# Patient Record
Sex: MALE | Race: WHITE | NOT HISPANIC OR LATINO | Employment: FULL TIME | URBAN - METROPOLITAN AREA
[De-identification: names, ages, dates, MRNs, and addresses within clinical notes are randomized per-mention and may not be internally consistent; named-entity substitution may affect disease eponyms.]

---

## 2017-01-20 ENCOUNTER — TRANSFERRED RECORDS (OUTPATIENT)
Dept: HEALTH INFORMATION MANAGEMENT | Facility: CLINIC | Age: 27
End: 2017-01-20

## 2017-02-27 ENCOUNTER — TRANSFERRED RECORDS (OUTPATIENT)
Dept: HEALTH INFORMATION MANAGEMENT | Facility: CLINIC | Age: 27
End: 2017-02-27

## 2017-03-05 ENCOUNTER — TRANSFERRED RECORDS (OUTPATIENT)
Dept: HEALTH INFORMATION MANAGEMENT | Facility: CLINIC | Age: 27
End: 2017-03-05

## 2017-03-05 ENCOUNTER — MEDICAL CORRESPONDENCE (OUTPATIENT)
Dept: HEALTH INFORMATION MANAGEMENT | Facility: CLINIC | Age: 27
End: 2017-03-05

## 2017-03-06 ENCOUNTER — TELEPHONE (OUTPATIENT)
Dept: TRANSPLANT | Facility: CLINIC | Age: 27
End: 2017-03-06

## 2017-03-06 ENCOUNTER — TRANSFERRED RECORDS (OUTPATIENT)
Dept: HEALTH INFORMATION MANAGEMENT | Facility: CLINIC | Age: 27
End: 2017-03-06

## 2017-03-06 VITALS — WEIGHT: 200 LBS | BODY MASS INDEX: 26.51 KG/M2 | HEIGHT: 73 IN

## 2017-03-06 NOTE — TELEPHONE ENCOUNTER
Received referral from [facility]  Referring- SHASTA Catalan,CNP  PCP- Dr Jeffery Azul  Coordinator- Jean Sanabria  Ascites due to Alcoholic Cirrhosis with Ascites  LM on AM to return call for intake questions

## 2017-03-06 NOTE — TELEPHONE ENCOUNTER
Intake Progress Note    Organ: Liver    On Dialysis:   No    Dialysis Schedule:  Days/shift  or N/A    Reported Medical History:  Jaundiced, paracentesis x 1  Inpt at [facility]    Records:  I will request.     Clinic RN Appt (Only Adult Kidney, Liver and Pancreas Referrals): Y     Preferred Evaluation Start Date:  ASAP     Online Forms    Best time patient can be reached: anytime- Leave message and will return call    Type of packet sent: Liver      Misc. Notes: May speak with emergency contacts listed in SWAPNIL [facility]    Verbal Consent: Y     Email Consent: Y or N    If no PCP or referring information the time of call informed pt to call back with information: Y or N    Informed patient to call if doesn't receive packet and or phone call from coordinator within given time Y    Insurance- [place]- [phone]

## 2017-03-06 NOTE — LETTER
LIVER TRANSPLANT  EVALUATION SCHEDULE    Patient:   Joe Sosa  MR#:    [id number]  Coordinator:  Jean Sanabria                                 [phone]  :     Andreina NORRIS     [phone]  [facility]    [facility]  Date(s):    March 27, 2017 & March 28, 2017    This is your evaluation schedule, please follow dates and times.  You will   receive reminder phone calls for other tests, but please follow this schedule  only!  If you have any questions about dates and times, please call us on  number listed above.  Thank you, Transplant Services     *NO FOOD or DRINK AFTER 10:30 PM*  (You may only have small amounts of water)    Day/Date:    Monday, March 27, 2017  Time Location Activity   6:30 a.m. Imaging and Lab testing  ([address] [place] [address] and [facility] ) Chest X-ray    6:45 a.m. Imaging and Lab testing  ([address] [place] [address] and [facility] ) Liver Ultrasound with dopplers=NO FOOD or DRINK 8 HOURS BEFORE TEST!!!   7:30 a.m. Imaging and Lab testing  ([address] [place] [address] and [facility] ) Blood tests    7:45 a.m. Imaging and Lab testing  ([address] [place] [address] and [facility] ) Blood tests (2nd ABO)   8:00 a.m. Diagnostic [facility] Treatment Center  ([address] [place] [address] and [facility]) Echocardiogram   9:30 a.m. [facility]  ([address] [place] [address] and [facility]) EKG   10:00-11:30a.m. Transplant Services  ([address] [place] [address] and [facility]) Pre-transplant class   With Class Transplant Services  ([address] [place] [address] and [facility]) Meet with Jean Sanabria,  Transplant Coordinator             Day/Date:    Tuesday, March 28, 2017  Time Location Activity   8:30 a.m. Transplant Services  ([address] [place] [address] and [facility]) Review evaluation process & daily schedule   9:00 a.m. Medicine Specialties  ([address] [place] [address] and [facility]) Appointment with Dr. Templeton,  Hepatologist   10:00 a.m. Transplant Services  ([address] [place] [address] and [facility]) Appointment with   Adrien,  Transplant Surgeon   10:30 a.m. Transplant Services  (3rd floor [facility]) Appointment with Shirley Machado  Registered Dietitian   11:00 a.m. Transplant Services  (3rd floor [facility]) Appointment with Ngoc Echeverria             Day/Date:    Every Thursday *OPTIONAL*  Time Location Activity   12:00p.m. - 1:30p.m. Liver Transplant Support Group  Hospital Station 7B  Room 7-120 Every Thursday *OPTIONAL*     *IF ON LINE CHECK IN IS NOT DONE, YOU WILL NEED TO CHECK IN 15 MINUTES EARLIER THEN THE FIRST SCHEDULED APPOINTMENT*

## 2017-03-06 NOTE — LETTER
2017    [facility]  [address] [place] [address]  Phone: [phone]  Fax: [phone]     Re: Joe Sosa  : 1990     Dear  Marline Jessica, SHASTA,CNP,    Thank you for referring your patient, Joe Sosa. We are writing to inform you that Mr. Sosa has completed the initial referral process with us to be considered for a liver transplant at the [facility].    Mr. Sosa assigned transplant coordinator is Jean Sanabria.  If you should have any questions or concerns, please feel free to call us at [phone] or [phone].  Our regular office hours are Monday - Friday from 8:30am to 5:00pm.    Sincerely,        Liver Transplant Team  [facility]

## 2017-03-07 ENCOUNTER — APPOINTMENT (OUTPATIENT)
Dept: TRANSPLANT | Facility: CLINIC | Age: 27
End: 2017-03-07
Attending: INTERNAL MEDICINE
Payer: MEDICAID

## 2017-03-09 ENCOUNTER — MEDICAL CORRESPONDENCE (OUTPATIENT)
Dept: TRANSPLANT | Facility: CLINIC | Age: 27
End: 2017-03-09

## 2017-03-09 DIAGNOSIS — K70.31 ALCOHOLIC CIRRHOSIS OF LIVER WITH ASCITES (H): Primary | ICD-10-CM

## 2017-03-09 DIAGNOSIS — K76.6 PORTAL HYPERTENSION (H): ICD-10-CM

## 2017-03-09 PROBLEM — E88.01 ALPHA-1-ANTITRYPSIN DEFICIENCY (H): Status: ACTIVE | Noted: 2017-03-09

## 2017-03-09 NOTE — TELEPHONE ENCOUNTER
27 y/o with alcoholic cirrhosis and possible alpha-1 antitrypsin (need to get records) who presented [facility] in [facility] on 2016 with decompensation. Patient stopped drinking a couple days before admit, went right into an IP treatment at [address] [place] [address] in [place], [place] and currently doing OP at [facility] in [place]. 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 He has had a couple of admits for decompensation since 11/2016 but appears to be getting his meds inline and doing well currently. He lives with a roommate and his mom helps out as well. Mentioned one, if not both, should attend his evaluation with him. He does not smoke, but has socially smoked in the past. It has been a while since he has been to the dentist.     MELD - 26    Ascites - yes but currently controlled  Taps - yes but not recently  HE - yes but controlled  GIB - no  EGD - 11/8/16 no bands  Colonoscopy - not needed    See snapshot for medical and Sx history    Plan: full evaluation with Dr. Templeton on 3/28

## 2017-03-13 NOTE — TELEPHONE ENCOUNTER
This patient has agreed to come for Liver Transplant Evaluation Appointments on 3/27/17 & 3/28/17. All appointments have been scheduled and a copy of the Liver Transplant Evaluation Schedule has been sent to the patient via [employer].

## 2017-03-14 ENCOUNTER — TRANSFERRED RECORDS (OUTPATIENT)
Dept: HEALTH INFORMATION MANAGEMENT | Facility: CLINIC | Age: 27
End: 2017-03-14

## 2017-03-27 ENCOUNTER — RADIANT APPOINTMENT (OUTPATIENT)
Dept: CARDIOLOGY | Facility: CLINIC | Age: 27
End: 2017-03-27
Attending: INTERNAL MEDICINE

## 2017-03-27 ENCOUNTER — APPOINTMENT (OUTPATIENT)
Dept: TRANSPLANT | Facility: CLINIC | Age: 27
End: 2017-03-27
Attending: INTERNAL MEDICINE
Payer: MEDICAID

## 2017-03-27 ENCOUNTER — TELEPHONE (OUTPATIENT)
Dept: GASTROENTEROLOGY | Facility: CLINIC | Age: 27
End: 2017-03-27

## 2017-03-27 DIAGNOSIS — K76.6 PORTAL HYPERTENSION (H): ICD-10-CM

## 2017-03-27 DIAGNOSIS — K70.31 ALCOHOLIC CIRRHOSIS OF LIVER WITH ASCITES (H): ICD-10-CM

## 2017-03-27 DIAGNOSIS — K70.31 ALCOHOLIC CIRRHOSIS OF LIVER WITH ASCITES (H): Primary | ICD-10-CM

## 2017-03-27 LAB
ABO + RH BLD: NORMAL
AFP SERPL-MCNC: NORMAL UG/L (ref 0–8)
ALBUMIN SERPL-MCNC: 2.5 G/DL (ref 3.4–5)
ALBUMIN UR-MCNC: NEGATIVE MG/DL
ALP SERPL-CCNC: 217 U/L (ref 40–150)
ALT SERPL W P-5'-P-CCNC: 58 U/L (ref 0–70)
ANION GAP SERPL CALCULATED.3IONS-SCNC: 7 MMOL/L (ref 3–14)
APPEARANCE UR: CLEAR
AST SERPL W P-5'-P-CCNC: 89 U/L (ref 0–45)
BILIRUB DIRECT SERPL-MCNC: 4.1 MG/DL (ref 0–0.2)
BILIRUB SERPL-MCNC: 7.7 MG/DL (ref 0.2–1.3)
BILIRUB UR QL STRIP: NEGATIVE
BLD GP AB SCN SERPL QL: NORMAL
BLD GP AB SCN TITR SERPL: NORMAL {TITER}
BLOOD BANK CMNT PATIENT-IMP: NORMAL
BUN SERPL-MCNC: 12 MG/DL (ref 7–30)
CALCIUM SERPL-MCNC: 8.1 MG/DL (ref 8.5–10.1)
CHLORIDE SERPL-SCNC: 100 MMOL/L (ref 94–109)
CHOLEST SERPL-MCNC: 86 MG/DL
CMV IGG SERPL QL IA: 0.3 AI (ref 0–0.8)
CO2 SERPL-SCNC: 33 MMOL/L (ref 20–32)
COLOR UR AUTO: ABNORMAL
CREAT SERPL-MCNC: 0.73 MG/DL (ref 0.66–1.25)
CREAT UR-MCNC: 159 MG/DL
DEPRECATED CALCIDIOL+CALCIFEROL SERPL-MC: ABNORMAL UG/L (ref 20–75)
EBV VCA IGG SER QL IA: ABNORMAL AI (ref 0–0.8)
ERYTHROCYTE [DISTWIDTH] IN BLOOD BY AUTOMATED COUNT: 13.9 % (ref 10–15)
GFR SERPL CREATININE-BSD FRML MDRD: ABNORMAL ML/MIN/1.7M2
GLUCOSE SERPL-MCNC: 104 MG/DL (ref 70–99)
GLUCOSE UR STRIP-MCNC: NEGATIVE MG/DL
HAV IGG SER QL IA: ABNORMAL
HBV CORE AB SERPL QL IA: ABNORMAL
HBV SURFACE AB SERPL IA-ACNC: 42.53 M[IU]/ML
HBV SURFACE AG SERPL QL IA: NONREACTIVE
HCT VFR BLD AUTO: 29.8 % (ref 40–53)
HDLC SERPL-MCNC: 34 MG/DL
HGB BLD-MCNC: 10.3 G/DL (ref 13.3–17.7)
HGB UR QL STRIP: NEGATIVE
HIV 1+2 AB+HIV1 P24 AG SERPL QL IA: NORMAL
HYALINE CASTS #/AREA URNS LPF: 9 /LPF (ref 0–2)
INR PPP: 2.6 (ref 0.86–1.14)
IRON SATN MFR SERPL: 65 % (ref 15–46)
IRON SERPL-MCNC: 138 UG/DL (ref 35–180)
KETONES UR STRIP-MCNC: NEGATIVE MG/DL
LDLC SERPL CALC-MCNC: 42 MG/DL
LEUKOCYTE ESTERASE UR QL STRIP: NEGATIVE
MCH RBC QN AUTO: 38.4 PG (ref 26.5–33)
MCHC RBC AUTO-ENTMCNC: 34.6 G/DL (ref 31.5–36.5)
MCV RBC AUTO: 111 FL (ref 78–100)
MUCOUS THREADS #/AREA URNS LPF: PRESENT /LPF
NITRATE UR QL: NEGATIVE
NONHDLC SERPL-MCNC: 52 MG/DL
PH UR STRIP: 6 PH (ref 5–7)
PHOSPHATE SERPL-MCNC: 4.5 MG/DL (ref 2.5–4.5)
PLATELET # BLD AUTO: 48 10E9/L (ref 150–450)
POTASSIUM SERPL-SCNC: 4 MMOL/L (ref 3.4–5.3)
PROT SERPL-MCNC: 6.3 G/DL (ref 6.8–8.8)
PROT UR-MCNC: <0.05 G/L
PROT/CREAT 24H UR: NORMAL G/G CR (ref 0–0.2)
PSA SERPL-ACNC: 0.02 UG/L (ref 0–4)
RBC # BLD AUTO: 2.68 10E12/L (ref 4.4–5.9)
RBC #/AREA URNS AUTO: 1 /HPF (ref 0–2)
SODIUM SERPL-SCNC: 140 MMOL/L (ref 133–144)
SP GR UR STRIP: 1.02 (ref 1–1.03)
SPECIMEN EXP DATE BLD: NORMAL
SPECIMEN EXP DATE BLD: NORMAL
T PALLIDUM IGG+IGM SER QL: NEGATIVE
TIBC SERPL-MCNC: 214 UG/DL (ref 240–430)
TRANSFERRIN SERPL-MCNC: 184 MG/DL (ref 210–360)
TRIGL SERPL-MCNC: 52 MG/DL
TSH SERPL DL<=0.005 MIU/L-ACNC: 2.46 MU/L (ref 0.4–4)
URN SPEC COLLECT METH UR: ABNORMAL
UROBILINOGEN UR STRIP-MCNC: 0 MG/DL (ref 0–2)
WBC # BLD AUTO: 4.6 10E9/L (ref 4–11)
WBC #/AREA URNS AUTO: 2 /HPF (ref 0–2)

## 2017-03-27 PROCEDURE — 82105 ALPHA-FETOPROTEIN SERUM: CPT | Performed by: INTERNAL MEDICINE

## 2017-03-27 PROCEDURE — 80076 HEPATIC FUNCTION PANEL: CPT | Performed by: INTERNAL MEDICINE

## 2017-03-27 PROCEDURE — 83540 ASSAY OF IRON: CPT | Performed by: INTERNAL MEDICINE

## 2017-03-27 PROCEDURE — 86480 TB TEST CELL IMMUN MEASURE: CPT | Performed by: INTERNAL MEDICINE

## 2017-03-27 PROCEDURE — 86704 HEP B CORE ANTIBODY TOTAL: CPT | Performed by: INTERNAL MEDICINE

## 2017-03-27 PROCEDURE — 85027 COMPLETE CBC AUTOMATED: CPT | Performed by: INTERNAL MEDICINE

## 2017-03-27 PROCEDURE — 86665 EPSTEIN-BARR CAPSID VCA: CPT | Performed by: INTERNAL MEDICINE

## 2017-03-27 PROCEDURE — 87340 HEPATITIS B SURFACE AG IA: CPT | Performed by: INTERNAL MEDICINE

## 2017-03-27 PROCEDURE — 86886 COOMBS TEST INDIRECT TITER: CPT | Performed by: INTERNAL MEDICINE

## 2017-03-27 PROCEDURE — 80061 LIPID PANEL: CPT | Performed by: INTERNAL MEDICINE

## 2017-03-27 PROCEDURE — 86850 RBC ANTIBODY SCREEN: CPT | Performed by: INTERNAL MEDICINE

## 2017-03-27 PROCEDURE — 80321 ALCOHOLS BIOMARKERS 1OR 2: CPT | Performed by: INTERNAL MEDICINE

## 2017-03-27 PROCEDURE — 84466 ASSAY OF TRANSFERRIN: CPT | Performed by: INTERNAL MEDICINE

## 2017-03-27 PROCEDURE — 86901 BLOOD TYPING SEROLOGIC RH(D): CPT | Performed by: INTERNAL MEDICINE

## 2017-03-27 PROCEDURE — 40000866 ZZHCL STATISTIC HIV 1/2 ANTIGEN/ANTIBODY PRETRANSPLANT ONLY: Performed by: INTERNAL MEDICINE

## 2017-03-27 PROCEDURE — 84156 ASSAY OF PROTEIN URINE: CPT | Mod: XU | Performed by: INTERNAL MEDICINE

## 2017-03-27 PROCEDURE — 86780 TREPONEMA PALLIDUM: CPT | Performed by: INTERNAL MEDICINE

## 2017-03-27 PROCEDURE — 86706 HEP B SURFACE ANTIBODY: CPT | Performed by: INTERNAL MEDICINE

## 2017-03-27 PROCEDURE — 85610 PROTHROMBIN TIME: CPT | Performed by: INTERNAL MEDICINE

## 2017-03-27 PROCEDURE — 84100 ASSAY OF PHOSPHORUS: CPT | Performed by: INTERNAL MEDICINE

## 2017-03-27 PROCEDURE — 86900 BLOOD TYPING SEROLOGIC ABO: CPT | Performed by: INTERNAL MEDICINE

## 2017-03-27 PROCEDURE — 86644 CMV ANTIBODY: CPT | Performed by: INTERNAL MEDICINE

## 2017-03-27 PROCEDURE — 80048 BASIC METABOLIC PNL TOTAL CA: CPT | Performed by: INTERNAL MEDICINE

## 2017-03-27 PROCEDURE — 84443 ASSAY THYROID STIM HORMONE: CPT | Performed by: INTERNAL MEDICINE

## 2017-03-27 PROCEDURE — 40000114 ZZH STATISTIC NO CHARGE CLINIC VISIT: Mod: ZF

## 2017-03-27 PROCEDURE — 86708 HEPATITIS A ANTIBODY: CPT | Performed by: INTERNAL MEDICINE

## 2017-03-27 PROCEDURE — 83550 IRON BINDING TEST: CPT | Performed by: INTERNAL MEDICINE

## 2017-03-27 PROCEDURE — G0103 PSA SCREENING: HCPCS | Performed by: INTERNAL MEDICINE

## 2017-03-27 PROCEDURE — 81001 URINALYSIS AUTO W/SCOPE: CPT | Performed by: INTERNAL MEDICINE

## 2017-03-27 PROCEDURE — 82306 VITAMIN D 25 HYDROXY: CPT | Performed by: INTERNAL MEDICINE

## 2017-03-27 PROCEDURE — 36415 COLL VENOUS BLD VENIPUNCTURE: CPT | Performed by: INTERNAL MEDICINE

## 2017-03-27 NOTE — MR AVS SNAPSHOT
After Visit Summary   3/27/2017    Joe Sosa    MRN: [id number]           Patient Information     Date Of Birth          1990        Visit Information        Provider Department      3/27/2017 10:00 AM UC TRANSPLANT CLASS [facility] Solid Organ Transplant        Today's Diagnoses     Alcoholic cirrhosis of liver with ascites (H)    -  1       Follow-ups after your visit        Your next 10 appointments already scheduled     Mar 28, 2017  8:30 AM CDT   (Arrive by 8:15 AM)   New Patient Visit with UC TXC EVALUATION   [facility] Solid Organ Transplant ([facility])    [address] [place] [id number] [phone]            Mar 28, 2017  9:00 AM CDT   (Arrive by 8:45 AM)   Pre-Transplant Liver Evaluation with Andreina Templeton MD   [facility] Hepatology ([facility])    [address] [place] [id number] [phone]            Mar 28, 2017 10:00 AM CDT   (Arrive by 9:45 AM)   Liver Transplant Pre-Op with Gian Jurado MD   [facility] Solid Organ Transplant ([facility])    [address] [place] [id number] [phone]            Mar 28, 2017 10:30 AM CDT   NUTRITION VISIT with Uma Machado RD   [facility] Solid Organ Transplant ([facility])    [address] [place] [id number] [phone]            Mar 28, 2017 11:00 AM CDT   (Arrive by 10:45 AM)   SOT SOCIAL WORK EVAL with TIFFANY Armendariz   [facility] Solid Organ Transplant ([facility])    [address] [place] [id number] [phone]              Who to contact     If you have questions or need follow up information about today's clinic visit or your schedule please contact [facility] SOLID ORGAN TRANSPLANT directly at [phone].  Normal or non-critical lab and imaging  results will be communicated to you by [employer], letter or phone within 4 business days after the clinic has received the results. If you do not hear from us within 7 days, please contact the clinic through [employer] or phone. If you have a critical or abnormal lab result, we will notify you by phone as soon as possible.  Submit refill requests through [employer] or call your pharmacy and they will forward the refill request to us. Please allow 3 business days for your refill to be completed.          Additional Information About Your Visit        [employer] Information     [employer] gives you secure access to your electronic health record. If you see a primary care provider, you can also send messages to your care team and make appointments. If you have questions, please call your primary care clinic.  If you do not have a primary care provider, please call [phone] and they will assist you.        Care EveryWhere ID     This is your Care EveryWhere ID. This could be used by other organizations to access your [place] medical records  [id number]         Blood Pressure from Last 3 Encounters:   No data found for BP    Weight from Last 3 Encounters:   03/06/17 90.7 kg (200 lb)              Today, you had the following     No orders found for display       Primary Care Provider Office Phone # Fax #    Jeffery Azul [phone]       [facility] [address] [place] [address]        Thank you!     Thank you for choosing [facility] SOLID ORGAN TRANSPLANT  for your care. Our goal is always to provide you with excellent care. Hearing back from our patients is one way we can continue to improve our services. Please take a few minutes to complete the written survey that you may receive in the mail after your visit with us. Thank you!             Your Updated Medication List - Protect others around you: Learn how to safely use, store and throw away your medicines at  www.disposemymeds.org.      Notice  As of 3/27/2017  3:06 PM    You have not been prescribed any medications.

## 2017-03-27 NOTE — PROGRESS NOTES
Liver Transplant Evaluation/Teaching    TEACHING TOPICS: Evaluation Process, Evaluation Items, Diagnostic Studies, Consultation, Chemical Dependency Policy, CD Eval, Donor Source, Liver Allocation, MELD System, UNOS, Waiting List, Follow up while on transplant list, Follow up after transplantation, Infection and Rejection, Immunosuppression , Medication Teaching, Lab Recording after transplant, Laboratory Frequency after transplant , Consent for evaluation and One year survival rates  INSTRUCTIONAL MATERIAL USED/GIVEN: Liver Transplant Handbook, MELD Booklet, Donor Booklet, Web Sites Options, Verbal instructions, Multiple Listing Brochure , Consent for evaluation signed, One year survival rates and SRTR (Scientific Registry) Data  Person(s) involved in teaching: patient and family  Asks Questions: YES  Eager to Learn: YES  Cooperative: YES  Receptive (willing/able to accept information): YES  Reason for the appointment, diagnosis and treatment plan:YES  Knowledge of proper use of medications and conditions for which they are ordered (with special attention to potential side effects or drug interactions): YES  Which situations necessitate calling provider and whom to contact:YES  Other: Patient and family attended transplant class. Asked excellent questions. Eval consent signed.   Teaching Concerns Addressed   Comments: n/a  Proper use and care of (medical equip, care aids, etc.):YES  Nutritional needs and diet plan: YES  Pain management techniques: YES  Wound Care: YES  How and/when to access community resources: YES  Patient is aware of and agrees to required commitment to post-op care and long term follow-up: YES  Patient has name and phone number of transplant coordinator.   Time Spent face-to-face teachin minutes.    Norma Harper RN, BSN  Liver transplant coordinator  [phone] Office

## 2017-03-27 NOTE — LETTER
3/27/2017       RE: Joe Sosa  [address]    [place] [address]     Dear Colleague,    Thank you for referring your patient, Joe Sosa, to the [facility] SOLID ORGAN TRANSPLANT at [facility]. Please see a copy of my visit note below.    Liver Transplant Evaluation/Teaching    TEACHING TOPICS: Evaluation Process, Evaluation Items, Diagnostic Studies, Consultation, Chemical Dependency Policy, CD Eval, Donor Source, Liver Allocation, MELD System, UNOS, Waiting List, Follow up while on transplant list, Follow up after transplantation, Infection and Rejection, Immunosuppression , Medication Teaching, Lab Recording after transplant, Laboratory Frequency after transplant , Consent for evaluation and One year survival rates  INSTRUCTIONAL MATERIAL USED/GIVEN: Liver Transplant Handbook, MELD Booklet, Donor Booklet, Web Sites Options, Verbal instructions, Multiple Listing Brochure , Consent for evaluation signed, One year survival rates and SRTR (Scientific Registry) Data  Person(s) involved in teaching: patient and family  Asks Questions: YES  Eager to Learn: YES  Cooperative: YES  Receptive (willing/able to accept information): YES  Reason for the appointment, diagnosis and treatment plan:YES  Knowledge of proper use of medications and conditions for which they are ordered (with special attention to potential side effects or drug interactions): YES  Which situations necessitate calling provider and whom to contact:YES  Other: Patient and family attended transplant class. Asked excellent questions. Eval consent signed.   Teaching Concerns Addressed   Comments: n/a  Proper use and care of (medical equip, care aids, etc.):YES  Nutritional needs and diet plan: YES  Pain management techniques: YES  Wound Care: YES  How and/when to access community resources: YES  Patient is aware of and agrees to required commitment to post-op care and long term follow-up: YES  Patient has  name and phone number of transplant coordinator.   Time Spent face-to-face teachin minutes.    Norma Harper RN, BSN  Liver transplant coordinator  [phone] Office          Again, thank you for allowing me to participate in the care of your patient.      Sincerely,    Transplant Class

## 2017-03-28 ENCOUNTER — ALLIED HEALTH/NURSE VISIT (OUTPATIENT)
Dept: TRANSPLANT | Facility: CLINIC | Age: 27
End: 2017-03-28
Attending: INTERNAL MEDICINE
Payer: MEDICAID

## 2017-03-28 ENCOUNTER — COMMITTEE REVIEW (OUTPATIENT)
Dept: TRANSPLANT | Facility: CLINIC | Age: 27
End: 2017-03-28

## 2017-03-28 ENCOUNTER — OFFICE VISIT (OUTPATIENT)
Dept: GASTROENTEROLOGY | Facility: CLINIC | Age: 27
End: 2017-03-28
Attending: INTERNAL MEDICINE
Payer: MEDICAID

## 2017-03-28 ENCOUNTER — OFFICE VISIT (OUTPATIENT)
Dept: TRANSPLANT | Facility: CLINIC | Age: 27
End: 2017-03-28
Attending: TRANSPLANT SURGERY
Payer: MEDICAID

## 2017-03-28 VITALS
DIASTOLIC BLOOD PRESSURE: 74 MMHG | HEIGHT: 73 IN | TEMPERATURE: 98.1 F | WEIGHT: 205 LBS | SYSTOLIC BLOOD PRESSURE: 128 MMHG | BODY MASS INDEX: 27.17 KG/M2 | OXYGEN SATURATION: 100 % | HEART RATE: 90 BPM

## 2017-03-28 VITALS
WEIGHT: 205 LBS | SYSTOLIC BLOOD PRESSURE: 128 MMHG | HEART RATE: 90 BPM | OXYGEN SATURATION: 100 % | BODY MASS INDEX: 27.17 KG/M2 | HEIGHT: 73 IN | TEMPERATURE: 98.1 F | DIASTOLIC BLOOD PRESSURE: 74 MMHG

## 2017-03-28 DIAGNOSIS — K70.31 ALCOHOLIC CIRRHOSIS OF LIVER WITH ASCITES (H): Primary | ICD-10-CM

## 2017-03-28 DIAGNOSIS — K76.82 HEPATIC ENCEPHALOPATHY (H): ICD-10-CM

## 2017-03-28 DIAGNOSIS — Z76.82 AWAITING ORGAN TRANSPLANT STATUS: Primary | ICD-10-CM

## 2017-03-28 DIAGNOSIS — E55.9 VITAMIN D DEFICIENCY: ICD-10-CM

## 2017-03-28 PROBLEM — F10.20 ALCOHOLISM (H): Status: ACTIVE | Noted: 2017-03-28

## 2017-03-28 LAB
ETHYL GLUCURONIDE UR QL: NORMAL
INTERPRETATION ECG - MUSE: NORMAL
M TB TUBERC IFN-G BLD QL: NEGATIVE
M TB TUBERC IFN-G/MITOGEN IGNF BLD: 0 IU/ML

## 2017-03-28 RX ORDER — ERGOCALCIFEROL 1.25 MG/1
50000 CAPSULE, LIQUID FILLED ORAL WEEKLY
Qty: 8 CAPSULE | Refills: 0 | Status: SHIPPED | OUTPATIENT
Start: 2017-03-28 | End: 2017-05-03

## 2017-03-28 RX ORDER — TORSEMIDE 20 MG/1
20 TABLET ORAL DAILY
Status: ON HOLD | COMMUNITY
Start: 2017-02-06 | End: 2017-07-06

## 2017-03-28 RX ORDER — LACTULOSE 10 G/15ML
45 SOLUTION ORAL 3 TIMES DAILY
Status: ON HOLD | COMMUNITY
Start: 2017-01-23 | End: 2017-07-06

## 2017-03-28 RX ORDER — SPIRONOLACTONE 50 MG/1
50 TABLET, FILM COATED ORAL DAILY
Status: ON HOLD | COMMUNITY
Start: 2017-02-28 | End: 2017-07-06

## 2017-03-28 ASSESSMENT — PAIN SCALES - GENERAL
PAINLEVEL: NO PAIN (0)
PAINLEVEL: NO PAIN (0)

## 2017-03-28 NOTE — LETTER
3/28/2017       RE: Joe Sosa  [address]    [place] [address]     Dear Colleague,    Thank you for referring your patient, Joe Sosa, to the [facility] SOLID ORGAN TRANSPLANT at [facility]. Please see a copy of my visit note below.    Assessment and Plan:  1. liver transplant evaluation - patient is a good candidate overall. Benefits and surgical risks of a liver transplantation were discussed.  2.  End stage liver disease due to Laennec's    Surgical evaluation:  1. Portal Vein:Patent  2. Hepatic Artery: Open  3. TIPS: absent  4. Previous Abdominal Surgery: No  5. Hepatocellular Carcinoma: None  6. Ascites: Present - minimal  7. Costal Angle: wide  8. Portopulmonary Hypertension: absent  9. Hepatopulmonary Syndrome: absent  10. Cardiac Evaluation: adequate  11. Nutritional Status: Good  12. Diabetes: none   13.Hypertension none  14. Smoker:none        Recommendations: 1. Complete chem dep counseling; 2. Requires 6 months of sobriety; 3. Evaluate for living donors      Patients overall evaluation will be discussed at the Liver Transplant selection committee meeting with a final recommendation on the patients suitability for transplant to be made at that time.    Consult Full  Details:  Joe Sosa was seen in consultation at the request of Dr. Templeton for evaluation as a potential liver transplant recipient.    Reason for Visit:  Joe Sosa is a 26 year old year old male with Laennec's, who presents for liver transplant evaluation.    HPI:  27 yo gentleman with h/o Laennec's cirrhosis, portal hypertension, MZ phenotype alpha-1 antitrypsin deficiency presented with decompensation last fall.  He developed ascites and edema.  EGD showed portal hypertensive gastropathy and varices.      His last alcohol intake was November, 2017, and he completed inpatient alcohol treatment.  Patient has shown symptomatic improvement after stopping alcohol.      His family history  is positive for liver dz in grandmother (? Alcohol cirrhosis) and great aunt with liver cancer.     MELD 25  ABO A  BMI 28    Past Medical History:   Diagnosis Date     Alcoholic cirrhosis of liver with ascites (H) 3/9/2017     Alpha-1-antitrypsin deficiency (H) 3/9/2017     Anasarca      Chronic hyponatremia      Hepatic encephalopathy (H)      SBP (spontaneous bacterial peritonitis) (H)      No past surgical history on file.  No past surgical history on file.  No family history on file.  No Known Allergies  Prior to Admission medications    Medication Sig Start Date End Date Taking? Authorizing Provider   sertraline (ZOLOFT) 50 MG tablet Take 1 tablet (50 mg) by mouth daily 4/18/17   Andreina Templeton MD   torsemide (DEMADEX) 20 MG tablet Take 20 mg by mouth daily  2/6/17 2/6/18  Reported, Patient   spironolactone (ALDACTONE) 50 MG tablet Take 50 mg by mouth daily  2/28/17 2/28/18  Reported, Patient   lactulose (CHRONULAC) 10 GM/15ML solution Take 45 mLs by mouth 3 times daily  1/23/17   Reported, Patient   rifaximin (XIFAXAN) 550 MG TABS tablet Take 550 mg by mouth 2 times daily 2/27/17   Reported, Patient   omeprazole (PRILOSEC) 20 MG CR capsule Take 20 mg by mouth daily  2/1/17   Reported, Patient   vitamin D (ERGOCALCIFEROL) 60773 UNIT capsule Take 1 capsule (50,000 Units) by mouth once a week 3/28/17   Andreina Templeton MD       Previous Transplant Hx: No    Cardiovascular Hx:       h/o Cardiac Issues: No       Exercise Tolerance: no chest pain or shortness of breath with exertion.    Potential Donor(s): No    ROS:    REVIEW OF SYSTEMS (check box if normal)  [x]                GENERAL  [x]                  PULMONARY [x]                 GENITOURINARY  [x]                 CNS                 [x]                  CARDIAC  [x]                  ENDOCRINE  [x]                 EARS,NOSE,THROAT [x]                  GASTROINTESTINAL [x]                  NEUROLOGIC    [x]                  FAROOQ  [x]                   HEMATOLOGY    Examination:     Vitals:  There were no vitals taken for this visit.    GENERAL APPEARANCE: alert and no distress  EYES: PERRL  HENT: mouth without ulcers or lesions  NECK: supple, no adenopathy  RESP: lungs clear to auscultation - no rales, rhonchi or wheezes  CV: regular rhythm, normal rate, no rub   ABDOMEN:  soft, nontender, no HSM or masses and bowel sounds normal  MS: extremities normal- no gross deformities noted, no evidence of inflammation in joints, no muscle tenderness  SKIN: no rash  NEURO: Normal strength and tone, sensory exam grossly normal, mentation intact and speech normal  PSYCH: mentation appears normal. and affect normal/bright      Results: No results found for this or any previous visit (from the past 168 hour(s)).  I had a long discussion with the patient regarding liver transplantation which included but was not limited to  the following points:    1. Liver transplant selection committee process.  2. The federal rules for cadaveric waiting list, the size and blood type matching of the organ. The availability of living-related donor transplantation.  3. The types of donors: brain death donors, non-heart beating donors, partial liver grafts: splits and living donor grafts  4. Extended criteria  Donors (older age, steasosis) and the increased  risk of primary non-function using the extended criteria donors  5. The CDC high risk donors,  Risk of donor transmitted infections and donor transmitted malignancy  6. The liver transplant operation and the associated risks and technical complications which can include intraoperative death, post operative death,  Primary non-function, bleeding requiring re-operations, arterial and biliary complications, bowel perforations, and intra abdominal abscess. Some of these complicaitons may require a second operation.  7. The postoperative course, the ICU stay and risk of postoperative complications which can  include sepsis, MI, stroke, brain injury, pneumonia, pleural effusions, and renal dysfunction.  8. The current 1 year and 5 year graft and patient survivals.  9. The need for life long immunosuppressive therapy and the side effects of these medications, including the possibility of toxicity, opportunistic infections, risk of cancer including lymphoma, and the possibility of rejection even if the patient is taking the medication exactly as prescribed.  10. The need for compliance with medications and follow-up visits in the clinic and thereafter.  11. The patient and family understand these risks and wish to proceed to transplantation       I spent 60 minutes with the patient and more than 50% of the time was spend in direct face to face counseling.      Again, thank you for allowing me to participate in the care of your patient.      Sincerely,    Gian Jurado MD

## 2017-03-28 NOTE — MR AVS SNAPSHOT
After Visit Summary   3/28/2017    Joe Sosa    MRN: [id number]           Patient Information     Date Of Birth          1990        Visit Information        Provider Department      3/28/2017 10:00 AM Gian Jurado MD [facility] Solid Organ Transplant        Today's Diagnoses     Alcoholic cirrhosis of liver with ascites (H)    -  1       Follow-ups after your visit        Your next 10 appointments already scheduled     Jun 20, 2017 10:00 AM CDT   Lab with  LAB   [facility] ([facility])    [address] [place] [address] [phone]            Jun 20, 2017 10:50 AM CDT   (Arrive by 10:35 AM)   Return Liver Transplant with Andreina Templeton MD   [facility] Hepatology ([facility])    [address] [place] [address] [phone]              Who to contact     If you have questions or need follow up information about today's clinic visit or your schedule please contact [facility] directly at [phone].  Normal or non-critical lab and imaging results will be communicated to you by Lontrahart, letter or phone within 4 business days after the clinic has received the results. If you do not hear from us within 7 days, please contact the clinic through LookStatt or phone. If you have a critical or abnormal lab result, we will notify you by phone as soon as possible.  Submit refill requests through Move Loot or call your pharmacy and they will forward the refill request to us. Please allow 3 business days for your refill to be completed.          Additional Information About Your Visit        Lontrahart Information     Move Loot gives you secure access to your electronic health record. If you see a primary care provider, you can also send messages to your care team and make appointments. If you have questions, please call your primary care clinic.  If you do not have a  primary care provider, please call [phone] and they will assist you.        Care EveryWhere ID     This is your Care EveryWhere ID. This could be used by other organizations to access your [place] medical records  [id number]         Blood Pressure from Last 3 Encounters:   04/18/17 117/75   03/28/17 128/74   03/28/17 128/74    Weight from Last 3 Encounters:   04/18/17 96 kg (211 lb 9.6 oz)   03/28/17 93 kg (205 lb)   03/28/17 93 kg (205 lb)              Today, you had the following     No orders found for display         Today's Medication Changes          These changes are accurate as of: 3/28/17 11:59 PM.  If you have any questions, ask your nurse or doctor.               Start taking these medicines.        Dose/Directions    vitamin D 33584 UNIT capsule   Commonly known as:  ERGOCALCIFEROL   Used for:  Vitamin D deficiency   Started by:  Andreina Templeton MD        Dose:  05621 Units   Take 1 capsule (50,000 Units) by mouth once a week   Quantity:  8 capsule   Refills:  0            Where to get your medicines      These medications were sent to [facility] [address] [place] [address] AT [address] [address]     Phone:  [phone]     vitamin D 41808 UNIT capsule                Primary Care Provider Office Phone # Fax #    Jeffery Azul [phone]       [facility] [address] [place] [address]        Thank you!     Thank you for choosing [facility] TRANSPL[facility]  for your care. Our goal is always to provide you with excellent care. Hearing back from our patients is one way we can continue to improve our services. Please take a few minutes to complete the written survey that you may receive in the mail after your visit with us. Thank you!             Your Updated Medication List - Protect others around you: Learn how to safely use, store and throw away your  medicines at www.disposemymeds.org.          This list is accurate as of: 3/28/17 11:59 PM.  Always use your most recent med list.                   Brand Name Dispense Instructions for use    lactulose 10 GM/15ML solution    CHRONULAC     Take 45 mLs by mouth 3 times daily       omeprazole 20 MG CR capsule    priLOSEC     Take 20 mg by mouth daily       rifaximin 550 MG Tabs tablet    XIFAXAN     Take 550 mg by mouth 2 times daily       spironolactone 50 MG tablet    ALDACTONE     Take 50 mg by mouth daily       torsemide 20 MG tablet    DEMADEX     Take 20 mg by mouth daily       vitamin D 63291 UNIT capsule    ERGOCALCIFEROL    8 capsule    Take 1 capsule (50,000 Units) by mouth once a week

## 2017-03-28 NOTE — LETTER
April 6, 2017    Joe Sosa  [address]  [address] [place] [id number]      Dear Mr. Sosa,    This letter is sent to confirm that you have completed your transplant work-up and you are a candidate in the liver transplant program at the [facility].  Your listing has been deferred in the hopes that you will not need a liver transplant.      Per our phone conversation, the plan will be to continue following your progress and if you liver does not continue to recover we will then proceed with listing you for liver transplantation.     Items we will need from you:      Please continue the work you are doing with your chemical dependency program.      Please continue to follow up with Dr. Templeton and the [facility], along with your PCP an any other physician caring for you.       We need to be kept informed of any changes in your medical condition such as:    o changes in your medications,   o significant changes in your health  o significant infections (such as pneumonia or abscesses)  o blood transfusions  o any condition which requires hospitalization  o any surgery      Remember to complete any routine cancer screening tests required before your transplant.  This includes colonoscopy; prostrate screening for men, and mammogram and gynecologic testing for women, as well as dental work.  Your primary care clinic can assist you with arranging for these exams.  Remind your caregivers to forward copies of the records and final reports.    We want you to know that our program has physician and surgeon coverage 24 hours a day, 365 days a year. If this coverage changes or there are substantial program changes, you will be notified in writing by letter.     Attached is a letter from the [employer] ([employer]). It describes the services and information offered to patients by [employer] and the Organ [facility]urement and Transplantation Network.    We appreciate having had the opportunity to  participate in your care.  If you have questions, please feel free to call the Transplant Office at [phone] or [phone].      Sincerely,     Saravanan Sanabria Jr., BSN, RN  Liver Transplant Coordinator  [phone]  Liver Transplant Program    Enclosures: [employer] Letter

## 2017-03-28 NOTE — LETTER
3/28/2017       RE: Joe Sosa  [address]  [address] [place] [address]     Dear Colleague,    Thank you for referring your patient, Joe Sosa, to the [facility] HEPATOLOGY at [facility]. Please see a copy of my visit note below.    REASON FOR CONSULTATION:  Liver transplant evaluation for alcoholic liver disease.      REFERRING PROVIDER:  Gastroenterology group at [facility], Marline Jessica NP, and also Jeffery Azul MD, Family Medicine at [facility].      SUBJECTIVE:  Joe Sosa is a 26-year-old man who was healthy up until last fall.  He noticed that when he was working that he was developing more lower extremity edema, and it became more and more difficult to work.  He is working overnight in the post office.  This continued through September and October, and finally on 11/07, he presented to medical attention.      He has a 10-year history of very heavy drinking.  He started drinking at age 12.  His most recent alcohol intake was a half a liter of vodka a day, and this had been a pattern for about 3 years.  He is currently completing chemical dependency counseling as an outpatient and aftercare program.  He did go through inpatient treatment.  This is his first chemical dependency evaluation and treatment.  He has been sober from alcohol since 11/04.  He stated he quit drinking because of the increased swelling and figured that alcohol was not good for him.  Over that weekend, he said he went through some withdrawal, and then finally on Monday, when he called the nurse line, he was told to come in to the hospital.      Since his initial diagnosis, he has had ascites requiring 1 tap and none since.  He has not had SBP.  He has lower extremity edema for which he takes torsemide 20 mg and spironolactone 50 mg.  He did have some low sodium values back December on increased diuretics, and his sodium reached just below 120.  Currently, his sodium is normal at 140.       He has had hepatic encephalopathy, is on rifaximin, lactulose.  He has about 4-5 bowel movements a day.  He presented to the Emergency Department last week because of increasing fatigue and a little bit of confusion.  He was discharged from the emergency department after an additional dose of lactulose.  He thinks he does understand how to titrate the lactulose and is able to manage this on his own.      He does not take anything herbal, over-the-counter, naturopathic, supplemental, etc.  He is also taking omeprazole.  He had an EGD in November that showed portal hypertensive gastropathy and varices.      He has had imaging and yesterday had an ultrasound with [facility].  The portal vein is patent.  There were no masses, and he had no ascites.      Currently, he feels his fluid is under pretty reasonable control, and he does maintain a low-sodium diet.      As part of this diagnosis, he was tested for alpha-1.  He is phenotype MZ.  His alpha-1 antitrypsin level is 76.  He also had hemochromatosis gene testing which was negative.  His iron saturation was a little bit elevated at 56.  Hepatitis A, B and C were checked.  Currently his bilirubin is 7.7.  This is the lowest it has been since November.  When last checked a week ago, it was 11.6.      PAST MEDICAL HISTORY:  Latexo teeth removal, otherwise healthy and no abdominal surgery.  No diabetes.      SOCIAL HISTORY:  He graduated from [employer].  He went to college at the [employer] for teaching.  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  He does not smoke.  Alcohol use is described as above.      FAMILY HISTORY:  Parents are both still alive.  Father lives in [place].  Mother, Therese, is here with him today.  She recently moved from [place] back to [place] in order  to be closer to him.  This was a planned move already, but she moved up the timing so she could be closer to him.  He has 2 siblings, an oldest brother and older sister who are alive and well.  In terms of liver disease in the family, his maternal grandmother and maternal great aunt both had liver disease, as did a maternal great grandmother.  Paternal grandmother had cirrhosis, possibly related to alcohol.  She  at age 82.  She was on oxygen and an inhaler at the end of her life. His maternal great aunt had liver cancer.  No further details are known.  Maternal great grandmother had some sort of liver condition and no further details are known.  He does not have any children.      REVIEW OF SYSTEMS:  Comprehensive review of systems is otherwise negative except what is listed in the history of present illness.  He has not been to dentist in a few years, as he does not have dental coverage.      PHYSICAL EXAMINATION:   VITAL SIGNS:  His blood pressure is 128/74, temperature 98.1, pulse 90, O2 sats 100%, weight is 205.  BMI is 27.   GENERAL:  Pleasant, in no acute distress.   HEENT:  He is icteric, no oral lesions, good dentition.   LYMPH:  No supraclavicular or cervical lymphadenopathy.   CARDIOVASCULAR:  Regular rate and rhythm.   CHEST:  Lungs are clear.   ABDOMEN:  Bowel sounds are present.  Abdomen is soft, nontender, nondistended.  Liver is slightly enlarged.  Spleen is not palpable.   EXTREMITIES:  Trace edema bilaterally.   NEUROLOGIC:  Speech is fluent and clear.  No asterixis or tremor.   SKIN:  Some dry scaly patches on his palms, otherwise no spider angiomata.  There is some acne on his back.   PSYCHIATRIC:  Appropriate, well-groomed, pleasant.      LABORATORY DATA:  From today, he is blood type A. Creatinine is 0.73, total bilirubin 7.7, INR is 2.6, sodium is 140, his MELD is 25, vitamin D is less than 4, TSH 2.46, white count 4.6, hemoglobin 2.3, platelets 48,000.      ASSESSMENT AND PLAN:  A  26-year-old man with alcoholic cirrhosis.  He has MZ phenotype alpha-1 antitrypsin, which would not have been the diagnosis related to his liver disease.      He has been sober from alcohol for 5 months and is currently completing treatment.  This is very encouraging.  He seems to have excellent insight.      He has a MELD score of 25 and is blood type A.  He does require a liver transplant evaluation, but my hope is that he will not end up needing a liver transplant.  There is a little that time that he may improve, and he is showing some signs of improvement both with fluid and his bilirubin declining.  Whether this will be sustained or not only time will tell.  He has no medical reasons why he would not be an excellent candidate and I think psychosocially he would also be an excellent candidate.  Therefore, we will complete the transplant evaluation and be prepared in the event that he needs a liver transplant without any improvement over the next few months.      I will leave his diuretics and his lactulose and Xifaxan where they are.  I instructed him how to titrate lactulose should it be necessary.  He can call Jean Diane, our transplant coordinator, any time for further management and assistance regarding medications.  We will check his labs in a week and see where he is.  I will plan to see him back in about 3 weeks.        Five months of medical charts were reviewed from [facility] including multiple pages and labs.  Over an hour was sent at this visit, over 50% counseling and coordination of care.         Again, thank you for allowing me to participate in the care of your patient.      Sincerely,    Andreina Templeton MD

## 2017-03-28 NOTE — MR AVS SNAPSHOT
After Visit Summary   3/28/2017    Joe Sosa    MRN: [id number]           Patient Information     Date Of Birth          1990        Visit Information        Provider Department      3/28/2017 10:30 AM Uma Machado RD [facility] Solid Organ Transplant        Today's Diagnoses     Alcoholic cirrhosis of liver with ascites (H)    -  1       Follow-ups after your visit        Future tests that were ordered for you today     Open Future Orders        Priority Expected Expires Ordered    Vitamin D Deficiency Routine 5/17/2017 4/27/2018 3/28/2017    Hepatic panel Routine 4/4/2017 4/28/2017 3/28/2017    INR Routine 4/4/2017 4/28/2017 3/28/2017    CBC with platelets Routine 4/4/2017 4/28/2017 3/28/2017    Basic metabolic panel Routine 4/4/2017 4/28/2017 3/28/2017            Who to contact     If you have questions or need follow up information about today's clinic visit or your schedule please contact [facility]GAN TRANSPL[facility] directly at [phone].  Normal or non-critical lab and imaging results will be communicated to you by Knowledge Delivery Systemshart, letter or phone within 4 business days after the clinic has received the results. If you do not hear from us within 7 days, please contact the clinic through Sterling Canyont or phone. If you have a critical or abnormal lab result, we will notify you by phone as soon as possible.  Submit refill requests through TripOvation or call your pharmacy and they will forward the refill request to us. Please allow 3 business days for your refill to be completed.          Additional Information About Your Visit        Knowledge Delivery Systemshart Information     TripOvation gives you secure access to your electronic health record. If you see a primary care provider, you can also send messages to your care team and make appointments. If you have questions, please call your primary care clinic.  If you do not have a primary care provider, please call [phone] and they will assist you.        Care  EveryWhere ID     This is your Care EveryWhere ID. This could be used by other organizations to access your [place] medical records  [id number]         Blood Pressure from Last 3 Encounters:   03/28/17 128/74   03/28/17 128/74    Weight from Last 3 Encounters:   03/28/17 93 kg (205 lb)   03/28/17 93 kg (205 lb)   03/06/17 90.7 kg (200 lb)              Today, you had the following     No orders found for display         Today's Medication Changes          These changes are accurate as of: 3/28/17 11:09 AM.  If you have any questions, ask your nurse or doctor.               Start taking these medicines.        Dose/Directions    vitamin D 28172 UNIT capsule   Commonly known as:  ERGOCALCIFEROL   Used for:  Vitamin D deficiency   Started by:  Andreina Templeton MD        Dose:  36555 Units   Take 1 capsule (50,000 Units) by mouth once a week   Quantity:  8 capsule   Refills:  0            Where to get your medicines      These medications were sent to [facility] [address] [place] [address] AT [address] [address]     Phone:  [phone]     vitamin D 81322 UNIT capsule                Primary Care Provider Office Phone # Fax #    Jeffery Azul [phone]       [facility] [address] [place] [address]        Thank you!     Thank you for choosing [facility]GAN TRANSPL[facility]  for your care. Our goal is always to provide you with excellent care. Hearing back from our patients is one way we can continue to improve our services. Please take a few minutes to complete the written survey that you may receive in the mail after your visit with us. Thank you!             Your Updated Medication List - Protect others around you: Learn how to safely use, store and throw away your medicines at www.disposemymeds.org.          This list is accurate as of: 3/28/17 11:09 AM.  Always use your most  recent med list.                   Brand Name Dispense Instructions for use    lactulose 10 GM/15ML solution    CHRONULAC     Take 30 g by mouth       omeprazole 20 MG CR capsule    priLOSEC     Take 20 mg by mouth       rifaximin 550 MG Tabs tablet    XIFAXAN     Take 550 mg by mouth 2 times daily       spironolactone 50 MG tablet    ALDACTONE     Take 50 mg by mouth       torsemide 20 MG tablet    DEMADEX     Take 20 mg by mouth       vitamin D 80261 UNIT capsule    ERGOCALCIFEROL    8 capsule    Take 1 capsule (50,000 Units) by mouth once a week

## 2017-03-28 NOTE — MR AVS SNAPSHOT
After Visit Summary   3/28/2017    Joe Sosa    MRN: [id number]           Patient Information     Date Of Birth          1990        Visit Information        Provider Department      3/28/2017 9:00 AM Andreina Templeton MD [facility] Hepatology        Today's Diagnoses     Alcoholic cirrhosis of liver with ascites (H)    -  1    Vitamin D deficiency        Hepatic encephalopathy (H)           Follow-ups after your visit        Follow-up notes from your care team     Return in about 3 weeks (around 4/18/2017).      Your next 10 appointments already scheduled     Apr 18, 2017  8:30 AM CDT   (Arrive by 8:15 AM)   Return Liver Transplant with Andreina Templeton MD   [facility] Hepatology ([facility])    [address] [place], [place] [address] [phone]              Future tests that were ordered for you today     Open Future Orders        Priority Expected Expires Ordered    Vitamin D Deficiency Routine 5/17/2017 4/27/2018 3/28/2017    Hepatic panel Routine 4/4/2017 4/28/2017 3/28/2017    INR Routine 4/4/2017 4/28/2017 3/28/2017    CBC with platelets Routine 4/4/2017 4/28/2017 3/28/2017    Basic metabolic panel Routine 4/4/2017 4/28/2017 3/28/2017            Who to contact     If you have questions or need follow up information about today's clinic visit or your schedule please contact [facility] HEPATOLOGY directly at [phone].  Normal or non-critical lab and imaging results will be communicated to you by MyChart, letter or phone within 4 business days after the clinic has received the results. If you do not hear from us within 7 days, please contact the clinic through MyChart or phone. If you have a critical or abnormal lab result, we will notify you by phone as soon as possible.  Submit refill requests through Breather or call your pharmacy and they will forward the refill request to us. Please allow 3 business days for your refill  "to be completed.          Additional Information About Your Visit        Loco2hart Information     [employer] gives you secure access to your electronic health record. If you see a primary care provider, you can also send messages to your care team and make appointments. If you have questions, please call your primary care clinic.  If you do not have a primary care provider, please call [phone] and they will assist you.        Care EveryWhere ID     This is your Care EveryWhere ID. This could be used by other organizations to access your [place] medical records  [id number]        Your Vitals Were     Pulse Temperature Height Pulse Oximetry BMI (Body Mass Index)       90 98.1  F (36.7  C) (Oral) 1.848 m (6' 0.75\") 100% 27.23 kg/m2        Blood Pressure from Last 3 Encounters:   03/28/17 128/74   03/28/17 128/74    Weight from Last 3 Encounters:   03/28/17 93 kg (205 lb)   03/28/17 93 kg (205 lb)   03/06/17 90.7 kg (200 lb)                 Today's Medication Changes          These changes are accurate as of: 3/28/17 11:54 AM.  If you have any questions, ask your nurse or doctor.               Start taking these medicines.        Dose/Directions    vitamin D 20389 UNIT capsule   Commonly known as:  ERGOCALCIFEROL   Used for:  Vitamin D deficiency   Started by:  Andreina Templeton MD        Dose:  13456 Units   Take 1 capsule (50,000 Units) by mouth once a week   Quantity:  8 capsule   Refills:  0            Where to get your medicines      These medications were sent to [facility] [address] [place] [address] AT [address] [address]     Phone:  [phone]     vitamin D 20527 UNIT capsule                Primary Care Provider Office Phone # Fax #    Jeffery Azul [phone]       [facility] [address] [place] [address]        Thank you!     Thank you for choosing [facility] HEPATOLOGY "  for your care. Our goal is always to provide you with excellent care. Hearing back from our patients is one way we can continue to improve our services. Please take a few minutes to complete the written survey that you may receive in the mail after your visit with us. Thank you!             Your Updated Medication List - Protect others around you: Learn how to safely use, store and throw away your medicines at www.disposemymeds.org.          This list is accurate as of: 3/28/17 11:54 AM.  Always use your most recent med list.                   Brand Name Dispense Instructions for use    lactulose 10 GM/15ML solution    CHRONULAC     Take 30 g by mouth       omeprazole 20 MG CR capsule    priLOSEC     Take 20 mg by mouth       rifaximin 550 MG Tabs tablet    XIFAXAN     Take 550 mg by mouth 2 times daily       spironolactone 50 MG tablet    ALDACTONE     Take 50 mg by mouth       torsemide 20 MG tablet    DEMADEX     Take 20 mg by mouth       vitamin D 22959 UNIT capsule    ERGOCALCIFEROL    8 capsule    Take 1 capsule (50,000 Units) by mouth once a week

## 2017-03-28 NOTE — PROGRESS NOTES
NUTRITION LIVER TRANSPLANT EVALUATION  Medical Nutrition Therapy    Weight and BMI:  Current BMI: 27.2  BMI is within criteria for liver transplant    Malnutrition Status:    Non-Severe malnutrition in the context of chronic illness  Malnutrition determined by:  - Up to 20% wt loss x 1 year (difficult to assess with major fluid losses)  - Mild/moderate fat loss  - Mild muscle loss/wasting        Visit Type: Initial Assessment    Joe Soas referred by Dr. Martin for MNT related to liver transplant evaluation    Patient accompanied by his mom    H/o previous txp: none    Nutrition Assessment:  Anthropometrics  Height:   72 in   BMI:    27.2  Weight Status:Overweight BMI 25-29.9   Weight:  205 lbs            IBW (lb): 178  % IBW: 115      Wt Hx: No edema currently. He was as high as 290's 11/2016 with fluid. With diuretics he reports losing ~100 lbs (with ~20-30 lbs of dry wt). UBW prior to hosp 11/2016, UBW ~240 lbs. He and his mom endorse some mild muscle loss and loss of strength.     Adj/dosing BW: 205 lbs/93 kg          Recent Labs   Lab Test  03/27/17   0628   CHOL  86   HDL  34*   LDL  42   TRIG  52   Vit D level <4    Vitamins, Supplements, Herbals, Pertinent Meds:   None     PHYSICAL FINDINGS  Observed  Muscle Wasting - Mild     MALNUTRITION  % Intake:  Decreased intake does not meet criteria for malnutrition   % Weight Loss:  Up to 20% in 1 year (non-severe malnutrition)  Subcutaneous Fat Loss:  Mild/Moderate  Muscle Loss:  Mild  Fluid Accumulation/Edema:  None noted  Malnutrition Diagnosis: Non-Severe malnutrition in the context of chronic illness    Nutrition History  Pt-reported special diets/eating habits: low Na+ since 11/2016   Dining out/food not made at home: 1x/week, but looks at Na+ content online prior to dining out   Appetite: good/baseline  Pt cooks for self. His mom does cook meals for him and gives him leftovers.     Recall:  B: oatmeal, Greek yogurt, fruit, toast with PB  L:  leftovers  D: buttered pasta noodles or lower sodium pasta sauce, home made beef stew with no salt broth, occasional pizza (monitors portion size), steamed veggie bags once every 2 days   Sn: manfred bar, crackers, skinny pop, ice cream   Beverages: water, crystal light, occasional juice, 1-2 cups milk/day (varies), Powerade     Current adherence to recommended diet (low Na+): Good    Physical Activity  Swims 2x/week      Nutrition Prescription  Energy:  2325     (25 kcal/kg for maintenance)   Protein:  74-98+  (0.8-1.0+ g/kg)           Fluid:  1 ml/kcal or per MD        Micronutrient:  Na+ <2000mg/day         Nutrition Diagnosis:  Food and nutrition related knowledge deficit r/t pre liver transplant eval AEB pt verbalized not hearing pre/post transplant diet guidelines.    Nutrition Intervention:  Nutrition education provided:  Discussed sodium intake (low sodium foods and drinks, seasoning food without salt and tips for low sodium diet). Pt is doing very well with this.   Reviewed adequate protein intake. Encouraged receiving protein from both animal and plant based sources. Provided pt with list of protein sources and goal range per above calculations. Pt just tried a protein shake with his mom this morning. Encouraged protein shakes or protein bars with 10-20 grams of protein minimum as this may be easier than eating a lot of meat/eggs, etc.   Discussed low vit D level with pt and Dr Templeton. Dr Templeton to Rx vit D.   Reviewed post txp diet guidelines in brief (will review in further detail post txp): med regimen and possible side effects, following a low sat/trans fat, low sodium, low simple CHO, high protein and calcium diet, K+ levels post-txp; avoiding excessive weight gain, adequate exercise, and review of proper food safety measures d/t immunosuppressant therapy post-op. Stressed importance of not taking any herbal/Chinese/alternative medicines or supplements post txp (d/t risk for rejection, unknown  effects on the organs, potential interactions with immunosuppresants).    Patient Understanding: Pt verbalized understanding of education provided.  Expected Compliance: Good  Follow-Up Plans: PRN     Nutrition Goals:  1. Limit Na+ <2000mg/day  2. Minimum of 74 g protein/day     Provided pt with contact info.   Time spent with patient: 30 minutes.  Uma Machado RD, LD

## 2017-03-28 NOTE — COMMITTEE REVIEW
"Abdominal Committee Review Note     Evaluation Date: 3/28/2017  Committee Review Date: 3/28/2017    Organ being evaluated for: Liver    Transplant Phase: Evaluation  Transplant Status: Active    Transplant Coordinator: Saravanan Sanabria Jr.  Transplant Surgeon:   Gian Jurado    Referring Physician: Marline Jessica    Primary Diagnosis: alcoholic cirrhosis  Secondary Diagnosis:     Committee Review Members:  Nutrition Uma Machado, RD   Pharmacy Antonio Gaines, [facility]    - Clinical Ngoc Echeverria, TIFFANY, Shantel Nam, [facility]   Transplant lAia Kc MD, Norma Harper, ANUSHKA, Noe Logan MD, Jr Saravanan Sanabria, ANUSHKA, Andreina Templeton MD, Emilia Friedman, APRN CNP, Steven Hilario MD, Abdirahman Kilpatrick MD, Asif Molina MD   Transplant Surgery Gian Jurado MD       Transplant Eligibility: Cirrhosis with MELD, ETOH    Committee Review Decision: Approved    Relative Contraindications: None    Absolute Contraindications: None    Committee Chair Andreina Templeton MD verbally attested to the committee's decision.    Committee Discussion Details: Approved pending evaluation and 6 months sober, however, defer listing pending continued follow up to see if he improves or MELD stays high.     Discussed with patient who stated it was a \"positive deferment\" and planned on staying the course with his CD appts and follow up with GI.    Transplant episode will be closed at this time and will be re-opened if needed in the future.   "

## 2017-03-28 NOTE — MR AVS SNAPSHOT
After Visit Summary   3/28/2017    Joe Sosa    MRN: [id number]           Patient Information     Date Of Birth          1990        Visit Information        Provider Department      3/28/2017 11:00 AM Ngoc Echeverria MSW [facility]        Today's Diagnoses     Awaiting organ transplant status    -  1       Follow-ups after your visit        Your next 10 appointments already scheduled     Apr 18, 2017  8:30 AM CDT   (Arrive by 8:15 AM)   Return Liver Transplant with Andreina Templeton MD   [facility] ([facility])    [address] [place] [id number] [phone]              Who to contact     If you have questions or need follow up information about today's clinic visit or your schedule please contact [facility] SOLID ORGAN TRANSPLANT directly at [phone].  Normal or non-critical lab and imaging results will be communicated to you by [employer], letter or phone within 4 business days after the clinic has received the results. If you do not hear from us within 7 days, please contact the clinic through [employer] or phone. If you have a critical or abnormal lab result, we will notify you by phone as soon as possible.  Submit refill requests through [employer] or call your pharmacy and they will forward the refill request to us. Please allow 3 business days for your refill to be completed.          Additional Information About Your Visit        MyChart Information     [employer] gives you secure access to your electronic health record. If you see a primary care provider, you can also send messages to your care team and make appointments. If you have questions, please call your primary care clinic.  If you do not have a primary care provider, please call [phone] and they will assist you.        Care EveryWhere ID     This is your Care EveryWhere ID. This could be used by other organizations to access your [facility]  medical records  [id number]         Blood Pressure from Last 3 Encounters:   03/28/17 128/74   03/28/17 128/74    Weight from Last 3 Encounters:   03/28/17 93 kg (205 lb)   03/28/17 93 kg (205 lb)   03/06/17 90.7 kg (200 lb)              Today, you had the following     No orders found for display         Today's Medication Changes          These changes are accurate as of: 3/28/17 11:59 PM.  If you have any questions, ask your nurse or doctor.               Start taking these medicines.        Dose/Directions    vitamin D 64770 UNIT capsule   Commonly known as:  ERGOCALCIFEROL   Used for:  Vitamin D deficiency   Started by:  Andreina Templeton MD        Dose:  00712 Units   Take 1 capsule (50,000 Units) by mouth once a week   Quantity:  8 capsule   Refills:  0            Where to get your medicines      These medications were sent to [facility] [address] [place] [id number]     Phone:  [phone]     vitamin D 34140 UNIT capsule                Primary Care Provider Office Phone # Fax #    Jeffery Azul [phone]       [facility] [address] [place] [address]        Thank you!     Thank you for choosing [facility] SOLID ORGAN TRANSPLANT  for your care. Our goal is always to provide you with excellent care. Hearing back from our patients is one way we can continue to improve our services. Please take a few minutes to complete the written survey that you may receive in the mail after your visit with us. Thank you!             Your Updated Medication List - Protect others around you: Learn how to safely use, store and throw away your medicines at www.disposemymeds.org.          This list is accurate as of: 3/28/17 11:59 PM.  Always use your most recent med list.                   Brand Name Dispense Instructions for use    lactulose 10 GM/15ML solution     CHRONULAC     Take 30 g by mouth       omeprazole 20 MG CR capsule    priLOSEC     Take 20 mg by mouth       rifaximin 550 MG Tabs tablet    XIFAXAN     Take 550 mg by mouth 2 times daily       spironolactone 50 MG tablet    ALDACTONE     Take 50 mg by mouth       torsemide 20 MG tablet    DEMADEX     Take 20 mg by mouth       vitamin D 02356 UNIT capsule    ERGOCALCIFEROL    8 capsule    Take 1 capsule (50,000 Units) by mouth once a week

## 2017-03-28 NOTE — PROGRESS NOTES
REASON FOR CONSULTATION:  Liver transplant evaluation for alcoholic liver disease.      REFERRING PROVIDER:  Gastroenterology group at [facility], Marline Jessica NP, and also Jeffery Azul MD, Family Medicine at [facility].      SUBJECTIVE:  Joe Sosa is a 26-year-old man who was healthy up until last fall.  He noticed that when he was working that he was developing more lower extremity edema, and it became more and more difficult to work.  He is working overnight in the post office.  This continued through September and October, and finally on 11/07, he presented to medical attention.      He has a 10-year history of very heavy drinking.  He started drinking at age 12.  His most recent alcohol intake was a half a liter of vodka a day, and this had been a pattern for about 3 years.  He is currently completing chemical dependency counseling as an outpatient and aftercare program.  He did go through inpatient treatment.  This is his first chemical dependency evaluation and treatment.  He has been sober from alcohol since 11/04.  He stated he quit drinking because of the increased swelling and figured that alcohol was not good for him.  Over that weekend, he said he went through some withdrawal, and then finally on Monday, when he called the nurse line, he was told to come in to the hospital.      Since his initial diagnosis, he has had ascites requiring 1 tap and none since.  He has not had SBP.  He has lower extremity edema for which he takes torsemide 20 mg and spironolactone 50 mg.  He did have some low sodium values back December on increased diuretics, and his sodium reached just below 120.  Currently, his sodium is normal at 140.      He has had hepatic encephalopathy, is on rifaximin, lactulose.  He has about 4-5 bowel movements a day.  He presented to the Emergency Department last week because of increasing fatigue and a little bit of confusion.  He was discharged from the emergency department after  an additional dose of lactulose.  He thinks he does understand how to titrate the lactulose and is able to manage this on his own.      He does not take anything herbal, over-the-counter, naturopathic, supplemental, etc.  He is also taking omeprazole.  He had an EGD in November that showed portal hypertensive gastropathy and varices.      He has had imaging and yesterday had an ultrasound with [facility].  The portal vein is patent.  There were no masses, and he had no ascites.      Currently, he feels his fluid is under pretty reasonable control, and he does maintain a low-sodium diet.      As part of this diagnosis, he was tested for alpha-1.  He is phenotype MZ.  His alpha-1 antitrypsin level is 76.  He also had hemochromatosis gene testing which was negative.  His iron saturation was a little bit elevated at 56.  Hepatitis A, B and C were checked.  Currently his bilirubin is 7.7.  This is the lowest it has been since November.  When last checked a week ago, it was 11.6.      PAST MEDICAL HISTORY:  [place] teeth removal, otherwise healthy and no abdominal surgery.  No diabetes.      SOCIAL HISTORY:  He graduated from [employer].  He went to college at the [employer] for teaching.  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  He does not smoke.  Alcohol use is described as above.      FAMILY HISTORY:  Parents are both still alive.  Father lives in [place].  Mother, Therese, is here with him today.  She recently moved from [place] back to [place] in order to be closer to him.  This was a planned move already, but she moved up the timing so she could be closer to him.  He has 2 siblings, an oldest brother and older sister who are alive and well.  In terms of liver disease in the family, his maternal grandmother and maternal great  aunt both had liver disease, as did a maternal great grandmother.  Paternal grandmother had cirrhosis, possibly related to alcohol.  She  at age 82.  She was on oxygen and an inhaler at the end of her life. His maternal great aunt had liver cancer.  No further details are known.  Maternal great grandmother had some sort of liver condition and no further details are known.  He does not have any children.      REVIEW OF SYSTEMS:  Comprehensive review of systems is otherwise negative except what is listed in the history of present illness.  He has not been to dentist in a few years, as he does not have dental coverage.      PHYSICAL EXAMINATION:   VITAL SIGNS:  His blood pressure is 128/74, temperature 98.1, pulse 90, O2 sats 100%, weight is 205.  BMI is 27.   GENERAL:  Pleasant, in no acute distress.   HEENT:  He is icteric, no oral lesions, good dentition.   LYMPH:  No supraclavicular or cervical lymphadenopathy.   CARDIOVASCULAR:  Regular rate and rhythm.   CHEST:  Lungs are clear.   ABDOMEN:  Bowel sounds are present.  Abdomen is soft, nontender, nondistended.  Liver is slightly enlarged.  Spleen is not palpable.   EXTREMITIES:  Trace edema bilaterally.   NEUROLOGIC:  Speech is fluent and clear.  No asterixis or tremor.   SKIN:  Some dry scaly patches on his palms, otherwise no spider angiomata.  There is some acne on his back.   PSYCHIATRIC:  Appropriate, well-groomed, pleasant.      LABORATORY DATA:  From today, he is blood type A. Creatinine is 0.73, total bilirubin 7.7, INR is 2.6, sodium is 140, his MELD is 25, vitamin D is less than 4, TSH 2.46, white count 4.6, hemoglobin 2.3, platelets 48,000.      ASSESSMENT AND PLAN:  A 26-year-old man with alcoholic cirrhosis.  He has MZ phenotype alpha-1 antitrypsin, which would not have been the diagnosis related to his liver disease.      He has been sober from alcohol for 5 months and is currently completing treatment.  This is very encouraging.  He seems to have  excellent insight.      He has a MELD score of 25 and is blood type A.  He does require a liver transplant evaluation, but my hope is that he will not end up needing a liver transplant.  There is a little that time that he may improve, and he is showing some signs of improvement both with fluid and his bilirubin declining.  Whether this will be sustained or not only time will tell.  He has no medical reasons why he would not be an excellent candidate and I think psychosocially he would also be an excellent candidate.  Therefore, we will complete the transplant evaluation and be prepared in the event that he needs a liver transplant without any improvement over the next few months.      I will leave his diuretics and his lactulose and Xifaxan where they are.  I instructed him how to titrate lactulose should it be necessary.  He can call Jean Diane, our transplant coordinator, any time for further management and assistance regarding medications.  We will check his labs in a week and see where he is.  I will plan to see him back in about 3 weeks.        Five months of medical charts were reviewed from [facility] including multiple pages and labs.  Over an hour was sent at this visit, over 50% counseling and coordination of care.

## 2017-03-29 NOTE — PROGRESS NOTES
Psychosocial Assessment for Liver Transplant Evaluation  Joe Sosa was seen in the Transplant Center as part of her evaluation as a potential liver transplant recipient.  His mother Therese was also present for this interview.  Living Situation: Joe is a [age] year old man from [place], [place].  He lives in an apartment along with a roommate who has been his friend since middle school.  Joe denies any current concerns with his living situation.  Education/Employment:  Joe's highest level of education is a Master's Degree in Education.  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  He is currently subbing two days per week, and is hopeful to work more when he finishes outpatients chemical dependency treatment.    Financial /Income: Froilan has income from his employment.  We discussed applying for Social Security Disability benefits in the future if he cannot return to more full-time employment.   His mother is currently receiving SSDI benefits and is aware of how to apply for benefits.  I encouraged Froilan to create an online account at [email or web address].  Health Insurance:  [place] Medicaid.  We reviewed the income and asset guidelines for this program.  Joe's current income is around $750 and he is well within the guideline.  This writer talked with Joe and his mother about the financial risks of transplant, particularly about the high cost of transplant related medications and the importance of maintaining adequate health insurance coverage.  Family/Social Support: Joe has never  and he is not currently in a relationship.  He has no dependents.  Joe has strong support from his mother Tehrese who lives in [place] and his father Fabrizio who lives in [place], [place].  He also has an other brother Randolph ([place]) and older sister Sobeida ([place], [place]).  Froilan reports many close friends and their parents who would  "also provide support.  This writer stressed the importance of having a stable and involved support network before and after transplant.  Provided Joe  with education about the relationship between a stable support system and better surgical and post-transplant outcomes compared to patients with a limited support system.    Functional Status: Joe is currently independent with his personal cares, medication management (mom provides some reminders), ambulation and driving.  He has had six hospitalizations since November of 2016 and he was cautioned about driving due to his diagnosis of Hepatic Encephalopathy.  Chemical Dependency:  Joe has a history of chewing tobacco but not since September of 2016.  He denies any history of pain medication abuse.  He reports use of marijuana in high school and college but no use in over two years.  He denies any other illicit drug use history.  Joe denies any current alcohol consumption.  He has been sober since 11/4/16.  Prior to abstaining from alcohol Joe estimates he was drinking six days per week, 1/2 liter of vodka per day.  He reports drinking this heavily for the past three years, and reports \"heavy\" drinking for the past ten years.  Joe attributes his heavy drinking to \"depression.\"  Joe completed inpatient chemical dependency treatment at [address] [place] [address] in [place], [place] on January 31, 2017 and he entered outpatient treatment at [facility] in [place] on February 1st, 2017.  Joe does not have an end date for his current treatment but he recently reduced his treatment days from four days per week to three days per week.  He also attends four AA meetings per week and has a sponsor who he meets with weekly.  Joe has no other history of chemical dependency treatment or previous attempts to quit drinking.  Mental Health: Joe self-reports depression.  He is not currently taking any medication.  He was prescribed Remron and an antidepressant while enrolled in inpatient " MI/CD treatment but it has since been discontinued.  Joe has a history of suicidal ideation dating back to when he was in fourth grade he thought about slitting his neck.  He also called a suicide hotline in May of 2016 when he experienced suicidal ideation.  Joe denies any hospitalizations for his mental health.  His PHQ-9 score today is nine.  He does not have an outpatient mental health provider other than his current chemical dependency counselor.  He will need to be referred to an outpatient provider following completion of chemical dependency treatment.  Adjustment to Illness:  Froilan became very tearful when talking about the progression of his illness and the impact it has had on his life.  He has reservations about accepting a live donor, though his mother reports his siblings have inquired about potentially being a live donor for Joe.  This writer provided Joe  with supportive counseling throughout this interview.  This writer also encouraged Joe to attend the liver transplant support group for additional support and encouragement.   Impression/Recommendations:   Joe and his mother Therese verbalize understanding the psychosocial risks of transplant and teaching provided during this evaluation.  Joe has been sober from alcohol since November 4, 2016.  He does not currently met the sobriety criteria recommended for listing.  He has completed an inpatient treatment program and is currently enrolled in an outpatient chemical dependency treatment program.  He will have his counselor update this writer with his progress and an estimated date of treatment completion.  Joe has a history of untreated depression and it will be extremely important for him to establish care with an outpatient mental health provider following chemical dependency treatment.  Joe is in agreement with this recommendation.  Joe has adequate finances and health insurance for transplant, a stable living situation and an involved  "support system.   We discussed the Caregiver Agreement for Liver Transplantation, and Therese reports she would be Joe's primary caregiver.  This writer will remain available to assist patient throughout the evaluation process and will follow patient through transplant if he is listed.  It was a pleasure to evaluate this patient for liver transplant.   Teaching completed during assessment:  1.     Housing and relocation needs post transplant.  2.     Caregiver needs post transplant.  3.     Financial issues related to transplant.  4.     Risks of alcohol use post transplant.  5.     Common psychosocial stressors pre/post transplant.          6.     Liver Transplant support group availability.          7.     [facility] Directive-patient has a directive, he was advised to provide             Psychosocial Risks of Transplant Reviewed:  1.     Increased stress related to your emotional, family, social, employment, or   financial situation.  2.     Affect on work and/or disability benefits.  3.     Affect on future health and life insurance.  4.     Transplant outcome expectations may not be met.  5.     Mental Health risks: anxiety, depression, PTSD, guilt, grief and chronic fatigue.     Ngoc Echeverria [facility]    Addendum on 4/3/17: Received an update from Joann Navarro MA, LPC at [facility]. This report provides verification of patient's attendance at their treatment program, and she anticipates Joe will complete treatment sometime in June 2017.    Addendum on 4/11/17: Another faxed letter was received from Joann Navarro in regards to Joe's depression and a recommendation for him to pursue medication \"should one be identified that would react well with his other medications and current functioning.\"  I left Joann a voice message today requesting she refer patient to their in-house psychiatrist if this has not yet been done.    I spoke with Joann and she reports patient has " been referred to [facility] in [place] but he has not yet scheduled an appointment because he is reluctant to try medication for his depression due to a past experience with medication(s).  I asked that she encourage him to have a psychiatric evaluation to determine if medication should/could be considered.

## 2017-04-11 DIAGNOSIS — E55.9 VITAMIN D DEFICIENCY: ICD-10-CM

## 2017-04-11 LAB
ALBUMIN SERPL-MCNC: 2.5 G/DL (ref 3.4–5)
ALP SERPL-CCNC: 194 U/L (ref 40–150)
ALT SERPL W P-5'-P-CCNC: 54 U/L (ref 0–70)
ANION GAP SERPL CALCULATED.3IONS-SCNC: 6 MMOL/L (ref 3–14)
AST SERPL W P-5'-P-CCNC: 92 U/L (ref 0–45)
BILIRUB DIRECT SERPL-MCNC: 4.2 MG/DL (ref 0–0.2)
BILIRUB SERPL-MCNC: 9.3 MG/DL (ref 0.2–1.3)
BUN SERPL-MCNC: 15 MG/DL (ref 7–30)
CALCIUM SERPL-MCNC: 8 MG/DL (ref 8.5–10.1)
CHLORIDE SERPL-SCNC: 102 MMOL/L (ref 94–109)
CO2 SERPL-SCNC: 30 MMOL/L (ref 20–32)
CREAT SERPL-MCNC: 0.65 MG/DL (ref 0.66–1.25)
ERYTHROCYTE [DISTWIDTH] IN BLOOD BY AUTOMATED COUNT: 13 % (ref 10–15)
GFR SERPL CREATININE-BSD FRML MDRD: ABNORMAL ML/MIN/1.7M2
GLUCOSE SERPL-MCNC: 117 MG/DL (ref 70–99)
HCT VFR BLD AUTO: 30.2 % (ref 40–53)
HGB BLD-MCNC: 10.8 G/DL (ref 13.3–17.7)
INR PPP: 1.85 (ref 0.86–1.14)
MCH RBC QN AUTO: 39.3 PG (ref 26.5–33)
MCHC RBC AUTO-ENTMCNC: 35.8 G/DL (ref 31.5–36.5)
MCV RBC AUTO: 110 FL (ref 78–100)
PLATELET # BLD AUTO: 51 10E9/L (ref 150–450)
POTASSIUM SERPL-SCNC: 3.6 MMOL/L (ref 3.4–5.3)
PROT SERPL-MCNC: 6.4 G/DL (ref 6.8–8.8)
RBC # BLD AUTO: 2.75 10E12/L (ref 4.4–5.9)
SODIUM SERPL-SCNC: 138 MMOL/L (ref 133–144)
WBC # BLD AUTO: 4.7 10E9/L (ref 4–11)

## 2017-04-11 PROCEDURE — 85610 PROTHROMBIN TIME: CPT | Performed by: INTERNAL MEDICINE

## 2017-04-11 PROCEDURE — 80048 BASIC METABOLIC PNL TOTAL CA: CPT | Performed by: INTERNAL MEDICINE

## 2017-04-11 PROCEDURE — 85027 COMPLETE CBC AUTOMATED: CPT | Performed by: INTERNAL MEDICINE

## 2017-04-11 PROCEDURE — 36415 COLL VENOUS BLD VENIPUNCTURE: CPT | Performed by: INTERNAL MEDICINE

## 2017-04-11 PROCEDURE — 80076 HEPATIC FUNCTION PANEL: CPT | Performed by: INTERNAL MEDICINE

## 2017-04-13 ENCOUNTER — TRANSFERRED RECORDS (OUTPATIENT)
Dept: HEALTH INFORMATION MANAGEMENT | Facility: CLINIC | Age: 27
End: 2017-04-13

## 2017-04-17 ENCOUNTER — TRANSFERRED RECORDS (OUTPATIENT)
Dept: HEALTH INFORMATION MANAGEMENT | Facility: CLINIC | Age: 27
End: 2017-04-17

## 2017-04-18 ENCOUNTER — OFFICE VISIT (OUTPATIENT)
Dept: GASTROENTEROLOGY | Facility: CLINIC | Age: 27
End: 2017-04-18
Attending: INTERNAL MEDICINE
Payer: MEDICAID

## 2017-04-18 VITALS
RESPIRATION RATE: 18 BRPM | WEIGHT: 211.6 LBS | HEIGHT: 73 IN | HEART RATE: 84 BPM | BODY MASS INDEX: 28.04 KG/M2 | DIASTOLIC BLOOD PRESSURE: 75 MMHG | TEMPERATURE: 98.3 F | OXYGEN SATURATION: 100 % | SYSTOLIC BLOOD PRESSURE: 117 MMHG

## 2017-04-18 DIAGNOSIS — E55.9 VITAMIN D DEFICIENCY: ICD-10-CM

## 2017-04-18 DIAGNOSIS — K70.31 ALCOHOLIC CIRRHOSIS OF LIVER WITH ASCITES (H): Primary | ICD-10-CM

## 2017-04-18 DIAGNOSIS — F10.20 ALCOHOLISM (H): ICD-10-CM

## 2017-04-18 DIAGNOSIS — F43.20 ADJUSTMENT DISORDER, UNSPECIFIED TYPE: ICD-10-CM

## 2017-04-18 DIAGNOSIS — K76.82 HEPATIC ENCEPHALOPATHY (H): ICD-10-CM

## 2017-04-18 PROCEDURE — 99212 OFFICE O/P EST SF 10 MIN: CPT | Mod: ZF

## 2017-04-18 ASSESSMENT — PAIN SCALES - GENERAL: PAINLEVEL: NO PAIN (0)

## 2017-04-18 NOTE — NURSING NOTE
"Chief Complaint   Patient presents with     RECHECK     alpha 1 antitrypsin and alcohol induced cirrhosis       Initial /75 (BP Location: Right arm, Patient Position: Chair, Cuff Size: Adult Large)  Pulse 84  Temp 98.3  F (36.8  C) (Oral)  Resp 18  Ht 1.848 m (6' 0.76\")  Wt 96 kg (211 lb 9.6 oz)  SpO2 100%  BMI 28.1 kg/m2 Estimated body mass index is 28.1 kg/(m^2) as calculated from the following:    Height as of this encounter: 1.848 m (6' 0.76\").    Weight as of this encounter: 96 kg (211 lb 9.6 oz).  Medication Reconciliation: complete    "

## 2017-04-18 NOTE — LETTER
4/18/2017      RE: Joe Sosa  [address]    [facility] [address]       SUBJECTIVE:  Joe is a 26-year-old man with alcoholic cirrhosis.  He has been sober from alcohol since 11/06/2016.  He is here for followup with his mother.      He still has been sober from alcohol.  He has been struggling mostly with anxiety, and asked for recommendations regarding medications.  When he was in treatment, he was on Remeron and Trintellix.  He has been working to establish care in a psychiatry/counseling office, but has not yet been given any prescriptions as he has not met with a prescriber. HE denies suicidal ideation, denies desire to hurt himself or others.     He has been paying close attention to his diet; eating a lot of protein, and making sure he is between 80 and 90 g per day.  He has not had any nausea, vomiting, diarrhea, constipation, blood in his stool or issues with confusion.      CURRENT MEDICATIONS:  Torsemide, spironolactone, rifaximin, omeprazole and lactulose.  He is taking all of these, and not having any difficulties.  He feels like his fluid is under good control.  He has gained a little bit of weight, but does not think that it is fluid overload.      He had blood tests done last week.  His MELD is 22.  His total bilirubin has gone from 7.7 to 9.3.  Kidney function is still good.  INR is improved from 2.6 to 1.85.        PHYSICAL EXAMINATION:   VITAL SIGNS:  His blood pressure is 117/75, temperature 98.3, pulse 84, respiratory rate 18, O2 sats 100%, weight 211.  BMI is 28.   GENERAL:  In no acute distress.   HEENT:  He is icteric.   LYMPH:  No supraclavicular or cervical lymphadenopathy.   CARDIOVASCULAR:  Regular rate and rhythm.   CHEST:  Lungs are clear.   ABDOMEN:  Bowel sounds are present.  Abdomen is soft, nontender and nondistended.   EXTREMITIES:  No edema.   SKIN:  No rash.   NEUROLOGIC:  Speech is fluent and clear.  No asterixis or tremor.      LABORATORY DATA:  Reviewed from last  week.      ASSESSMENT AND PLAN:  A 26-year-old man with alcoholic cirrhosis.  He has been sober from alcohol for about 5 months.  He has had some elevation in his bilirubin, but improved INR and his labs over about a 3-week period.  We will check them again later this week and see what trajectory he is on.  He will remain on the same medications.      He patient completed an 8-week course of vitamin D for vitamin D deficiency, and I will also check his vitamin D level at that time.      Regarding his anxiety and depression, he agrees with me that this is the primary issue, and really needs to establish care with someone who can prescribe medications for anxiety and depression.  I will get him started on Zoloft to see if that is helpful; 50 mg at bedtime.  His primary care doctor or psychiatrist could take this over at any time.  I am not familiar with Trintellix, and am not the best person to manage his anxiety and depression, and he understands this.      I plan to see him back in a couple of months, and we will continue our discussion about whether he needs to have a liver transplant evaluation completed.  This was a 40-minute visit, over 50% in counseling and coordination of care.       Andreina Templeton MD

## 2017-04-18 NOTE — MR AVS SNAPSHOT
After Visit Summary   4/18/2017    Joe Sosa    MRN: [id number]           Patient Information     Date Of Birth          1990        Visit Information        Provider Department      4/18/2017 8:30 AM Anrdeina Templeton MD [facility]        Today's Diagnoses     Alcoholic cirrhosis of liver with ascites (H)    -  1    Adjustment disorder, unspecified type        Vitamin D deficiency        Hepatic encephalopathy (H)        Alcoholism (H)           Follow-ups after your visit        Follow-up notes from your care team     Return in about 2 months (around 6/18/2017).      Your next 10 appointments already scheduled     Jun 20, 2017 10:00 AM CDT   Lab with  LAB   [facility] ([facility])    [address] [place] [address]  1st [facility] [id number] [phone]            Jun 20, 2017 10:50 AM CDT   (Arrive by 10:35 AM)   Return Liver Transplant with Andreina Templeton MD   [facility] ([facility])    [address] [place] [address]  3rd [facility] [address] [phone]              Future tests that were ordered for you today     Open Future Orders        Priority Expected Expires Ordered    Vitamin D Deficiency Routine 4/20/2017 5/18/2017 4/18/2017    Hepatic panel Routine 4/27/2017 5/18/2017 4/18/2017    INR Routine 4/27/2017 5/18/2017 4/18/2017    CBC with platelets Routine 4/27/2017 5/18/2017 4/18/2017    Basic metabolic panel Routine 4/27/2017 5/18/2017 4/18/2017            Who to contact     If you have questions or need follow up information about today's clinic visit or your schedule please contact [facility] directly at [phone].  Normal or non-critical lab and imaging results will be communicated to you by MyChart, letter or phone within 4 business days after the clinic has received the results. If you do not hear from us within 7 days, please contact the clinic through [employer] or  "phone. If you have a critical or abnormal lab result, we will notify you by phone as soon as possible.  Submit refill requests through [employer] or call your pharmacy and they will forward the refill request to us. Please allow 3 business days for your refill to be completed.          Additional Information About Your Visit        [employer] Information     [employer] gives you secure access to your electronic health record. If you see a primary care provider, you can also send messages to your care team and make appointments. If you have questions, please call your primary care clinic.  If you do not have a primary care provider, please call [phone] and they will assist you.        Care EveryWhere ID     This is your Care EveryWhere ID. This could be used by other organizations to access your [facility] medical records  [id number]        Your Vitals Were     Pulse Temperature Respirations Height Pulse Oximetry BMI (Body Mass Index)    84 98.3  F (36.8  C) (Oral) 18 1.848 m (6' 0.76\") 100% 28.1 kg/m2       Blood Pressure from Last 3 Encounters:   04/18/17 117/75   03/28/17 128/74   03/28/17 128/74    Weight from Last 3 Encounters:   04/18/17 96 kg (211 lb 9.6 oz)   03/28/17 93 kg (205 lb)   03/28/17 93 kg (205 lb)                 Today's Medication Changes          These changes are accurate as of: 4/18/17  9:15 AM.  If you have any questions, ask your nurse or doctor.               Start taking these medicines.        Dose/Directions    sertraline 50 MG tablet   Commonly known as:  ZOLOFT   Used for:  Adjustment disorder, unspecified type   Started by:  Andreina Templeton MD        Dose:  50 mg   Take 1 tablet (50 mg) by mouth daily   Quantity:  30 tablet   Refills:  1            Where to get your medicines      These medications were sent to [facility] [id number] [place] [address] [address] [address] AT [facility] & [address] [address]     Phone:  [phone]     " sertraline 50 MG tablet                Primary Care Provider Office Phone # Fax #    Jeffery Azul [phone]       [facility] [address] [place] [address]        Thank you!     Thank you for choosing [facility]  for your care. Our goal is always to provide you with excellent care. Hearing back from our patients is one way we can continue to improve our services. Please take a few minutes to complete the written survey that you may receive in the mail after your visit with us. Thank you!             Your Updated Medication List - Protect others around you: Learn how to safely use, store and throw away your medicines at www.disposemymeds.org.          This list is accurate as of: 4/18/17  9:15 AM.  Always use your most recent med list.                   Brand Name Dispense Instructions for use    lactulose 10 GM/15ML solution    CHRONULAC     Take 45 mLs by mouth 3 times daily       omeprazole 20 MG CR capsule    priLOSEC     Take 20 mg by mouth daily       rifaximin 550 MG Tabs tablet    XIFAXAN     Take 550 mg by mouth 2 times daily       sertraline 50 MG tablet    ZOLOFT    30 tablet    Take 1 tablet (50 mg) by mouth daily       spironolactone 50 MG tablet    ALDACTONE     Take 50 mg by mouth daily       torsemide 20 MG tablet    DEMADEX     Take 20 mg by mouth daily       vitamin D 28709 UNIT capsule    ERGOCALCIFEROL    8 capsule    Take 1 capsule (50,000 Units) by mouth once a week

## 2017-04-18 NOTE — PROGRESS NOTES
SUBJECTIVE:  Joe is a 26-year-old man with alcoholic cirrhosis.  He has been sober from alcohol since 11/06/2016.  He is here for followup with his mother.      He still has been sober from alcohol.  He has been struggling mostly with anxiety, and asked for recommendations regarding medications.  When he was in treatment, he was on Remeron and Trintellix.  He has been working to establish care in a psychiatry/counseling office, but has not yet been given any prescriptions as he has not met with a prescriber. HE denies suicidal ideation, denies desire to hurt himself or others.     He has been paying close attention to his diet; eating a lot of protein, and making sure he is between 80 and 90 g per day.  He has not had any nausea, vomiting, diarrhea, constipation, blood in his stool or issues with confusion.      CURRENT MEDICATIONS:  Torsemide, spironolactone, rifaximin, omeprazole and lactulose.  He is taking all of these, and not having any difficulties.  He feels like his fluid is under good control.  He has gained a little bit of weight, but does not think that it is fluid overload.      He had blood tests done last week.  His MELD is 22.  His total bilirubin has gone from 7.7 to 9.3.  Kidney function is still good.  INR is improved from 2.6 to 1.85.        PHYSICAL EXAMINATION:   VITAL SIGNS:  His blood pressure is 117/75, temperature 98.3, pulse 84, respiratory rate 18, O2 sats 100%, weight 211.  BMI is 28.   GENERAL:  In no acute distress.   HEENT:  He is icteric.   LYMPH:  No supraclavicular or cervical lymphadenopathy.   CARDIOVASCULAR:  Regular rate and rhythm.   CHEST:  Lungs are clear.   ABDOMEN:  Bowel sounds are present.  Abdomen is soft, nontender and nondistended.   EXTREMITIES:  No edema.   SKIN:  No rash.   NEUROLOGIC:  Speech is fluent and clear.  No asterixis or tremor.      LABORATORY DATA:  Reviewed from last week.      ASSESSMENT AND PLAN:  A 26-year-old man with alcoholic cirrhosis.  He has  been sober from alcohol for about 5 months.  He has had some elevation in his bilirubin, but improved INR and his labs over about a 3-week period.  We will check them again later this week and see what trajectory he is on.  He will remain on the same medications.      He patient completed an 8-week course of vitamin D for vitamin D deficiency, and I will also check his vitamin D level at that time.      Regarding his anxiety and depression, he agrees with me that this is the primary issue, and really needs to establish care with someone who can prescribe medications for anxiety and depression.  I will get him started on Zoloft to see if that is helpful; 50 mg at bedtime.  His primary care doctor or psychiatrist could take this over at any time.  I am not familiar with Trintellix, and am not the best person to manage his anxiety and depression, and he understands this.      I plan to see him back in a couple of months, and we will continue our discussion about whether he needs to have a liver transplant evaluation completed.  This was a 40-minute visit, over 50% in counseling and coordination of care.

## 2017-04-20 ENCOUNTER — TELEPHONE (OUTPATIENT)
Dept: TRANSPLANT | Facility: CLINIC | Age: 27
End: 2017-04-20

## 2017-04-20 NOTE — TELEPHONE ENCOUNTER
Received a call from patient's mother Therese requesting education about applying for SSDI benefits and education was provided.   Therese reports Joe has many financial concerns because of he has not been able to work much (1-2 days per week).  He has not been able to afford his rent and his roommate has been covering the cost of his rent.  Therese is contemplating talking to Joe about moving in with her to increase his level of supervision and financial concerns.         TIFFANY Paul, [facility]  Liver Transplant   Phone [phone]  Pager [phone]

## 2017-04-25 NOTE — PROGRESS NOTES
Assessment and Plan:  1. liver transplant evaluation - patient is a good candidate overall. Benefits and surgical risks of a liver transplantation were discussed.  2.  End stage liver disease due to Laennec's    Surgical evaluation:  1. Portal Vein:Patent  2. Hepatic Artery: Open  3. TIPS: absent  4. Previous Abdominal Surgery: No  5. Hepatocellular Carcinoma: None  6. Ascites: Present - minimal  7. Costal Angle: wide  8. Portopulmonary Hypertension: absent  9. Hepatopulmonary Syndrome: absent  10. Cardiac Evaluation: adequate  11. Nutritional Status: Good  12. Diabetes: none   13.Hypertension none  14. Smoker:none        Recommendations: 1. Complete chem dep counseling; 2. Requires 6 months of sobriety; 3. Evaluate for living donors      Patients overall evaluation will be discussed at the Liver Transplant selection committee meeting with a final recommendation on the patients suitability for transplant to be made at that time.    Consult Full  Details:  Joe Sosa was seen in consultation at the request of Dr. Templeton for evaluation as a potential liver transplant recipient.    Reason for Visit:  Joe Sosa is a 26 year old year old male with Laennec's, who presents for liver transplant evaluation.    HPI:  25 yo gentleman with h/o Laennec's cirrhosis, portal hypertension, MZ phenotype alpha-1 antitrypsin deficiency presented with decompensation last fall.  He developed ascites and edema.  EGD showed portal hypertensive gastropathy and varices.      His last alcohol intake was November, 2017, and he completed inpatient alcohol treatment.  Patient has shown symptomatic improvement after stopping alcohol.      His family history is positive for liver dz in grandmother (? Alcohol cirrhosis) and great aunt with liver cancer.     MELD 25  ABO A  BMI 28    Past Medical History:   Diagnosis Date     Alcoholic cirrhosis of liver with ascites (H) 3/9/2017     Alpha-1-antitrypsin deficiency (H) 3/9/2017     Louisesarylie       Chronic hyponatremia      Hepatic encephalopathy (H)      SBP (spontaneous bacterial peritonitis) (H)      No past surgical history on file.  No past surgical history on file.  No family history on file.  No Known Allergies  Prior to Admission medications    Medication Sig Start Date End Date Taking? Authorizing Provider   sertraline (ZOLOFT) 50 MG tablet Take 1 tablet (50 mg) by mouth daily 4/18/17   Andreina Templeton MD   torsemide (DEMADEX) 20 MG tablet Take 20 mg by mouth daily  2/6/17 2/6/18  Reported, Patient   spironolactone (ALDACTONE) 50 MG tablet Take 50 mg by mouth daily  2/28/17 2/28/18  Reported, Patient   lactulose (CHRONULAC) 10 GM/15ML solution Take 45 mLs by mouth 3 times daily  1/23/17   Reported, Patient   rifaximin (XIFAXAN) 550 MG TABS tablet Take 550 mg by mouth 2 times daily 2/27/17   Reported, Patient   omeprazole (PRILOSEC) 20 MG CR capsule Take 20 mg by mouth daily  2/1/17   Reported, Patient   vitamin D (ERGOCALCIFEROL) 30701 UNIT capsule Take 1 capsule (50,000 Units) by mouth once a week 3/28/17   Andreina Templeton MD       Previous Transplant Hx: No    Cardiovascular Hx:       h/o Cardiac Issues: No       Exercise Tolerance: no chest pain or shortness of breath with exertion.    Potential Donor(s): No    ROS:    REVIEW OF SYSTEMS (check box if normal)  [x]                GENERAL  [x]                  PULMONARY [x]                 GENITOURINARY  [x]                 CNS                 [x]                  CARDIAC  [x]                  ENDOCRINE  [x]                 EARS,NOSE,THROAT [x]                  GASTROINTESTINAL [x]                  NEUROLOGIC    [x]                 MUSCLOSKELTAL  [x]                   HEMATOLOGY    Examination:     Vitals:  There were no vitals taken for this visit.    GENERAL APPEARANCE: alert and no distress  EYES: PERRL  HENT: mouth without ulcers or lesions  NECK: supple, no adenopathy  RESP: lungs clear to auscultation - no rales,  rhonchi or wheezes  CV: regular rhythm, normal rate, no rub   ABDOMEN:  soft, nontender, no HSM or masses and bowel sounds normal  MS: extremities normal- no gross deformities noted, no evidence of inflammation in joints, no muscle tenderness  SKIN: no rash  NEURO: Normal strength and tone, sensory exam grossly normal, mentation intact and speech normal  PSYCH: mentation appears normal. and affect normal/bright      Results: No results found for this or any previous visit (from the past 168 hour(s)).  I had a long discussion with the patient regarding liver transplantation which included but was not limited to  the following points:    1. Liver transplant selection committee process.  2. The federal rules for cadaveric waiting list, the size and blood type matching of the organ. The availability of living-related donor transplantation.  3. The types of donors: brain death donors, non-heart beating donors, partial liver grafts: splits and living donor grafts  4. Extended criteria  Donors (older age, steasosis) and the increased  risk of primary non-function using the extended criteria donors  5. The CDC high risk donors,  Risk of donor transmitted infections and donor transmitted malignancy  6. The liver transplant operation and the associated risks and technical complications which can include intraoperative death, post operative death,  Primary non-function, bleeding requiring re-operations, arterial and biliary complications, bowel perforations, and intra abdominal abscess. Some of these complicaitons may require a second operation.  7. The postoperative course, the ICU stay and risk of postoperative complications which can include sepsis, MI, stroke, brain injury, pneumonia, pleural effusions, and renal dysfunction.  8. The current 1 year and 5 year graft and patient survivals.  9. The need for life long immunosuppressive therapy and the side effects of these medications, including the possibility of toxicity,  opportunistic infections, risk of cancer including lymphoma, and the possibility of rejection even if the patient is taking the medication exactly as prescribed.  10. The need for compliance with medications and follow-up visits in the clinic and thereafter.  11. The patient and family understand these risks and wish to proceed to transplantation       I spent 60 minutes with the patient and more than 50% of the time was spend in direct face to face counseling.

## 2017-05-01 DIAGNOSIS — F43.20 ADJUSTMENT DISORDER, UNSPECIFIED TYPE: ICD-10-CM

## 2017-05-01 LAB
ALBUMIN SERPL-MCNC: 2.6 G/DL (ref 3.4–5)
ALP SERPL-CCNC: 244 U/L (ref 40–150)
ALT SERPL W P-5'-P-CCNC: 105 U/L (ref 0–70)
ANION GAP SERPL CALCULATED.3IONS-SCNC: 9 MMOL/L (ref 3–14)
AST SERPL W P-5'-P-CCNC: 127 U/L (ref 0–45)
BILIRUB DIRECT SERPL-MCNC: 4.5 MG/DL (ref 0–0.2)
BILIRUB SERPL-MCNC: 9.4 MG/DL (ref 0.2–1.3)
BUN SERPL-MCNC: 16 MG/DL (ref 7–30)
CALCIUM SERPL-MCNC: 8.2 MG/DL (ref 8.5–10.1)
CHLORIDE SERPL-SCNC: 90 MMOL/L (ref 94–109)
CO2 SERPL-SCNC: 33 MMOL/L (ref 20–32)
CREAT SERPL-MCNC: 0.67 MG/DL (ref 0.66–1.25)
ERYTHROCYTE [DISTWIDTH] IN BLOOD BY AUTOMATED COUNT: 13.9 % (ref 10–15)
GFR SERPL CREATININE-BSD FRML MDRD: ABNORMAL ML/MIN/1.7M2
GLUCOSE SERPL-MCNC: 115 MG/DL (ref 70–99)
HCT VFR BLD AUTO: 33.3 % (ref 40–53)
HGB BLD-MCNC: 11.8 G/DL (ref 13.3–17.7)
INR PPP: 1.76 (ref 0.86–1.14)
MCH RBC QN AUTO: 38.9 PG (ref 26.5–33)
MCHC RBC AUTO-ENTMCNC: 35.4 G/DL (ref 31.5–36.5)
MCV RBC AUTO: 110 FL (ref 78–100)
PLATELET # BLD AUTO: ABNORMAL 10E9/L (ref 150–450)
POTASSIUM SERPL-SCNC: 4.4 MMOL/L (ref 3.4–5.3)
PROT SERPL-MCNC: 6.4 G/DL (ref 6.8–8.8)
RBC # BLD AUTO: 3.03 10E12/L (ref 4.4–5.9)
SODIUM SERPL-SCNC: 132 MMOL/L (ref 133–144)
WBC # BLD AUTO: 6.1 10E9/L (ref 4–11)

## 2017-05-01 PROCEDURE — 85610 PROTHROMBIN TIME: CPT | Performed by: INTERNAL MEDICINE

## 2017-05-01 PROCEDURE — 80048 BASIC METABOLIC PNL TOTAL CA: CPT | Performed by: INTERNAL MEDICINE

## 2017-05-01 PROCEDURE — 36415 COLL VENOUS BLD VENIPUNCTURE: CPT | Performed by: INTERNAL MEDICINE

## 2017-05-01 PROCEDURE — 80076 HEPATIC FUNCTION PANEL: CPT | Performed by: INTERNAL MEDICINE

## 2017-05-01 PROCEDURE — 82306 VITAMIN D 25 HYDROXY: CPT | Performed by: INTERNAL MEDICINE

## 2017-05-01 PROCEDURE — 85027 COMPLETE CBC AUTOMATED: CPT | Performed by: INTERNAL MEDICINE

## 2017-05-02 LAB — DEPRECATED CALCIDIOL+CALCIFEROL SERPL-MC: 6 UG/L (ref 20–75)

## 2017-05-03 ENCOUNTER — TELEPHONE (OUTPATIENT)
Dept: GASTROENTEROLOGY | Facility: CLINIC | Age: 27
End: 2017-05-03

## 2017-05-03 DIAGNOSIS — E55.9 VITAMIN D DEFICIENCY: ICD-10-CM

## 2017-05-03 DIAGNOSIS — E55.9 VITAMIN D DEFICIENCY: Primary | ICD-10-CM

## 2017-05-03 RX ORDER — ERGOCALCIFEROL 1.25 MG/1
50000 CAPSULE, LIQUID FILLED ORAL WEEKLY
Qty: 8 CAPSULE | Refills: 0 | Status: SHIPPED | OUTPATIENT
Start: 2017-05-03 | End: 2017-08-10

## 2017-05-03 NOTE — TELEPHONE ENCOUNTER
Instructed pt the following per Dr. Templeton: Vitamin D level improved but still low. Continue vitamin D 50,000 IU weekly for another 8 weeks, then recheck vitamin D level. Sent in refill for Vitamin D.

## 2017-05-04 ENCOUNTER — TRANSFERRED RECORDS (OUTPATIENT)
Dept: HEALTH INFORMATION MANAGEMENT | Facility: CLINIC | Age: 27
End: 2017-05-04

## 2017-05-08 ENCOUNTER — TRANSFERRED RECORDS (OUTPATIENT)
Dept: HEALTH INFORMATION MANAGEMENT | Facility: CLINIC | Age: 27
End: 2017-05-08

## 2017-05-09 DIAGNOSIS — K70.31 ALCOHOLIC CIRRHOSIS OF LIVER WITH ASCITES (H): Primary | ICD-10-CM

## 2017-05-09 DIAGNOSIS — E88.01 ALPHA-1-ANTITRYPSIN DEFICIENCY (H): ICD-10-CM

## 2017-05-16 ENCOUNTER — COMMITTEE REVIEW (OUTPATIENT)
Dept: TRANSPLANT | Facility: CLINIC | Age: 27
End: 2017-05-16

## 2017-05-16 NOTE — COMMITTEE REVIEW
Abdominal Committee Review Note     Evaluation Date: 3/28/2017  Committee Review Date: 5/16/2017    Organ being evaluated for: Liver    Transplant Phase: Evaluation  Transplant Status: Active    Transplant Coordinator: Saravanan Sanabria Jr.  Transplant Surgeon:   Gian Jurado    Referring Physician: Marline Jessica    Primary Diagnosis: alcoholic cirrhosis  Secondary Diagnosis:     Committee Review Members:  Nutrition Uma Machado, RD   Pharmacy Antonio Gaines, [facility]    - Clinical Ngoc Echeverria, TIFFANY, Shantel Nam, [facility]   Transplant Alia Kc MD, Norma Harper, RN, Jr Saravanan Sanabria RN, Andreina Templeton MD, Steven Hilario MD, Chino Martin MD, Asif Molina MD, Gian Jurado MD       Transplant Eligibility: Cirrhosis with MELD, ETOH    Committee Review Decision: Approved    Relative Contraindications: None    Absolute Contraindications: None    Committee Chair Andreina Templeton MD verbally attested to the committee's decision.    Committee Discussion Details: Approved for listing - previously approved but deferred at that time

## 2017-05-17 DIAGNOSIS — E55.9 VITAMIN D DEFICIENCY: ICD-10-CM

## 2017-05-17 DIAGNOSIS — K70.31 ALCOHOLIC CIRRHOSIS OF LIVER WITH ASCITES (H): ICD-10-CM

## 2017-05-17 LAB
AFP SERPL-MCNC: 2 UG/L (ref 0–8)
ALBUMIN SERPL-MCNC: 2.3 G/DL (ref 3.4–5)
ALP SERPL-CCNC: 226 U/L (ref 40–150)
ALT SERPL W P-5'-P-CCNC: 63 U/L (ref 0–70)
ANION GAP SERPL CALCULATED.3IONS-SCNC: 7 MMOL/L (ref 3–14)
AST SERPL W P-5'-P-CCNC: 79 U/L (ref 0–45)
BILIRUB DIRECT SERPL-MCNC: 3.8 MG/DL (ref 0–0.2)
BILIRUB SERPL-MCNC: 7.6 MG/DL (ref 0.2–1.3)
BUN SERPL-MCNC: 12 MG/DL (ref 7–30)
CALCIUM SERPL-MCNC: 7.6 MG/DL (ref 8.5–10.1)
CHLORIDE SERPL-SCNC: 101 MMOL/L (ref 94–109)
CO2 SERPL-SCNC: 29 MMOL/L (ref 20–32)
CREAT SERPL-MCNC: 0.58 MG/DL (ref 0.66–1.25)
GFR SERPL CREATININE-BSD FRML MDRD: ABNORMAL ML/MIN/1.7M2
GLUCOSE SERPL-MCNC: 121 MG/DL (ref 70–99)
POTASSIUM SERPL-SCNC: 3.9 MMOL/L (ref 3.4–5.3)
PROT SERPL-MCNC: 5.9 G/DL (ref 6.8–8.8)
SODIUM SERPL-SCNC: 137 MMOL/L (ref 133–144)

## 2017-05-17 PROCEDURE — 85027 COMPLETE CBC AUTOMATED: CPT | Performed by: INTERNAL MEDICINE

## 2017-05-17 PROCEDURE — 80321 ALCOHOLS BIOMARKERS 1OR 2: CPT | Mod: 90 | Performed by: INTERNAL MEDICINE

## 2017-05-17 PROCEDURE — 82105 ALPHA-FETOPROTEIN SERUM: CPT | Performed by: INTERNAL MEDICINE

## 2017-05-17 PROCEDURE — 80076 HEPATIC FUNCTION PANEL: CPT | Performed by: INTERNAL MEDICINE

## 2017-05-17 PROCEDURE — 99000 SPECIMEN HANDLING OFFICE-LAB: CPT | Performed by: INTERNAL MEDICINE

## 2017-05-17 PROCEDURE — 36415 COLL VENOUS BLD VENIPUNCTURE: CPT | Performed by: INTERNAL MEDICINE

## 2017-05-17 PROCEDURE — 82306 VITAMIN D 25 HYDROXY: CPT | Performed by: INTERNAL MEDICINE

## 2017-05-17 PROCEDURE — 80048 BASIC METABOLIC PNL TOTAL CA: CPT | Performed by: INTERNAL MEDICINE

## 2017-05-17 PROCEDURE — 85610 PROTHROMBIN TIME: CPT | Performed by: INTERNAL MEDICINE

## 2017-05-18 LAB
DEPRECATED CALCIDIOL+CALCIFEROL SERPL-MC: 7 UG/L (ref 20–75)
ERYTHROCYTE [DISTWIDTH] IN BLOOD BY AUTOMATED COUNT: 13.8 % (ref 10–15)
ETHYL GLUCURONIDE UR QL: NORMAL
HCT VFR BLD AUTO: 30.7 % (ref 40–53)
HGB BLD-MCNC: 11 G/DL (ref 13.3–17.7)
INR PPP: 1.72 (ref 0.86–1.14)
MCH RBC QN AUTO: 39 PG (ref 26.5–33)
MCHC RBC AUTO-ENTMCNC: 35.8 G/DL (ref 31.5–36.5)
MCV RBC AUTO: 109 FL (ref 78–100)
PLATELET # BLD AUTO: 51 10E9/L (ref 150–450)
RBC # BLD AUTO: 2.82 10E12/L (ref 4.4–5.9)
WBC # BLD AUTO: 5.5 10E9/L (ref 4–11)

## 2017-05-18 RX ORDER — ERGOCALCIFEROL 1.25 MG/1
CAPSULE, LIQUID FILLED ORAL
Qty: 8 CAPSULE | Refills: 0 | OUTPATIENT
Start: 2017-05-18

## 2017-05-19 ENCOUNTER — DOCUMENTATION ONLY (OUTPATIENT)
Dept: TRANSPLANT | Facility: CLINIC | Age: 27
End: 2017-05-19

## 2017-05-19 NOTE — LETTER
May 19, 2017    Joe Sosa  [address]  [address] [place] [id number]      Dear Mr. Sosa,    This letter is sent to confirm that you have completed your transplant work-up and you are a candidate in the liver transplant program at the [facility].  You were placed on the liver active waitlist on 5/19/17. You will need to have your MELD labs drawn at least once a month to update you on the transplant list.      When you are active on the waitlist and an organ becomes available, a coordinator will need to speak to you immediately.  You could be contacted at any time during the day or night as an organ could become available at any time.  Please make certain our office always has your current telephone numbers and address.      Items we will need from you:      We have received approval from your insurance company for the transplant procedure.  It is critical that you notify us if there is any change in your insurance.  It is also important that you familiarize yourself with the details of your specific insurance policy.  Our patient  is available to assist you if you should have any questions regarding your coverage.      During this waiting period, we may request additional periodic laboratory tests with your primary physician.  It will be your responsibility to remind your physician to forward your results to the Transplant Office.      We need to be kept informed of any changes in your medical condition such as:    o changes in your medications,   o significant changes in your health  o significant infections (such as pneumonia or abscesses)  o blood transfusions  o any condition which requires hospitalization  o any surgery      Remember to complete any routine cancer screening tests required before your transplant.  This includes colonoscopy; prostate screening for men, and mammogram and gynecologic testing for women, as well as dental work.  Your primary  care clinic can assist you with arranging for these exams.  Remind your caregivers to forward copies of the records and final reports.    We want you to know that our program has physician and surgeon coverage 24 hours a day, 365 days a year. If this coverage changes or there are substantial program changes, you will be notified in writing by letter.     Attached is a letter from the [employer] ([employer]). It describes the services and information offered to patients by [employer] and the Organ Procurement and Transplantation Network.    We appreciate having had the opportunity to participate in your care.  If you have questions, please feel free to call the Transplant Office at [phone] or [phone].      Sincerely,     Saravanan Sanabria Jr., BSN, RN  Liver Transplant Coordinator  [phone]  Liver Transplant Program    Enclosures: [facility] Letter

## 2017-05-23 ENCOUNTER — DOCUMENTATION ONLY (OUTPATIENT)
Dept: PHARMACY | Facility: CLINIC | Age: 27
End: 2017-05-23

## 2017-05-23 NOTE — PHARMACY-MEDICATION REGIMEN REVIEW
Pharmacy Liver Pre-Transplant Medication Evaluation    This patient is a 26 year old male being considered for liver transplantation.   As part of the liver pre-transplant patient evaluation, pharmacy has screened this patient s electronic medical record for medication related concerns.     Assessment/Plan:  No significant potential medication related issues are expected for this patient post-transplant, based on the medical record medication list review.  Pharmacy will continue to participate in this patient's care throughout the transplant course. Please contact pharmacy with any further medication related questions or concerns.     Antonio Gaines R.Ph.  [facility]  [phone] pager

## 2017-05-30 ENCOUNTER — TELEPHONE (OUTPATIENT)
Dept: TRANSPLANT | Facility: CLINIC | Age: 27
End: 2017-05-30

## 2017-05-30 NOTE — TELEPHONE ENCOUNTER
Received a voice message from patient's mother Therese with concerns about patient's insurance not covering Xifaxan anymore.  I called Therese to address her concern.  Therese reports patient's MA changed to [employer] MA this month.  It is likely the medication requires prior authorization under this new insurance carrier.  I advised Therese to contact [facility] and request they obtain prior authorization.  Joe's copay should not be more than $3 if prior authorized.  Therese agrees to this plan.  She also reports Joe has nine days left of this medication.  I encouraged her to call with with any additional concerns.        TIFFANY Paul, [facility]  Liver Transplant   Phone [phone]  Pager [phone]

## 2017-06-01 ENCOUNTER — ALLIED HEALTH/NURSE VISIT (OUTPATIENT)
Dept: RESEARCH | Facility: CLINIC | Age: 27
End: 2017-06-01

## 2017-06-01 ENCOUNTER — TRANSFERRED RECORDS (OUTPATIENT)
Dept: HEALTH INFORMATION MANAGEMENT | Facility: CLINIC | Age: 27
End: 2017-06-01

## 2017-06-01 DIAGNOSIS — Z00.6 EXAMINATION OF PARTICIPANT IN CLINICAL TRIAL: Primary | ICD-10-CM

## 2017-06-01 NOTE — PROGRESS NOTES
Study Name: International Randomized Trial to Evaluate The Effectiveness of the Portable Organ Care System(OCS) Liver For Preserving and Assessing Donor Livers for Transplantation (OCS Liver PROTECT Trial) (IRB #[id number])  Per our IRB approved recruitment process, an eligibility letter and consent for the above study was sent to the patient on 6/1/2017. The patient will be called the following week to ensure it was received and to discuss the study.   Please contact the , Smiley Licea RN at [phone] with any questions.

## 2017-06-01 NOTE — MR AVS SNAPSHOT
After Visit Summary   6/1/2017    Joe Sosa    MRN: [id number]           Patient Information     Date Of Birth          1990        Visit Information        Provider Department      6/1/2017 10:46 AM Specialty, Provider [facility], [place],  Clinical Research        Today's Diagnoses     Examination of participant in clinical trial    -  1       Follow-ups after your visit        Your next 10 appointments already scheduled     Jun 20, 2017 10:00 AM CDT   Lab with  LAB   [facility] ([facility])    [address] [place] [id number] [phone]            Jun 20, 2017 10:50 AM CDT   (Arrive by 10:35 AM)   Return Liver Transplant with Andreina Templeton MD   [facility] Hepatology ([facility])    [address] [place] [id number] [phone]              Who to contact     If you have questions or need follow up information about today's clinic visit or your schedule please contact [facility]  [facility] directly at [phone].  Normal or non-critical lab and imaging results will be communicated to you by WIBhart, letter or phone within 4 business days after the clinic has received the results. If you do not hear from us within 7 days, please contact the clinic through [employer] or phone. If you have a critical or abnormal lab result, we will notify you by phone as soon as possible.  Submit refill requests through [employer] or call your pharmacy and they will forward the refill request to us. Please allow 3 business days for your refill to be completed.          Additional Information About Your Visit        WIBhart Information     [employer] gives you secure access to your electronic health record. If you see a primary care provider, you can also send messages to your care team and make appointments. If you have questions, please call your primary care clinic.  If you do not have a  primary care provider, please call [phone] and they will assist you.        Care EveryWhere ID     This is your Care EveryWhere ID. This could be used by other organizations to access your [facility] medical records  [id number]         Blood Pressure from Last 3 Encounters:   04/18/17 117/75   03/28/17 128/74   03/28/17 128/74    Weight from Last 3 Encounters:   04/18/17 96 kg (211 lb 9.6 oz)   03/28/17 93 kg (205 lb)   03/28/17 93 kg (205 lb)              Today, you had the following     No orders found for display       Primary Care Provider Office Phone # Fax #    Jeffery Azul [phone]       [facility] [address] [place] [address]        Thank you!     Thank you for choosing [facility],  [facility]  for your care. Our goal is always to provide you with excellent care. Hearing back from our patients is one way we can continue to improve our services. Please take a few minutes to complete the written survey that you may receive in the mail after your visit with us. Thank you!             Your Updated Medication List - Protect others around you: Learn how to safely use, store and throw away your medicines at www.disposemymeds.org.          This list is accurate as of: 6/1/17 10:48 AM.  Always use your most recent med list.                   Brand Name Dispense Instructions for use    lactulose 10 GM/15ML solution    CHRONULAC     Take 45 mLs by mouth 3 times daily       omeprazole 20 MG CR capsule    priLOSEC     Take 20 mg by mouth daily       rifaximin 550 MG Tabs tablet    XIFAXAN     Take 550 mg by mouth 2 times daily       sertraline 50 MG tablet    ZOLOFT    30 tablet    Take 1 tablet (50 mg) by mouth daily       spironolactone 50 MG tablet    ALDACTONE     Take 50 mg by mouth daily       torsemide 20 MG tablet    DEMADEX     Take 20 mg by mouth daily       vitamin D 77614 UNIT capsule    ERGOCALCIFEROL    8 capsule    Take 1 capsule  (50,000 Units) by mouth once a week

## 2017-06-08 ENCOUNTER — TELEPHONE (OUTPATIENT)
Dept: TRANSPLANT | Facility: CLINIC | Age: 27
End: 2017-06-08

## 2017-06-08 NOTE — TELEPHONE ENCOUNTER
I called patient to discuss his progress with chemical dependency treatment.  He reports he will complete outpatient treatment this week, but will continue seeing his counselor Joann Navarro weekly.  He also continues to attend AA meetings 2-3 times per week and he sees his sponsor weekly.  I asked Joe to have Joann send a discharge summary to me upon completion of treatment.    Reminded Joe of the Liver Transplant Support Group and encouraged his attendance. Supportive counseling also provided today.        TIFFANY Paul, [facility]  Liver Transplant   Phone [phone]  Pager [phone]     Addendum: I received a fax from Joann Navarro MA, LPC at the [facility], dated 6/12/17.  She states Joe did graduate from the group portion of their program on 6/1/17.  She further states Joe will continue to work with her on an individual basis.  Drug screens have been negative for substances.  Entire fax sent to [facility] for scanning into [employer].

## 2017-06-12 ENCOUNTER — TRANSFERRED RECORDS (OUTPATIENT)
Dept: HEALTH INFORMATION MANAGEMENT | Facility: CLINIC | Age: 27
End: 2017-06-12

## 2017-06-14 ENCOUNTER — ALLIED HEALTH/NURSE VISIT (OUTPATIENT)
Dept: RESEARCH | Facility: CLINIC | Age: 27
End: 2017-06-14

## 2017-06-14 DIAGNOSIS — Z00.6 EXAMINATION OF PARTICIPANT IN CLINICAL TRIAL: Primary | ICD-10-CM

## 2017-06-14 NOTE — PROGRESS NOTES
Transplant Research Organization Informed Consent Process Documentation  Study Name: International Randomized Trial to Evaluate the Effectiveness of the Portable Organ Care System (OCS) Liver for Preserving and Assessing Donor Livers for Transplantation (OCS Liver PROTECT Trial) (IRB # [id number])  ICF Version Date / IRB Approval Date:  Version dt 02/21/2017, IRB Approved 02/21/2017    Date of Approach/Consent: 6/13/2017    The subject was screened and meets all of the inclusion criteria and none of the exclusion criteria is met.    The subject was told:  -that the study involves research  -the purpose of the research study  -the expected duration of the study and the approximate number of subject sought  -of procedures that are identified as experimental  -of reasonably foreseeable risks or discomforts to the subject  -of any benefits to the subject or others that may be expected from the research  -of alternative procedures and/or treatment  -how the confidentiality of records would be maintained  -whether or not compensation and medical treatments are available should injury occur as a result of the study  -who to contact if they have questions related to the research study or questions regarding research subjects' rights  -that participation is completely voluntary and that their decision to or not to participate will have no impact on their relationships with the N and the staff    No study procedures were completed prior to the consent being obtained.  The use of historical information (lab or assessments) used for the purpose of the study was approved by subject.  The subject was fully aware that we would be reviewing their medical record for the study.  The subject demonstrated an understanding of what the study involved.  Specifically, how this study differed from standard of care at our center and what was required of the subject as part of the study.  The subject reviewed the consent form and was given  the opportunity to ask questions before signing.  Questions and concerns were answered by the study staff and/or study physician.  A copy of the signed informed consent document was provided to the subject.  [x] Yes [] No  The subject was offered a copy of the signed informed consent but declined. [] Yes [x] No  The consent require the use of a :   [] Yes [x]  No     A 'short form' consent was used:     [] Yes [x] No         If yes, provide name of witness: N/A  The subject required a legally-authorized representative (LAR) to sign on their behalf:                                                    [] Yes  [x] No   If yes, please record name of LAR: N/A    Questions to Evaluate Subject Comprehension of Study  Adult or Legally Authorized Representative (LAR) for a Dependent:  Question: Adequate Response? If No, explain what actions were taken   What is being studied? [x] Yes  [] No   If you participate, what will be different than if you decide not to participate?  [x] Yes  [] No   How long will the study last; will you be required to return for visits? [x] Yes  [] No   What kinds of risks are these? [x] Yes  [] No

## 2017-06-20 ENCOUNTER — OFFICE VISIT (OUTPATIENT)
Dept: GASTROENTEROLOGY | Facility: CLINIC | Age: 27
End: 2017-06-20
Attending: INTERNAL MEDICINE
Payer: COMMERCIAL

## 2017-06-20 VITALS
BODY MASS INDEX: 29.2 KG/M2 | HEART RATE: 79 BPM | DIASTOLIC BLOOD PRESSURE: 75 MMHG | TEMPERATURE: 98.3 F | HEIGHT: 73 IN | SYSTOLIC BLOOD PRESSURE: 124 MMHG | WEIGHT: 220.3 LBS

## 2017-06-20 DIAGNOSIS — K70.31 ALCOHOLIC CIRRHOSIS OF LIVER WITH ASCITES (H): Primary | ICD-10-CM

## 2017-06-20 DIAGNOSIS — K76.82 HEPATIC ENCEPHALOPATHY (H): ICD-10-CM

## 2017-06-20 DIAGNOSIS — K70.31 ALCOHOLIC CIRRHOSIS OF LIVER WITH ASCITES (H): ICD-10-CM

## 2017-06-20 LAB
ALBUMIN SERPL-MCNC: 2.3 G/DL (ref 3.4–5)
ALP SERPL-CCNC: 302 U/L (ref 40–150)
ALT SERPL W P-5'-P-CCNC: 48 U/L (ref 0–70)
ANION GAP SERPL CALCULATED.3IONS-SCNC: 6 MMOL/L (ref 3–14)
AST SERPL W P-5'-P-CCNC: 85 U/L (ref 0–45)
BILIRUB DIRECT SERPL-MCNC: 3.7 MG/DL (ref 0–0.2)
BILIRUB SERPL-MCNC: 8.4 MG/DL (ref 0.2–1.3)
BUN SERPL-MCNC: 11 MG/DL (ref 7–30)
CALCIUM SERPL-MCNC: 8 MG/DL (ref 8.5–10.1)
CHLORIDE SERPL-SCNC: 100 MMOL/L (ref 94–109)
CO2 SERPL-SCNC: 29 MMOL/L (ref 20–32)
CREAT SERPL-MCNC: 0.62 MG/DL (ref 0.66–1.25)
ERYTHROCYTE [DISTWIDTH] IN BLOOD BY AUTOMATED COUNT: 13.2 % (ref 10–15)
GFR SERPL CREATININE-BSD FRML MDRD: ABNORMAL ML/MIN/1.7M2
GLUCOSE SERPL-MCNC: 118 MG/DL (ref 70–99)
HCT VFR BLD AUTO: 31.4 % (ref 40–53)
HGB BLD-MCNC: 11.2 G/DL (ref 13.3–17.7)
INR PPP: 2.39 (ref 0.86–1.14)
MCH RBC QN AUTO: 39.6 PG (ref 26.5–33)
MCHC RBC AUTO-ENTMCNC: 35.7 G/DL (ref 31.5–36.5)
MCV RBC AUTO: 111 FL (ref 78–100)
PLATELET # BLD AUTO: 50 10E9/L (ref 150–450)
POTASSIUM SERPL-SCNC: 4 MMOL/L (ref 3.4–5.3)
PROT SERPL-MCNC: 5.9 G/DL (ref 6.8–8.8)
RBC # BLD AUTO: 2.83 10E12/L (ref 4.4–5.9)
SODIUM SERPL-SCNC: 134 MMOL/L (ref 133–144)
WBC # BLD AUTO: 4.7 10E9/L (ref 4–11)

## 2017-06-20 PROCEDURE — 85610 PROTHROMBIN TIME: CPT | Performed by: INTERNAL MEDICINE

## 2017-06-20 PROCEDURE — 36415 COLL VENOUS BLD VENIPUNCTURE: CPT | Performed by: INTERNAL MEDICINE

## 2017-06-20 PROCEDURE — 85027 COMPLETE CBC AUTOMATED: CPT | Performed by: INTERNAL MEDICINE

## 2017-06-20 PROCEDURE — 80076 HEPATIC FUNCTION PANEL: CPT | Performed by: INTERNAL MEDICINE

## 2017-06-20 PROCEDURE — 80048 BASIC METABOLIC PNL TOTAL CA: CPT | Performed by: INTERNAL MEDICINE

## 2017-06-20 PROCEDURE — 99213 OFFICE O/P EST LOW 20 MIN: CPT | Mod: ZF

## 2017-06-20 ASSESSMENT — PAIN SCALES - GENERAL: PAINLEVEL: NO PAIN (0)

## 2017-06-20 NOTE — PROGRESS NOTES
"SUBJECTIVE:  Joe is a 26-year-old man with alcoholic cirrhosis, he is listed for liver transplant since 5/19/17.  He has been sober from alcohol since 11/06/2016.  He is here for followup with his mother. MELD is 26. Last labs were today. He is listed for LT.    He has a brother who is being evaluated for being a living donor.      He still has been sober from alcohol.  Not working as school is out for the summer.Planning to move in with mom for now. Worried about howhe will hear the phone if it rings for an organ offer in the middle of the night.  Worried about the fact that his MELD went from 20 to 26 in one month.      He has been paying close attention to his diet; eating a lot of protein, and making sure he is between 80 and 90 g per day.  He has not had any nausea, vomiting, diarrhea, constipation, blood in his stool or issues with confusion.  NO regular exercise but will       CURRENT MEDICATIONS:  Torsemide, spironolactone, rifaximin, omeprazole and lactulose.  He is taking all of these, and not having any difficulties.  He feels like his fluid is under good control.  He has gained a little bit of weight, but does not think that it is fluid overload.         PHYSICAL EXAMINATION:   Vitals: /75  Pulse 79  Temp 98.3  F (36.8  C) (Oral)  Ht 1.854 m (6' 1\")  Wt 99.9 kg (220 lb 4.8 oz)  BMI 29.07 kg/m2  BMI= Body mass index is 29.07 kg/(m^2).  GENERAL:  In no acute distress.   HEENT:  He is icteric.   LYMPH:  No supraclavicular or cervical lymphadenopathy.   CARDIOVASCULAR:  Regular rate and rhythm.   CHEST:  Lungs are clear.   ABDOMEN:  Bowel sounds are present.  Abdomen is soft, nontender and nondistended.   EXTREMITIES:  No edema.   SKIN:  No rash.   NEUROLOGIC:  Speech is fluent and clear.  No asterixis or tremor.       LABORATORY DATA:  Reviewed today.      ASSESSMENT AND PLAN:  A 26-year-old man with alcoholic cirrhosis.  He has been sober from alcohol for about 7 months.  He is listed for liver " transplant with a MELD of 26, ABO A.      He completed an 8-week course of vitamin D for vitamin D deficiency, and his vitamin D level came up but he is still defiicient. In the middle of a second course. Will recheck vitamin D after another 8 week treatment, mid-end July      Will recheck labs in about 1 week, as his has had a jump in his MELD and he is worried about the instability. I reviewed and updated all of his phone numbers.    Encouraged continued attention to good nutrition and to activity as tolerated.    I plan to see him back in a couple of months.

## 2017-06-20 NOTE — NURSING NOTE
"Chief Complaint   Patient presents with     RECHECK     follow up with general liver, tzimmer cma       Initial /75  Pulse 79  Temp 98.3  F (36.8  C) (Oral)  Ht 1.854 m (6' 1\")  Wt 99.9 kg (220 lb 4.8 oz)  BMI 29.07 kg/m2 Estimated body mass index is 29.07 kg/(m^2) as calculated from the following:    Height as of this encounter: 1.854 m (6' 1\").    Weight as of this encounter: 99.9 kg (220 lb 4.8 oz).  Medication Reconciliation: complete    "

## 2017-06-20 NOTE — LETTER
"6/20/2017       RE: Joe Sosa  [address]  [address]  [facility] [address]     Dear Colleague,    Thank you for referring your patient, Joe Sosa, to the [facility] HEPATOLOGY at [facility]. Please see a copy of my visit note below.    SUBJECTIVE:  Joe is a 26-year-old man with alcoholic cirrhosis, he is listed for liver transplant since 5/19/17.  He has been sober from alcohol since 11/06/2016.  He is here for followup with his mother. MELD is 26. Last labs were today. He is listed for LT.    He has a brother who is being evaluated for being a living donor.      He still has been sober from alcohol.   Not working as school is out for the summer.Planning to move in with mom for now. Worried about howhe will hear the phone if it rings for an organ offer in the middle of the night.  Worried about the fact that his MELD went from 20 to 26 in one month.      He has been paying close attention to his diet; eating a lot of protein, and making sure he is between 80 and 90 g per day.  He has not had any nausea, vomiting, diarrhea, constipation, blood in his stool or issues with confusion.  NO regular exercise but will       CURRENT MEDICATIONS:  Torsemide, spironolactone, rifaximin, omeprazole and lactulose.  He is taking all of these, and not having any difficulties.  He feels like his fluid is under good control.  He has gained a little bit of weight, but does not think that it is fluid overload.         PHYSICAL EXAMINATION:   Vitals: /75  Pulse 79  Temp 98.3  F (36.8  C) (Oral)  Ht 1.854 m (6' 1\")  Wt 99.9 kg (220 lb 4.8 oz)  BMI 29.07 kg/m2  BMI= Body mass index is 29.07 kg/(m^2).  GENERAL:  In no acute distress.   HEENT:  He is icteric.   LYMPH:  No supraclavicular or cervical lymphadenopathy.   CARDIOVASCULAR:  Regular rate and rhythm.   CHEST:  Lungs are clear.   ABDOMEN:  Bowel sounds are present.  Abdomen is soft, nontender and nondistended. "   EXTREMITIES:  No edema.   SKIN:  No rash.   NEUROLOGIC:  Speech is fluent and clear.  No asterixis or tremor.       LABORATORY DATA:  Reviewed  today.      ASSESSMENT AND PLAN:  A 26-year-old man with alcoholic cirrhosis.  He has been sober from alcohol for about 7 months.  He is listed for liver transplant with a MELD of 26, ABO A.      He completed an 8-week course of vitamin D for vitamin D deficiency, and his vitamin D level came up but he is still defiicient. In the middle of a second course. Will recheck vitamin D after another 8 week treatment, mid-end July      Will recheck labs in about 1 week, as his has had a jump in his MELD and he is worried about the instability. I reviewed and updated all of his phone numbers.    Encouraged continued attention to good nutrition and to activity as tolerated.    I plan to see him back in a couple of months.    Again, thank you for allowing me to participate in the care of your patient.      Sincerely,    Andreina Templeton MD

## 2017-06-20 NOTE — MR AVS SNAPSHOT
After Visit Summary   6/20/2017    Joe Sosa    MRN: [id number]           Patient Information     Date Of Birth          1990        Visit Information        Provider Department      6/20/2017 10:50 AM Andreina Templeton MD [facility] Hepatology        Today's Diagnoses     Alcoholic cirrhosis of liver with ascites (H)    -  1    Hepatic encephalopathy (H)           Follow-ups after your visit        Follow-up notes from your care team     Return in about 3 months (around 9/20/2017).      Your next 10 appointments already scheduled     Sep 19, 2017 10:15 AM CDT   Lab with  LAB   [facility] ([facility])    [address] [place] [address]  1st [facility] [id number] [phone]            Sep 19, 2017 11:10 AM CDT   (Arrive by 10:55 AM)   Return Liver Transplant with Andreina Templeton MD   [facility] Hepatology ([facility])    [address] [place] [address] [id number] [phone]              Future tests that were ordered for you today     Open Future Orders        Priority Expected Expires Ordered    INR Routine 6/27/2017 7/20/2017 6/20/2017    CBC with platelets Routine 6/27/2017 7/20/2017 6/20/2017    Basic metabolic panel Routine 6/27/2017 7/20/2017 6/20/2017    Hepatic panel Routine 6/27/2017 7/20/2017 6/20/2017            Who to contact     If you have questions or need follow up information about today's clinic visit or your schedule please contact [facility] HEPATOLOGY directly at [phone].  Normal or non-critical lab and imaging results will be communicated to you by MyChart, letter or phone within 4 business days after the clinic has received the results. If you do not hear from us within 7 days, please contact the clinic through MyChart or phone. If you have a critical or abnormal lab result, we will notify you by phone as soon as possible.  Submit refill requests through [employer] or call your  "pharmacy and they will forward the refill request to us. Please allow 3 business days for your refill to be completed.          Additional Information About Your Visit        [employer] Information     [employer] gives you secure access to your electronic health record. If you see a primary care provider, you can also send messages to your care team and make appointments. If you have questions, please call your primary care clinic.  If you do not have a primary care provider, please call [phone] and they will assist you.        Care EveryWhere ID     This is your Care EveryWhere ID. This could be used by other organizations to access your [place] medical records  [id number]        Your Vitals Were     Pulse Temperature Height BMI (Body Mass Index)          79 98.3  F (36.8  C) (Oral) 1.854 m (6' 1\") 29.07 kg/m2         Blood Pressure from Last 3 Encounters:   06/20/17 124/75   04/18/17 117/75   03/28/17 128/74    Weight from Last 3 Encounters:   06/20/17 99.9 kg (220 lb 4.8 oz)   04/18/17 96 kg (211 lb 9.6 oz)   03/28/17 93 kg (205 lb)                 Today's Medication Changes          These changes are accurate as of: 6/20/17 11:28 AM.  If you have any questions, ask your nurse or doctor.               Stop taking these medicines if you haven't already. Please contact your care team if you have questions.     sertraline 50 MG tablet   Commonly known as:  ZOLOFT   Stopped by:  Andreina Templeton MD                    Primary Care Provider Office Phone # Fax #    Jeffery Azul [phone]       [facility] [address] [place] [address]        Thank you!     Thank you for choosing [facility] HEPATOLOGY  for your care. Our goal is always to provide you with excellent care. Hearing back from our patients is one way we can continue to improve our services. Please take a few minutes to complete the written survey that you may receive in the mail after your " visit with us. Thank you!             Your Updated Medication List - Protect others around you: Learn how to safely use, store and throw away your medicines at www.disposemymeds.org.          This list is accurate as of: 6/20/17 11:28 AM.  Always use your most recent med list.                   Brand Name Dispense Instructions for use    lactulose 10 GM/15ML solution    CHRONULAC     Take 45 mLs by mouth 3 times daily       omeprazole 20 MG CR capsule    priLOSEC     Take 20 mg by mouth daily       rifaximin 550 MG Tabs tablet    XIFAXAN     Take 550 mg by mouth 2 times daily       spironolactone 50 MG tablet    ALDACTONE     Take 50 mg by mouth daily       torsemide 20 MG tablet    DEMADEX     Take 20 mg by mouth daily       vitamin D 01123 UNIT capsule    ERGOCALCIFEROL    8 capsule    Take 1 capsule (50,000 Units) by mouth once a week

## 2017-06-27 ENCOUNTER — HOSPITAL ENCOUNTER (INPATIENT)
Facility: CLINIC | Age: 27
LOS: 8 days | Discharge: HOME HEALTH | DRG: 006 | End: 2017-07-06
Attending: TRANSPLANT SURGERY | Admitting: TRANSPLANT SURGERY
Payer: COMMERCIAL

## 2017-06-27 ENCOUNTER — ORGAN (OUTPATIENT)
Dept: TRANSPLANT | Facility: CLINIC | Age: 27
End: 2017-06-27

## 2017-06-27 ENCOUNTER — ANESTHESIA EVENT (OUTPATIENT)
Dept: SURGERY | Facility: CLINIC | Age: 27
DRG: 006 | End: 2017-06-27
Payer: COMMERCIAL

## 2017-06-27 ENCOUNTER — APPOINTMENT (OUTPATIENT)
Dept: GENERAL RADIOLOGY | Facility: CLINIC | Age: 27
DRG: 006 | End: 2017-06-27
Attending: PHYSICIAN ASSISTANT
Payer: COMMERCIAL

## 2017-06-27 DIAGNOSIS — Z94.4 LIVER TRANSPLANT RECIPIENT (H): Primary | ICD-10-CM

## 2017-06-27 PROBLEM — K72.10 END STAGE LIVER DISEASE (H): Status: ACTIVE | Noted: 2017-06-27

## 2017-06-27 LAB
ABO + RH BLD: NORMAL
ABO + RH BLD: NORMAL
ALBUMIN SERPL-MCNC: 2.4 G/DL (ref 3.4–5)
ALP SERPL-CCNC: 280 U/L (ref 40–150)
ALT SERPL W P-5'-P-CCNC: 51 U/L (ref 0–70)
AMYLASE SERPL-CCNC: 62 U/L (ref 30–110)
ANION GAP SERPL CALCULATED.3IONS-SCNC: 6 MMOL/L (ref 3–14)
APTT PPP: 45 SEC (ref 22–37)
AST SERPL W P-5'-P-CCNC: 88 U/L (ref 0–45)
BASOPHILS # BLD AUTO: 0 10E9/L (ref 0–0.2)
BASOPHILS NFR BLD AUTO: 0.2 %
BILIRUB SERPL-MCNC: 7.1 MG/DL (ref 0.2–1.3)
BLD GP AB SCN SERPL QL: NORMAL
BLD PROD TYP BPU: NORMAL
BLOOD BANK CMNT PATIENT-IMP: NORMAL
BUN SERPL-MCNC: 12 MG/DL (ref 7–30)
CALCIUM SERPL-MCNC: 8.3 MG/DL (ref 8.5–10.1)
CHLORIDE SERPL-SCNC: 100 MMOL/L (ref 94–109)
CMV IGG SERPL QL IA: 0.2 AI (ref 0–0.8)
CMV IGM SERPL QL IA: NORMAL AI (ref 0–0.8)
CO2 SERPL-SCNC: 30 MMOL/L (ref 20–32)
CREAT SERPL-MCNC: 0.63 MG/DL (ref 0.66–1.25)
DIFFERENTIAL METHOD BLD: ABNORMAL
EBV VCA IGG SER QL IA: ABNORMAL AI (ref 0–0.8)
EBV VCA IGM SER QL IA: 0.7 AI (ref 0–0.8)
EOSINOPHIL # BLD AUTO: 0 10E9/L (ref 0–0.7)
EOSINOPHIL NFR BLD AUTO: 0.7 %
ERYTHROCYTE [DISTWIDTH] IN BLOOD BY AUTOMATED COUNT: 13.9 % (ref 10–15)
FIBRINOGEN PPP-MCNC: 173 MG/DL (ref 200–420)
GFR SERPL CREATININE-BSD FRML MDRD: ABNORMAL ML/MIN/1.7M2
GLUCOSE SERPL-MCNC: 107 MG/DL (ref 70–99)
HBV CORE AB SERPL QL IA: NONREACTIVE
HBV SURFACE AB SERPL IA-ACNC: 25.58 M[IU]/ML
HCT VFR BLD AUTO: 31.2 % (ref 40–53)
HCV AB SERPL QL IA: NORMAL
HGB BLD-MCNC: 10.9 G/DL (ref 13.3–17.7)
HIV 1+2 AB+HIV1 P24 AG SERPL QL IA: NORMAL
IMM GRANULOCYTES # BLD: 0 10E9/L (ref 0–0.4)
IMM GRANULOCYTES NFR BLD: 0.2 %
INR PPP: 2.33 (ref 0.86–1.14)
LYMPHOCYTES # BLD AUTO: 1.1 10E9/L (ref 0.8–5.3)
LYMPHOCYTES NFR BLD AUTO: 25.3 %
MAGNESIUM SERPL-MCNC: 2 MG/DL (ref 1.6–2.3)
MCH RBC QN AUTO: 37.8 PG (ref 26.5–33)
MCHC RBC AUTO-ENTMCNC: 34.9 G/DL (ref 31.5–36.5)
MCV RBC AUTO: 108 FL (ref 78–100)
MONOCYTES # BLD AUTO: 0.6 10E9/L (ref 0–1.3)
MONOCYTES NFR BLD AUTO: 13.1 %
NEUTROPHILS # BLD AUTO: 2.7 10E9/L (ref 1.6–8.3)
NEUTROPHILS NFR BLD AUTO: 60.5 %
NRBC # BLD AUTO: 0 10*3/UL
NRBC BLD AUTO-RTO: 0 /100
NUM BPU REQUESTED: 10
PHOSPHATE SERPL-MCNC: 3.8 MG/DL (ref 2.5–4.5)
PLATELET # BLD AUTO: 49 10E9/L (ref 150–450)
PLATELET # BLD EST: ABNORMAL 10*3/UL
POTASSIUM SERPL-SCNC: 4.3 MMOL/L (ref 3.4–5.3)
PROT SERPL-MCNC: 6 G/DL (ref 6.8–8.8)
RBC # BLD AUTO: 2.88 10E12/L (ref 4.4–5.9)
SODIUM SERPL-SCNC: 135 MMOL/L (ref 133–144)
SPECIMEN EXP DATE BLD: NORMAL
WBC # BLD AUTO: 4.5 10E9/L (ref 4–11)

## 2017-06-27 PROCEDURE — 87389 HIV-1 AG W/HIV-1&-2 AB AG IA: CPT | Performed by: PHYSICIAN ASSISTANT

## 2017-06-27 PROCEDURE — 85610 PROTHROMBIN TIME: CPT | Performed by: PHYSICIAN ASSISTANT

## 2017-06-27 PROCEDURE — 86901 BLOOD TYPING SEROLOGIC RH(D): CPT | Performed by: PHYSICIAN ASSISTANT

## 2017-06-27 PROCEDURE — 86803 HEPATITIS C AB TEST: CPT | Performed by: PHYSICIAN ASSISTANT

## 2017-06-27 PROCEDURE — 83735 ASSAY OF MAGNESIUM: CPT | Performed by: PHYSICIAN ASSISTANT

## 2017-06-27 PROCEDURE — 86923 COMPATIBILITY TEST ELECTRIC: CPT | Performed by: PHYSICIAN ASSISTANT

## 2017-06-27 PROCEDURE — 86645 CMV ANTIBODY IGM: CPT | Performed by: PHYSICIAN ASSISTANT

## 2017-06-27 PROCEDURE — 86704 HEP B CORE ANTIBODY TOTAL: CPT | Performed by: PHYSICIAN ASSISTANT

## 2017-06-27 PROCEDURE — 84100 ASSAY OF PHOSPHORUS: CPT | Performed by: PHYSICIAN ASSISTANT

## 2017-06-27 PROCEDURE — 86706 HEP B SURFACE ANTIBODY: CPT | Performed by: PHYSICIAN ASSISTANT

## 2017-06-27 PROCEDURE — 85730 THROMBOPLASTIN TIME PARTIAL: CPT | Performed by: PHYSICIAN ASSISTANT

## 2017-06-27 PROCEDURE — 93010 ELECTROCARDIOGRAM REPORT: CPT | Performed by: INTERNAL MEDICINE

## 2017-06-27 PROCEDURE — 86665 EPSTEIN-BARR CAPSID VCA: CPT | Performed by: PHYSICIAN ASSISTANT

## 2017-06-27 PROCEDURE — 93005 ELECTROCARDIOGRAM TRACING: CPT

## 2017-06-27 PROCEDURE — 86850 RBC ANTIBODY SCREEN: CPT | Performed by: PHYSICIAN ASSISTANT

## 2017-06-27 PROCEDURE — 80053 COMPREHEN METABOLIC PANEL: CPT | Performed by: PHYSICIAN ASSISTANT

## 2017-06-27 PROCEDURE — 85384 FIBRINOGEN ACTIVITY: CPT | Performed by: PHYSICIAN ASSISTANT

## 2017-06-27 PROCEDURE — 86644 CMV ANTIBODY: CPT | Performed by: PHYSICIAN ASSISTANT

## 2017-06-27 PROCEDURE — 85025 COMPLETE CBC W/AUTO DIFF WBC: CPT | Performed by: PHYSICIAN ASSISTANT

## 2017-06-27 PROCEDURE — 25000132 ZZH RX MED GY IP 250 OP 250 PS 637: Performed by: PHYSICIAN ASSISTANT

## 2017-06-27 PROCEDURE — 82150 ASSAY OF AMYLASE: CPT | Performed by: PHYSICIAN ASSISTANT

## 2017-06-27 PROCEDURE — 36415 COLL VENOUS BLD VENIPUNCTURE: CPT | Performed by: PHYSICIAN ASSISTANT

## 2017-06-27 PROCEDURE — 25000132 ZZH RX MED GY IP 250 OP 250 PS 637: Performed by: TRANSPLANT SURGERY

## 2017-06-27 PROCEDURE — 86900 BLOOD TYPING SEROLOGIC ABO: CPT | Performed by: PHYSICIAN ASSISTANT

## 2017-06-27 PROCEDURE — 71020 XR CHEST 2 VW: CPT

## 2017-06-27 RX ORDER — PIPERACILLIN SODIUM, TAZOBACTAM SODIUM 3; .375 G/15ML; G/15ML
3.38 INJECTION, POWDER, LYOPHILIZED, FOR SOLUTION INTRAVENOUS ONCE
Status: DISCONTINUED | OUTPATIENT
Start: 2017-06-27 | End: 2017-06-28 | Stop reason: HOSPADM

## 2017-06-27 RX ORDER — LIDOCAINE 40 MG/G
CREAM TOPICAL
Status: DISCONTINUED | OUTPATIENT
Start: 2017-06-27 | End: 2017-06-28 | Stop reason: HOSPADM

## 2017-06-27 RX ORDER — PIPERACILLIN SODIUM, TAZOBACTAM SODIUM 2; .25 G/10ML; G/10ML
2.25 INJECTION, POWDER, LYOPHILIZED, FOR SOLUTION INTRAVENOUS
Status: DISCONTINUED | OUTPATIENT
Start: 2017-06-27 | End: 2017-06-28 | Stop reason: HOSPADM

## 2017-06-27 RX ORDER — LACTULOSE 10 G/15ML
45 SOLUTION ORAL 3 TIMES DAILY
Status: DISCONTINUED | OUTPATIENT
Start: 2017-06-27 | End: 2017-06-29

## 2017-06-27 RX ORDER — ERGOCALCIFEROL 1.25 MG/1
50000 CAPSULE, LIQUID FILLED ORAL WEEKLY
Status: DISCONTINUED | OUTPATIENT
Start: 2017-06-28 | End: 2017-06-29

## 2017-06-27 RX ORDER — TORSEMIDE 20 MG/1
20 TABLET ORAL DAILY
Status: DISCONTINUED | OUTPATIENT
Start: 2017-06-27 | End: 2017-06-29

## 2017-06-27 RX ORDER — SPIRONOLACTONE 25 MG/1
50 TABLET ORAL DAILY
Status: DISCONTINUED | OUTPATIENT
Start: 2017-06-27 | End: 2017-06-29

## 2017-06-27 RX ADMIN — RIFAXIMIN 550 MG: 550 TABLET ORAL at 20:24

## 2017-06-27 RX ADMIN — LACTULOSE 45 ML: 20 SOLUTION ORAL at 20:24

## 2017-06-27 RX ADMIN — SPIRONOLACTONE 50 MG: 25 TABLET ORAL at 11:02

## 2017-06-27 RX ADMIN — RIFAXIMIN 550 MG: 550 TABLET ORAL at 11:02

## 2017-06-27 RX ADMIN — TORSEMIDE 20 MG: 20 TABLET ORAL at 11:03

## 2017-06-27 RX ADMIN — LACTULOSE 45 ML: 20 SOLUTION ORAL at 14:21

## 2017-06-27 RX ADMIN — OMEPRAZOLE 20 MG: 20 CAPSULE, DELAYED RELEASE ORAL at 11:02

## 2017-06-27 NOTE — PROGRESS NOTES
"CLINICAL NUTRITION SERVICES - ASSESSMENT NOTE     Nutrition Prescription    Malnutrition Status:    Patient does not meet two of the above criteria necessary for diagnosing malnutrition but is at risk for malnutrition    Future/Additional Recommendations:  TF recommendations: TwoCal HN @ 60 ml/hr (1440 ml/day) + 3 pkts Prosource TF to provide 3000 kcals (30 Kcals/kg/day), 154 g PRO (1.6 gms/kg/day), 1008 ml free H2O, 315 g CHO and 7 g Fiber daily.    With diet advancement, please order oral supplements (Ensure Plus TID between meals) and calorie counts.      Consider increasing vitamin D to 50,000 2-3x/week d/t chronically low levels (<10).         REASON FOR ASSESSMENT  Joe Sosa is a/an 26 year old male assessed by the dietitian for Provider Order - Liver Transplant Pre-Op    NUTRITION HISTORY  Patient seen by outpatient RD in 3/2017 where patient reported following a low sodium diet since 11/2016.  Since his RD visit has been focusing on increasing his protein to 80-90 grams/day.      He notes his mom cooks all of his meals.  He has a good appetite and has been eating all of his meals.  She also makes 1-2 protein shakes for him daily (using Ryan One protein powder or Ryan Protein and Greens powder).      CURRENT NUTRITION ORDERS  Diet: Regular  Intake/Tolerance: Anxious to eat a lunch meal this afternoon, feeling very hungry.    LABS  Very low vitamin D levels (5/17) - 7, is taking 50,000 of ergocalciferol weekly since 3/2017 with only slow improvement (started at <4)    MEDICATIONS  Medications reviewed    ANTHROPOMETRICS  Height: 188 cm (6' 2\")  Most Recent Weight: 98.8 kg (217 lb 14.4 oz)    IBW: 86.4 kg (114%)  BMI: Overweight BMI 25-29.9  Weight History: Denies weight loss, appears to have possibly gained desirable weight over the last 3 months.    Wt Readings from Last 15 Encounters:   06/27/17 98.8 kg (217 lb 14.4 oz)   06/20/17 99.9 kg (220 lb 4.8 oz)   04/18/17 96 kg (211 lb 9.6 oz)   03/28/17 93 kg " (205 lb)   03/28/17 93 kg (205 lb)   03/06/17 90.7 kg (200 lb)     Dosing Weight: 99 kg    ASSESSED NUTRITION NEEDS  Estimated Energy Needs: 1154-5583 kcals/day (30 - 35 kcals/kg )  Justification: Increased needs s/p liver transplant  Estimated Protein Needs: 149-198 grams protein/day (1.5 - 2 grams of pro/kg)  Justification: Increased needs s/p liver transplant  Estimated Fluid Needs: 1 mL/kcal   Justification: Maintenance    PHYSICAL FINDINGS  See malnutrition section below.    MALNUTRITION  % Intake: No decreased intake noted  % Weight Loss: None noted  Subcutaneous Fat Loss: None observed  Muscle Loss: Upper arm (bicep, tricep):  mild  Fluid Accumulation/Edema: None noted  Malnutrition Diagnosis: Patient does not meet two of the above criteria necessary for diagnosing malnutrition but is at risk for malnutrition    NUTRITION DIAGNOSIS  Predicted inadequate nutrient intake (kcal/pro) related to high assessed needs post-transplant    INTERVENTIONS  Implementation  Nutrition Education: Provided education on high assessed needs post-transplant, oral supplements, potential for TFs/FT, briefly discussed post-transplant diet guidelines    Goals  Diet adv v nutrition support within 2-3 days.     Monitoring/Evaluation  Progress toward goals will be monitored and evaluated per protocol.    Yamilex Jay MS, RD, LD  Pager [phone]

## 2017-06-27 NOTE — LETTER
Transition Communication Hand-off for Care Transitions to Next Level of Care Provider    Name: Joe Sosa  MRN #: [id number]  Primary Care Provider: Jeffery Azul     Primary Clinic: [facility] [address] [place] [address]     Reason for Hospitalization:  Transplant [Z94.9]  Admit Date/Time: 6/27/2017  8:42 AM  Discharge Date: 7.6.17  Payor Source: Payor: UCARE / Plan: UCARE [place] / Product Type: HMO /                Reason for Communication Hand-off Referral: Other s/p LT    Discharge Plan:       Concern for non-adherence with plan of care:   Y/N N  Discharge Needs Assessment:  Needs       Most Recent Value    Equipment Currently Used at Home none    Transportation Available car, family or friend will provide    Home Care [facility] [phone], Fax: [phone]            Follow-up specialty is recommended: Yes    Follow-up plan:  Future Appointments  Date Time Provider Department Center   7/10/2017 9:00 AM UC SPEC INFUSION [facility]   7/10/2017 9:30 AM Chino Martin MD [facility]   9/19/2017 10:15 AM  LAB UCLAB [facility]   9/19/2017 11:10 AM Andreina Templeton MD [facility]       Any outstanding tests or procedures:        Referrals     Future Labs/Procedures    Home care nursing referral     Comments:    _______________________  [facility]  Phone  [phone]  Fax  [phone]  ______________________    RN skilled nursing visit. RN to assess vital signs and weight, respiratory and cardiac status, pain level and activity tolerance, incision for signs/symptoms of infection, hydration, nutrition and bowel status and home safety.  RN to teach medication management.  RN to provide morning lab draws (Monday/Thursday)  Patient will need first lab draw 7/13    ---pt will come to [facility] clinic on Monday, 7/10 @ 0900    Your provider has ordered home care nursing services. If you have not been contacted within 2 days of your  discharge please call the inpatient department phone number at [phone] .            Bean Recommendations:      Odette Juarez    AVS/Discharge Summary is the source of truth; this is a helpful guide for improved communication of patient story

## 2017-06-27 NOTE — H&P
[facility], [place]    History and Physical  Solid Organ Transplant     Date of Admission:  6/27/2017    Assessment & Plan   Joe Sosa is a 26 year old male who presents with end stage liver disease secondary to alcoholic cirrhosis. ESLD complicated by hepatic encephalopathy, ascites (paracentesis x 1-2x), SBP x 1 episode, chronic hyponatremia and anasarca. PMH significant for alpha-1antitrypsin deficiency. No recent illnesses or hospitalizations. Admitted for possible liver transplant dependent on quality of donor graft.     May take AM medications  NPO status, will depend on OR time. May be allowed to eat  OR time tomorrow AM.  Start NS 50 cc/hr while NPO  EKG, prolonged QT, QTc 499  CXR and labs to be reviewed.       Tennille Peña PA-C    Code Status   Full Code    Primary Care Physician   Jeffery Azul    Chief Complaint   End stage liver disease, candidate for liver transplant    History is obtained from the patient    History of Present Illness   Joe Sosa is a 26 year old male who presents with end stage liver disease secondary to alcoholic cirrhosis. ESLD complicated by hepatic encephalopathy, ascites (paracentesis x 1-2x), SBP x 1 episode, chronic hyponatremia and anasarca. PMH significant for alpha-1antitrypsin deficiency. No recent illnesses or hospitalizations. Admitted for possible liver transplant dependent on quality of donor graft. He has been sober since 11/2016. He has completed inpatient and outpatient CD treatment. He continues to go to  and see a counselor.     Per surgical evaluation consult note:  Surgical evaluation:  1. Portal Vein:Patent  2. Hepatic Artery: Open  3. TIPS: absent  4. Previous Abdominal Surgery: No  5. Hepatocellular Carcinoma: None  6. Ascites: Present - minimal  7. Costal Angle: wide  8. Portopulmonary Hypertension: absent  9. Hepatopulmonary Syndrome: absent  10. Cardiac Evaluation: adequate  11. Nutritional Status: Good  12.  Diabetes: none                  13.Hypertension none  14. Smoker:none           Past Medical History    I have reviewed this patient's medical history and updated it with pertinent information if needed.   Past Medical History:   Diagnosis Date     Alcoholic cirrhosis of liver with ascites (H) 3/9/2017     Alpha-1-antitrypsin deficiency (H) 3/9/2017     Anasarca      Chronic hyponatremia      Hepatic encephalopathy (H)      SBP (spontaneous bacterial peritonitis) (H)        Past Surgical History   I have reviewed this patient's surgical history and updated it with pertinent information if needed.  No past surgical history on file.    Prior to Admission Medications   Prior to Admission Medications   Prescriptions Last Dose Informant Patient Reported? Taking?   lactulose (CHRONULAC) 10 GM/15ML solution   Yes No   Sig: Take 45 mLs by mouth 3 times daily    omeprazole (PRILOSEC) 20 MG CR capsule   Yes No   Sig: Take 20 mg by mouth daily    rifaximin (XIFAXAN) 550 MG TABS tablet   Yes No   Sig: Take 550 mg by mouth 2 times daily   spironolactone (ALDACTONE) 50 MG tablet   Yes No   Sig: Take 50 mg by mouth daily    torsemide (DEMADEX) 20 MG tablet   Yes No   Sig: Take 20 mg by mouth daily    vitamin D (ERGOCALCIFEROL) 09912 UNIT capsule   No No   Sig: Take 1 capsule (50,000 Units) by mouth once a week      Facility-Administered Medications: None     Allergies   No Known Allergies    Social History   I have reviewed this patient's social history and updated it with pertinent information if needed. Joe OSMAN Ian  reports that he has never smoked. He has quit using smokeless tobacco. His smokeless tobacco use included Chew. He reports that he does not drink alcohol or use illicit drugs.    Family History   I have reviewed this patient's family history and updated it with pertinent information if needed.   No family history on file.    Review of Systems   C: NEGATIVE for fever, chills, change in weight  E/M: NEGATIVE for ear,  mouth and throat problems  R: NEGATIVE for significant cough or SOB  CV: NEGATIVE for chest pain, palpitations or peripheral edema  CV: NEGATIVE for chest pain, palpitations or peripheral edema  GI: NEGATIVE for nausea, abdominal pain, heartburn, or change in bowel habits  : negative for dysuria, hematuria, decreased urinary stream, erectile dysfunction  MUSCULOSKELETAL: NEGATIVE for significant arthralgias or myalgia  NEURO: NEGATIVE for weakness, dizziness or paresthesias  ENDOCRINE: NEGATIVE for temperature intolerance, skin/hair changes  HEME/ALLERGY/IMMUNE: NEGATIVE for bleeding problems  PSYCHIATRIC: negative  ROS otherwise negative    Physical Exam   Temp: 97.7  F (36.5  C) Temp src: Oral   Pulse: 92   Resp: 16 SpO2: 100 % O2 Device: None (Room air)    Vital Signs with Ranges  Temp:  [97.7  F (36.5  C)] 97.7  F (36.5  C)  Pulse:  [92] 92  Resp:  [16] 16  SpO2:  [100 %] 100 %  217 lbs 14.4 oz    General Appearance: in no apparent distress.   Skin: normal, dry, jaundice  Heart: regular rate and rhythm  Lungs: clear to auscultation  Abdomen: soft, non tender, fluid wave.  Extremities: edema: present bilateral. 1-2+   Neurologic: awake, alert and oriented. Tremor absent.. Asterixis: absent.  No IV access    Data   Results for orders placed or performed during the hospital encounter of 06/27/17 (from the past 24 hour(s))   EKG 12-lead, tracing only   Result Value Ref Range    Interpretation ECG Click View Image link to view waveform and result    CBC with platelets differential   Result Value Ref Range    WBC 4.5 4.0 - 11.0 10e9/L    RBC Count 2.88 (L) 4.4 - 5.9 10e12/L    Hemoglobin 10.9 (L) 13.3 - 17.7 g/dL    Hematocrit 31.2 (L) 40.0 - 53.0 %     (H) 78 - 100 fl    MCH 37.8 (H) 26.5 - 33.0 pg    MCHC 34.9 31.5 - 36.5 g/dL    RDW 13.9 10.0 - 15.0 %    Platelet Count 49 (LL) 150 - 450 10e9/L    Diff Method Automated Method     % Neutrophils 60.5 %    % Lymphocytes 25.3 %    % Monocytes 13.1 %    %  Eosinophils 0.7 %    % Basophils 0.2 %    % Immature Granulocytes 0.2 %    Nucleated RBCs 0 0 /100    Absolute Neutrophil 2.7 1.6 - 8.3 10e9/L    Absolute Lymphocytes 1.1 0.8 - 5.3 10e9/L    Absolute Monocytes 0.6 0.0 - 1.3 10e9/L    Absolute Eosinophils 0.0 0.0 - 0.7 10e9/L    Absolute Basophils 0.0 0.0 - 0.2 10e9/L    Abs Immature Granulocytes 0.0 0 - 0.4 10e9/L    Absolute Nucleated RBC 0.0     Platelet Estimate Confirming automated cell count    INR   Result Value Ref Range    INR 2.33 (H) 0.86 - 1.14   Partial thromboplastin time   Result Value Ref Range    PTT 45 (H) 22 - 37 sec   Fibrinogen activity   Result Value Ref Range    Fibrinogen 173 (L) 200 - 420 mg/dL   Comprehensive metabolic panel   Result Value Ref Range    Sodium 135 133 - 144 mmol/L    Potassium 4.3 3.4 - 5.3 mmol/L    Chloride 100 94 - 109 mmol/L    Carbon Dioxide 30 20 - 32 mmol/L    Anion Gap 6 3 - 14 mmol/L    Glucose 107 (H) 70 - 99 mg/dL    Urea Nitrogen 12 7 - 30 mg/dL    Creatinine 0.63 (L) 0.66 - 1.25 mg/dL    GFR Estimate >90  Non  GFR Calc   >60 mL/min/1.7m2    GFR Estimate If Black >90   GFR Calc   >60 mL/min/1.7m2    Calcium 8.3 (L) 8.5 - 10.1 mg/dL    Bilirubin Total 7.1 (H) 0.2 - 1.3 mg/dL    Albumin 2.4 (L) 3.4 - 5.0 g/dL    Protein Total 6.0 (L) 6.8 - 8.8 g/dL    Alkaline Phosphatase 280 (H) 40 - 150 U/L    ALT 51 0 - 70 U/L    AST 88 (H) 0 - 45 U/L   Magnesium level   Result Value Ref Range    Magnesium 2.0 1.6 - 2.3 mg/dL   Phosphorus level   Result Value Ref Range    Phosphorus 3.8 2.5 - 4.5 mg/dL   Amylase   Result Value Ref Range    Amylase 62 30 - 110 U/L   ABO/Rh type and screen   Result Value Ref Range    ABO Pending     RH(D) Pending     Antibody Screen Pending     Test Valid Only At       [facility]    Specimen Expires 06/30/2017

## 2017-06-27 NOTE — IP AVS SNAPSHOT
MRN:[id number]                      After Visit Summary   2017    Joe Sosa    MRN: [id number]           Thank you!     Thank you for choosing [facility] for your care. Our goal is always to provide you with excellent care. Hearing back from our patients is one way we can continue to improve our services. Please take a few minutes to complete the written survey that you may receive in the mail after you visit with us. Thank you!        Patient Information     Date Of Birth          1990        Designated Caregiver       Most Recent Value    Caregiver    Will someone help with your care after discharge? yes    Name of designated caregiver Therese Welch    Phone number of caregiver [phone]    Caregiver address [id number], [place]      About your hospital stay     You were admitted on:  2017 You last received care in the:  [address] [facility] [place]    You were discharged on:  2017        Reason for your hospital stay        donor liver transplant, duct to duct anastomosis of bile duct over biliary stent. Surgeon: Dr. Martin                  Who to Call     For medical emergencies, please call 911.  For non-urgent questions about your medical care, please call your primary care provider or clinic, [phone]  For questions related to your surgery, please call your surgery clinic        Attending Provider     Provider Specialty    Chino Martin MD Transplant    [facility]       Primary Care Provider Office Phone # Fax #    Jeffery Azul [phone]      Follow-up Appointments     Adult [facility] Follow-up and recommended labs and tests       [facility] will [facility] in [facility] (ph. [phone]) next Monday, 7/10.   Your labs will be drawn at 9:00am just prior to your appointment. DO NOT take tacrolimus (Prograf) until after your labs are drawn. Remember to bring all  of your medications with you to this appointment.      LABS:  Transplant labs (CBC, BMP, hepatic panel, mag, phos, and tacrolimus levels) to be drawn every Monday and Thursday for 4 weeks.  Check Tacrolimus level and BMP on 7/7. Page results to transplant fellow, [phone]).   FOLLOW UP APPOINTMENTS:  Remember to always bring an updated medication list to all appointments.     Follow up with your transplant surgeon, Dr. Martin, in 2 weeks.  Follow up with transplant hepatology at 1 month.    Follow up with primary care provider in 2-4 weeks. (Pt to schedule). Follow up blood glucose levels.   Your staples will be removed 3 weeks after your operation.  You have a biliary stent in place.  Your coordinator will follow up in 6-8 weeks.  Call scheduling at [phone] (option 1), if you have not heard about your appointments within 48 hours after discharge.    WHEN TO CONTACT YOUR  COORDINATOR:  Transplant Coordinator [phone]  Notify your coordinator if you have abdominal pain, increased redness or drainage from your incision, or fever greater than 100.5F.  Notify your coordinator immediately if you are ever unable to take your immunosuppressive medications for any reason.  If it is outside of office hours, please call the hospital switchboard at [phone] and ask to have the liver transplant surgery fellow paged for urgent medical questions, or present to the emergency department.    OTHER DISCHARGE INSTRUCTIONS:  LAM plan: Please monitor color and amount of output. Drain will remain in place until output is < 500 cc x 3 days.   Monitor blood glucose levels with scheduled labs (fasting labs).   Walk at least four times a day, lift no greater than 10 pounds for 6-8 weeks from the time of surgery.  No driving while taking narcotics or 3 weeks after surgery.    Diet recommendations post-transplant: Heart healthy dietary habits long term (low saturated/trans fat, low sodium). High protein diet x 8 weeks. Practice  food safety precautions.                  Your next 10 appointments already scheduled     Jul 07, 2017  8:40 AM CDT   LAB with BA LAB ONLY   [facility] ([facility])    [address] [place] [address] [phone]           Patient must bring picture ID.  Patient should be prepared to give a urine specimen  Please do not eat 10-12 hours before your appointment if you are coming in fasting for labs on lipids, cholesterol, or glucose (sugar).  Pregnant women should follow their Care Team instructions. Water with medications is okay. Do not drink coffee or other fluids.   If you have concerns about taking  your medications, please ask at office or if scheduling via [employer], send a message by clicking on Secure Messaging, Message Your Care Team.            Jul 10, 2017  9:00 AM CDT   (Arrive by 8:45 AM)   New Transplant Visit with UC SPEC INFUSION, UC 43 ATC   [facility] ([facility])    [address]  [address] [facility] [id number] [phone]            Jul 10, 2017  9:30 AM CDT   (Arrive by 9:15 AM)   Return with Chino Martin MD   [facility] ([facility])    [address]  [address] [facility] [id number] [phone]            Sep 19, 2017 10:15 AM CDT   Lab with UC LAB   [facility] ([facility])    [address] [place] [address]  1st Floor  [facility] [address] [phone]            Sep 19, 2017 11:10 AM CDT   (Arrive by 10:55 AM)   Return Liver Transplant with Andreina Templeton MD   [facility] ([facility])    [address]  3rd Floor  [facility] [id number] [phone]              Additional Services     Home care nursing referral       _______________________  [facility]  Phone   "[phone]  Fax  [phone]  ______________________    RN skilled nursing visit, pt will need visit Saturday, 7/8   ---Joe, Home care cannot see you on Friday 7/7 do you will need to go to [facility] for Labs @ 8:20am 7/7/17 (just this one time that home care cannot see you)  ---pt will come to ATC clinic on Monday, 7/10 @ 0900    RN to assess vital signs and weight, respiratory and cardiac status, pain level and activity tolerance, incision for signs/symptoms of infection, hydration, nutrition and bowel status and home safety.    RN to teach medication management.    RN to provide morning lab draws (Monday/Thursday)  And per MD orders      Your provider has ordered home care nursing services. If you have not been contacted within 2 days of your discharge please call the inpatient department phone number at [phone] .                  Further instructions from your care team       ________________________________________________________  Discharge RN please fax discharge orders to home care agency: [facility]  ________________________________________________________        HOLD Tacrolimus on 7/6 evening dose. Restart tacrolimus 3 mg oral twice a day on 7/7 morning dose.     Pending Results     No orders found from 6/25/2017 to 6/28/2017.            Statement of Approval     Ordered          07/06/17 0958  I have reviewed and agree with all the recommendations and orders detailed in this document.     Approved and electronically signed by:  Tennille Peña PA-C             Admission Information     Date & Time Provider Department Dept. Phone    6/27/2017 Gian Jurado MD [address] [facility] [phone]      Your Vitals Were     Blood Pressure Pulse Temperature Respirations Height Weight    130/87 (BP Location: Right arm) 77 98.2  F (36.8  C) (Oral) 16 1.88 m (6' 2\") 100.9 kg (222 lb 6.4 oz)    Pulse Oximetry BMI (Body Mass Index)                97% 28.55 kg/m2          MyChart " Information     [employer] gives you secure access to your electronic health record. If you see a primary care provider, you can also send messages to your care team and make appointments. If you have questions, please call your primary care clinic.  If you do not have a primary care provider, please call [phone] and they will assist you.        Care EveryWhere ID     This is your Care EveryWhere ID. This could be used by other organizations to access your [place] medical records  [id number]        Equal Access to Services     JENAE VAUGHN : Hadii hermilo turner hadasho Soomaali, waaxda luqadaha, qaybta kaalmada adeegyada, waxobdulio lee. So [facility] [phone].    ATENCIÓN: Si habla español, tiene a chun disposición servicios gratuitos de asistencia lingüística. Llame al [phone].    We comply with applicable federal civil rights laws and [place] laws. We do not discriminate on the basis of race, color, national origin, age, disability sex, sexual orientation or gender identity.               Review of your medicines      START taking        Dose / Directions    clotrimazole 10 MG Lozg lozenge   Commonly known as:  MYCELEX        Dose:  1 Nona   Place 1 lozenge (1 Nona) inside cheek 3 times daily   Quantity:  90 each   Refills:  0       furosemide 20 MG tablet   Commonly known as:  LASIX        Dose:  20 mg   Take 1 tablet (20 mg) by mouth daily for 7 days   Quantity:  7 tablet   Refills:  1       multivitamin, therapeutic with minerals Tabs tablet        Dose:  1 tablet   1 tablet by Per Feeding Tube route daily   Quantity:  30 each   Refills:  0       mycophenolate 250 MG capsule   Commonly known as:  CELLCEPT - GENERIC EQUIVALENT        Dose:  1000 mg   Take 4 capsules (1,000 mg) by mouth 2 times daily   Quantity:  240 capsule   Refills:  11       oxyCODONE 5 MG IR tablet   Commonly known as:  ROXICODONE        Dose:  5-10 mg   Take 1-2 tablets (5-10 mg) by mouth every 6 hours as  needed for moderate to severe pain   Quantity:  40 tablet   Refills:  0       senna-docusate 8.6-50 MG per tablet   Commonly known as:  SENOKOT-S;PERICOLACE        Dose:  2 tablet   Take 2 tablets by mouth 2 times daily as needed for constipation   Quantity:  30 tablet   Refills:  0       sulfamethoxazole-trimethoprim 400-80 MG per tablet   Commonly known as:  BACTRIM/SEPTRA   Indication:  PCP prophylaxis        Dose:  1 tablet   Take 1 tablet by mouth daily   Quantity:  30 tablet   Refills:  5       tacrolimus 1 MG capsule   Commonly known as:  PROGRAF - GENERIC EQUIVALENT        Dose:  3 mg   Start taking on:  7/7/2017   Take 3 capsules (3 mg) by mouth 2 times daily   Quantity:  180 capsule   Refills:  11       ursodiol 300 MG capsule   Commonly known as:  ACTIGALL        Dose:  300 mg   Take 1 capsule (300 mg) by mouth 2 times daily   Quantity:  60 capsule   Refills:  11       valGANciclovir 450 MG tablet   Commonly known as:  VALCYTE   Indication:  Treatment to Prevent Cytomegalovirus Disease        Dose:  450 mg   Take 1 tablet (450 mg) by mouth daily   Quantity:  30 tablet   Refills:  5         CONTINUE these medicines which have NOT CHANGED        Dose / Directions    omeprazole 20 MG CR capsule   Commonly known as:  priLOSEC   Used for:  Vitamin D deficiency        Dose:  20 mg   Take 20 mg by mouth daily   Refills:  0       vitamin D 34419 UNIT capsule   Commonly known as:  ERGOCALCIFEROL   Used for:  Vitamin D deficiency        Dose:  30765 Units   Take 1 capsule (50,000 Units) by mouth once a week   Quantity:  8 capsule   Refills:  0         STOP taking     lactulose 10 GM/15ML solution   Commonly known as:  CHRONULAC           rifaximin 550 MG Tabs tablet   Commonly known as:  XIFAXAN           spironolactone 50 MG tablet   Commonly known as:  ALDACTONE           torsemide 20 MG tablet   Commonly known as:  DEMADEX                Where to get your medicines      These medications were sent to [facility] [place], [place] - [address] [address]     Phone:  [phone]     clotrimazole 10 MG Lozg lozenge    furosemide 20 MG tablet    multivitamin, therapeutic with minerals Tabs tablet    mycophenolate 250 MG capsule    senna-docusate 8.6-50 MG per tablet    sulfamethoxazole-trimethoprim 400-80 MG per tablet    tacrolimus 1 MG capsule    ursodiol 300 MG capsule    valGANciclovir 450 MG tablet         Some of these will need a paper prescription and others can be bought over the counter. Ask your nurse if you have questions.     Bring a paper prescription for each of these medications     oxyCODONE 5 MG IR tablet                Protect others around you: Learn how to safely use, store and throw away your medicines at www.disposemymeds.org.             Medication List: This is a list of all your medications and when to take them. Check marks below indicate your daily home schedule. Keep this list as a reference.      Medications           Morning Afternoon Evening Bedtime As Needed    clotrimazole 10 MG Lozg lozenge   Commonly known as:  MYCELEX   Place 1 lozenge (1 Nona) inside cheek 3 times daily   Last time this was given:  1 Nona on 7/6/2017  8:27 AM                                furosemide 20 MG tablet   Commonly known as:  LASIX   Take 1 tablet (20 mg) by mouth daily for 7 days   Last time this was given:  40 mg on 7/6/2017  8:27 AM                                multivitamin, therapeutic with minerals Tabs tablet   1 tablet by Per Feeding Tube route daily   Last time this was given:  1 tablet on 7/5/2017  1:04 PM                                mycophenolate 250 MG capsule   Commonly known as:  CELLCEPT - GENERIC EQUIVALENT   Take 4 capsules (1,000 mg) by mouth 2 times daily   Last time this was given:  1,000 mg on 7/6/2017  8:27 AM                                omeprazole 20 MG CR capsule   Commonly known as:  priLOSEC   Take 20 mg by mouth  daily   Last time this was given:  20 mg on 6/27/2017 11:02 AM                                oxyCODONE 5 MG IR tablet   Commonly known as:  ROXICODONE   Take 1-2 tablets (5-10 mg) by mouth every 6 hours as needed for moderate to severe pain   Last time this was given:  10 mg on 7/6/2017 11:01 AM                                senna-docusate 8.6-50 MG per tablet   Commonly known as:  SENOKOT-S;PERICOLACE   Take 2 tablets by mouth 2 times daily as needed for constipation   Last time this was given:  2 tablets on 7/4/2017  7:23 PM                                sulfamethoxazole-trimethoprim 400-80 MG per tablet   Commonly known as:  BACTRIM/SEPTRA   Take 1 tablet by mouth daily   Last time this was given:  1 tablet on 7/6/2017  8:27 AM                                tacrolimus 1 MG capsule   Commonly known as:  PROGRAF - GENERIC EQUIVALENT   Take 3 capsules (3 mg) by mouth 2 times daily   Start taking on:  7/7/2017   Last time this was given:  5 mg on 7/6/2017  8:27 AM                                ursodiol 300 MG capsule   Commonly known as:  ACTIGALL   Take 1 capsule (300 mg) by mouth 2 times daily   Last time this was given:  300 mg on 7/6/2017  8:27 AM                                valGANciclovir 450 MG tablet   Commonly known as:  VALCYTE   Take 1 tablet (450 mg) by mouth daily   Last time this was given:  450 mg on 7/6/2017  8:27 AM                                vitamin D 98587 UNIT capsule   Commonly known as:  ERGOCALCIFEROL   Take 1 capsule (50,000 Units) by mouth once a week

## 2017-06-27 NOTE — IP AVS SNAPSHOT
[address] [address]    Phone:  [phone]                                       After Visit Summary   6/27/2017    Joe Sosa    MRN: [id number]           After Visit Summary Signature Page     I have received my discharge instructions, and my questions have been answered. I have discussed any challenges I see with this plan with the nurse or doctor.    ..........................................................................................................................................  Patient/Patient Representative Signature      ..........................................................................................................................................  Patient Representative Print Name and Relationship to Patient    ..................................................               ................................................  Date                                            Time    ..........................................................................................................................................  Reviewed by Signature/Title    ...................................................              ..............................................  Date                                                            Time

## 2017-06-27 NOTE — TELEPHONE ENCOUNTER
Donors with risk factors or behaviors that increase their chance of having an infection are called a Public Health Service (or [facility]) Increased Risk donor.  These donors undergo basic and a second level of more sensitive testing for infections like HIV (AIDS), Hepatitis B or C.   This particular donor meets PHS Increased Risk guidelines due to Person who has been newly diagnosed with or have been treated for syphilis, gonorrhea, chlamydia, or genital ulcers in the preceding 12 months     Even without the PHS increased risk label, infection or cancer can be transmitted from donors through transplant, but it is rare.  The risk of getting a donor transmitted infection from this donor is low, but higher than a donor without this label, so that is why we discuss this with you.  The test results for HIV and hepatitis in this donor were negative. Even with negative test results, there is still a small chance (less than 1 in 100) that this donor could have an infection that could be transmitted with the organ transplant.  Our doctor has reviewed the information about this donor. She/he recommends that you consider this organ. In his/her opinion, the potential benefits of accepting the organ outweigh the risks of getting an infection from this donor.   Everyone has a different level of how much risk they are willing to accept for themselves.  The decision to accept this organ is yours.  If you decide NOT to accept the organ, you will not lose your place on the waiting list.  Do you have any questions?

## 2017-06-27 NOTE — CONSULTS
Liver Transplant Social Work  D: Joe Sosa is a 26 year old single man who was admitted for a potential  donor liver transplant today. I met with him and his mother for a short time to introduce myself and social work services, as his liver transplant , Ngoc Echeverria, is away this week. Joe lives with his mother in [place]. She will be his primary caregiver.   I: I provided brief education about the transplant process, and answered questions. I also provided his mother with information about discount parking passes. A chart review was also performed.   A: Joe and his mother were awaiting an update from the transplant surgeons, who arrived as I was leaving.   P: I will follow up with this patient and his mother tomorrow. (pager [phone])

## 2017-06-27 NOTE — PLAN OF CARE
Problem: Goal Outcome Summary  Goal: Goal Outcome Summary  Outcome: No Change  Patient admitted for possible liver transplant. At this time awaiting results. Possible 3 am surgery. Labs, EKG, Chest x-ray completed. Still needs PIV and two showers. Patient is alert and oriented. VSS on room air. Tolerating a regular diet. Switched to clears NPO at 1800. Up independently. Will continue to monitor and plan of care.

## 2017-06-28 ENCOUNTER — DOCUMENTATION ONLY (OUTPATIENT)
Dept: TRANSPLANT | Facility: CLINIC | Age: 27
End: 2017-06-28

## 2017-06-28 ENCOUNTER — APPOINTMENT (OUTPATIENT)
Dept: GENERAL RADIOLOGY | Facility: CLINIC | Age: 27
DRG: 006 | End: 2017-06-28
Attending: TRANSPLANT SURGERY
Payer: COMMERCIAL

## 2017-06-28 ENCOUNTER — ANESTHESIA (OUTPATIENT)
Dept: SURGERY | Facility: CLINIC | Age: 27
DRG: 006 | End: 2017-06-28
Payer: COMMERCIAL

## 2017-06-28 PROBLEM — Z94.4 LIVER TRANSPLANT RECIPIENT (H): Status: ACTIVE | Noted: 2017-06-28

## 2017-06-28 PROBLEM — Z94.4 S/P LIVER TRANSPLANT (H): Status: ACTIVE | Noted: 2017-06-28

## 2017-06-28 LAB
ALBUMIN SERPL-MCNC: 2.2 G/DL (ref 3.4–5)
ALP SERPL-CCNC: 84 U/L (ref 40–150)
ALT SERPL W P-5'-P-CCNC: 836 U/L (ref 0–70)
ANGLE RATE OF CLOT GROWTH: 61.2 DEG (ref 59–74)
ANGLE RATE OF CLOT GROWTH: 62.6 DEG (ref 59–74)
ANGLE RATE OF CLOT GROWTH: 63.3 DEG (ref 59–74)
ANGLE RATE OF CLOT GROWTH: 63.6 DEG (ref 59–74)
ANGLE RATE OF CLOT GROWTH: 63.7 DEG (ref 59–74)
ANGLE RATE OF CLOT GROWTH: NORMAL DEG (ref 59–74)
ANGLE RATE OF CLOT GROWTH: NORMAL DEG (ref 59–74)
ANION GAP SERPL CALCULATED.3IONS-SCNC: 7 MMOL/L (ref 3–14)
ANION GAP SERPL CALCULATED.3IONS-SCNC: 8 MMOL/L (ref 3–14)
APTT PPP: 103 SEC (ref 22–37)
APTT PPP: 45 SEC (ref 22–37)
APTT PPP: 59 SEC (ref 22–37)
AST SERPL W P-5'-P-CCNC: 2612 U/L (ref 0–45)
BASE DEFICIT BLDA-SCNC: 1.1 MMOL/L
BASE DEFICIT BLDA-SCNC: 2.6 MMOL/L
BASE DEFICIT BLDA-SCNC: 3.8 MMOL/L
BASE DEFICIT BLDA-SCNC: 4 MMOL/L
BASE DEFICIT BLDA-SCNC: 4.4 MMOL/L
BASE DEFICIT BLDA-SCNC: NORMAL MMOL/L
BASE EXCESS BLDA CALC-SCNC: 0 MMOL/L
BASE EXCESS BLDA CALC-SCNC: 3.7 MMOL/L
BASE EXCESS BLDA CALC-SCNC: NORMAL MMOL/L
BASE EXCESS BLDV CALC-SCNC: 0.4 MMOL/L
BASOPHILS # BLD AUTO: 0 10E9/L (ref 0–0.2)
BASOPHILS NFR BLD AUTO: 0 %
BILIRUB SERPL-MCNC: 7.5 MG/DL (ref 0.2–1.3)
BLD PROD TYP BPU: NORMAL
BLD UNIT ID BPU: 0
BLOOD PRODUCT CODE: NORMAL
BPU ID: NORMAL
BUN SERPL-MCNC: 16 MG/DL (ref 7–30)
BUN SERPL-MCNC: 20 MG/DL (ref 7–30)
CA-I BLD-MCNC: 4.1 MG/DL (ref 4.4–5.2)
CA-I BLD-MCNC: 4.2 MG/DL (ref 4.4–5.2)
CA-I BLD-MCNC: 4.4 MG/DL (ref 4.4–5.2)
CA-I BLD-MCNC: 4.9 MG/DL (ref 4.4–5.2)
CA-I BLD-MCNC: 5.2 MG/DL (ref 4.4–5.2)
CA-I BLD-MCNC: 5.3 MG/DL (ref 4.4–5.2)
CA-I BLD-MCNC: 5.3 MG/DL (ref 4.4–5.2)
CA-I BLD-MCNC: NORMAL MG/DL (ref 4.4–5.2)
CALCIUM SERPL-MCNC: 8.6 MG/DL (ref 8.5–10.1)
CALCIUM SERPL-MCNC: 9 MG/DL (ref 8.5–10.1)
CHLORIDE SERPL-SCNC: 108 MMOL/L (ref 94–109)
CHLORIDE SERPL-SCNC: 108 MMOL/L (ref 94–109)
CI HYPERCOAGULATION INDEX: NORMAL RATIO (ref 0–3)
CI HYPERCOAGULATION INDEX: NORMAL RATIO (ref 0–3)
CI HYPOCOAGULATION INDEX: 0.5 RATIO (ref 0–3)
CI HYPOCOAGULATION INDEX: 0.8 RATIO (ref 0–3)
CI HYPOCOAGULATION INDEX: 1.9 RATIO (ref 0–3)
CI HYPOCOAGULATION INDEX: 2.5 RATIO (ref 0–3)
CI HYPOCOAGULATION INDEX: 3.4 RATIO (ref 0–3)
CI HYPOCOAGULATION INDEX: NORMAL RATIO (ref 0–3)
CI HYPOCOAGULATION INDEX: NORMAL RATIO (ref 0–3)
CLOT LYSIS 30M P MA LENFR BLD TEG: 0 % (ref 0–8)
CLOT LYSIS 30M P MA LENFR BLD TEG: NORMAL % (ref 0–8)
CLOT LYSIS 30M P MA LENFR BLD TEG: NORMAL % (ref 0–8)
CLOT STRENGTH BLD TEG: 3.9 KD/SC (ref 5.3–13.2)
CLOT STRENGTH BLD TEG: 4.7 KD/SC (ref 5.3–13.2)
CLOT STRENGTH BLD TEG: 4.9 KD/SC (ref 5.3–13.2)
CLOT STRENGTH BLD TEG: 5.1 KD/SC (ref 5.3–13.2)
CLOT STRENGTH BLD TEG: 5.2 KD/SC (ref 5.3–13.2)
CLOT STRENGTH BLD TEG: NORMAL KD/SC (ref 5.3–13.2)
CLOT STRENGTH BLD TEG: NORMAL KD/SC (ref 5.3–13.2)
CO2 SERPL-SCNC: 24 MMOL/L (ref 20–32)
CO2 SERPL-SCNC: 25 MMOL/L (ref 20–32)
CREAT SERPL-MCNC: 0.64 MG/DL (ref 0.66–1.25)
CREAT SERPL-MCNC: 0.74 MG/DL (ref 0.66–1.25)
DIFFERENTIAL METHOD BLD: ABNORMAL
EOSINOPHIL # BLD AUTO: 0 10E9/L (ref 0–0.7)
EOSINOPHIL NFR BLD AUTO: 0 %
ERYTHROCYTE [DISTWIDTH] IN BLOOD BY AUTOMATED COUNT: 17.9 % (ref 10–15)
ERYTHROCYTE [DISTWIDTH] IN BLOOD BY AUTOMATED COUNT: 18.1 % (ref 10–15)
ERYTHROCYTE [DISTWIDTH] IN BLOOD BY AUTOMATED COUNT: 18.4 % (ref 10–15)
FIBRINOGEN PPP-MCNC: 132 MG/DL (ref 200–420)
FIBRINOGEN PPP-MCNC: 144 MG/DL (ref 200–420)
FIBRINOGEN PPP-MCNC: 146 MG/DL (ref 200–420)
FIBRINOGEN PPP-MCNC: 152 MG/DL (ref 200–420)
FIBRINOGEN PPP-MCNC: 154 MG/DL (ref 200–420)
GFR SERPL CREATININE-BSD FRML MDRD: ABNORMAL ML/MIN/1.7M2
GFR SERPL CREATININE-BSD FRML MDRD: ABNORMAL ML/MIN/1.7M2
GLUCOSE BLD-MCNC: 115 MG/DL (ref 70–99)
GLUCOSE BLD-MCNC: 123 MG/DL (ref 70–99)
GLUCOSE BLD-MCNC: 239 MG/DL (ref 70–99)
GLUCOSE BLD-MCNC: 261 MG/DL (ref 70–99)
GLUCOSE BLD-MCNC: 95 MG/DL (ref 70–99)
GLUCOSE BLD-MCNC: 97 MG/DL (ref 70–99)
GLUCOSE BLD-MCNC: NORMAL MG/DL (ref 70–99)
GLUCOSE BLDC GLUCOMTR-MCNC: 159 MG/DL (ref 70–99)
GLUCOSE BLDC GLUCOMTR-MCNC: 162 MG/DL (ref 70–99)
GLUCOSE BLDC GLUCOMTR-MCNC: 166 MG/DL (ref 70–99)
GLUCOSE BLDC GLUCOMTR-MCNC: 171 MG/DL (ref 70–99)
GLUCOSE BLDC GLUCOMTR-MCNC: 174 MG/DL (ref 70–99)
GLUCOSE BLDC GLUCOMTR-MCNC: 179 MG/DL (ref 70–99)
GLUCOSE BLDC GLUCOMTR-MCNC: 198 MG/DL (ref 70–99)
GLUCOSE BLDC GLUCOMTR-MCNC: 213 MG/DL (ref 70–99)
GLUCOSE BLDC GLUCOMTR-MCNC: 218 MG/DL (ref 70–99)
GLUCOSE BLDC GLUCOMTR-MCNC: 241 MG/DL (ref 70–99)
GLUCOSE BLDC GLUCOMTR-MCNC: 268 MG/DL (ref 70–99)
GLUCOSE SERPL-MCNC: 197 MG/DL (ref 70–99)
GLUCOSE SERPL-MCNC: 250 MG/DL (ref 70–99)
HCO3 BLD-SCNC: 20 MMOL/L (ref 21–28)
HCO3 BLD-SCNC: 20 MMOL/L (ref 21–28)
HCO3 BLD-SCNC: 21 MMOL/L (ref 21–28)
HCO3 BLD-SCNC: 22 MMOL/L (ref 21–28)
HCO3 BLD-SCNC: 24 MMOL/L (ref 21–28)
HCO3 BLD-SCNC: 24 MMOL/L (ref 21–28)
HCO3 BLD-SCNC: 27 MMOL/L (ref 21–28)
HCO3 BLD-SCNC: NORMAL MMOL/L (ref 21–28)
HCO3 BLDV-SCNC: 24 MMOL/L (ref 21–28)
HCT VFR BLD AUTO: 21 % (ref 40–53)
HCT VFR BLD AUTO: 25 % (ref 40–53)
HCT VFR BLD AUTO: 25.8 % (ref 40–53)
HGB BLD-MCNC: 7.5 G/DL (ref 13.3–17.7)
HGB BLD-MCNC: 8.3 G/DL (ref 13.3–17.7)
HGB BLD-MCNC: 8.4 G/DL (ref 13.3–17.7)
HGB BLD-MCNC: 8.5 G/DL (ref 13.3–17.7)
HGB BLD-MCNC: 8.9 G/DL (ref 13.3–17.7)
HGB BLD-MCNC: 9 G/DL (ref 13.3–17.7)
HGB BLD-MCNC: 9.1 G/DL (ref 13.3–17.7)
HGB BLD-MCNC: 9.2 G/DL (ref 13.3–17.7)
HGB BLD-MCNC: 9.2 G/DL (ref 13.3–17.7)
HGB BLD-MCNC: 9.3 G/DL (ref 13.3–17.7)
HGB BLD-MCNC: 9.4 G/DL (ref 13.3–17.7)
HGB BLD-MCNC: NORMAL G/DL (ref 13.3–17.7)
IMM GRANULOCYTES # BLD: 0 10E9/L (ref 0–0.4)
IMM GRANULOCYTES NFR BLD: 0.2 %
IMM GRANULOCYTES NFR BLD: 0.3 %
IMM GRANULOCYTES NFR BLD: 0.3 %
INR PPP: 1.64 (ref 0.86–1.14)
INR PPP: 2.24 (ref 0.86–1.14)
INR PPP: 2.49 (ref 0.86–1.14)
INR PPP: 2.49 (ref 0.86–1.14)
INR PPP: 2.78 (ref 0.86–1.14)
INR PPP: 3.4 (ref 0.86–1.14)
INTERPRETATION ECG - MUSE: NORMAL
K TIME TO SPEC CLOT STRENGTH: 1.9 MIN (ref 1–3)
K TIME TO SPEC CLOT STRENGTH: 2.1 MIN (ref 1–3)
K TIME TO SPEC CLOT STRENGTH: 2.2 MIN (ref 1–3)
K TIME TO SPEC CLOT STRENGTH: 2.3 MIN (ref 1–3)
K TIME TO SPEC CLOT STRENGTH: 2.3 MIN (ref 1–3)
K TIME TO SPEC CLOT STRENGTH: NORMAL MIN (ref 1–3)
K TIME TO SPEC CLOT STRENGTH: NORMAL MIN (ref 1–3)
LACTATE BLD-SCNC: 1.2 MMOL/L (ref 0.7–2.1)
LACTATE BLD-SCNC: 2.9 MMOL/L (ref 0.7–2.1)
LACTATE BLD-SCNC: 3.9 MMOL/L (ref 0.7–2.1)
LACTATE BLD-SCNC: 4 MMOL/L (ref 0.7–2.1)
LACTATE BLD-SCNC: 4.3 MMOL/L (ref 0.7–2.1)
LACTATE BLD-SCNC: 5.2 MMOL/L (ref 0.7–2.1)
LACTATE BLD-SCNC: 5.6 MMOL/L (ref 0.7–2.1)
LACTATE BLD-SCNC: 5.8 MMOL/L (ref 0.7–2.1)
LIPASE SERPL-CCNC: 327 U/L (ref 73–393)
LY60 LYSIS AT 60 MINUTES: 0 % (ref 0–15)
LY60 LYSIS AT 60 MINUTES: 0.1 % (ref 0–15)
LY60 LYSIS AT 60 MINUTES: 1.9 % (ref 0–15)
LY60 LYSIS AT 60 MINUTES: 2.5 % (ref 0–15)
LY60 LYSIS AT 60 MINUTES: 2.7 % (ref 0–15)
LY60 LYSIS AT 60 MINUTES: NORMAL % (ref 0–15)
LY60 LYSIS AT 60 MINUTES: NORMAL % (ref 0–15)
LYMPHOCYTES # BLD AUTO: 0.3 10E9/L (ref 0.8–5.3)
LYMPHOCYTES # BLD AUTO: 0.4 10E9/L (ref 0.8–5.3)
LYMPHOCYTES # BLD AUTO: 0.5 10E9/L (ref 0.8–5.3)
LYMPHOCYTES NFR BLD AUTO: 4.1 %
LYMPHOCYTES NFR BLD AUTO: 5.6 %
LYMPHOCYTES NFR BLD AUTO: 6.2 %
MA MAXIMUM CLOT STRENGTH: 43.6 MM (ref 55–74)
MA MAXIMUM CLOT STRENGTH: 48.2 MM (ref 55–74)
MA MAXIMUM CLOT STRENGTH: 49.3 MM (ref 55–74)
MA MAXIMUM CLOT STRENGTH: 50.4 MM (ref 55–74)
MA MAXIMUM CLOT STRENGTH: 51 MM (ref 55–74)
MA MAXIMUM CLOT STRENGTH: NORMAL MM (ref 55–74)
MA MAXIMUM CLOT STRENGTH: NORMAL MM (ref 55–74)
MAGNESIUM SERPL-MCNC: 1.8 MG/DL (ref 1.6–2.3)
MAGNESIUM SERPL-MCNC: 2 MG/DL (ref 1.6–2.3)
MCH RBC QN AUTO: 36.5 PG (ref 26.5–33)
MCH RBC QN AUTO: 36.6 PG (ref 26.5–33)
MCH RBC QN AUTO: 37 PG (ref 26.5–33)
MCHC RBC AUTO-ENTMCNC: 35.7 G/DL (ref 31.5–36.5)
MCHC RBC AUTO-ENTMCNC: 35.7 G/DL (ref 31.5–36.5)
MCHC RBC AUTO-ENTMCNC: 36.4 G/DL (ref 31.5–36.5)
MCV RBC AUTO: 102 FL (ref 78–100)
MONOCYTES # BLD AUTO: 0.4 10E9/L (ref 0–1.3)
MONOCYTES # BLD AUTO: 0.4 10E9/L (ref 0–1.3)
MONOCYTES # BLD AUTO: 0.7 10E9/L (ref 0–1.3)
MONOCYTES NFR BLD AUTO: 5.4 %
MONOCYTES NFR BLD AUTO: 6.1 %
MONOCYTES NFR BLD AUTO: 9.4 %
MRSA DNA SPEC QL NAA+PROBE: ABNORMAL
NEUTROPHILS # BLD AUTO: 6 10E9/L (ref 1.6–8.3)
NEUTROPHILS # BLD AUTO: 6.5 10E9/L (ref 1.6–8.3)
NEUTROPHILS # BLD AUTO: 7.4 10E9/L (ref 1.6–8.3)
NEUTROPHILS NFR BLD AUTO: 84.1 %
NEUTROPHILS NFR BLD AUTO: 88 %
NEUTROPHILS NFR BLD AUTO: 90.3 %
NRBC # BLD AUTO: 0 10*3/UL
NRBC BLD AUTO-RTO: 0 /100
NUM BPU REQUESTED: 1
NUM BPU REQUESTED: 2
NUM BPU REQUESTED: 3
NUM BPU REQUESTED: 5
NUM BPU REQUESTED: 5
O2/TOTAL GAS SETTING VFR VENT: 28 %
O2/TOTAL GAS SETTING VFR VENT: 28 %
O2/TOTAL GAS SETTING VFR VENT: 32 %
O2/TOTAL GAS SETTING VFR VENT: 40 %
O2/TOTAL GAS SETTING VFR VENT: 43 %
O2/TOTAL GAS SETTING VFR VENT: NORMAL %
OXYHGB MFR BLDV: 82 %
PCO2 BLD: 32 MM HG (ref 35–45)
PCO2 BLD: 33 MM HG (ref 35–45)
PCO2 BLD: 35 MM HG (ref 35–45)
PCO2 BLD: 36 MM HG (ref 35–45)
PCO2 BLD: 38 MM HG (ref 35–45)
PCO2 BLD: NORMAL MM HG (ref 35–45)
PCO2 BLDV: 35 MM HG (ref 40–50)
PH BLD: 7.38 PH (ref 7.35–7.45)
PH BLD: 7.4 PH (ref 7.35–7.45)
PH BLD: 7.41 PH (ref 7.35–7.45)
PH BLD: 7.41 PH (ref 7.35–7.45)
PH BLD: 7.42 PH (ref 7.35–7.45)
PH BLD: 7.47 PH (ref 7.35–7.45)
PH BLD: 7.53 PH (ref 7.35–7.45)
PH BLD: NORMAL PH (ref 7.35–7.45)
PH BLDV: 7.45 PH (ref 7.32–7.43)
PHOSPHATE SERPL-MCNC: 3.7 MG/DL (ref 2.5–4.5)
PLATELET # BLD AUTO: 54 10E9/L (ref 150–450)
PLATELET # BLD AUTO: 55 10E9/L (ref 150–450)
PLATELET # BLD AUTO: 67 10E9/L (ref 150–450)
PLATELET # BLD AUTO: 69 10E9/L (ref 150–450)
PLATELET # BLD AUTO: 71 10E9/L (ref 150–450)
PO2 BLD: 122 MM HG (ref 80–105)
PO2 BLD: 151 MM HG (ref 80–105)
PO2 BLD: 158 MM HG (ref 80–105)
PO2 BLD: 158 MM HG (ref 80–105)
PO2 BLD: 160 MM HG (ref 80–105)
PO2 BLD: 220 MM HG (ref 80–105)
PO2 BLD: 52 MM HG (ref 80–105)
PO2 BLD: NORMAL MM HG (ref 80–105)
PO2 BLDV: 51 MM HG (ref 25–47)
POTASSIUM BLD-SCNC: 3.6 MMOL/L (ref 3.4–5.3)
POTASSIUM BLD-SCNC: 3.7 MMOL/L (ref 3.4–5.3)
POTASSIUM BLD-SCNC: 3.7 MMOL/L (ref 3.4–5.3)
POTASSIUM BLD-SCNC: 4.1 MMOL/L (ref 3.4–5.3)
POTASSIUM BLD-SCNC: 4.2 MMOL/L (ref 3.4–5.3)
POTASSIUM BLD-SCNC: 4.7 MMOL/L (ref 3.4–5.3)
POTASSIUM BLD-SCNC: NORMAL MMOL/L (ref 3.4–5.3)
POTASSIUM SERPL-SCNC: 3.9 MMOL/L (ref 3.4–5.3)
POTASSIUM SERPL-SCNC: 4 MMOL/L (ref 3.4–5.3)
POTASSIUM SERPL-SCNC: 4 MMOL/L (ref 3.4–5.3)
PROT SERPL-MCNC: 4.6 G/DL (ref 6.8–8.8)
R TIME UNTIL CLOT FORMS: 4.6 MIN (ref 4–9)
R TIME UNTIL CLOT FORMS: 4.7 MIN (ref 4–9)
R TIME UNTIL CLOT FORMS: 6.6 MIN (ref 4–9)
R TIME UNTIL CLOT FORMS: 7.1 MIN (ref 4–9)
R TIME UNTIL CLOT FORMS: 7.2 MIN (ref 4–9)
R TIME UNTIL CLOT FORMS: NORMAL MIN (ref 4–9)
R TIME UNTIL CLOT FORMS: NORMAL MIN (ref 4–9)
RBC # BLD AUTO: 2.05 10E12/L (ref 4.4–5.9)
RBC # BLD AUTO: 2.46 10E12/L (ref 4.4–5.9)
RBC # BLD AUTO: 2.52 10E12/L (ref 4.4–5.9)
SODIUM BLD-SCNC: 133 MMOL/L (ref 133–144)
SODIUM BLD-SCNC: 134 MMOL/L (ref 133–144)
SODIUM BLD-SCNC: 135 MMOL/L (ref 133–144)
SODIUM BLD-SCNC: 135 MMOL/L (ref 133–144)
SODIUM BLD-SCNC: 136 MMOL/L (ref 133–144)
SODIUM BLD-SCNC: 136 MMOL/L (ref 133–144)
SODIUM BLD-SCNC: NORMAL MMOL/L (ref 133–144)
SODIUM SERPL-SCNC: 140 MMOL/L (ref 133–144)
SODIUM SERPL-SCNC: 140 MMOL/L (ref 133–144)
SPECIMEN SOURCE: ABNORMAL
TRANSFUSION STATUS PATIENT QL: NORMAL
WBC # BLD AUTO: 6.8 10E9/L (ref 4–11)
WBC # BLD AUTO: 7.8 10E9/L (ref 4–11)
WBC # BLD AUTO: 8.1 10E9/L (ref 4–11)

## 2017-06-28 PROCEDURE — 25000128 H RX IP 250 OP 636: Performed by: ANESTHESIOLOGY

## 2017-06-28 PROCEDURE — 85384 FIBRINOGEN ACTIVITY: CPT | Performed by: TRANSPLANT SURGERY

## 2017-06-28 PROCEDURE — 83735 ASSAY OF MAGNESIUM: CPT | Performed by: SURGERY

## 2017-06-28 PROCEDURE — S0164 INJECTION PANTROPRAZOLE: HCPCS | Performed by: STUDENT IN AN ORGANIZED HEALTH CARE EDUCATION/TRAINING PROGRAM

## 2017-06-28 PROCEDURE — C9399 UNCLASSIFIED DRUGS OR BIOLOG: HCPCS | Performed by: ANESTHESIOLOGY

## 2017-06-28 PROCEDURE — 25000125 ZZHC RX 250: Performed by: ANESTHESIOLOGY

## 2017-06-28 PROCEDURE — 83605 ASSAY OF LACTIC ACID: CPT | Performed by: TRANSPLANT SURGERY

## 2017-06-28 PROCEDURE — 82803 BLOOD GASES ANY COMBINATION: CPT | Performed by: ANESTHESIOLOGY

## 2017-06-28 PROCEDURE — 25000132 ZZH RX MED GY IP 250 OP 250 PS 637: Performed by: PHYSICIAN ASSISTANT

## 2017-06-28 PROCEDURE — 87077 CULTURE AEROBIC IDENTIFY: CPT | Performed by: TRANSPLANT SURGERY

## 2017-06-28 PROCEDURE — 25000125 ZZHC RX 250: Performed by: STUDENT IN AN ORGANIZED HEALTH CARE EDUCATION/TRAINING PROGRAM

## 2017-06-28 PROCEDURE — P9012 CRYOPRECIPITATE EACH UNIT: HCPCS | Performed by: TRANSPLANT SURGERY

## 2017-06-28 PROCEDURE — 85384 FIBRINOGEN ACTIVITY: CPT | Performed by: STUDENT IN AN ORGANIZED HEALTH CARE EDUCATION/TRAINING PROGRAM

## 2017-06-28 PROCEDURE — 25000128 H RX IP 250 OP 636: Performed by: STUDENT IN AN ORGANIZED HEALTH CARE EDUCATION/TRAINING PROGRAM

## 2017-06-28 PROCEDURE — 27210913 ZZH NUTRITION PRODUCT INTERMEDIATE PACKET

## 2017-06-28 PROCEDURE — 87641 MR-STAPH DNA AMP PROBE: CPT | Performed by: TRANSPLANT SURGERY

## 2017-06-28 PROCEDURE — 27210995 ZZH RX 272: Performed by: TRANSPLANT SURGERY

## 2017-06-28 PROCEDURE — P9037 PLATE PHERES LEUKOREDU IRRAD: HCPCS | Performed by: TRANSPLANT SURGERY

## 2017-06-28 PROCEDURE — 83605 ASSAY OF LACTIC ACID: CPT | Performed by: SURGERY

## 2017-06-28 PROCEDURE — P9041 ALBUMIN (HUMAN),5%, 50ML: HCPCS | Performed by: STUDENT IN AN ORGANIZED HEALTH CARE EDUCATION/TRAINING PROGRAM

## 2017-06-28 PROCEDURE — 82947 ASSAY GLUCOSE BLOOD QUANT: CPT | Performed by: TRANSPLANT SURGERY

## 2017-06-28 PROCEDURE — 88309 TISSUE EXAM BY PATHOLOGIST: CPT | Performed by: TRANSPLANT SURGERY

## 2017-06-28 PROCEDURE — 85610 PROTHROMBIN TIME: CPT | Performed by: TRANSPLANT SURGERY

## 2017-06-28 PROCEDURE — 84295 ASSAY OF SERUM SODIUM: CPT | Performed by: ANESTHESIOLOGY

## 2017-06-28 PROCEDURE — 40000275 ZZH STATISTIC RCP TIME EA 10 MIN

## 2017-06-28 PROCEDURE — 82805 BLOOD GASES W/O2 SATURATION: CPT | Performed by: TRANSPLANT SURGERY

## 2017-06-28 PROCEDURE — 36000068 ZZH SURGERY LEVEL 5 1ST 30 MIN - UMMC: Performed by: TRANSPLANT SURGERY

## 2017-06-28 PROCEDURE — 37000008 ZZH ANESTHESIA TECHNICAL FEE, 1ST 30 MIN: Performed by: TRANSPLANT SURGERY

## 2017-06-28 PROCEDURE — 82330 ASSAY OF CALCIUM: CPT | Performed by: TRANSPLANT SURGERY

## 2017-06-28 PROCEDURE — 83690 ASSAY OF LIPASE: CPT | Performed by: STUDENT IN AN ORGANIZED HEALTH CARE EDUCATION/TRAINING PROGRAM

## 2017-06-28 PROCEDURE — 40000170 ZZH STATISTIC PRE-PROCEDURE ASSESSMENT II: Performed by: TRANSPLANT SURGERY

## 2017-06-28 PROCEDURE — 87081 CULTURE SCREEN ONLY: CPT | Performed by: TRANSPLANT SURGERY

## 2017-06-28 PROCEDURE — 87186 SC STD MICRODIL/AGAR DIL: CPT | Performed by: TRANSPLANT SURGERY

## 2017-06-28 PROCEDURE — 20000004 ZZH R&B ICU UMMC

## 2017-06-28 PROCEDURE — 80048 BASIC METABOLIC PNL TOTAL CA: CPT | Performed by: SURGERY

## 2017-06-28 PROCEDURE — 84132 ASSAY OF SERUM POTASSIUM: CPT | Performed by: TRANSPLANT SURGERY

## 2017-06-28 PROCEDURE — 80053 COMPREHEN METABOLIC PANEL: CPT | Performed by: STUDENT IN AN ORGANIZED HEALTH CARE EDUCATION/TRAINING PROGRAM

## 2017-06-28 PROCEDURE — 25000125 ZZHC RX 250: Performed by: TRANSPLANT SURGERY

## 2017-06-28 PROCEDURE — 37000009 ZZH ANESTHESIA TECHNICAL FEE, EACH ADDTL 15 MIN: Performed by: TRANSPLANT SURGERY

## 2017-06-28 PROCEDURE — 85610 PROTHROMBIN TIME: CPT | Performed by: STUDENT IN AN ORGANIZED HEALTH CARE EDUCATION/TRAINING PROGRAM

## 2017-06-28 PROCEDURE — 85730 THROMBOPLASTIN TIME PARTIAL: CPT | Performed by: STUDENT IN AN ORGANIZED HEALTH CARE EDUCATION/TRAINING PROGRAM

## 2017-06-28 PROCEDURE — 88304 TISSUE EXAM BY PATHOLOGIST: CPT | Performed by: TRANSPLANT SURGERY

## 2017-06-28 PROCEDURE — P9012 CRYOPRECIPITATE EACH UNIT: HCPCS | Performed by: SURGERY

## 2017-06-28 PROCEDURE — 83605 ASSAY OF LACTIC ACID: CPT | Performed by: STUDENT IN AN ORGANIZED HEALTH CARE EDUCATION/TRAINING PROGRAM

## 2017-06-28 PROCEDURE — 83735 ASSAY OF MAGNESIUM: CPT | Performed by: STUDENT IN AN ORGANIZED HEALTH CARE EDUCATION/TRAINING PROGRAM

## 2017-06-28 PROCEDURE — 25000128 H RX IP 250 OP 636: Performed by: TRANSPLANT SURGERY

## 2017-06-28 PROCEDURE — 40000048 ZZH STATISTIC DAILY SWAN MONITORING

## 2017-06-28 PROCEDURE — 41000018 ZZH PER-PERFUSION 1ST 30 MIN: Performed by: TRANSPLANT SURGERY

## 2017-06-28 PROCEDURE — 36415 COLL VENOUS BLD VENIPUNCTURE: CPT | Performed by: ANESTHESIOLOGY

## 2017-06-28 PROCEDURE — 93503 INSERT/PLACE HEART CATHETER: CPT

## 2017-06-28 PROCEDURE — 85027 COMPLETE CBC AUTOMATED: CPT | Performed by: STUDENT IN AN ORGANIZED HEALTH CARE EDUCATION/TRAINING PROGRAM

## 2017-06-28 PROCEDURE — 40000196 ZZH STATISTIC RAPCV CVP MONITORING

## 2017-06-28 PROCEDURE — 81200005 ZZH ACQUISITION LIVER CADAVER DONOR

## 2017-06-28 PROCEDURE — 25000565 ZZH ISOFLURANE, EA 15 MIN: Performed by: TRANSPLANT SURGERY

## 2017-06-28 PROCEDURE — 94002 VENT MGMT INPAT INIT DAY: CPT

## 2017-06-28 PROCEDURE — 40000344 ZZHCL STATISTIC THAWING COMPONENT: Performed by: SURGERY

## 2017-06-28 PROCEDURE — 40000014 ZZH STATISTIC ARTERIAL MONITORING DAILY

## 2017-06-28 PROCEDURE — 85004 AUTOMATED DIFF WBC COUNT: CPT | Performed by: STUDENT IN AN ORGANIZED HEALTH CARE EDUCATION/TRAINING PROGRAM

## 2017-06-28 PROCEDURE — 83605 ASSAY OF LACTIC ACID: CPT | Performed by: ANESTHESIOLOGY

## 2017-06-28 PROCEDURE — 85396 CLOTTING ASSAY WHOLE BLOOD: CPT | Performed by: TRANSPLANT SURGERY

## 2017-06-28 PROCEDURE — 36000070 ZZH SURGERY LEVEL 5 EA 15 ADDTL MIN - UMMC: Performed by: TRANSPLANT SURGERY

## 2017-06-28 PROCEDURE — 85610 PROTHROMBIN TIME: CPT | Performed by: SURGERY

## 2017-06-28 PROCEDURE — P9016 RBC LEUKOCYTES REDUCED: HCPCS | Performed by: PHYSICIAN ASSISTANT

## 2017-06-28 PROCEDURE — 85610 PROTHROMBIN TIME: CPT | Performed by: ANESTHESIOLOGY

## 2017-06-28 PROCEDURE — 85730 THROMBOPLASTIN TIME PARTIAL: CPT | Performed by: TRANSPLANT SURGERY

## 2017-06-28 PROCEDURE — 85049 AUTOMATED PLATELET COUNT: CPT | Performed by: TRANSPLANT SURGERY

## 2017-06-28 PROCEDURE — 41000019 ZZH PERA-PERFUSION EACH ADDTL 15 MIN: Performed by: TRANSPLANT SURGERY

## 2017-06-28 PROCEDURE — 87081 CULTURE SCREEN ONLY: CPT | Performed by: PHYSICIAN ASSISTANT

## 2017-06-28 PROCEDURE — P9059 PLASMA, FRZ BETWEEN 8-24HOUR: HCPCS | Performed by: TRANSPLANT SURGERY

## 2017-06-28 PROCEDURE — 88313 SPECIAL STAINS GROUP 2: CPT | Performed by: TRANSPLANT SURGERY

## 2017-06-28 PROCEDURE — 27210447 ZZH PACK CELL SAVER CSP: Performed by: TRANSPLANT SURGERY

## 2017-06-28 PROCEDURE — 87640 STAPH A DNA AMP PROBE: CPT | Performed by: TRANSPLANT SURGERY

## 2017-06-28 PROCEDURE — 82803 BLOOD GASES ANY COMBINATION: CPT | Performed by: TRANSPLANT SURGERY

## 2017-06-28 PROCEDURE — 84100 ASSAY OF PHOSPHORUS: CPT | Performed by: STUDENT IN AN ORGANIZED HEALTH CARE EDUCATION/TRAINING PROGRAM

## 2017-06-28 PROCEDURE — 83735 ASSAY OF MAGNESIUM: CPT | Performed by: TRANSPLANT SURGERY

## 2017-06-28 PROCEDURE — P9059 PLASMA, FRZ BETWEEN 8-24HOUR: HCPCS | Performed by: STUDENT IN AN ORGANIZED HEALTH CARE EDUCATION/TRAINING PROGRAM

## 2017-06-28 PROCEDURE — 25000132 ZZH RX MED GY IP 250 OP 250 PS 637: Performed by: TRANSPLANT SURGERY

## 2017-06-28 PROCEDURE — 27210429 ZZH NUTRITION PRODUCT INTERMEDIATE LITER

## 2017-06-28 PROCEDURE — C1769 GUIDE WIRE: HCPCS | Performed by: TRANSPLANT SURGERY

## 2017-06-28 PROCEDURE — 40000986 XR CHEST PORT 1 VW

## 2017-06-28 PROCEDURE — 27210794 ZZH OR GENERAL SUPPLY STERILE: Performed by: TRANSPLANT SURGERY

## 2017-06-28 PROCEDURE — 84295 ASSAY OF SERUM SODIUM: CPT | Performed by: TRANSPLANT SURGERY

## 2017-06-28 PROCEDURE — P9041 ALBUMIN (HUMAN),5%, 50ML: HCPCS | Performed by: TRANSPLANT SURGERY

## 2017-06-28 PROCEDURE — 84132 ASSAY OF SERUM POTASSIUM: CPT | Performed by: ANESTHESIOLOGY

## 2017-06-28 PROCEDURE — 84132 ASSAY OF SERUM POTASSIUM: CPT | Performed by: SURGERY

## 2017-06-28 PROCEDURE — 82330 ASSAY OF CALCIUM: CPT | Performed by: ANESTHESIOLOGY

## 2017-06-28 PROCEDURE — 85025 COMPLETE CBC W/AUTO DIFF WBC: CPT | Performed by: SURGERY

## 2017-06-28 PROCEDURE — 40000344 ZZHCL STATISTIC THAWING COMPONENT: Performed by: STUDENT IN AN ORGANIZED HEALTH CARE EDUCATION/TRAINING PROGRAM

## 2017-06-28 PROCEDURE — P9059 PLASMA, FRZ BETWEEN 8-24HOUR: HCPCS | Performed by: SURGERY

## 2017-06-28 PROCEDURE — 25000132 ZZH RX MED GY IP 250 OP 250 PS 637: Performed by: SURGERY

## 2017-06-28 PROCEDURE — C2617 STENT, NON-COR, TEM W/O DEL: HCPCS | Performed by: TRANSPLANT SURGERY

## 2017-06-28 PROCEDURE — 25000131 ZZH RX MED GY IP 250 OP 636 PS 637: Performed by: TRANSPLANT SURGERY

## 2017-06-28 PROCEDURE — 85018 HEMOGLOBIN: CPT | Performed by: TRANSPLANT SURGERY

## 2017-06-28 PROCEDURE — P9041 ALBUMIN (HUMAN),5%, 50ML: HCPCS | Performed by: ANESTHESIOLOGY

## 2017-06-28 PROCEDURE — 00000146 ZZHCL STATISTIC GLUCOSE BY METER IP

## 2017-06-28 PROCEDURE — 25000128 H RX IP 250 OP 636

## 2017-06-28 PROCEDURE — 0FY00Z0 TRANSPLANTATION OF LIVER, ALLOGENEIC, OPEN APPROACH: ICD-10-PCS | Performed by: TRANSPLANT SURGERY

## 2017-06-28 PROCEDURE — 85384 FIBRINOGEN ACTIVITY: CPT | Performed by: SURGERY

## 2017-06-28 PROCEDURE — 40000344 ZZHCL STATISTIC THAWING COMPONENT: Performed by: TRANSPLANT SURGERY

## 2017-06-28 PROCEDURE — 25000125 ZZHC RX 250

## 2017-06-28 PROCEDURE — 82947 ASSAY GLUCOSE BLOOD QUANT: CPT | Performed by: ANESTHESIOLOGY

## 2017-06-28 PROCEDURE — 25000125 ZZHC RX 250: Performed by: SURGERY

## 2017-06-28 PROCEDURE — 40000556 ZZH STATISTIC PERIPHERAL IV START W US GUIDANCE

## 2017-06-28 DEVICE — STENT URETERAL FIRM 6FRX28CM W/O GW G23407
Type: IMPLANTABLE DEVICE | Site: BILE DUCT | Status: NON-FUNCTIONAL
Removed: 2017-08-11

## 2017-06-28 RX ORDER — SODIUM CHLORIDE, SODIUM LACTATE, POTASSIUM CHLORIDE, CALCIUM CHLORIDE 600; 310; 30; 20 MG/100ML; MG/100ML; MG/100ML; MG/100ML
INJECTION, SOLUTION INTRAVENOUS CONTINUOUS PRN
Status: DISCONTINUED | OUTPATIENT
Start: 2017-06-28 | End: 2017-06-28

## 2017-06-28 RX ORDER — CALCIUM CHLORIDE 100 MG/ML
INJECTION INTRAVENOUS; INTRAVENTRICULAR PRN
Status: DISCONTINUED | OUTPATIENT
Start: 2017-06-28 | End: 2017-06-28

## 2017-06-28 RX ORDER — MYCOPHENOLATE MOFETIL 200 MG/ML
1000 POWDER, FOR SUSPENSION ORAL
Status: DISCONTINUED | OUTPATIENT
Start: 2017-06-28 | End: 2017-07-01 | Stop reason: CLARIF

## 2017-06-28 RX ORDER — POTASSIUM CHLORIDE 750 MG/1
20-40 TABLET, EXTENDED RELEASE ORAL
Status: DISCONTINUED | OUTPATIENT
Start: 2017-06-28 | End: 2017-06-30

## 2017-06-28 RX ORDER — NICOTINE POLACRILEX 4 MG
15-30 LOZENGE BUCCAL
Status: DISCONTINUED | OUTPATIENT
Start: 2017-06-28 | End: 2017-07-01

## 2017-06-28 RX ORDER — ALBUMIN, HUMAN INJ 5% 5 %
50 SOLUTION INTRAVENOUS ONCE
Status: DISCONTINUED | OUTPATIENT
Start: 2017-06-28 | End: 2017-06-28

## 2017-06-28 RX ORDER — MAGNESIUM SULFATE HEPTAHYDRATE 40 MG/ML
4 INJECTION, SOLUTION INTRAVENOUS EVERY 4 HOURS PRN
Status: DISCONTINUED | OUTPATIENT
Start: 2017-06-28 | End: 2017-06-30

## 2017-06-28 RX ORDER — DEXTROSE MONOHYDRATE 25 G/50ML
25-50 INJECTION, SOLUTION INTRAVENOUS
Status: DISCONTINUED | OUTPATIENT
Start: 2017-06-28 | End: 2017-07-01

## 2017-06-28 RX ORDER — FUROSEMIDE 10 MG/ML
20 INJECTION INTRAMUSCULAR; INTRAVENOUS ONCE
Status: COMPLETED | OUTPATIENT
Start: 2017-06-28 | End: 2017-06-28

## 2017-06-28 RX ORDER — FENTANYL CITRATE 50 UG/ML
INJECTION, SOLUTION INTRAMUSCULAR; INTRAVENOUS
Status: COMPLETED
Start: 2017-06-28 | End: 2017-06-28

## 2017-06-28 RX ORDER — IPRATROPIUM BROMIDE AND ALBUTEROL SULFATE 2.5; .5 MG/3ML; MG/3ML
3 SOLUTION RESPIRATORY (INHALATION) EVERY 4 HOURS PRN
Status: DISCONTINUED | OUTPATIENT
Start: 2017-06-28 | End: 2017-07-06 | Stop reason: HOSPADM

## 2017-06-28 RX ORDER — VALGANCICLOVIR 450 MG/1
450 TABLET, FILM COATED ORAL DAILY
Status: DISCONTINUED | OUTPATIENT
Start: 2017-06-28 | End: 2017-07-06 | Stop reason: HOSPADM

## 2017-06-28 RX ORDER — PIPERACILLIN SODIUM, TAZOBACTAM SODIUM 2; .25 G/10ML; G/10ML
2.25 INJECTION, POWDER, LYOPHILIZED, FOR SOLUTION INTRAVENOUS
Status: DISCONTINUED | OUTPATIENT
Start: 2017-06-28 | End: 2017-06-28 | Stop reason: HOSPADM

## 2017-06-28 RX ORDER — PROPOFOL 10 MG/ML
INJECTION, EMULSION INTRAVENOUS CONTINUOUS PRN
Status: DISCONTINUED | OUTPATIENT
Start: 2017-06-28 | End: 2017-06-28

## 2017-06-28 RX ORDER — PROPOFOL 10 MG/ML
5-75 INJECTION, EMULSION INTRAVENOUS CONTINUOUS
Status: DISCONTINUED | OUTPATIENT
Start: 2017-06-28 | End: 2017-06-30

## 2017-06-28 RX ORDER — LIDOCAINE HYDROCHLORIDE 20 MG/ML
INJECTION, SOLUTION INFILTRATION; PERINEURAL PRN
Status: DISCONTINUED | OUTPATIENT
Start: 2017-06-28 | End: 2017-06-28

## 2017-06-28 RX ORDER — POTASSIUM CL/LIDO/0.9 % NACL 10MEQ/0.1L
10 INTRAVENOUS SOLUTION, PIGGYBACK (ML) INTRAVENOUS
Status: DISCONTINUED | OUTPATIENT
Start: 2017-06-28 | End: 2017-06-30

## 2017-06-28 RX ORDER — SODIUM CHLORIDE 9 MG/ML
INJECTION, SOLUTION INTRAVENOUS CONTINUOUS PRN
Status: DISCONTINUED | OUTPATIENT
Start: 2017-06-28 | End: 2017-06-28

## 2017-06-28 RX ORDER — FENTANYL CITRATE 50 UG/ML
50-100 INJECTION, SOLUTION INTRAMUSCULAR; INTRAVENOUS
Status: DISCONTINUED | OUTPATIENT
Start: 2017-06-28 | End: 2017-06-30

## 2017-06-28 RX ORDER — NALOXONE HYDROCHLORIDE 0.4 MG/ML
.1-.4 INJECTION, SOLUTION INTRAMUSCULAR; INTRAVENOUS; SUBCUTANEOUS
Status: DISCONTINUED | OUTPATIENT
Start: 2017-06-28 | End: 2017-06-28

## 2017-06-28 RX ORDER — SULFAMETHOXAZOLE AND TRIMETHOPRIM 400; 80 MG/1; MG/1
1 TABLET ORAL DAILY
Status: DISCONTINUED | OUTPATIENT
Start: 2017-06-28 | End: 2017-07-06 | Stop reason: HOSPADM

## 2017-06-28 RX ORDER — PIPERACILLIN SODIUM, TAZOBACTAM SODIUM 3; .375 G/15ML; G/15ML
3.38 INJECTION, POWDER, LYOPHILIZED, FOR SOLUTION INTRAVENOUS EVERY 6 HOURS
Status: COMPLETED | OUTPATIENT
Start: 2017-06-28 | End: 2017-06-30

## 2017-06-28 RX ORDER — PROPOFOL 10 MG/ML
10-20 INJECTION, EMULSION INTRAVENOUS EVERY 30 MIN PRN
Status: DISCONTINUED | OUTPATIENT
Start: 2017-06-28 | End: 2017-06-29

## 2017-06-28 RX ORDER — SODIUM CHLORIDE, SODIUM LACTATE, POTASSIUM CHLORIDE, CALCIUM CHLORIDE 600; 310; 30; 20 MG/100ML; MG/100ML; MG/100ML; MG/100ML
INJECTION, SOLUTION INTRAVENOUS CONTINUOUS
Status: DISCONTINUED | OUTPATIENT
Start: 2017-06-28 | End: 2017-06-28 | Stop reason: HOSPADM

## 2017-06-28 RX ORDER — PREDNISONE 10 MG/1
10 TABLET ORAL ONCE
Status: COMPLETED | OUTPATIENT
Start: 2017-07-03 | End: 2017-07-03

## 2017-06-28 RX ORDER — MYCOPHENOLATE MOFETIL 250 MG/1
1000 CAPSULE ORAL
Status: DISCONTINUED | OUTPATIENT
Start: 2017-06-28 | End: 2017-07-06 | Stop reason: HOSPADM

## 2017-06-28 RX ORDER — FUROSEMIDE 10 MG/ML
INJECTION INTRAMUSCULAR; INTRAVENOUS
Status: COMPLETED
Start: 2017-06-28 | End: 2017-06-28

## 2017-06-28 RX ORDER — FENTANYL CITRATE 50 UG/ML
25-50 INJECTION, SOLUTION INTRAMUSCULAR; INTRAVENOUS
Status: DISCONTINUED | OUTPATIENT
Start: 2017-06-28 | End: 2017-06-28

## 2017-06-28 RX ORDER — ONDANSETRON 4 MG/1
4 TABLET, ORALLY DISINTEGRATING ORAL EVERY 30 MIN PRN
Status: DISCONTINUED | OUTPATIENT
Start: 2017-06-28 | End: 2017-06-28

## 2017-06-28 RX ORDER — SODIUM CHLORIDE, SODIUM LACTATE, POTASSIUM CHLORIDE, CALCIUM CHLORIDE 600; 310; 30; 20 MG/100ML; MG/100ML; MG/100ML; MG/100ML
INJECTION, SOLUTION INTRAVENOUS CONTINUOUS
Status: DISCONTINUED | OUTPATIENT
Start: 2017-06-28 | End: 2017-06-30

## 2017-06-28 RX ORDER — METHYLPREDNISOLONE SODIUM SUCCINATE 125 MG/2ML
100 INJECTION, POWDER, LYOPHILIZED, FOR SOLUTION INTRAMUSCULAR; INTRAVENOUS ONCE
Status: COMPLETED | OUTPATIENT
Start: 2017-06-30 | End: 2017-06-30

## 2017-06-28 RX ORDER — AMINO ACIDS/PROTEIN HYDROLYS 11G-40/45
1 LIQUID IN PACKET (ML) ORAL 3 TIMES DAILY
Status: DISCONTINUED | OUTPATIENT
Start: 2017-06-28 | End: 2017-06-30

## 2017-06-28 RX ORDER — ONDANSETRON 2 MG/ML
4 INJECTION INTRAMUSCULAR; INTRAVENOUS EVERY 30 MIN PRN
Status: DISCONTINUED | OUTPATIENT
Start: 2017-06-28 | End: 2017-06-28

## 2017-06-28 RX ORDER — POTASSIUM CHLORIDE 7.45 MG/ML
10 INJECTION INTRAVENOUS
Status: DISCONTINUED | OUTPATIENT
Start: 2017-06-28 | End: 2017-06-30

## 2017-06-28 RX ORDER — NALOXONE HYDROCHLORIDE 0.4 MG/ML
.1-.4 INJECTION, SOLUTION INTRAMUSCULAR; INTRAVENOUS; SUBCUTANEOUS
Status: DISCONTINUED | OUTPATIENT
Start: 2017-06-28 | End: 2017-07-06 | Stop reason: HOSPADM

## 2017-06-28 RX ORDER — ALBUMIN, HUMAN INJ 5% 5 %
SOLUTION INTRAVENOUS CONTINUOUS PRN
Status: DISCONTINUED | OUTPATIENT
Start: 2017-06-28 | End: 2017-06-28

## 2017-06-28 RX ORDER — FENTANYL CITRATE 50 UG/ML
INJECTION, SOLUTION INTRAMUSCULAR; INTRAVENOUS PRN
Status: DISCONTINUED | OUTPATIENT
Start: 2017-06-28 | End: 2017-06-28

## 2017-06-28 RX ORDER — PIPERACILLIN SODIUM, TAZOBACTAM SODIUM 3; .375 G/15ML; G/15ML
3.38 INJECTION, POWDER, LYOPHILIZED, FOR SOLUTION INTRAVENOUS ONCE
Status: COMPLETED | OUTPATIENT
Start: 2017-06-28 | End: 2017-06-28

## 2017-06-28 RX ORDER — METHYLPREDNISOLONE SODIUM SUCCINATE 125 MG/2ML
50 INJECTION, POWDER, LYOPHILIZED, FOR SOLUTION INTRAMUSCULAR; INTRAVENOUS ONCE
Status: COMPLETED | OUTPATIENT
Start: 2017-07-01 | End: 2017-07-01

## 2017-06-28 RX ORDER — HYDROMORPHONE HYDROCHLORIDE 1 MG/ML
.3-.5 INJECTION, SOLUTION INTRAMUSCULAR; INTRAVENOUS; SUBCUTANEOUS EVERY 5 MIN PRN
Status: DISCONTINUED | OUTPATIENT
Start: 2017-06-28 | End: 2017-06-28

## 2017-06-28 RX ORDER — PROPOFOL 10 MG/ML
INJECTION, EMULSION INTRAVENOUS PRN
Status: DISCONTINUED | OUTPATIENT
Start: 2017-06-28 | End: 2017-06-28

## 2017-06-28 RX ORDER — ALBUMIN, HUMAN INJ 5% 5 %
SOLUTION INTRAVENOUS CONTINUOUS PRN
Status: DISCONTINUED | OUTPATIENT
Start: 2017-06-28 | End: 2017-06-30

## 2017-06-28 RX ORDER — POTASSIUM CHLORIDE 29.8 MG/ML
20 INJECTION INTRAVENOUS
Status: DISCONTINUED | OUTPATIENT
Start: 2017-06-28 | End: 2017-06-30

## 2017-06-28 RX ORDER — POTASSIUM CHLORIDE 1.5 G/1.58G
20-40 POWDER, FOR SOLUTION ORAL
Status: DISCONTINUED | OUTPATIENT
Start: 2017-06-28 | End: 2017-06-30

## 2017-06-28 RX ORDER — ALBUMIN, HUMAN INJ 5% 5 %
25 SOLUTION INTRAVENOUS ONCE
Status: COMPLETED | OUTPATIENT
Start: 2017-06-28 | End: 2017-06-28

## 2017-06-28 RX ORDER — VALGANCICLOVIR HYDROCHLORIDE 50 MG/ML
450 POWDER, FOR SOLUTION ORAL DAILY
Status: DISCONTINUED | OUTPATIENT
Start: 2017-06-28 | End: 2017-07-01 | Stop reason: CLARIF

## 2017-06-28 RX ADMIN — HUMAN INSULIN 5.5 UNITS/HR: 100 INJECTION, SOLUTION SUBCUTANEOUS at 23:23

## 2017-06-28 RX ADMIN — FENTANYL CITRATE 100 MCG: 50 INJECTION, SOLUTION INTRAMUSCULAR; INTRAVENOUS at 07:15

## 2017-06-28 RX ADMIN — SODIUM CHLORIDE: 9 INJECTION, SOLUTION INTRAVENOUS at 09:34

## 2017-06-28 RX ADMIN — FUROSEMIDE 20 MG: 10 INJECTION, SOLUTION INTRAVENOUS at 18:08

## 2017-06-28 RX ADMIN — ROCURONIUM BROMIDE 20 MG: 10 INJECTION INTRAVENOUS at 11:50

## 2017-06-28 RX ADMIN — SODIUM CHLORIDE 1000 MG: 9 INJECTION, SOLUTION INTRAVENOUS at 10:53

## 2017-06-28 RX ADMIN — PIPERACILLIN SODIUM AND TAZOBACTAM SODIUM 2.25 G: .25; 2 INJECTION, POWDER, LYOPHILIZED, FOR SOLUTION INTRAVENOUS at 12:32

## 2017-06-28 RX ADMIN — Medication 2 G: at 22:55

## 2017-06-28 RX ADMIN — PHENYLEPHRINE HYDROCHLORIDE 100 MCG: 10 INJECTION, SOLUTION INTRAMUSCULAR; INTRAVENOUS; SUBCUTANEOUS at 08:17

## 2017-06-28 RX ADMIN — HUMAN INSULIN 6 UNITS/HR: 100 INJECTION, SOLUTION SUBCUTANEOUS at 15:18

## 2017-06-28 RX ADMIN — MICAFUNGIN SODIUM 100 MG: 10 INJECTION, POWDER, LYOPHILIZED, FOR SOLUTION INTRAVENOUS at 09:29

## 2017-06-28 RX ADMIN — CALCIUM CHLORIDE 1000 MG: 100 INJECTION, SOLUTION INTRAVENOUS at 09:02

## 2017-06-28 RX ADMIN — ALBUMIN (HUMAN) 25 G: 12.5 SOLUTION INTRAVENOUS at 20:57

## 2017-06-28 RX ADMIN — FENTANYL CITRATE 100 MCG: 50 INJECTION, SOLUTION INTRAMUSCULAR; INTRAVENOUS at 09:15

## 2017-06-28 RX ADMIN — SODIUM CHLORIDE, POTASSIUM CHLORIDE, SODIUM LACTATE AND CALCIUM CHLORIDE: 600; 310; 30; 20 INJECTION, SOLUTION INTRAVENOUS at 07:36

## 2017-06-28 RX ADMIN — ROCURONIUM BROMIDE 20 MG: 10 INJECTION INTRAVENOUS at 10:20

## 2017-06-28 RX ADMIN — FENTANYL CITRATE 100 MCG: 50 INJECTION, SOLUTION INTRAMUSCULAR; INTRAVENOUS at 14:00

## 2017-06-28 RX ADMIN — ROCURONIUM BROMIDE 20 MG: 10 INJECTION INTRAVENOUS at 12:33

## 2017-06-28 RX ADMIN — PIPERACILLIN SODIUM, TAZOBACTAM SODIUM 3.38 G: 3; .375 INJECTION, POWDER, LYOPHILIZED, FOR SOLUTION INTRAVENOUS at 08:32

## 2017-06-28 RX ADMIN — PANTOPRAZOLE SODIUM 40 MG: 40 INJECTION, POWDER, FOR SOLUTION INTRAVENOUS at 16:23

## 2017-06-28 RX ADMIN — PROPOFOL 160 MG: 10 INJECTION, EMULSION INTRAVENOUS at 07:15

## 2017-06-28 RX ADMIN — SODIUM BICARBONATE 50 MEQ: 84 INJECTION INTRAVENOUS at 10:47

## 2017-06-28 RX ADMIN — NOREPINEPHRINE BITARTRATE 6.4 MCG: 1 INJECTION INTRAVENOUS at 09:02

## 2017-06-28 RX ADMIN — PROPOFOL 70 MCG/KG/MIN: 10 INJECTION, EMULSION INTRAVENOUS at 21:41

## 2017-06-28 RX ADMIN — FENTANYL CITRATE 50 MCG: 50 INJECTION, SOLUTION INTRAMUSCULAR; INTRAVENOUS at 10:22

## 2017-06-28 RX ADMIN — PROPOFOL 100 MCG/KG/MIN: 10 INJECTION, EMULSION INTRAVENOUS at 15:02

## 2017-06-28 RX ADMIN — PROPOFOL 70 MCG/KG/MIN: 10 INJECTION, EMULSION INTRAVENOUS at 17:00

## 2017-06-28 RX ADMIN — PHENYLEPHRINE HYDROCHLORIDE 100 MCG: 10 INJECTION, SOLUTION INTRAMUSCULAR; INTRAVENOUS; SUBCUTANEOUS at 07:15

## 2017-06-28 RX ADMIN — SODIUM BICARBONATE 50 MEQ: 84 INJECTION INTRAVENOUS at 10:55

## 2017-06-28 RX ADMIN — POTASSIUM CHLORIDE 20 MEQ: 1.5 POWDER, FOR SOLUTION ORAL at 16:10

## 2017-06-28 RX ADMIN — FENTANYL CITRATE 50 MCG: 50 INJECTION, SOLUTION INTRAMUSCULAR; INTRAVENOUS at 08:43

## 2017-06-28 RX ADMIN — CALCIUM CHLORIDE 500 MG: 100 INJECTION, SOLUTION INTRAVENOUS at 11:04

## 2017-06-28 RX ADMIN — LACTULOSE 45 ML: 20 SOLUTION ORAL at 19:41

## 2017-06-28 RX ADMIN — NOREPINEPHRINE BITARTRATE 6.4 MCG: 1 INJECTION INTRAVENOUS at 10:03

## 2017-06-28 RX ADMIN — ROCURONIUM BROMIDE 20 MG: 10 INJECTION INTRAVENOUS at 11:12

## 2017-06-28 RX ADMIN — MYCOPHENOLATE MOFETIL 1000 MG: 200 POWDER, FOR SUSPENSION ORAL at 17:47

## 2017-06-28 RX ADMIN — ROCURONIUM BROMIDE 30 MG: 10 INJECTION INTRAVENOUS at 09:14

## 2017-06-28 RX ADMIN — PHENYLEPHRINE HYDROCHLORIDE 100 MCG: 10 INJECTION, SOLUTION INTRAMUSCULAR; INTRAVENOUS; SUBCUTANEOUS at 08:56

## 2017-06-28 RX ADMIN — PROPOFOL 70 MCG/KG/MIN: 10 INJECTION, EMULSION INTRAVENOUS at 23:53

## 2017-06-28 RX ADMIN — CALCIUM CHLORIDE 500 MG: 100 INJECTION, SOLUTION INTRAVENOUS at 10:44

## 2017-06-28 RX ADMIN — FENTANYL CITRATE 50 MCG: 50 INJECTION, SOLUTION INTRAMUSCULAR; INTRAVENOUS at 12:34

## 2017-06-28 RX ADMIN — PIPERACILLIN SODIUM AND TAZOBACTAM SODIUM 2.25 G: .25; 2 INJECTION, POWDER, LYOPHILIZED, FOR SOLUTION INTRAVENOUS at 10:32

## 2017-06-28 RX ADMIN — FENTANYL CITRATE 50 MCG/HR: 50 INJECTION, SOLUTION INTRAMUSCULAR; INTRAVENOUS at 15:22

## 2017-06-28 RX ADMIN — SULFAMETHOXAZOLE AND TRIMETHOPRIM 1 TABLET: 400; 80 TABLET ORAL at 16:09

## 2017-06-28 RX ADMIN — PHENYLEPHRINE HYDROCHLORIDE 100 MCG: 10 INJECTION, SOLUTION INTRAMUSCULAR; INTRAVENOUS; SUBCUTANEOUS at 08:58

## 2017-06-28 RX ADMIN — FENTANYL CITRATE 50 MCG: 50 INJECTION, SOLUTION INTRAMUSCULAR; INTRAVENOUS at 07:40

## 2017-06-28 RX ADMIN — PHENYLEPHRINE HYDROCHLORIDE 100 MCG: 10 INJECTION, SOLUTION INTRAMUSCULAR; INTRAVENOUS; SUBCUTANEOUS at 08:59

## 2017-06-28 RX ADMIN — ALBUMIN (HUMAN): 12.5 SOLUTION INTRAVENOUS at 08:59

## 2017-06-28 RX ADMIN — PROPOFOL 10 MCG/KG/MIN: 10 INJECTION, EMULSION INTRAVENOUS at 12:49

## 2017-06-28 RX ADMIN — CALCIUM CHLORIDE 500 MG: 100 INJECTION, SOLUTION INTRAVENOUS at 10:51

## 2017-06-28 RX ADMIN — PHENYLEPHRINE HYDROCHLORIDE 100 MCG: 10 INJECTION, SOLUTION INTRAMUSCULAR; INTRAVENOUS; SUBCUTANEOUS at 08:53

## 2017-06-28 RX ADMIN — LIDOCAINE HYDROCHLORIDE 100 MG: 20 INJECTION, SOLUTION INFILTRATION; PERINEURAL at 07:15

## 2017-06-28 RX ADMIN — ALBUMIN (HUMAN): 12.5 SOLUTION INTRAVENOUS at 10:33

## 2017-06-28 RX ADMIN — PIPERACILLIN AND TAZOBACTAM 3.38 G: 3; .375 INJECTION, POWDER, LYOPHILIZED, FOR SOLUTION INTRAVENOUS; PARENTERAL at 19:42

## 2017-06-28 RX ADMIN — ROCURONIUM BROMIDE 20 MG: 10 INJECTION INTRAVENOUS at 09:41

## 2017-06-28 RX ADMIN — ROCURONIUM BROMIDE 50 MG: 10 INJECTION INTRAVENOUS at 07:15

## 2017-06-28 RX ADMIN — PROPOFOL 30 MG: 10 INJECTION, EMULSION INTRAVENOUS at 09:14

## 2017-06-28 RX ADMIN — SUGAMMADEX 200 MG: 100 INJECTION, SOLUTION INTRAVENOUS at 14:03

## 2017-06-28 RX ADMIN — VANCOMYCIN HYDROCHLORIDE 1500 MG: 10 INJECTION, POWDER, LYOPHILIZED, FOR SOLUTION INTRAVENOUS at 09:29

## 2017-06-28 RX ADMIN — PROPOFOL 40 MG: 10 INJECTION, EMULSION INTRAVENOUS at 14:03

## 2017-06-28 RX ADMIN — PROPOFOL 40 MG: 10 INJECTION, EMULSION INTRAVENOUS at 13:53

## 2017-06-28 RX ADMIN — MIDAZOLAM HYDROCHLORIDE 2 MG: 1 INJECTION, SOLUTION INTRAMUSCULAR; INTRAVENOUS at 07:15

## 2017-06-28 RX ADMIN — ROCURONIUM BROMIDE 20 MG: 10 INJECTION INTRAVENOUS at 07:25

## 2017-06-28 RX ADMIN — CALCIUM CHLORIDE 500 MG: 100 INJECTION, SOLUTION INTRAVENOUS at 10:59

## 2017-06-28 RX ADMIN — MULTIVITAMIN 15 ML: LIQUID ORAL at 16:09

## 2017-06-28 RX ADMIN — FENTANYL CITRATE 100 MCG: 50 INJECTION, SOLUTION INTRAMUSCULAR; INTRAVENOUS at 11:12

## 2017-06-28 RX ADMIN — HUMAN INSULIN 2.5 UNITS/HR: 100 INJECTION, SOLUTION SUBCUTANEOUS at 12:05

## 2017-06-28 RX ADMIN — NOREPINEPHRINE BITARTRATE 0.03 MCG/KG/MIN: 1 INJECTION INTRAVENOUS at 09:04

## 2017-06-28 RX ADMIN — VASOPRESSIN 1 UNITS: 20 INJECTION, SOLUTION INTRAMUSCULAR; SUBCUTANEOUS at 10:51

## 2017-06-28 RX ADMIN — MIDAZOLAM HYDROCHLORIDE 1 MG: 1 INJECTION, SOLUTION INTRAMUSCULAR; INTRAVENOUS at 10:42

## 2017-06-28 RX ADMIN — FENTANYL CITRATE 50 MCG: 50 INJECTION, SOLUTION INTRAMUSCULAR; INTRAVENOUS at 07:43

## 2017-06-28 RX ADMIN — PIPERACILLIN AND TAZOBACTAM 3.38 G: 3; .375 INJECTION, POWDER, LYOPHILIZED, FOR SOLUTION INTRAVENOUS; PARENTERAL at 16:09

## 2017-06-28 RX ADMIN — EPINEPHRINE 0.01 MCG/KG/MIN: 1 INJECTION PARENTERAL at 10:25

## 2017-06-28 RX ADMIN — SODIUM CHLORIDE: 9 INJECTION, SOLUTION INTRAVENOUS at 07:06

## 2017-06-28 RX ADMIN — FENTANYL CITRATE 100 MCG: 50 INJECTION, SOLUTION INTRAMUSCULAR; INTRAVENOUS at 14:43

## 2017-06-28 RX ADMIN — FUROSEMIDE 20 MG: 10 INJECTION INTRAMUSCULAR; INTRAVENOUS at 18:08

## 2017-06-28 RX ADMIN — PROPOFOL: 10 INJECTION, EMULSION INTRAVENOUS at 13:35

## 2017-06-28 RX ADMIN — POTASSIUM CHLORIDE 20 MEQ: 29.8 INJECTION, SOLUTION INTRAVENOUS at 23:53

## 2017-06-28 RX ADMIN — VALGANCICLOVIR HYDROCHLORIDE 450 MG: 50 POWDER, FOR SOLUTION ORAL at 16:09

## 2017-06-28 RX ADMIN — SODIUM CHLORIDE, POTASSIUM CHLORIDE, SODIUM LACTATE AND CALCIUM CHLORIDE: 600; 310; 30; 20 INJECTION, SOLUTION INTRAVENOUS at 15:24

## 2017-06-28 RX ADMIN — ROCURONIUM BROMIDE 20 MG: 10 INJECTION INTRAVENOUS at 08:26

## 2017-06-28 RX ADMIN — VASOPRESSIN 1 UNITS: 20 INJECTION, SOLUTION INTRAMUSCULAR; SUBCUTANEOUS at 08:58

## 2017-06-28 RX ADMIN — ROCURONIUM BROMIDE 20 MG: 10 INJECTION INTRAVENOUS at 07:44

## 2017-06-28 RX ADMIN — PROPOFOL 70 MCG/KG/MIN: 10 INJECTION, EMULSION INTRAVENOUS at 19:57

## 2017-06-28 RX ADMIN — FENTANYL CITRATE 50 MCG: 50 INJECTION, SOLUTION INTRAMUSCULAR; INTRAVENOUS at 08:36

## 2017-06-28 RX ADMIN — SODIUM CHLORIDE, POTASSIUM CHLORIDE, SODIUM LACTATE AND CALCIUM CHLORIDE: 600; 310; 30; 20 INJECTION, SOLUTION INTRAVENOUS at 11:14

## 2017-06-28 RX ADMIN — VASOPRESSIN 1 UNITS/HR: 20 INJECTION, SOLUTION INTRAMUSCULAR; SUBCUTANEOUS at 10:25

## 2017-06-28 RX ADMIN — PROPOFOL 10 MG: 10 INJECTION, EMULSION INTRAVENOUS at 07:45

## 2017-06-28 RX ADMIN — ALBUMIN (HUMAN): 12.5 SOLUTION INTRAVENOUS at 10:01

## 2017-06-28 RX ADMIN — RIFAXIMIN 550 MG: 550 TABLET ORAL at 19:42

## 2017-06-28 RX ADMIN — Medication 1 PACKET: at 19:42

## 2017-06-28 RX ADMIN — MIDAZOLAM HYDROCHLORIDE 1 MG: 1 INJECTION, SOLUTION INTRAMUSCULAR; INTRAVENOUS at 09:25

## 2017-06-28 NOTE — H&P
SURGICAL ICU H&P  June 28, 2017      CO-MORBIDITIES:   No diagnosis found.    ASSESSMENT: Joe Sosa is a 26 year old male with alpha 1 antitrypsin and alcoholic cirrhosis POD # 0 s/p DDLT    PLAN:  Neuro/ pain/ sedation:  - Monitor neurological status. Notify the MD for any acute changes in exam.  - Fentanyl IV  mcg IV PRN for pain.  - Propofol gtt for sedation.    Pulmonary care: Respiratory insufficiency requiring ventilation  - Mechanically ventilated  - FiO2 100%, PEEP 5  - Wean FiO2 as able  - Pressure support trial in AM    Cardiovascular: No issues  - Monitor hemodynamic status  - levophed gtt, wean as tolerated  - goal CVP 8-10    GI care: S/p liver transplant  - NPO  - NGT LIS  - Hepatic panel in AM  - INR/lactate q6h   - Protonix 40 mg suspension via NG qd for ulcer ppx in context of steroids   - immunosuppression regimen per txp    Fluids/ Electrolytes/ Nutrition:   - No indication for parenteral nutrition.  - restart maintenance fluids after CVP 8-10.    Renal/ Fluid Balance:   -Urine output low  -Will continue to monitor intake and output via maya  -BMP q12h    Endocrine:   - insulin gtt  - Prednisone taper    ID/ Antibiotics:  - Perioperative IV vanc/zosyn/fluconazole for 2 days    Heme:  - Goals: INR <2; Fibrinogen >200; Hgb >8; plt >50  - Hemoglobin stable.  - Recheck labs q6h in perioperative period; replace with products PRN     Prophylaxis:    - Post transplant ppx: Valcyte/dapsone/nystatin   - Mechanical prophylaxis for DVT.   - No chemical DVT prophylaxis due to high risk of bleeding on POD0    Lines/ tubes/ drains:  - R IJ CVC, L subclavian mac with PA catheter, Maya, NGT, PIV, A line, ETT, abdominal LAM     Disposition:  - Surgical ICU.     Patient findings and plan discussed with surgical ICU staff Dr. Brigid Henriquez   Surgery PGY2  [phone]    - - - - - - - - - - - - - - - - - - - - - - - - - - - - - - - - - - - - - - - - - - - - - - - - - - - - - - - - - - - -  - - - - - - - - - - - -     PRIMARY TEAM: Liver transplant  PRIMARY PHYSICIAN: Dr. Martin    REASON FOR CRITICAL CARE ADMISSION: Hemodynamic monitoring, ventilator care   ADMITTING PHYSICIAN: Dr. Kuhn    HISTORY PRESENTING ILLNESS: Joe Sosa is a 26 year old male with hx of alpha-1-antitrypsin and alcoholic cirrhosis c/b encephalopathy, ascities, hx of SBP, chronic hyponatremia admitted for liver transplant.     REVIEW OF SYSTEMS: Unable to obtain    PAST MEDICAL HISTORY:   Past Medical History:   Diagnosis Date     Alcoholic cirrhosis of liver with ascites (H) 3/9/2017     Alpha-1-antitrypsin deficiency (H) 3/9/2017     Anasarca      Chronic hyponatremia      Hepatic encephalopathy (H)      SBP (spontaneous bacterial peritonitis) (H)        SURGICAL HISTORY:   History reviewed. No pertinent surgical history.    SOCIAL HISTORY:   Social History     Social History     Marital status: Single     Spouse name: N/A     Number of children: N/A     Years of education: N/A     Occupational History     Not on file.     Social History Main Topics     Smoking status: Never Smoker     Smokeless tobacco: Former User     Types: Chew     Alcohol use No      Comment: Quit Nov. 4, 2016     Drug use: No     Sexual activity: Not on file     Other Topics Concern     Not on file     Social History Narrative         FAMILY HISTORY: No bleeding/clotting disorders nor problems with anesthesia.    ALLERGIES:    No Known Allergies    MEDICATIONS:    No current facility-administered medications on file prior to encounter.   Current Outpatient Prescriptions on File Prior to Encounter:  vitamin D (ERGOCALCIFEROL) 11723 UNIT capsule Take 1 capsule (50,000 Units) by mouth once a week   torsemide (DEMADEX) 20 MG tablet Take 20 mg by mouth daily    spironolactone (ALDACTONE) 50 MG tablet Take 50 mg by mouth daily    lactulose (CHRONULAC) 10 GM/15ML solution Take 45 mLs by mouth 3 times daily    rifaximin (XIFAXAN) 550 MG TABS tablet Take  550 mg by mouth 2 times daily   omeprazole (PRILOSEC) 20 MG CR capsule Take 20 mg by mouth daily        PHYSICAL EXAMINATION:  Temp:  [98.2  F (36.8  C)-100.1  F (37.8  C)] 100.1  F (37.8  C)  Pulse:  [83-90] 83  Heart Rate:  [94-99] 98  Resp:  [12-18] 12  BP: (116-139)/(54-72) 139/72  MAP:  [78 mmHg-91 mmHg] 91 mmHg  Arterial Line BP: (117-131)/(57-68) 131/68  SpO2:  [97 %-100 %] 99 %  General: Intubated, sedated  HEENT: Normocephalic, atraumatic. Patent nares. RIJ MAC  Neck: No cervical lymphadenopathy. No thyromegaly.   Chest wall: Symmetric thorax. No masses or tenderness to palpation. L subclavian MAC   Respiratory: Non-labored breathing. Lung sounds clear to auscultation bilaterally.   Cardiovascular: Regular rate and rhythm.  Gastrointestinal: Abdomen soft, non-distended, incision c/d/i  Extremities: Moving all four extremities. No limb deformities. No pedal edema.   Skin: As noted above. No rashes or lesions appreciated.    LABS: Reviewed.   Arterial Blood Gases     Recent Labs  Lab 06/28/17  1430 06/28/17  1330 06/28/17  1226 06/28/17  1151   PH 7.41 Quantity not sufficientNOTIFIED JOEL AT 1350 6/28/17 JSD 7.41 7.42   PCO2 38 Quantity not sufficientNOTIFIED JOEL AT 1350 6/28/17 JSD 32* 32*   PO2 52* Quantity not sufficientNOTIFIED JOEL AT 1350 6/28/17 * 151*   HCO3 24 Quantity not sufficientNOTIFIED JOEL AT 1350 6/28/17 JSD 20* 20*     Complete Blood Count     Recent Labs  Lab 06/28/17  1550 06/28/17  1421 06/28/17  1330 06/28/17  1226  06/28/17  1145  06/27/17  0923   WBC 6.8 7.8  --   --   --   --   --  4.5   HGB 9.1* 9.2* Quantity not sufficientNOTIFIED JOEL AT 1350 6/28/17 JSD  9.0* 8.5*  < > 8.3*  < > 10.9*   PLT 54* 67* 69*  --   --  71*  --  49*   < > = values in this interval not displayed.  Basic Metabolic Panel    Recent Labs  Lab 06/28/17  1550 06/28/17  1421 06/28/17  1330 06/28/17  1226  06/27/17  0923    140 Quantity not sufficientNOTIFIED JOEL AT 1350 6/28/17    < > 135   POTASSIUM 3.9 4.0 Quantity not sufficientNOTIFIED JOEL AT 1350 17 JSD 4.2  < > 4.3   CHLORIDE 108 108  --   --   --  100   CO2 25 24  --   --   --  30   BUN 20 16  --   --   --  12   CR 0.74 0.64*  --   --   --  0.63*   * 250* Quantity not sufficientNOTIFIED JOEL AT 1350 17 *  < > 107*   < > = values in this interval not displayed.  Liver Function Tests    Recent Labs  Lab 17  1550 17  1421 17  1330 17  1145  17  0923   AST  --  2612*  --   --   --  88*   ALT  --  836*  --   --   --  51   ALKPHOS  --  84  --   --   --  280*   BILITOTAL  --  7.5*  --   --   --  7.1*   ALBUMIN  --  2.2*  --   --   --  2.4*   INR 2.24* 2.49* 2.78* 3.40*  < > 2.33*   < > = values in this interval not displayed.  Pancreatic Enzymes    Recent Labs  Lab 17  1421 17  0923   LIPASE 327  --    AMYLASE  --  62     Coagulation Profile    Recent Labs  Lab 17  1550 17  1421 17  1330 17  1145  17  0923   INR 2.24* 2.49* 2.78* 3.40*  < > 2.33*   PTT  --  45* 59* 103*  --  45*   < > = values in this interval not displayed.  Lactate  Invalid input(s): LACTATE    IMAGING:  Results for orders placed or performed during the hospital encounter of 17   XR Chest 2 Views    Narrative    XR CHEST 2 VW  2017 9:37 AM      HISTORY:  donor transplant going to OR NOW    COMPARISON: 3/27/2017    FINDINGS: Upper abdomen is unremarkable. Cardiac silhouette is not  enlarged. No pneumothorax or pleural effusion. No focal airspace  opacities. No acute osseous abnormalities.      Impression    IMPRESSION: Clear lungs. No acute findings.    I have personally reviewed the examination and initial interpretation  and I agree with the findings.    NED HINDS MD   XR Chest Port 1 View    Narrative    XR CHEST PORT 1 VW  2017 2:52 PM      HISTORY: Check ET tube placement - Liver transplant    COMPARISON: 2017    FINDINGS:  Single portable AP view of the chest upright. Endotracheal  tube 3.5 cm superior to the medardo. Right IJ Medina-Stephanie catheter tip  projects over the main pulmonary artery outflow tract. Enteric tube  proceeds below the level of the diaphragm and the inferior margin of  the field-of-view. The cardiomediastinal silhouette and pulmonary  vasculature are stable and within normal limits. Low lung volumes.  There is no focal airspace opacity, pleural effusion, or pneumothorax.  The visualized upper abdomen, osseous structures, and soft tissues are  unremarkable.      Impression    IMPRESSION:   1. Endotracheal tube 3.5 cm superior to the medardo.  2. No focal airspace consolidation.  3. Low lung volumes.     I have personally reviewed the examination and initial interpretation  and I agree with the findings.    LEONARDO HOLMAN MD

## 2017-06-28 NOTE — ANESTHESIA POSTPROCEDURE EVALUATION
Patient: Joe Sosa    Procedure(s):  TRANSPLANT LIVER RECIPIENT  DONOR with bile duct stenting - Wound Class: II-Clean Contaminated    Diagnosis:Liver failure  Diagnosis Additional Information: No value filed.    Anesthesia Type:  General, ETT    Note:  Anesthesia Post Evaluation    Patient location during evaluation: ICU  Patient participation: Unable to evaluate secondary to administered sedation  Level of consciousness: responsive to light touch  Pain management: unable to assess  Airway patency: patent  Cardiovascular status: blood pressure returned to baseline and stable  Respiratory status: ventilator and ETT  Hydration status: euvolemic  PONV: unable to assess     Anesthetic complications: None          Last vitals:  Vitals:    17 2037 17 2342 17 0331   BP: 116/63 128/60 122/54   Pulse: 90 85 83   Resp: 18 18 18   Temp: 36.9  C (98.4  F) 36.8  C (98.2  F) 36.8  C (98.3  F)   SpO2: 97% 97% 100%         Electronically Signed By: Abdelrahman Spain MD  2017  2:28 PM

## 2017-06-28 NOTE — ANESTHESIA PREPROCEDURE EVALUATION
Anesthesia Evaluation     . Pt has not had prior anesthetic            ROS/MED HX    ENT/Pulmonary:  - neg pulmonary ROS     Neurologic: Comment: Hepatic Encephalopathy     (+)other neuro Hepatic Encephalopathy    Cardiovascular:  - neg cardiovascular ROS   (+) ----. : . . . :. . Previous cardiac testing Echodate:3/27/17results:Interpretation Summary  Global and regional left ventricular function is normal with an EF of 55-60%.  The right ventricle is normal size. Global right ventricular function is  normal.  No significant valvular abnormalities.  Pulmonary artery systolic pressure cannot be assessed. Estimated mean right  atrial pressure is <3 mmHg.   date: results: date:3/27/17 results:Sinus rhythm  Prolonged QT  Ventricular rate 91 BPM date: results:          METS/Exercise Tolerance:     Hematologic:  - neg hematologic  ROS       Musculoskeletal:  - neg musculoskeletal ROS       GI/Hepatic:  - neg GI/hepatic ROS   (+) liver disease, Other GI/Hepatic Alcoholic Cirrhosis       Renal/Genitourinary:  - ROS Renal section negative       Endo:  - neg endo ROS       Psychiatric:  - neg psychiatric ROS       Infectious Disease:  - neg infectious disease ROS       Malignancy:      - no malignancy   Other:    (+) No chance of pregnancy C-spine cleared: N/A, no H/O Chronic Pain,no other significant disability   - neg other ROS               Procedure: Procedure(s):  TRANSPLANT LIVER RECIPIENT  DONOR  - Wound Class:     HPI: Joe Sosa is a 26 year old male with end stage liver disease secondary to alcoholic cirrhosis, who presents electively for orthoptic liver transplantation under general anesthesia. ESLD complicated by hepatic encephalopathy, ascites (paracentesis x 1-2x), prior SBP, chronic hyponatremia and anasarca. PMH significant for alpha-1antitrypsin deficiency.  No history of prior surgical interventions, family history of adverse reactions to anesthesia or known drug allergies.      PMHx/PSHx:  Past  Medical History:   Diagnosis Date     Alcoholic cirrhosis of liver with ascites (H) 3/9/2017     Alpha-1-antitrypsin deficiency (H) 3/9/2017     Anasarca      Chronic hyponatremia      Hepatic encephalopathy (H)      SBP (spontaneous bacterial peritonitis) (H)        No past surgical history on file.      No current facility-administered medications on file prior to encounter.   Current Outpatient Prescriptions on File Prior to Encounter:  vitamin D (ERGOCALCIFEROL) 64472 UNIT capsule Take 1 capsule (50,000 Units) by mouth once a week   torsemide (DEMADEX) 20 MG tablet Take 20 mg by mouth daily    spironolactone (ALDACTONE) 50 MG tablet Take 50 mg by mouth daily    lactulose (CHRONULAC) 10 GM/15ML solution Take 45 mLs by mouth 3 times daily    rifaximin (XIFAXAN) 550 MG TABS tablet Take 550 mg by mouth 2 times daily   omeprazole (PRILOSEC) 20 MG CR capsule Take 20 mg by mouth daily        Social Hx:   Social History   Substance Use Topics     Smoking status: Never Smoker     Smokeless tobacco: Former User     Types: Chew     Alcohol use No      Comment: Quit Nov. 4, 2016       Allergies: No Known Allergies      NPO Status: > 8 hours     Labs:    Blood Bank:  Lab Results   Component Value Date    ABO A 06/27/2017    RH  Pos 06/27/2017    AS Neg 06/27/2017     BMP:  Recent Labs   Lab Test  06/27/17 0923   NA  135   POTASSIUM  4.3   CHLORIDE  100   CO2  30   BUN  12   CR  0.63*   GLC  107*   BRITNI  8.3*     CBC:   Recent Labs   Lab Test  06/27/17 0923   WBC  4.5   RBC  2.88*   HGB  10.9*   HCT  31.2*   MCV  108*   MCH  37.8*   MCHC  34.9   RDW  13.9   PLT  49*     Coags:  Recent Labs   Lab Test  06/27/17 0923   INR  2.33*   PTT  45*   FIBR  173*         Physical Exam  Normal systems: cardiovascular, pulmonary and dental    Airway   Mallampati: I  TM distance: >3 FB  Neck ROM: full    Dental     Cardiovascular   Rhythm and rate: regular and normal      Pulmonary    breath sounds clear to auscultation    Other  findings: *Icteric  *Asterixis   *18 G IV on the right forearm- patent                 Anesthesia Plan      History & Physical Review  History and physical reviewed and following examination; no interval change.    ASA Status:  3 .    NPO Status:  > 8 hours    Plan for General and ETT with Intravenous induction. Maintenance will be Balanced.      Additional equipment: 2nd IV, Arterial Line, Central Line and CVP      Postoperative Care  Postoperative pain management:  IV analgesics.      Consents  Anesthetic plan, risks, benefits and alternatives discussed with:  Patient and Parent (Mother and/or Father).  Use of blood products discussed: Yes.   Use of blood products discussed with Patient.  Consented to blood products.  .        Airway: No prior intubations, feasible airway per physical examination    Special Considerations:  MELD score 24     Plan:   - ASA 3  - Post induction arterial line   - GETA with standard ASA monitors, IV induction, balanced anesthetic  - PIV x 2   - 2 MAC CVC RIJ catheters   - Antibiotics per transplant surgery  - PONV prophylaxis  - Pain management with Fentanyl/dilaudid boluses     Emil Cornejo MD  Anesthesiology Resident  [phone]

## 2017-06-28 NOTE — TELEPHONE ENCOUNTER
Donors with risk factors or behaviors that increase their chance of having an infection are called a Public Health Service (or [facility]) Increased Risk donor.  These donors undergo basic and a second level of more sensitive testing for infections like HIV (AIDS), Hepatitis B or C.   This particular donor meets PHS Increased Risk guidelines due to Person who has been newly diagnosed with or have been treated for syphilis, gonorrhea, chlamydia, or genital ulcers in the preceding 12 months     Even without the PHS increased risk label, infection or cancer can be transmitted from donors through transplant, but it is rare.  The risk of getting a donor transmitted infection from this donor is low, but higher than a donor without this label, so that is why we discuss this with you.  The test results for HIV and hepatitis in this donor were negative. Even with negative test results, there is still a small chance (less than 1 in 100) that this donor could have an infection that could be transmitted with the organ transplant.  Our doctor has reviewed the information about this donor. She/he recommends that you consider this organ. In his/her opinion, the potential benefits of accepting the organ outweigh the risks of getting an infection from this donor.   Everyone has a different level of how much risk they are willing to accept for themselves.  The decision to accept this organ is yours.  If you decide NOT to accept the organ, you will not lose your place on the waiting list.  Do you have any questions?

## 2017-06-28 NOTE — PLAN OF CARE
Problem: Goal Outcome Summary  Goal: Goal Outcome Summary  Outcome: No Change  VSS on RA. Alert and oriented x4. Denies pain and nausea. NPO since 1800 for possible liver transplant. Pre op showers completed x2. Voiding spontaneously, loose BM x1 with lactulose dose. PIV SL. Report given to OR RN at approximately 0530; pt on litter to pre op area at 0545. All belongings gathered and sent with pt. Pt's family is at bedside, supportive.

## 2017-06-28 NOTE — ANESTHESIA PROCEDURE NOTES
Arterial Line Procedure Note  Staff:     Anesthesiologist:  JOSEPH SANDS    Resident/CRNA:  SPENCER GALVAN    Arterial line performed by resident/CRNA in presence of a teaching physician    Location: In OR After Induction  Procedure Start/Stop Times:     patient identified, IV checked, risks and benefits discussed, informed consent, monitors and equipment checked, pre-op evaluation and at physician/surgeon's request      Correct Patient: Yes      Correct Position: Yes      Correct Site: Yes      Correct Procedure: Yes      Correct Laterality:  N/A    Site Marked:  N/A  Line Placement:     Procedure:  Arterial Line    Insertion Site:  Radial    Insertion laterality:  Left    Skin Prep: Chloraprep      Patient Prep: patient draped, mask, sterile gloves, hat and hand hygiene      Local skin infiltration:  None    Ultrasound Guided?: No      Catheter size:  20 gauge, 12 cm    Cath secured with: suture      Dressing:  Tegaderm    Complications:  None obvious    Arterial waveform: Yes      IBP within 10% of NIBP: Yes

## 2017-06-28 NOTE — OP NOTE
Transplant Center   Operative Note     Procedure date:  17    Preoperative diagnosis:  End Stage Liver Disease due to Laennec's    Postoperative diagnosis:  Same, severe coagulopathy after reperfusion and severe portal hypertension    Procedure:  1. Liver transplant   2. End-to-end Choledochocholedochostomy  3.Portal Venovenous bypass       Surgeon:  Surgeon(s) and Role:     * Chino Martin MD - Primary     * Emil Chappell MD - Assisting        Fellow/assistant:   Dr Ta LÓPEZ ,There was no qualified resident to assist with this procedure    Anesthesia:  General    Specimen:  LIVER    Drains:  1 melanie    Urine output:  few mls    Estimated blood loss:  600 cc    Fluids administered:       Intraoperative Events: liver reperfused well    Complications: None    Findings: severe portal hypertension and coagulopathy     Indication: Joe Sosa with a history of End Stage Liver Disease due to Laennec's who presents for  - Brain Death  Liver  transplant. A suitable donor offer has become available. I spoke to the patient and his mother about the donor. The donor was aged 37 yrs with at BMI of 47, liver biopsy showed stage 1 fibrosis and <20% macro steatosis. After discussing the risks and benefits of proceeding, the patient agreed to proceed with surgery and provided informed consent.    Final ABO/Crossmatch verification: After the donor organ arrived to the operating room and prior to anastomosis, I participated in the transplant pre-verification upon organ receipt timeout by visually verifying the donor ID, organ and laterality, donor blood type, recipient unique identifier, recipient blood type, and that the donor and recipient are blood type compatible.     Donor UNOS ID:  [id number]    Donor ABO:  A    Donor arterial clamp on:  2017  1:06 AM    Preservation fluid:  SSPS    Vessels with organ:  Yes    Donor organ arrival to recipient room:  2017  7:12 AM    Total ischemic time:   593    Cold ischemic time:  544    Warm ischemic time:  49    Ex-vivo:  NO    Time placed on Ex-vivo perfusion:  Yes    Total time on Ex-vivo perfusion:  NO min     Back Table Preparation:   Procedure:  Bench preparation of the liver allograft for transplantation    Preoperative diagnosis:  End Stage Liver Disease due to Laennec's    Postoperative diagnosis:  Same,     Surgeon:  Surgeon(s) and Role:     * Chino Martin MD - Primary     * Emil Chappell MD - Assisting     * Ailyn Rodriguez MD - Assisting     * Jose A Lafleur MD - Assisting    Co-Surgeon:  Chino Martin M.D.    Fellow/Assistant: There was no qualified resident to assist with this procedure    Anesthesia:  None    Graft biopsy:  Yes-   <20% MACRO AND STAGE I fibrosis    Macroscopic steatosis:  Mild    Back table reconstruction:  none    Intimal flap repair:  no    # of hepatic arteries:  1 the entire hepatic artery was from SMA    # of portal veins:  1    Accessory arteries:  0    # of hepatic veins:  3    # of bile ducts:  1    Graft weight:  VERY LARGE LIVER     Findings:   Liver Laceration: No  Overall quality of liver: GOOD    Back Table Procedure: The liver allograft was received and inspected and the aforementioned findings were noted. It had been previously flushed with UW. The donor liver was placed in fresh ice-cold preservation solution. We identified the inferior vena cava. Two stay sutures were placed on the supra-hepatic portion of the cava. Two stay sutures were placed on the infra-hepatic portion of the cava. The fibro-fatty tissue and adrenal gland was cleared of inferior vena cava. The phrenic vein was ligated. The adrenal vein was ligated. The IVC was tested for leaks by using a bulb syringe. We suture ligated all identified leaks. The portal vein was identified. All the fibro-fatty area or tissue around the portal vein was removed and the portal vein was dissected up to its bifurcation. An 8-Comoran  cannula was placed in the portal vein and fixed with a stitch. The portal vein was tested for leaks. We suture ligated all identified leaks. The cannula was left in place to be used for flushing the liver at the time of implantation. The hepatic artery anatomy was identified. The celiac axis  was traced all the way from the aortic patch to the level of the gastro-duodenal artery. Dissection was stopped at the level of the gastro-duodenal artery. All the leaks in the hepatic artery tributaries were suture ligated. The bile duct was inspected and flushed. No reconstruction was required. The liver was placed back in ice-cold preservation solution until ready for transplantation. Faculty was present for the critical portions of the procedure.    Findings: SEVERE PORTAL HYPERTENSION AND COAGULOPATHY   Operative Procedure:   Arterial anastomosis start:  6/28/2017 10:10 AM    Recipient arterial unclamp:  6/28/2017 10:59 AM    Extra vessels used:  NO    Extra vessels banked:  No    Previous upper abd surgery:  No    Previous cholecystectomy?  Yes    Portal vein:  Thrombus: No   Patent: Yes-     Specify:     On portal bypass:  Yes-  1 Lt/mt    Arterial flow:  Sufficient: Yes-  Recipient hepatic artery at bifurcation to the donor hepatic artery close to GDA    Bile duct anastomosis:  To bowel: No   Specify:    To duct: Yes-  Recipient duct was 10 mm and donor duct was 4mm. interupted anastamosis over a single J stent   Specify:      Joe Sosa was brought to the operating room, placed in a supine position, and a time out was performed. Sequential compression devices were placed on both lower extremities and general endotracheal anesthesia was induced. The patient was given IV antibiotics, and Solumedrol. A Major catheter was placed. A central line was placed by Anesthesia service. The abdomen was then shaved, prepped, and draped in the usual sterile fashion.  The backtable preparation occurred prior to implantation.    We  entered the abdominal cavity using mercedes  incision. The abdomen was examined. Lysis of adhesions took  an additional 15  minutes of operating time. We proceeded to mobilization of the liver first by dividing falciform ligament, next dividing the triangular ligaments and mobilizing the left lobe with further evaluation of the right lobe of the liver. Next the right lobe of the liver was mobilized. We elected to proceed with a hilar dissection. The right and left hepatic arteries were identified, ligated and divided, followed by ligation and division of the common bile duct. The portal vein was cleared from tissue and small branches were tied off and divided. The left and right portal veins were separately ligated and the portal vein was divided. At this point we went on the bypass with bypass flow of 1 liter per minute. Next, we continued mobilizing the right lobe. The adhesions between the right lobe and the right diaphragm were divided and the lobe was mobilized up to the vena cava. The hepatic veins were identified. Next, we dissected out the caudate lobe and infrahepatic IVC.     We applied Infrahepatic and suprahepatic clamps to the IVC and the liver was excised with curved Genao scissors. The recipient's liver was passed off from the operating table. Next, complete hemostasis was obtained. The donor liver was brought into the field. The suprahepatic IVC anastomosis was constructed with 3-0 Prolene followed by the construction of infrahepatic anastomosis with 4-0 Prolene. Next, we came off the bypass and end-to-end portal anastomosis was constructed between the donor and recipient portal veins using 6-0 Prolene. During the construction of the infrahepatic IVC anastomosis, the liver was flushed with 5% albumin. Once the portal anastomosis was constructed, the liver was reperfused. Complete hemostasis was obtained and arterial anastomosis was performed between the donor celiac trunk patch using Camargo technique  and the bifurcation of the right and left recipient hepatic arteries. After clamps were released, there was a good flow within the donor artery. Again, all the arterial branches that were bleeding were ligated and complete hemostasis was obtained. After the anastomosis was performed, good flow was re-established, hemostasis was obtained. Next, the biliary anastomosis was performed using a 6-0 INTERRUPTED PDS suture over the stent.     Next again the abdomen was irrigated and hemostasis was obtained. We closed the abdomen with No 1 PDS. All needle, sponge and instrument counts were correct x 2. Faculty was present for key portions of the procedure. The patient was awakened, extubated, and transferred to the ICU for post-op monitoring.

## 2017-06-28 NOTE — TELEPHONE ENCOUNTER
Donors with risk factors or behaviors that increase their chance of having an infection are called a Public Health Service (or [facility]) Increased Risk donor.  These donors undergo basic and a second level of more sensitive testing for infections like HIV (AIDS), Hepatitis B or C.   This particular donor meets PHS Increased Risk guidelines due to Person who has been newly diagnosed with or have been treated for syphilis, gonorrhea, chlamydia, or genital ulcers in the preceding 12 months     Even without the PHS increased risk label, infection or cancer can be transmitted from donors through transplant, but it is rare.  The risk of getting a donor transmitted infection from this donor is low, but higher than a donor without this label, so that is why we discuss this with you.  The test results for HIV and hepatitis in this donor were negative. Even with negative test results, there is still a small chance (less than 1 in 100) that this donor could have an infection that could be transmitted with the organ transplant.  Our doctor has reviewed the information about this donor. She/he recommends that you consider this organ. In his/her opinion, the potential benefits of accepting the organ outweigh the risks of getting an infection from this donor.   Everyone has a different level of how much risk they are willing to accept for themselves.  The decision to accept this organ is yours.  If you decide NOT to accept the organ, you will not lose your place on the waiting list.  Do you have any questions?

## 2017-06-28 NOTE — OR NURSING
INR 2.33 from yesterday's AM labs. Unable to reach AKIRA Bull, by phone. [facility] text paged with result. INR to be rechecked this morning. No new orders received from [facility]. Will continue to monitor.

## 2017-06-28 NOTE — PROGRESS NOTES
Liver Transplant Social Work  D: I met with Mr. Vizcaino's mother, Therese, in the OR waiting room this afternoon. His father and sister were also present today during Joe's liver transplant surgery. His mother stated that he had been fairly calm going into surgery this morning, but seemed a little nervous when his family had to leave him.   I: Education related to the post transplant phase, including potential discharge options was discussed. I reminded his mother of tomorrow's weekly liver transplant support group. I also encouraged her to go home to sleep tonight, once she sees her son and feels comfortable with how he is doing, as she has been at the hospital for at least 36 hours.   A: Joe continues to have a good support network. He will hopefully be able to return to his mother's home with home care when ready for discharge, as he is reported to have been quite functional prior to surgery. He may, however, require a brief ARU stay, depending on his medical status. He has [facility] which should provide good coverage for his post transplant care needs.   P: Social work will continue to be available for support, resources, disposition and other psychosocial needs as they arise. (pager [phone])

## 2017-06-28 NOTE — PROGRESS NOTES
Transplant Surgery    I discussed an organ offer with Mr. Joe Sosa today. The donor is a Public Health Service ([facility]S) increased risk donor. The nature of the known risk behaviors (IV drug use) was disclosed. CLARISSE testing is negative for HCV, HIV and other tests were reviewed. The risk of potential donor transmitted infection due to hi-risk status was discussed in detail, estimated overall as low, but higher than a donor without this designation. Mr. Sosa verbalized an understanding of this risk and wishes to proceed with transplantation with this donor.    Emil Chappell MD  Transplant Surgery Fellow  Pager: [phone]

## 2017-06-28 NOTE — ANESTHESIA PROCEDURE NOTES
PA Catheter Insertion Note  Anesthesiologist: JOSEPH SANDS  Resident:  DEENA KHAN   PA Catheter placed by resident/CRNA in the presence of a teaching physician.  Introducer: Introducer placed as part of procedure (SEE separate note)   Skin prep:  Chloraprep Cap, Full body drape, hand hygiene, Mask, Sterile gloves and Sterile gown    PA Catheter type:  CCO    Distance catheter advanced:  50 cm.    Appropriate RA, RV, PA  waveforms?: Yes    Dressing:  Biopatch and Tegaderm    Complications:  None apparent

## 2017-06-28 NOTE — OR NURSING
No order for informed consent in Epic. Liver transplant cross cover notified, awaiting response. Will continue to monitor.

## 2017-06-28 NOTE — ANESTHESIA PROCEDURE NOTES
Central Line Procedure Note  Staff:     Anesthesiologist:  JOSEPH SANDS    Resident/CRNA:  DEENA KHAN    Central line placed by Resident/CRNA in the presence of a teaching physician    Location: In OR after induction  Procedure Start/Stop Times:     patient identified, IV checked, site marked, risks and benefits discussed, informed consent, monitors and equipment checked, pre-op evaluation and at physician/surgeon's request      Correct Patient: Yes      Correct Position: Yes      Correct Site: Yes      Correct Procedure: Yes      Correct Laterality:  Yes    Site Marked:  Yes  Line Placement:     Procedure:  Central Line    Insertion laterality:  Left    Insertion site:  Subclavian    Position:  Trendelenburg      Maximal Sterile Barriers: All elements of maximal sterile barrier technique followed      (Maximal sterile barriers include:   Sterile gown, Sterile Gloves, Mask, Cap, Whole body draped, hand hygiene and acceptable skin prep).       Injection Technique:  Ultrasound guided and Seldinger Technique    Sterile Ultrasound Technique:  Sterile probe cover and Sterile gel    Vein evaluated via U/S for patency/adequacy of catheter insertion and is adequate.  Using realtime U/S imaging the vein was punctured, and needle was observed entering vein on U/S      Permanent Image entered into patient's record      Catheter size:  9 Fr, 2 lumen 11.5 cm (MAC)    Cath secured with: suture      Dressing:  Tegaderm and Biopatch    Complications:  None obvious    Blood aspirated all lumens: Yes      All Lumens Flushed: Yes      Verification method:  Ultrasound

## 2017-06-28 NOTE — ANESTHESIA CARE TRANSFER NOTE
Patient: Joe Sosa    Procedure(s):  TRANSPLANT LIVER RECIPIENT  DONOR with bile duct stenting - Wound Class: II-Clean Contaminated    Diagnosis: Liver failure  Diagnosis Additional Information: No value filed.    Anesthesia Type:   General, ETT     Note:  Airway :ETT  Patient transferred to:PACU  Comments: Transported with monitors.  Stable.  Report given.      Vitals: (Last set prior to Anesthesia Care Transfer)    CRNA VITALS  2017 1333 - 2017 1413      2017             Resp Rate (observed): 18                Electronically Signed By: Payton Silva MD  2017  2:13 PM

## 2017-06-28 NOTE — PROGRESS NOTES
CLINICAL NUTRITION SERVICES - BRIEF NOTE    Nutrition Prescription    RECOMMENDATIONS FOR MDs/PROVIDERS TO ORDER:  -Free water flush adjustment per MD discretion pending sodium and fluid status  -Consider increasing vitamin D to 50,000 2-3x/week d/t chronically low levels (<10)    Recommendations already ordered by Registered Dietitian (RD):  -Once FT verified, start TF via NGT: TwoCal HN @ 10 mL/hr.  -If K/Mg are WNL and Phos >1.9, adv TF by 10 mL q8h to goal of 60 mL/hr  -15 mL liquid MVI for micronutrient needs  -30 mL water flush q4h for tube patency  -Prosource TF protein modular TID to provide an additional 120 kcal and 33 g PRO  -TF @ goal + Prosource packets provide total provisions of: 3000 kcals (30 Kcals/kg/day), 154 g PRO (1.6 gms/kg/day), 1008 ml free H2O, 315 g CHO and 7 g Fiber daily.  -Assess gastric residuals and HOB >30 degrees while feeding gastrically    Future/Additional Recommendations:  -EN start, adv, lytes  -Gastric feeding tolerance     Received consult for Registered Dietitian to Assess and Order TF per MNT protocol    Nutrition Progress Note - f/u for progress towards previous nutrition POC (see previous 6/27 reassessment for details)    Vani Ramirez RDN, LD  Pgr: [phone]

## 2017-06-28 NOTE — ANESTHESIA PROCEDURE NOTES
Central Line Procedure Note  Staff:     Anesthesiologist:  JOSEPH SADNS    Resident/CRNA:  DEENA KHAN    Central line placed by Resident/CRNA in the presence of a teaching physician    Location: In OR after induction  Procedure Start/Stop Times:     patient identified, IV checked, site marked, risks and benefits discussed, informed consent, monitors and equipment checked, pre-op evaluation and at physician/surgeon's request      Correct Patient: Yes      Correct Position: Yes      Correct Site: Yes      Correct Procedure: Yes      Correct Laterality:  Yes    Site Marked:  Yes  Line Placement:     Procedure:  Central Line    Insertion laterality:  Right    Insertion site:  Internal Jugular    Position:  Trendelenburg      Maximal Sterile Barriers: All elements of maximal sterile barrier technique followed      (Maximal sterile barriers include:   Sterile gown, Sterile Gloves, Mask, Cap, Whole body draped, hand hygiene and acceptable skin prep).Skin Prep: Chloraprep         Injection Technique:  Ultrasound guided and Seldinger Technique    Sterile Ultrasound Technique:  Sterile probe cover and Sterile gel    Vein evaluated via U/S for patency/adequacy of catheter insertion and is adequate.  Using realtime U/S imaging the vein was punctured, and needle was observed entering vein on U/S      Permanent Image entered into patient's record      Local skin infiltration:  None    Catheter size:  9 Fr, 2 lumen 11.5 cm (MAC)    Cath secured with: suture      Dressing:  Tegaderm and Biopatch    Complications:  None obvious    Blood aspirated all lumens: Yes      All Lumens Flushed: Yes      Verification method:  Ultrasound

## 2017-06-28 NOTE — PHARMACY-CONSULT NOTE
Pharmacy Tube Feeding Consult    Medication reviewed for administration by feeding tube and for potential food/drug interactions.    Recommendation: No changes are needed at this time. If pantoprazole is changed to enteral route, the oral suspension formulation is recommended.    Pharmacy will continue to follow as new medications are ordered.    Norberto Villatoro, LidaD

## 2017-06-29 ENCOUNTER — DOCUMENTATION ONLY (OUTPATIENT)
Dept: TRANSPLANT | Facility: CLINIC | Age: 27
End: 2017-06-29

## 2017-06-29 ENCOUNTER — ALLIED HEALTH/NURSE VISIT (OUTPATIENT)
Dept: RESEARCH | Facility: CLINIC | Age: 27
End: 2017-06-29

## 2017-06-29 ENCOUNTER — APPOINTMENT (OUTPATIENT)
Dept: PHYSICAL THERAPY | Facility: CLINIC | Age: 27
DRG: 006 | End: 2017-06-29
Attending: TRANSPLANT SURGERY
Payer: COMMERCIAL

## 2017-06-29 ENCOUNTER — APPOINTMENT (OUTPATIENT)
Dept: ULTRASOUND IMAGING | Facility: CLINIC | Age: 27
DRG: 006 | End: 2017-06-29
Attending: SURGERY
Payer: COMMERCIAL

## 2017-06-29 ENCOUNTER — APPOINTMENT (OUTPATIENT)
Dept: GENERAL RADIOLOGY | Facility: CLINIC | Age: 27
DRG: 006 | End: 2017-06-29
Attending: SURGERY
Payer: COMMERCIAL

## 2017-06-29 DIAGNOSIS — Z00.6 EXAMINATION OF PARTICIPANT IN CLINICAL TRIAL: Primary | ICD-10-CM

## 2017-06-29 LAB
ALBUMIN SERPL-MCNC: 2.4 G/DL (ref 3.4–5)
ALP SERPL-CCNC: 56 U/L (ref 40–150)
ALT SERPL W P-5'-P-CCNC: 556 U/L (ref 0–70)
AMYLASE SERPL-CCNC: 30 U/L (ref 30–110)
ANION GAP SERPL CALCULATED.3IONS-SCNC: 8 MMOL/L (ref 3–14)
ANION GAP SERPL CALCULATED.3IONS-SCNC: 9 MMOL/L (ref 3–14)
AST SERPL W P-5'-P-CCNC: 1282 U/L (ref 0–45)
BASE DEFICIT BLDA-SCNC: 0.2 MMOL/L
BASE DEFICIT BLDA-SCNC: 0.9 MMOL/L
BASOPHILS # BLD AUTO: 0 10E9/L (ref 0–0.2)
BASOPHILS NFR BLD AUTO: 0 %
BILIRUB DIRECT SERPL-MCNC: 1.5 MG/DL (ref 0–0.2)
BILIRUB SERPL-MCNC: 2.2 MG/DL (ref 0.2–1.3)
BLD PROD TYP BPU: NORMAL
BLD UNIT ID BPU: 0
BLOOD PRODUCT CODE: NORMAL
BPU ID: NORMAL
BUN SERPL-MCNC: 24 MG/DL (ref 7–30)
BUN SERPL-MCNC: 38 MG/DL (ref 7–30)
CA-I BLD-MCNC: 5 MG/DL (ref 4.4–5.2)
CALCIUM SERPL-MCNC: 7.8 MG/DL (ref 8.5–10.1)
CALCIUM SERPL-MCNC: 8.2 MG/DL (ref 8.5–10.1)
CHLORIDE SERPL-SCNC: 107 MMOL/L (ref 94–109)
CHLORIDE SERPL-SCNC: 109 MMOL/L (ref 94–109)
CO2 SERPL-SCNC: 25 MMOL/L (ref 20–32)
CO2 SERPL-SCNC: 26 MMOL/L (ref 20–32)
CREAT SERPL-MCNC: 1.05 MG/DL (ref 0.66–1.25)
CREAT SERPL-MCNC: 1.21 MG/DL (ref 0.66–1.25)
DIFFERENTIAL METHOD BLD: ABNORMAL
EOSINOPHIL # BLD AUTO: 0 10E9/L (ref 0–0.7)
EOSINOPHIL NFR BLD AUTO: 0 %
ERYTHROCYTE [DISTWIDTH] IN BLOOD BY AUTOMATED COUNT: 19.5 % (ref 10–15)
ERYTHROCYTE [DISTWIDTH] IN BLOOD BY AUTOMATED COUNT: 20 % (ref 10–15)
ERYTHROCYTE [DISTWIDTH] IN BLOOD BY AUTOMATED COUNT: 20.2 % (ref 10–15)
ERYTHROCYTE [DISTWIDTH] IN BLOOD BY AUTOMATED COUNT: 20.4 % (ref 10–15)
FIBRINOGEN PPP-MCNC: 152 MG/DL (ref 200–420)
FIBRINOGEN PPP-MCNC: 242 MG/DL (ref 200–420)
GFR SERPL CREATININE-BSD FRML MDRD: 71 ML/MIN/1.7M2
GFR SERPL CREATININE-BSD FRML MDRD: 84 ML/MIN/1.7M2
GLUCOSE BLDC GLUCOMTR-MCNC: 122 MG/DL (ref 70–99)
GLUCOSE BLDC GLUCOMTR-MCNC: 122 MG/DL (ref 70–99)
GLUCOSE BLDC GLUCOMTR-MCNC: 132 MG/DL (ref 70–99)
GLUCOSE BLDC GLUCOMTR-MCNC: 135 MG/DL (ref 70–99)
GLUCOSE BLDC GLUCOMTR-MCNC: 140 MG/DL (ref 70–99)
GLUCOSE BLDC GLUCOMTR-MCNC: 141 MG/DL (ref 70–99)
GLUCOSE BLDC GLUCOMTR-MCNC: 141 MG/DL (ref 70–99)
GLUCOSE BLDC GLUCOMTR-MCNC: 143 MG/DL (ref 70–99)
GLUCOSE BLDC GLUCOMTR-MCNC: 145 MG/DL (ref 70–99)
GLUCOSE BLDC GLUCOMTR-MCNC: 148 MG/DL (ref 70–99)
GLUCOSE BLDC GLUCOMTR-MCNC: 151 MG/DL (ref 70–99)
GLUCOSE BLDC GLUCOMTR-MCNC: 158 MG/DL (ref 70–99)
GLUCOSE BLDC GLUCOMTR-MCNC: 160 MG/DL (ref 70–99)
GLUCOSE BLDC GLUCOMTR-MCNC: 161 MG/DL (ref 70–99)
GLUCOSE BLDC GLUCOMTR-MCNC: 166 MG/DL (ref 70–99)
GLUCOSE BLDC GLUCOMTR-MCNC: 189 MG/DL (ref 70–99)
GLUCOSE SERPL-MCNC: 147 MG/DL (ref 70–99)
GLUCOSE SERPL-MCNC: 147 MG/DL (ref 70–99)
HBA1C MFR BLD: NORMAL % (ref 4.3–6)
HCO3 BLD-SCNC: 24 MMOL/L (ref 21–28)
HCO3 BLD-SCNC: 25 MMOL/L (ref 21–28)
HCT VFR BLD AUTO: 19.6 % (ref 40–53)
HCT VFR BLD AUTO: 20.8 % (ref 40–53)
HCT VFR BLD AUTO: 21.5 % (ref 40–53)
HCT VFR BLD AUTO: 25.7 % (ref 40–53)
HGB BLD-MCNC: 6.8 G/DL (ref 13.3–17.7)
HGB BLD-MCNC: 7.3 G/DL (ref 13.3–17.7)
HGB BLD-MCNC: 7.6 G/DL (ref 13.3–17.7)
HGB BLD-MCNC: 8.1 G/DL (ref 13.3–17.7)
HGB BLD-MCNC: 9.2 G/DL (ref 13.3–17.7)
IMM GRANULOCYTES # BLD: 0 10E9/L (ref 0–0.4)
IMM GRANULOCYTES NFR BLD: 0.2 %
INR PPP: 1.27 (ref 0.86–1.14)
INR PPP: 1.54 (ref 0.86–1.14)
LACTATE BLD-SCNC: 0.8 MMOL/L (ref 0.7–2.1)
LACTATE BLD-SCNC: 0.9 MMOL/L (ref 0.7–2.1)
LACTATE BLD-SCNC: 1.3 MMOL/L (ref 0.7–2.1)
LACTATE BLD-SCNC: 1.6 MMOL/L (ref 0.7–2.1)
LIPASE SERPL-CCNC: 113 U/L (ref 73–393)
LYMPHOCYTES # BLD AUTO: 0.4 10E9/L (ref 0.8–5.3)
LYMPHOCYTES NFR BLD AUTO: 5 %
MAGNESIUM SERPL-MCNC: 2.5 MG/DL (ref 1.6–2.3)
MCH RBC QN AUTO: 33.8 PG (ref 26.5–33)
MCH RBC QN AUTO: 34.3 PG (ref 26.5–33)
MCH RBC QN AUTO: 34.5 PG (ref 26.5–33)
MCH RBC QN AUTO: 34.5 PG (ref 26.5–33)
MCHC RBC AUTO-ENTMCNC: 34.7 G/DL (ref 31.5–36.5)
MCHC RBC AUTO-ENTMCNC: 35.1 G/DL (ref 31.5–36.5)
MCHC RBC AUTO-ENTMCNC: 35.3 G/DL (ref 31.5–36.5)
MCHC RBC AUTO-ENTMCNC: 35.8 G/DL (ref 31.5–36.5)
MCV RBC AUTO: 100 FL (ref 78–100)
MCV RBC AUTO: 96 FL (ref 78–100)
MCV RBC AUTO: 96 FL (ref 78–100)
MCV RBC AUTO: 98 FL (ref 78–100)
MONOCYTES # BLD AUTO: 0.5 10E9/L (ref 0–1.3)
MONOCYTES NFR BLD AUTO: 6.1 %
NEUTROPHILS # BLD AUTO: 7.3 10E9/L (ref 1.6–8.3)
NEUTROPHILS NFR BLD AUTO: 88.7 %
NRBC # BLD AUTO: 0 10*3/UL
NRBC BLD AUTO-RTO: 0 /100
NUM BPU REQUESTED: 1
NUM BPU REQUESTED: 10
NUM BPU REQUESTED: 5
O2/TOTAL GAS SETTING VFR VENT: 30 %
O2/TOTAL GAS SETTING VFR VENT: 30 %
PCO2 BLD: 39 MM HG (ref 35–45)
PCO2 BLD: 41 MM HG (ref 35–45)
PH BLD: 7.38 PH (ref 7.35–7.45)
PH BLD: 7.41 PH (ref 7.35–7.45)
PHOSPHATE SERPL-MCNC: 5.4 MG/DL (ref 2.5–4.5)
PLATELET # BLD AUTO: 40 10E9/L (ref 150–450)
PLATELET # BLD AUTO: 45 10E9/L (ref 150–450)
PLATELET # BLD AUTO: 46 10E9/L (ref 150–450)
PLATELET # BLD AUTO: 65 10E9/L (ref 150–450)
PO2 BLD: 117 MM HG (ref 80–105)
PO2 BLD: 120 MM HG (ref 80–105)
POTASSIUM SERPL-SCNC: 4 MMOL/L (ref 3.4–5.3)
POTASSIUM SERPL-SCNC: 4.2 MMOL/L (ref 3.4–5.3)
PROT SERPL-MCNC: 4.8 G/DL (ref 6.8–8.8)
RBC # BLD AUTO: 1.97 10E12/L (ref 4.4–5.9)
RBC # BLD AUTO: 2.13 10E12/L (ref 4.4–5.9)
RBC # BLD AUTO: 2.25 10E12/L (ref 4.4–5.9)
RBC # BLD AUTO: 2.67 10E12/L (ref 4.4–5.9)
SODIUM SERPL-SCNC: 142 MMOL/L (ref 133–144)
SODIUM SERPL-SCNC: 142 MMOL/L (ref 133–144)
TRANSFUSION STATUS PATIENT QL: NORMAL
WBC # BLD AUTO: 12.8 10E9/L (ref 4–11)
WBC # BLD AUTO: 7.4 10E9/L (ref 4–11)
WBC # BLD AUTO: 8.3 10E9/L (ref 4–11)
WBC # BLD AUTO: 9.9 10E9/L (ref 4–11)

## 2017-06-29 PROCEDURE — 25000125 ZZHC RX 250: Performed by: STUDENT IN AN ORGANIZED HEALTH CARE EDUCATION/TRAINING PROGRAM

## 2017-06-29 PROCEDURE — 20000004 ZZH R&B ICU UMMC

## 2017-06-29 PROCEDURE — 85027 COMPLETE CBC AUTOMATED: CPT | Performed by: TRANSPLANT SURGERY

## 2017-06-29 PROCEDURE — 83735 ASSAY OF MAGNESIUM: CPT | Performed by: SURGERY

## 2017-06-29 PROCEDURE — 97161 PT EVAL LOW COMPLEX 20 MIN: CPT | Mod: GP

## 2017-06-29 PROCEDURE — 00000146 ZZHCL STATISTIC GLUCOSE BY METER IP

## 2017-06-29 PROCEDURE — 40000048 ZZH STATISTIC DAILY SWAN MONITORING

## 2017-06-29 PROCEDURE — 40000193 ZZH STATISTIC PT WARD VISIT

## 2017-06-29 PROCEDURE — 25000128 H RX IP 250 OP 636: Performed by: TRANSPLANT SURGERY

## 2017-06-29 PROCEDURE — 85610 PROTHROMBIN TIME: CPT | Performed by: SURGERY

## 2017-06-29 PROCEDURE — 25000131 ZZH RX MED GY IP 250 OP 636 PS 637: Performed by: TRANSPLANT SURGERY

## 2017-06-29 PROCEDURE — 85025 COMPLETE CBC W/AUTO DIFF WBC: CPT | Performed by: SURGERY

## 2017-06-29 PROCEDURE — 40000344 ZZHCL STATISTIC THAWING COMPONENT: Performed by: STUDENT IN AN ORGANIZED HEALTH CARE EDUCATION/TRAINING PROGRAM

## 2017-06-29 PROCEDURE — 40000014 ZZH STATISTIC ARTERIAL MONITORING DAILY

## 2017-06-29 PROCEDURE — 76700 US EXAM ABDOM COMPLETE: CPT | Mod: XS

## 2017-06-29 PROCEDURE — 25000128 H RX IP 250 OP 636: Performed by: STUDENT IN AN ORGANIZED HEALTH CARE EDUCATION/TRAINING PROGRAM

## 2017-06-29 PROCEDURE — 27210913 ZZH NUTRITION PRODUCT INTERMEDIATE PACKET

## 2017-06-29 PROCEDURE — 85018 HEMOGLOBIN: CPT | Performed by: SURGERY

## 2017-06-29 PROCEDURE — 85027 COMPLETE CBC AUTOMATED: CPT | Performed by: STUDENT IN AN ORGANIZED HEALTH CARE EDUCATION/TRAINING PROGRAM

## 2017-06-29 PROCEDURE — 40000986 XR CHEST PORT 1 VW

## 2017-06-29 PROCEDURE — 25000125 ZZHC RX 250: Performed by: SURGERY

## 2017-06-29 PROCEDURE — 99207 ZZC DROP WITH A PROCEDURE: CPT | Mod: 25

## 2017-06-29 PROCEDURE — P9012 CRYOPRECIPITATE EACH UNIT: HCPCS | Performed by: STUDENT IN AN ORGANIZED HEALTH CARE EDUCATION/TRAINING PROGRAM

## 2017-06-29 PROCEDURE — 25000131 ZZH RX MED GY IP 250 OP 636 PS 637: Performed by: PHYSICIAN ASSISTANT

## 2017-06-29 PROCEDURE — 40000275 ZZH STATISTIC RCP TIME EA 10 MIN

## 2017-06-29 PROCEDURE — 25000132 ZZH RX MED GY IP 250 OP 250 PS 637: Performed by: STUDENT IN AN ORGANIZED HEALTH CARE EDUCATION/TRAINING PROGRAM

## 2017-06-29 PROCEDURE — P9016 RBC LEUKOCYTES REDUCED: HCPCS | Performed by: PHYSICIAN ASSISTANT

## 2017-06-29 PROCEDURE — 97116 GAIT TRAINING THERAPY: CPT | Mod: GP

## 2017-06-29 PROCEDURE — 82330 ASSAY OF CALCIUM: CPT | Performed by: SURGERY

## 2017-06-29 PROCEDURE — 25000132 ZZH RX MED GY IP 250 OP 250 PS 637: Performed by: PHYSICIAN ASSISTANT

## 2017-06-29 PROCEDURE — 83690 ASSAY OF LIPASE: CPT | Performed by: SURGERY

## 2017-06-29 PROCEDURE — 25000132 ZZH RX MED GY IP 250 OP 250 PS 637: Performed by: SURGERY

## 2017-06-29 PROCEDURE — P9037 PLATE PHERES LEUKOREDU IRRAD: HCPCS | Performed by: STUDENT IN AN ORGANIZED HEALTH CARE EDUCATION/TRAINING PROGRAM

## 2017-06-29 PROCEDURE — 40000196 ZZH STATISTIC RAPCV CVP MONITORING

## 2017-06-29 PROCEDURE — 27210429 ZZH NUTRITION PRODUCT INTERMEDIATE LITER

## 2017-06-29 PROCEDURE — 36556 INSERT NON-TUNNEL CV CATH: CPT | Mod: GC | Performed by: SURGERY

## 2017-06-29 PROCEDURE — 85384 FIBRINOGEN ACTIVITY: CPT | Performed by: SURGERY

## 2017-06-29 PROCEDURE — 80048 BASIC METABOLIC PNL TOTAL CA: CPT | Performed by: TRANSPLANT SURGERY

## 2017-06-29 PROCEDURE — 97530 THERAPEUTIC ACTIVITIES: CPT | Mod: GP

## 2017-06-29 PROCEDURE — 94003 VENT MGMT INPAT SUBQ DAY: CPT

## 2017-06-29 PROCEDURE — 82248 BILIRUBIN DIRECT: CPT | Performed by: SURGERY

## 2017-06-29 PROCEDURE — 83605 ASSAY OF LACTIC ACID: CPT | Performed by: SURGERY

## 2017-06-29 PROCEDURE — 25000132 ZZH RX MED GY IP 250 OP 250 PS 637: Performed by: TRANSPLANT SURGERY

## 2017-06-29 PROCEDURE — 82150 ASSAY OF AMYLASE: CPT | Performed by: SURGERY

## 2017-06-29 PROCEDURE — 84100 ASSAY OF PHOSPHORUS: CPT | Performed by: SURGERY

## 2017-06-29 PROCEDURE — 80053 COMPREHEN METABOLIC PANEL: CPT | Performed by: SURGERY

## 2017-06-29 PROCEDURE — 82803 BLOOD GASES ANY COMBINATION: CPT | Performed by: SURGERY

## 2017-06-29 PROCEDURE — 85610 PROTHROMBIN TIME: CPT | Performed by: TRANSPLANT SURGERY

## 2017-06-29 PROCEDURE — 82803 BLOOD GASES ANY COMBINATION: CPT | Performed by: TRANSPLANT SURGERY

## 2017-06-29 RX ORDER — FUROSEMIDE 10 MG/ML
20 INJECTION INTRAMUSCULAR; INTRAVENOUS ONCE
Status: COMPLETED | OUTPATIENT
Start: 2017-06-29 | End: 2017-06-29

## 2017-06-29 RX ORDER — FUROSEMIDE 10 MG/ML
60 INJECTION INTRAMUSCULAR; INTRAVENOUS ONCE
Status: COMPLETED | OUTPATIENT
Start: 2017-06-29 | End: 2017-06-29

## 2017-06-29 RX ORDER — FLUCONAZOLE 200 MG/1
200 TABLET ORAL DAILY
Status: DISCONTINUED | OUTPATIENT
Start: 2017-06-29 | End: 2017-07-05

## 2017-06-29 RX ORDER — TACROLIMUS 0.5 MG/1
2 CAPSULE ORAL
Status: DISCONTINUED | OUTPATIENT
Start: 2017-06-29 | End: 2017-07-02

## 2017-06-29 RX ORDER — MULTIPLE VITAMINS W/ MINERALS TAB 9MG-400MCG
1 TAB ORAL DAILY
Status: DISCONTINUED | OUTPATIENT
Start: 2017-06-29 | End: 2017-07-06 | Stop reason: HOSPADM

## 2017-06-29 RX ADMIN — PROPOFOL 70 MCG/KG/MIN: 10 INJECTION, EMULSION INTRAVENOUS at 02:07

## 2017-06-29 RX ADMIN — POTASSIUM CHLORIDE 20 MEQ: 29.8 INJECTION, SOLUTION INTRAVENOUS at 20:51

## 2017-06-29 RX ADMIN — PIPERACILLIN AND TAZOBACTAM 3.38 G: 3; .375 INJECTION, POWDER, LYOPHILIZED, FOR SOLUTION INTRAVENOUS; PARENTERAL at 02:07

## 2017-06-29 RX ADMIN — PANTOPRAZOLE SODIUM 40 MG: 40 INJECTION, POWDER, FOR SOLUTION INTRAVENOUS at 07:38

## 2017-06-29 RX ADMIN — MYCOPHENOLATE MOFETIL 1000 MG: 200 POWDER, FOR SUSPENSION ORAL at 07:40

## 2017-06-29 RX ADMIN — LACTULOSE 45 ML: 20 SOLUTION ORAL at 07:41

## 2017-06-29 RX ADMIN — Medication 1 PACKET: at 07:41

## 2017-06-29 RX ADMIN — Medication 1 PACKET: at 14:55

## 2017-06-29 RX ADMIN — HUMAN INSULIN 3 UNITS/HR: 100 INJECTION, SOLUTION SUBCUTANEOUS at 09:54

## 2017-06-29 RX ADMIN — FUROSEMIDE 20 MG: 10 INJECTION, SOLUTION INTRAVENOUS at 01:41

## 2017-06-29 RX ADMIN — PIPERACILLIN AND TAZOBACTAM 3.38 G: 3; .375 INJECTION, POWDER, LYOPHILIZED, FOR SOLUTION INTRAVENOUS; PARENTERAL at 14:54

## 2017-06-29 RX ADMIN — FENTANYL CITRATE 75 MCG/HR: 50 INJECTION, SOLUTION INTRAMUSCULAR; INTRAVENOUS at 08:29

## 2017-06-29 RX ADMIN — PIPERACILLIN AND TAZOBACTAM 3.38 G: 3; .375 INJECTION, POWDER, LYOPHILIZED, FOR SOLUTION INTRAVENOUS; PARENTERAL at 19:42

## 2017-06-29 RX ADMIN — TACROLIMUS 2 MG: 5 CAPSULE ORAL at 09:53

## 2017-06-29 RX ADMIN — TACROLIMUS 2 MG: 0.5 CAPSULE ORAL at 19:42

## 2017-06-29 RX ADMIN — FUROSEMIDE 60 MG: 10 INJECTION, SOLUTION INTRAVENOUS at 10:20

## 2017-06-29 RX ADMIN — SULFAMETHOXAZOLE AND TRIMETHOPRIM 1 TABLET: 400; 80 TABLET ORAL at 07:39

## 2017-06-29 RX ADMIN — PROPOFOL 20 MCG/KG/MIN: 10 INJECTION, EMULSION INTRAVENOUS at 08:54

## 2017-06-29 RX ADMIN — FLUCONAZOLE 200 MG: 200 TABLET ORAL at 12:49

## 2017-06-29 RX ADMIN — VALGANCICLOVIR HYDROCHLORIDE 450 MG: 450 TABLET ORAL at 07:39

## 2017-06-29 RX ADMIN — HUMAN INSULIN 5.5 UNITS/HR: 100 INJECTION, SOLUTION SUBCUTANEOUS at 04:16

## 2017-06-29 RX ADMIN — POTASSIUM CHLORIDE 20 MEQ: 750 TABLET, EXTENDED RELEASE ORAL at 19:03

## 2017-06-29 RX ADMIN — SPIRONOLACTONE 50 MG: 25 TABLET ORAL at 07:39

## 2017-06-29 RX ADMIN — HUMAN INSULIN 3 UNITS/HR: 100 INJECTION, SOLUTION SUBCUTANEOUS at 19:23

## 2017-06-29 RX ADMIN — MULTIPLE VITAMINS W/ MINERALS TAB 1 TABLET: TAB at 14:54

## 2017-06-29 RX ADMIN — PIPERACILLIN AND TAZOBACTAM 3.38 G: 3; .375 INJECTION, POWDER, LYOPHILIZED, FOR SOLUTION INTRAVENOUS; PARENTERAL at 07:38

## 2017-06-29 RX ADMIN — PROPOFOL 55 MCG/KG/MIN: 10 INJECTION, EMULSION INTRAVENOUS at 04:37

## 2017-06-29 RX ADMIN — SODIUM CHLORIDE 200 MG: 9 INJECTION, SOLUTION INTRAVENOUS at 07:41

## 2017-06-29 RX ADMIN — MYCOPHENOLATE MOFETIL 1000 MG: 250 CAPSULE ORAL at 19:03

## 2017-06-29 RX ADMIN — RIFAXIMIN 550 MG: 550 TABLET ORAL at 07:39

## 2017-06-29 NOTE — PLAN OF CARE
Problem: Goal Outcome Summary  Goal: Goal Outcome Summary  Outcome: No Change  Neuro: Pupils 3mm equal and reactive. Squeezes hand and gives thumbs up to command. Fentanyl @ 75 and Propofol @ 35.  CV: NSR 80-90s HR overnight. MAP goal >60 met overnight without intervention. CVP ranged from 9-13. Paged SICU about 13 CVP, 20 of lasix was given, pt put out about 300cc UO after lasix. MIV @ 10.  CO: 13.8-14.6, CI: 6.1-6.3, PA: 22/10 ,24/12, 28/9. All pulses palpable.   Resp: Intubated. 8.0 ET tube. CMV. 30%, TV- 600, R-12, PEEP-5. Lung sounds clear, coarse at 0000, resolved with lasix. Currently PS, tolerating well. ABG sent, WNL  GI: OG with trickle feeds @ 10. 30 FWF. Insulin drip 3-5.5 overnight, BS at goal. No BM overnight.   : Major in place, about 80 cc UO/hr.  Skin:  Jaundice. Abdominal incision, dried drainage on dressing. LAM in RQ about 5-10cc dark blood UO/2 hrs. Abdomen soft and round.   Labs: Overnight a total of 2 RBC (for hemoglobin of 7.5 and 6.8, MD notified) , 1 plasma, 1 platelet (for plts of 46 (MD notified, goal is >50), and cyro were transfused. Mag and K were replaced. ABX given overnight.

## 2017-06-29 NOTE — PROGRESS NOTES
[facility], [place]  Procedure Note          Extubation:       Joe Sosa  MRN# [id number]   10:40 AM, June 29, 2017         Patient extubated at: 10:40 AM, June 29, 2017   Supplemental Oxygen: Via nasal cannula at 3 liters per minute   Cough: The cough is good and non-productive   Secretion Mode: Able to clear   Secretion Amount: non amount, thin and secnt in color   Respiratory Exam:: Breath sounds: clear and diminished     Location: bilaterally   Skin Exam:: Patient color: natural   Patient Status: Currently appears comfortable. No adverse reaction     Arterial Blood Gasses: pH Arterial (pH)   Date Value   06/29/2017 7.38     pO2 Arterial (mm Hg)   Date Value   06/29/2017 120 (H)     pCO2 Arterial (mm Hg)   Date Value   06/29/2017 41     Bicarbonate Arterial (mmol/L)   Date Value   06/29/2017 24            Recorded by Maxi Ferreira

## 2017-06-29 NOTE — PLAN OF CARE
Problem: Goal Outcome Summary  Goal: Goal Outcome Summary  OT 4A: OT orders received. Per discussion with PT and chart review, plan to hold OT eval d/t anticipation that pt will not have needs post-op. Pt is young and with independent PLOF, will likely be able to self-modify ADL within precautions without skilled instruction.  PT to notify OT skilled intervention required as pt progresses.

## 2017-06-29 NOTE — PROGRESS NOTES
SURGICAL ICU Progress Note  6/29/2017        CO-MORBIDITIES:   No diagnosis found.    ASSESSMENT: Joe Sosa is a 27 year old male with alpha 1 antitrypsin and alcoholic cirrhosis POD # 1 s/p DDLT.  He continues to do well and improve in the post-operative period.    Plan today/Major changes:  - Multiple blood products required overnight and this morning   Total Blood Products since arrival to SICU:    Platelets: 2     FFP: 2    PRBCs: 3    Cryoprecipitate: 2  - D/c liver failure meds  - Start fluconazole   - Extubated this morning, doing well on 3L O2 NC  - Lasix 60mg   - d/c NG tube, begin clear liquid diet     PLAN:  Neuro/ pain/ sedation:  - Monitor neurological status. Notify the MD for any acute changes in exam.  - Fentanyl IV  mcg IV gtt, + PRN for pain, running at 75 mcg/hr currently.    Pulmonary care: Respiratory insufficiency requiring ventilation  - Extubated, satting well on 3L O2 via NC.  - d/c PPI    Cardiovascular: No issues  - Monitor hemodynamic status, vital signs stable but requiring blood products to maintain Hgb/Plt goals  - off pressors  - goal CVP 8-10    GI care: S/p liver transplant  - Advanced to clear liquid diet today after extubation, tube feeds stopped and NG removed  - Hepatic panel in AM  - INR/lactate q6h   - immunosuppression regimen per txp    Fluids/ Electrolytes/ Nutrition:   - restart maintenance fluids after CVP 8-10.    Renal/ Fluid Balance:   -Urine output low, 1x dose Lasix 60mg per txp recs to increase output  -Will continue to monitor intake and output via maya  -BMP q12h    Endocrine:   - insulin gtt  - Prednisone taper    ID/ Antibiotics:  - Perioperative IV vanc/zosyn/fluconazole for 2 days    Heme:  - Goals: INR <2; Fibrinogen >200; Hgb >8; plt >50  - Hemoglobin stable, requiring products to remain >8  - Recheck labs q6h in perioperative period; replace with products PRN    Prophylaxis:    - Post transplant ppx: Valcyte/dapsone/nystatin   - Mechanical  prophylaxis for DVT.   - No chemical DVT prophylaxis due to high risk of bleeding    Lines/ tubes/ drains:  - R IJ CVC, L subclavian mac with PA catheter, Major, NGT, PIV, A line, abdominal LAM     Disposition:  - Surgical ICU.     Patient findings and plan discussed with surgical ICU staff Dr. Brigid Osman MD  PGY-1  #: [id number]    - - - - - - - - - - - - - - - - - - - - - - - - - - - - - - - - - - - - - - - - - - - - - - - - - - - - - - - - - - - - - - - - - - - - - - - -   Subjective:  Mr. Sosa continues to improve today.  After extubation, he is in good spirits and is able to communicate well with family and staff.  He has been calm and cooperative, and has no issues at this point in time.  He reports good pain control, no nausea/vomiting, no fevers/chills, and no shortness of breath.    PAST MEDICAL HISTORY:   Past Medical History:   Diagnosis Date     Alcoholic cirrhosis of liver with ascites (H) 3/9/2017     Alpha-1-antitrypsin deficiency (H) 3/9/2017     Anasarca      Chronic hyponatremia      Hepatic encephalopathy (H)      SBP (spontaneous bacterial peritonitis) (H)        SURGICAL HISTORY:   Past Surgical History:   Procedure Laterality Date     TRANSPLANT LIVER RECIPIENT  DONOR N/A 2017    Procedure: TRANSPLANT LIVER RECIPIENT  DONOR;  TRANSPLANT LIVER RECIPIENT  DONOR with bile duct stenting;  Surgeon: Chino Martin MD;  Location:  OR       SOCIAL HISTORY:   Social History     Social History     Marital status: Single     Spouse name: N/A     Number of children: N/A     Years of education: N/A     Occupational History     Not on file.     Social History Main Topics     Smoking status: Never Smoker     Smokeless tobacco: Former User     Types: Chew     Alcohol use No      Comment: Quit 2016     Drug use: No     Sexual activity: Not on file     Other Topics Concern     Not on file     Social History Narrative         FAMILY HISTORY: No  bleeding/clotting disorders nor problems with anesthesia.    ALLERGIES:    No Known Allergies    MEDICATIONS:    No current facility-administered medications on file prior to encounter.   Current Outpatient Prescriptions on File Prior to Encounter:  vitamin D (ERGOCALCIFEROL) 16605 UNIT capsule Take 1 capsule (50,000 Units) by mouth once a week   torsemide (DEMADEX) 20 MG tablet Take 20 mg by mouth daily    spironolactone (ALDACTONE) 50 MG tablet Take 50 mg by mouth daily    lactulose (CHRONULAC) 10 GM/15ML solution Take 45 mLs by mouth 3 times daily    rifaximin (XIFAXAN) 550 MG TABS tablet Take 550 mg by mouth 2 times daily   omeprazole (PRILOSEC) 20 MG CR capsule Take 20 mg by mouth daily        PHYSICAL EXAMINATION:  Temp:  [98.2  F (36.8  C)-101.2  F (38.4  C)] 98.4  F (36.9  C)  Heart Rate:  [] 85  Resp:  [10-14] 11  BP: (115-142)/(55-81) 135/81  MAP:  [60 mmHg-93 mmHg] 86 mmHg  Arterial Line BP: ()/(45-71) 125/64  FiO2 (%):  [30 %-40 %] 30 %  SpO2:  [80 %-99 %] 99 %  General: Calm and cooperative, no acute distress.  Extubated and doing well.  HEENT: Normocephalic, atraumatic. Patent nares. RIJ MAC  Chest wall: Symmetric thorax. No masses or tenderness to palpation. L subclavian MAC   Respiratory: Non-labored breathing. Lung sounds clear to auscultation bilaterally.   Cardiovascular: Regular rate and rhythm, no murmurs/rubs/gallops.  Gastrointestinal: Abdomen soft, non-distended, incision closed with staples and is c/d/i  Extremities: Moving all four extremities spontaneously.  No peripheral edema.   Skin: No rashes or lesions appreciated.    LABS: Reviewed.   Arterial Blood Gases     Recent Labs  Lab 06/29/17  0641 06/29/17  0311 06/28/17  1430 06/28/17  1330   PH 7.38 7.41 7.41 Quantity not sufficientNOTIFIED JOEL AT 1350 6/28/17 JSD   PCO2 41 39 38 Quantity not sufficientNOTIFIED JOEL AT 1350 6/28/17 JSD   PO2 120* 117* 52* Quantity not sufficientNOTIFIED JOEL AT 1350 6/28/17 DON    HCO3 24 25 24 Quantity not sufficientNOTIFIED JOEL AT 1350 17 JSD     Complete Blood Count     Recent Labs  Lab 17  0641 17  1550   WBC 9.9 8.3 8.1 6.8   HGB 7.3* 6.8* 7.5* 9.1*   PLT 45* 46* 55* 54*     Basic Metabolic Panel    Recent Labs  Lab 17  1550 17  1421 17  1330  17  0923     --  140 140 Quantity not sufficientNOTIFIED JOEL AT 1350 17 JSD  < > 135   POTASSIUM 4.2 4.0 3.9 4.0 Quantity not sufficientNOTIFIED JOEL AT 1350 17 JSD  < > 4.3   CHLORIDE 109  --  108 108  --   --  100   CO2 25  --  25 24  --   --  30   BUN 24  --  20 16  --   --  12   CR 1.05  --  0.74 0.64*  --   --  0.63*   *  --  197* 250* Quantity not sufficientNOTIFIED JOEL AT 1350 17 JSD  < > 107*   < > = values in this interval not displayed.  Liver Function Tests    Recent Labs  Lab 17  1550 17  1421  17  0923   AST 1282*  --   --  2612*  --  88*   *  --   --  836*  --  51   ALKPHOS 56  --   --  84  --  280*   BILITOTAL 2.2*  --   --  7.5*  --  7.1*   ALBUMIN 2.4*  --   --  2.2*  --  2.4*   INR 1.54* 1.64* 2.24* 2.49*  < > 2.33*   < > = values in this interval not displayed.  Pancreatic Enzymes    Recent Labs  Lab 17  14217  0923   LIPASE 113 327  --    AMYLASE 30  --  62     Coagulation Profile    Recent Labs  Lab 1728/17  1550 17  1421 17  1330 17  1145  17  0923   INR 1.54* 1.64* 2.24* 2.49* 2.78* 3.40*  < > 2.33*   PTT  --   --   --  45* 59* 103*  --  45*   < > = values in this interval not displayed.  Lactate  Invalid input(s): LACTATE    IMAGING:  Results for orders placed or performed during the hospital encounter of 17   XR Chest 2 Views    Narrative    XR CHEST 2 VW  2017 9:37 AM      HISTORY:  donor transplant going to OR  NOW    COMPARISON: 3/27/2017    FINDINGS: Upper abdomen is unremarkable. Cardiac silhouette is not  enlarged. No pneumothorax or pleural effusion. No focal airspace  opacities. No acute osseous abnormalities.      Impression    IMPRESSION: Clear lungs. No acute findings.    I have personally reviewed the examination and initial interpretation  and I agree with the findings.    NED HINDS MD   XR Chest Port 1 View    Narrative    XR CHEST PORT 1 VW  6/28/2017 2:52 PM      HISTORY: Check ET tube placement - Liver transplant    COMPARISON: 6/27/2017    FINDINGS: Single portable AP view of the chest upright. Endotracheal  tube 3.5 cm superior to the medardo. Right IJ [place]Stephanie catheter tip  projects over the main pulmonary artery outflow tract. Enteric tube  proceeds below the level of the diaphragm and the inferior margin of  the field-of-view. The cardiomediastinal silhouette and pulmonary  vasculature are stable and within normal limits. Low lung volumes.  There is no focal airspace opacity, pleural effusion, or pneumothorax.  The visualized upper abdomen, osseous structures, and soft tissues are  unremarkable.      Impression    IMPRESSION:   1. Endotracheal tube 3.5 cm superior to the medardo.  2. No focal airspace consolidation.  3. Low lung volumes.     I have personally reviewed the examination and initial interpretation  and I agree with the findings.    LEONARDO HOLMAN MD

## 2017-06-29 NOTE — PROGRESS NOTES
Transplant Research Organization Subject Eligibility Documentation  Study Name: International Randomized Trial to Evaluate the Effectiveness of the Portable Organ Care System (OCS) Liver for Preserving and Assessing Donor Livers for Transplantation (OCS Liver PROTECT Trial) (IRB # [id number])  ICF Version Date / IRB Approval Date:  Version dt 02/21/2017, IRB Approved 02/21/2017   Contacts: Kelsey Elliott ([phone]) and Neisha Modi ([phone])     I was paged at 04:54 AM on 6/27/2017 by Jennifer Phan  regarding the offer of a liver for transplant to Joe Sosa who was consented to the PROTECT Liver OCS study.  Ultimately, Dr. Molina and Dr. Jurado considered the donor organ to be ineligible for the study due to marginal condition and the subject was transplanted off-study.

## 2017-06-29 NOTE — PHARMACY-TRANSPLANT NOTE
Adult Liver Transplant Post Operative Note    27 year old male  s/p  donor liver transplant on 17 for Alcoholic liver disease.      Planned immunosuppression regimen to include:   INDUCTION with: methylprednisolone/prednisone taper through POD #5.  MAINTENANCE to include mycophenolate and tacrolimus with initial goal trough levels of 10-15 mcg/L for 0-3 months post-transplant.  Patient may continue prednisone at 5 mg PO daily until tacrolimus level is therapeutic.     Surgical prophylaxis includes: piperacillin-tazobactam IV for 48 hours.    Opportunistic pathogen prophylaxis includes: trimethoprim/sulfamethoxazole, valganciclovir, and fluconazole.    Patient is not enrolled in medication study.    Pharmacy will monitor for medication interactions and immunosuppression levels in conjunction with the team. Medication therapy needs for discharge planning will continue to be addressed throughout the current admission via multidisciplinary rounds and order review.  Pharmacy will make recommendations as appropriate.    Octaviano Justin,PharmD

## 2017-06-29 NOTE — PROGRESS NOTES
Immunosuppression Note:    Joe Sosa is a 27 year old male who is seen today  for immunosuppression management     IChino MD, I have examined the patient with the resident/PA/Fellow, discussed and agree with the note and findings.  I have reviewed today's vital signs, medications, labs and imaging. I reviewed the immunosuppression medications and levels. I spoke to the patient/family and explained below clinical details and answered all the questions      Transplant Surgery  Inpatient Daily Progress Note  06/29/2017    Assessment & Plan: 27 year old male with PMH significant for ESLD secondary to EtOH/alpha 1 antitrypsin now s/p DD OLT on 6/28/17 with Dr. Martin. ESLD complicated by hepatic encephalopathy, ascites (paracentesis x 1-2x), SBP x 1 episode, chronic hyponatremia and anasarca.    Graft function: good, improved. LFTs improving. Pain well controlled.   Immunosuppression management: Solu-medrol taper per protocol. Cellcept 1000 mg BID. Tacrolimus, will start 2 mg BID today. Goal 10-15. Complexity of management:Medium. Contributing factors: thrombocytopenia, anemia and induction  Hematology: HGB 6.8 this AM, transfuse PRBC, repeat HGB 7.5 this AM. PLT 45. PLT, FFP, PRBC and cryo transfusions since transplant. Recommend giving 10 Vitamin K today.   Cardiorespiratory: Intubated on Vent overnight, extubated this AM. Now stable on room kayla to 1.5 L oxygen.    GI/Nutrition: NPO. Plan to advance diet as tolerated. Will start bowel regimen.   Endocrine: Hyperglycemia due to steroids, continue insulin gtt  Fluid/Electrolytes: MIVF: LR@ 10 cc/hour. Plan for Lasix today.   : Major to be discontinued today  Infectious disease: Tmax 101.2. Continue Zosyn x 3 days per protocol. D/c Micafungin and start Fluconazole. Will d/c Vanco today as well.   Prophylaxis: DVT, fall, GI, fungal  Disposition: 4A    Medical Decision Making: Medium  Subsequent visit 86654 (moderate level decision  "making)    DENISE/Fellow/Resident Provider: Sydni Ram PA-C [id number]    Faculty: Chino Martin M.D.    __________________________________________________________________  Transplant History: Admitted 6/27/2017 for liver transplant.   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    6/28/2017 (Liver), Postoperative day: 1     Interval History: History is obtained from the patient  Overnight events: Liver transplant yesterday. Extubated this AM. No complaints.     ROS:   A 10-point review of systems was negative except as noted above.    Curent Meds:    fluconazole  200 mg Oral Daily     multivitamin, therapeutic with minerals  1 tablet Per Feeding Tube Daily     tacrolimus  2 mg Oral BID IS     sodium chloride (PF)  3 mL Intravenous Q8H     [START ON 6/30/2017] methylPREDNISolone  100 mg Intravenous Once    Followed by     [START ON 7/1/2017] methylPREDNISolone  50 mg Intravenous Once    Followed by     [START ON 7/2/2017] predniSONE  25 mg Oral Once    Followed by     [START ON 7/3/2017] predniSONE  10 mg Oral Once     valGANciclovir  450 mg Oral Daily    Or     valGANciclovir  450 mg Oral or NG Tube Daily     piperacillin-tazobactam  3.375 g Intravenous Q6H     sulfamethoxazole-trimethoprim  1 tablet Oral Daily     mycophenolate  1,000 mg Oral BID IS    Or     mycophenolate  1,000 mg Oral or NG Tube BID IS     protein modular  1 packet Per Feeding Tube TID       Physical Exam:     Admit Weight: 98.8 kg (217 lb 14.4 oz)    Current Vitals:   /88  Pulse 83  Temp 98.8  F (37.1  C) (Axillary)  Resp 13  Ht 1.88 m (6' 2\")  Wt 107.4 kg (236 lb 12.4 oz)  SpO2 99%  BMI 30.4 kg/m2    CVP (mmHg): 10 mmHg    Vital sign ranges:    Temp:  [98.2  F (36.8  C)-101.2  F (38.4  C)] 98.8  F (37.1  C)  Heart Rate:  [] 83  Resp:  [10-18] 13  BP: (115-146)/(55-90) 142/88  MAP:  [60 mmHg-101 mmHg] 97 mmHg  Arterial Line BP: ()/(45-74) 142/70  FiO2 (%):  [30 %-40 %] 30 %  SpO2:  [80 %-100 %] " 99 %  Patient Vitals for the past 24 hrs:   BP Temp Temp src Heart Rate Resp SpO2 Weight   06/29/17 1200 - 98.8  F (37.1  C) Axillary - - - -   06/29/17 1045 - 98.4  F (36.9  C) - 83 13 99 % -   06/29/17 1030 142/88 98.5  F (36.9  C) - 81 18 99 % -   06/29/17 1015 - - - 92 - 100 % -   06/29/17 1000 132/81 - - 85 12 98 % -   06/29/17 0930 143/90 - - 83 - 100 % -   06/29/17 0915 - - - 87 - 98 % -   06/29/17 0900 146/73 98.4  F (36.9  C) Axillary 84 11 98 % -   06/29/17 0845 - 98.2  F (36.8  C) - 85 10 99 % -   06/29/17 0830 - 98.2  F (36.8  C) - - - - -   06/29/17 0815 - 98.5  F (36.9  C) - 90 13 - -   06/29/17 0800 - 98.2  F (36.8  C) Axillary 89 14 98 % -   06/29/17 0700 - - - - 14 - -   06/29/17 0645 - - - 89 - 97 % -   06/29/17 0630 135/81 - - 94 - 99 % -   06/29/17 0615 - 99  F (37.2  C) - 91 - 98 % 107.4 kg (236 lb 12.4 oz)   06/29/17 0600 115/60 - - 83 14 (!) 80 % -   06/29/17 0545 - - - 82 - 97 % -   06/29/17 0530 121/58 - - 84 - 97 % -   06/29/17 0510 - - - 85 - 97 % -   06/29/17 0505 - - - 85 - 97 % -   06/29/17 0500 120/62 - - 84 12 97 % -   06/29/17 0455 - 99.3  F (37.4  C) - 84 - 97 % -   06/29/17 0445 - - - 85 - 97 % -   06/29/17 0430 124/59 99.5  F (37.5  C) - 87 - 97 % -   06/29/17 0415 - - - 89 - 98 % -   06/29/17 0400 120/62 99.5  F (37.5  C) Axillary 83 12 96 % -   06/29/17 0345 - - - 83 - 97 % -   06/29/17 0330 122/61 - - 83 - 97 % -   06/29/17 0315 - - - 83 - 97 % -   06/29/17 0300 125/63 - - 84 12 97 % -   06/29/17 0245 - - - 83 - 97 % -   06/29/17 0230 - - - 84 - 97 % -   06/29/17 0215 - - - 86 - 97 % -   06/29/17 0200 119/55 - - 87 13 96 % -   06/29/17 0145 - 99.5  F (37.5  C) - 89 - 97 % -   06/29/17 0130 - - - 87 - 97 % -   06/29/17 0120 - - - 89 - 97 % -   06/29/17 0115 - - - 87 - 97 % -   06/29/17 0110 - - - 88 - 97 % -   06/29/17 0105 - - - 87 - 97 % -   06/29/17 0100 122/57 - - 88 12 97 % -   06/29/17 0045 - - - 90 - 97 % -   06/29/17 0030 - - - 90 - 97 % -   06/29/17 0015 - - - 90 - 97 %  -   06/29/17 0000 125/61 99.2  F (37.3  C) Axillary 88 12 97 % -   06/28/17 2350 - - - 89 - 99 % -   06/28/17 2345 - - - 89 - 99 % -   06/28/17 2330 - - - 90 - 98 % -   06/28/17 2315 - - - 93 - 98 % -   06/28/17 2310 - 99.5  F (37.5  C) Axillary 92 - 98 % -   06/28/17 2300 131/67 - - 94 12 98 % -   06/28/17 2245 - - - 93 - 98 % -   06/28/17 2230 - - - 95 - 98 % -   06/28/17 2225 - - - 96 - 98 % -   06/28/17 2220 - - - 95 - 98 % -   06/28/17 2215 - 99.7  F (37.6  C) - 96 - 98 % -   06/28/17 2200 135/69 - - 96 12 98 % -   06/28/17 2145 - - - 97 - 99 % -   06/28/17 2130 - - - 100 - 99 % -   06/28/17 2115 - - - 101 - 98 % -   06/28/17 2110 - - - 102 - 98 % -   06/28/17 2105 - - - 102 - 98 % -   06/28/17 2100 142/74 101.2  F (38.4  C) - 100 12 98 % -   06/28/17 2045 - - - 102 - 98 % -   06/28/17 2030 - - - 103 - 98 % -   06/28/17 2015 - - - 104 - 99 % -   06/28/17 2000 139/72 100.1  F (37.8  C) Axillary 102 13 99 % -   06/28/17 1945 - 100.1  F (37.8  C) - 102 - 98 % -   06/28/17 1930 - - - 102 - 98 % -   06/28/17 1915 - - - 102 - 98 % -   06/28/17 1900 141/78 - - 101 - 97 % -   06/28/17 1845 - 100.3  F (37.9  C) Axillary 101 - 99 % -   06/28/17 1830 - 100.5  F (38.1  C) - 101 - 99 % -   06/28/17 1815 - - - 101 - - -   06/28/17 1800 136/69 - - 99 - 99 % -   06/28/17 1745 - - - 99 - - -   06/28/17 1730 - - - 98 - 99 % -   06/28/17 1715 - - - 99 - 99 % -   06/28/17 1700 139/72 - - 97 - 99 % -   06/28/17 1645 - 100.1  F (37.8  C) Axillary 96 - 99 % -   06/28/17 1636 - 100.1  F (37.8  C) Axillary 97 - 99 % -   06/28/17 1630 - - - 96 - 99 % -   06/28/17 1615 - - - 95 - 99 % -   06/28/17 1600 138/72 100.1  F (37.8  C) Axillary 94 12 99 % -   06/28/17 1545 - - - - - 99 % -     General Appearance: in no apparent distress.   Skin: normal  Heart: regular rate and rhythm  Lungs: NLB, extubated  Abdomen: rounded, and  appropriately tender, generalized. The wound is Healing well with operative dressing in place. LAM with sero sang  output.  : Major in place.   Extremities: edema: absent. There is no skin breakdown.  Neurologic: awake, alert and oriented. Tremor absent. Asterixis: absent.    Data:   CMP  Recent Labs  Lab 06/29/17  0311 06/29/17  0308 06/28/17  2129 06/28/17  1550 06/28/17  1430 06/28/17  1421  06/27/17  0923   NA  --  142  --  140  --  140  < > 135   POTASSIUM  --  4.2 4.0 3.9  --  4.0  < > 4.3   CHLORIDE  --  109  --  108  --  108  --  100   CO2  --  25  --  25  --  24  --  30   GLC  --  147*  --  197*  --  250*  < > 107*   BUN  --  24  --  20  --  16  --  12   CR  --  1.05  --  0.74  --  0.64*  --  0.63*   GFRESTIMATED  --  84  --  >90Non  GFR Calc  --  >90Non  GFR Calc  --  >90Non  GFR Calc   GFRESTBLACK  --  >90African American GFR Calc  --  >90African American GFR Calc  --  >90African American GFR Calc  --  >90African American GFR Calc   BRITNI  --  8.2*  --  8.6  --  9.0  --  8.3*   ICAW 5.0  --   --   --  5.3*  --   < >  --    MAG  --  2.5* 2.0  --   --  1.8  --  2.0   PHOS  --  5.4*  --   --   --  3.7  --  3.8   AMYLASE  --  30  --   --   --   --   --  62   LIPASE  --  113  --   --   --  327  --   --    ALBUMIN  --  2.4*  --   --   --  2.2*  --  2.4*   BILITOTAL  --  2.2*  --   --   --  7.5*  --  7.1*   ALKPHOS  --  56  --   --   --  84  --  280*   AST  --  1282*  --   --   --  2612*  --  88*   ALT  --  556*  --   --   --  836*  --  51   < > = values in this interval not displayed.  CBC  Recent Labs  Lab 06/29/17  1201 06/29/17  0641 06/29/17  0308   HGB 8.1* 7.3* 6.8*   WBC  --  9.9 8.3   PLT  --  45* 46*   A1C  --   --  Canceled, Test credited Below Assay Range     COAGS  Recent Labs  Lab 06/29/17  0308 06/28/17  2129  06/28/17  1421 06/28/17  1330   INR 1.54* 1.64*  < > 2.49* 2.78*   PTT  --   --   --  45* 59*   < > = values in this interval not displayed.   Urinalysis  Recent Labs   Lab Test  03/27/17   0738   COLOR  Ana Cristina   APPEARANCE  Clear   URINEGLC  Negative    URINEBILI  Negative   URINEKETONE  Negative   SG  1.018   UBLD  Negative   URINEPH  6.0   PROTEIN  Negative   NITRITE  Negative   LEUKEST  Negative   RBCU  1   WBCU  2   UTPG  Unable to calculate due to low value     Virology:  Hepatitis C Antibody   Date Value Ref Range Status   06/27/2017  NR Final    Nonreactive   Assay performance characteristics have not been established for newborns,   infants, and children

## 2017-06-29 NOTE — PLAN OF CARE
Problem: Restraint for Non-Violent/Non-Self-Destructive Behavior  Goal: Prevent/Manage Potential Problems  Maintain safety of patient and others during period of restraint.  Promote psychological and physical wellbeing.  Prevent injury to skin and involved body parts.  Promote nutrition, hydration, and elimination.   Outcome: No Change  Pt continues to grab at ET tube and IV lines, bilateral soft wrist restraints continued. RN will continue to reassess restraint need.

## 2017-06-29 NOTE — PLAN OF CARE
Problem: Goal Outcome Summary  Goal: Goal Outcome Summary  PT 4A: Pt tolerated session very well. No adverse vitals responses with ambulation in hallway and multiple transfers. Expect pt to be able to d/c home with PRN assist pending improved gait/stair tolerance.

## 2017-06-29 NOTE — MR AVS SNAPSHOT
After Visit Summary   6/29/2017    Joe Sosa    MRN: [id number]           Patient Information     Date Of Birth          1990        Visit Information        Provider Department      6/29/2017 10:05 AM Specialty, Provider [facility], [place],  Clinical Research        Today's Diagnoses     Examination of participant in clinical trial    -  1       Follow-ups after your visit        Your next 10 appointments already scheduled     Sep 19, 2017 10:15 AM CDT   Lab with  LAB   [facility] ([facility])    [address] [place] [id number] [phone]            Sep 19, 2017 11:10 AM CDT   (Arrive by 10:55 AM)   Return Liver Transplant with Andreina Templeton MD   [facility] Hepatology ([facility])    [address] [place] [id number] [phone]              Who to contact     If you have questions or need follow up information about today's clinic visit or your schedule please contact [facility] directly at [phone].  Normal or non-critical lab and imaging results will be communicated to you by [employer], letter or phone within 4 business days after the clinic has received the results. If you do not hear from us within 7 days, please contact the clinic through [employer] or phone. If you have a critical or abnormal lab result, we will notify you by phone as soon as possible.  Submit refill requests through [employer] or call your pharmacy and they will forward the refill request to us. Please allow 3 business days for your refill to be completed.          Additional Information About Your Visit        Style[employer]ch[employer] Information     [employer] gives you secure access to your electronic health record. If you see a primary care provider, you can also send messages to your care team and make appointments. If you have questions, please call your primary care clinic.  If you do not have  a primary care provider, please call [phone] and they will assist you.        Care EveryWhere ID     This is your Care EveryWhere ID. This could be used by other organizations to access your [facility] medical records  [id number]         Blood Pressure from Last 3 Encounters:   06/29/17 135/81   06/20/17 124/75   04/18/17 117/75    Weight from Last 3 Encounters:   06/29/17 107.4 kg (236 lb 12.4 oz)   06/20/17 99.9 kg (220 lb 4.8 oz)   04/18/17 96 kg (211 lb 9.6 oz)              Today, you had the following     No orders found for display         Today's Medication Changes      Notice     This visit is during an admission. Changes to the med list made in this visit will be reflected in the After Visit Summary of the admission.             Primary Care Provider Office Phone # Fax #    Jeffery Azul [phone]       [facility] [address] [place] [address]        Equal Access to Services     [facility] : Hadii hermilo ku hadasho Soomaali, waaxda luqadaha, qaybta kaalmada adeegyada, vahe eaton . So [facility] [phone].    ATENCIÓN: Si habla español, tiene a chun disposición servicios gratuitos de asistencia lingüística. Llame al [phone].    We comply with applicable federal civil rights laws and [place] laws. We do not discriminate on the basis of race, color, national origin, age, disability sex, sexual orientation or gender identity.            Thank you!     Thank you for choosing [facility]  for your care. Our goal is always to provide you with excellent care. Hearing back from our patients is one way we can continue to improve our services. Please take a few minutes to complete the written survey that you may receive in the mail after your visit with us. Thank you!             Your Updated Medication List - Protect others around you: Learn how to safely use, store and throw away your medicines at  www.disposemymeds.org.      Notice     This visit is during an admission. Changes to the med list made in this visit will be reflected in the After Visit Summary of the admission.

## 2017-06-29 NOTE — PROGRESS NOTES
06/29/17 1400   Quick Adds   Type of Visit Initial PT Evaluation   Living Environment   Lives With parent(s)  (Mom)   Living Arrangements apartment   Home Accessibility no concerns  (elevator available)   Number of Stairs to Enter Home 0   Number of Stairs Within Home 0   Transportation Available car;family or friend will provide   Living Environment Comment Flight of stairs to/from apartment that pt frequently took prior to admission.   Self-Care   Usual Activity Tolerance good   Current Activity Tolerance moderate   Regular Exercise no   Equipment Currently Used at Home none   Activity/Exercise/Self-Care Comment Works as a . Enjoys card games with his brother (Magic the Gathering)   Functional Level Prior   Ambulation 0-->independent   Transferring 0-->independent   Fall history within last six months no   Which of the above functional risks had a recent onset or change? ambulation   Prior Functional Level Comment Pt indep prior to admission.   General Information   Onset of Illness/Injury or Date of Surgery - Date 06/27/17   Patient/Family Goals Statement Wants to get OOB and into chair   Pertinent History of Current Problem (include personal factors and/or comorbidities that impact the POC) Joe Sosa is a 27 year old male with alpha 1 antitrypsin and alcoholic cirrhosis POD # 1 s/p DDLT.   Precautions/Limitations abdominal precautions   General Observations Pt supine in bed; agreeable to session.   Cognitive Status Examination   Orientation orientation to person, place and time   Level of Consciousness alert   Follows Commands and Answers Questions 100% of the time   Personal Safety and Judgment intact   Memory intact   Pain Assessment   Patient Currently in Pain Yes, see Vital Sign flowsheet   Integumentary/Edema   Integumentary/Edema Comments Mild non pitting edema noted B LE   Posture    Posture Forward head position;Protracted shoulders   Posture Comments Mild   Range of Motion (ROM)  "  ROM Comment B LE AROM grossly WFL   Strength   Strength Comments B LE strength grossly WFL as per functional mobility.    Bed Mobility   Bed Mobility Comments Supine>sit: min A with HOB elevated   Transfer Skills   Transfer Comments Sit>stand: CGA up to FWW.   Gait   Gait Comments Amb 15' with FWW/CGA. No LOB. Cues given for upright posture and increased guera   Balance   Balance Comments CGA provided for standing dynamic/static balance.    Sensory Examination   Sensory Perception no deficits were identified   General Therapy Interventions   Planned Therapy Interventions balance training;bed mobility training;gait training;strengthening;stretching;transfer training;home program guidelines;progressive activity/exercise   Clinical Impression   Criteria for Skilled Therapeutic Intervention yes, treatment indicated   PT Diagnosis Impaired functional mobility   Influenced by the following impairments Mild post op deconditioning/precautions, fair posture   Functional limitations due to impairments Bed mobility, transfers, gait, balance, endurance   Clinical Presentation Stable/Uncomplicated   Clinical Presentation Rationale Clinical judgement   Clinical Decision Making (Complexity) Low complexity   Therapy Frequency` 5 times/week   Predicted Duration of Therapy Intervention (days/wks) 1 week   Anticipated Discharge Disposition Home with Assist   Risk & Benefits of therapy have been explained Yes   Patient, Family & other staff in agreement with plan of care Yes   [facility] AM-PAC  \"6 Clicks\" V.2 Basic Mobility Inpatient Short Form   1. Turning from your back to your side while in a flat bed without using bedrails? 4 - None   2. Moving from lying on your back to sitting on the side of a flat bed without using bedrails? 3 - A Little   3. Moving to and from a bed to a chair (including a wheelchair)? 3 - A Little   4. Standing up from a chair using your arms (e.g., wheelchair, or bedside chair)? 3 - A Little   5. " To walk in hospital room? 3 - A Little   6. Climbing 3-5 steps with a railing? 3 - A Little   Basic Mobility Raw Score (Score out of 24.Lower scores equate to lower levels of function) 19   Total Evaluation Time   Total Evaluation Time (Minutes) 12

## 2017-06-29 NOTE — PLAN OF CARE
Problem: Goal Outcome Summary  Goal: Goal Outcome Summary  Outcome: Improving.   Neuro-Patient sedated on propofol. Fentanyl gtt for comfort. Sedation vacation done for brief period of time: patient woke up and was very agitated, trying to grab at all lines and tube, was able to squeeze hands when instructed. PERRL. BARR with good strength in all extremities.   Pulmonary: Vent settings 40/12/5/600. Oxygen sats 98%. Strong cough. Scant amount of clear/thin secretions.  Cardiac: HR  Sinus.  No ectopy noted. Continuous CO high up to 16s. MD notified and orders placed. CVP 9. -130s/60-70s. MAPS 80s. SWAN locked in at 50. LAM drain with small amount of bloody drainage. Abdominal incision with moderate amount of bloody/SS drainage.   GI: OK to start trickle feeding per SICU. TF going at 10cc/hr through NG tube. Insulin drip being titrated to keep BG within parameters. Abdomen soft. Hypoactive BS. No BM noted during shift.   :Major in place.60cc/hr output. Lasix given for high CO/CI.   Labs: Labs reviewed- electrolytes replaced, Currently transfusing FFP and Cryo.

## 2017-06-29 NOTE — TELEPHONE ENCOUNTER
Organ Offer Encounter Information    Organ Offer Information   Organ offer date & time:  6/27/2017  6:17 AM   Coordinator/Fellow/Attending name:  TRENT ARAMBULA   Organ(s):   Organ UNOS ID Match Run ID Comment Organ Laterality   Liver [id number] MNOP          Recent infections?:  No     Recent pregnancy?:  No   Angicoagulation medications?:  No Recent vaccinations?:  No   Recent blood transfusions?:  No Recent hospitalizations?:  No   Has your insurance changed in the last 6-12 months?:  Neg    Discussed organ offer with:  Patient   Patient/Caregiver name:  Joe   Discussed risk category with Patient/Other:  [employer] Risk   Did not understand donor criteria, questions answered, verbalized understanding   Patient/Other asked to speak to a surgeon?:  No   Discussed program-specific outcomes:  Did not have questions regarding SRTR   Right to decline organ offer without penalty, Patient/Other:  Aware of option to decline without penalty   Organ offer decision status Patient/Other:  Accepted Offer   Organ disposition:  Transplanted   Additional Comments:  June 27, 2017 6:26 AM  Liver: Back Up, Local, DBD, PHS Increased Risk  MD: Veronica  Plan: Patient back up.  MD to review donor liver biopsy slides this AM to evaluate.  Called patient, discussed back up offer.  Explained PHS Increased Risk.  Health assessment as above.  Will follow up with patient after MD reviews biopsy slides and allocation is finalized.  Contact info provided, also provided with oncoming coordinator, Yessica, contact info.    Donor meets OCS criteria, spoke with Kelsey at Research to discuss possibility of case.  Will update research after MD reviews biopsy.  Jennifer Arambula, RN   Transplant Coordinator    7:44 AM  June 27, 2017  Notified Mr. Sosa of admission.  Will still be backup for liver offer. Arrival within the hour.  Admissions: Done-[facility]ars   Unit: Done- [facility]   Immunology: Done Carlos  OR: Aye Didi  Blood Bank: Done-  johann  Research: Page sent   TransNet/ABO Verification: Done  Add Organ: Done  Yancy Landin RN, CCTC  On Call Organ Coordinator    10:54 AM  June 27, 2017  Per Dr. Jurado, will not randomize for OCS.  She notified Research.  Yancy Landin RN, CCTC  On Call Organ Coordinator             Attestation I have discussed all of the above with the Patient/Legal Guardian/Caregiver regarding this organ offer.:  Yes   Coordinator/Fellow/Attending name:  TRENT ARAMBULA

## 2017-06-29 NOTE — PROCEDURES
Procedure Report  6/29/2017    Pre-operative Diagnosis: Liver transplant   Post-operative Diagnosis: Same  Procedure: Left Subclavian MAC line exchange to a triple lumen CVC  Staff: Rodolfo Kuhn MD  EBL: 10 ml  Anesthesia: Local 3 ml of 1% lidocaine sc   Specimen: None   Complication: None   Description: The preexisting MAC line was prepper with 4 chlorhexidine prep sticks, and under sterile condition (full body drape and maximal precautions, gown, gloves, mask), a guidewire was introduced into the MAC line introducer port and then removed. The Triple lumen CVC was then introduced over the wire via seldinger technique and sutured at the skin at 20 cm (at the CVC hub) using three point fixation. All ports were aspirated free of air until blood flash was seen in the syringe, and all ports flushed and kira blood easily. A pursed ring suture was placed around the catheter hub for hemostasis. Patient tolerated the procedure well without   Complications. CXR confirmed appropriate line placement.    [facility]

## 2017-06-29 NOTE — PROGRESS NOTES
2:51 PM  .June 29, 2017    /Final positive donor sputum culture results have been uploaded into UNOS. Donor ID [id number]. Daniel Martin and Ta notified of results.  Yancy Landin RN, CCTC  On Call Organ Coordinator

## 2017-06-29 NOTE — PLAN OF CARE
Problem: Goal Outcome Summary  Goal: Goal Outcome Summary  Outcome: Improving  [id number]: Assumed care of pt at 1500.  NEURO: A&Ox4. No neuro deficits. PERRLA. Speech clear/appropriate. Fentanyl at 75/hr, no PRNs needed.   CARDIAC: Left Radial A. Line, BP stable. HR 90s in NSR. CVP 9.  RESP: Non-labored on RA. Was extubated at 10am. Weak non-productive cough.   GI: Tolerating clear liquid diet. Clam shell incision dressing drainage marked, unchanged. LAM intact. Passing gas.   : Major removed at 1400, has since voided. Up in chair most of day, ambulated x1.   LABS at 1600: HGB 9.2, Fibrinogen 242, PLT 65, INR 1.27. No products given this evening.   - Will remove right neck IJ tonight per order.

## 2017-06-30 ENCOUNTER — APPOINTMENT (OUTPATIENT)
Dept: GENERAL RADIOLOGY | Facility: CLINIC | Age: 27
DRG: 006 | End: 2017-06-30
Attending: TRANSPLANT SURGERY
Payer: COMMERCIAL

## 2017-06-30 ENCOUNTER — APPOINTMENT (OUTPATIENT)
Dept: PHYSICAL THERAPY | Facility: CLINIC | Age: 27
DRG: 006 | End: 2017-06-30
Attending: TRANSPLANT SURGERY
Payer: COMMERCIAL

## 2017-06-30 LAB
ALBUMIN SERPL-MCNC: 2.8 G/DL (ref 3.4–5)
ALP SERPL-CCNC: 63 U/L (ref 40–150)
ALT SERPL W P-5'-P-CCNC: 472 U/L (ref 0–70)
ANION GAP SERPL CALCULATED.3IONS-SCNC: 9 MMOL/L (ref 3–14)
AST SERPL W P-5'-P-CCNC: 509 U/L (ref 0–45)
BACTERIA SPEC CULT: NORMAL
BASOPHILS # BLD AUTO: 0 10E9/L (ref 0–0.2)
BASOPHILS NFR BLD AUTO: 0.2 %
BILIRUB DIRECT SERPL-MCNC: 1.4 MG/DL (ref 0–0.2)
BILIRUB SERPL-MCNC: 2.4 MG/DL (ref 0.2–1.3)
BLD PROD TYP BPU: NORMAL
BLD UNIT ID BPU: 0
BLOOD PRODUCT CODE: NORMAL
BPU ID: NORMAL
BUN SERPL-MCNC: 40 MG/DL (ref 7–30)
CA-I BLD-MCNC: 4.4 MG/DL (ref 4.4–5.2)
CALCIUM SERPL-MCNC: 7.9 MG/DL (ref 8.5–10.1)
CHLORIDE SERPL-SCNC: 104 MMOL/L (ref 94–109)
CO2 SERPL-SCNC: 23 MMOL/L (ref 20–32)
CREAT SERPL-MCNC: 1.21 MG/DL (ref 0.66–1.25)
DIFFERENTIAL METHOD BLD: ABNORMAL
EOSINOPHIL # BLD AUTO: 0.1 10E9/L (ref 0–0.7)
EOSINOPHIL NFR BLD AUTO: 1 %
ERYTHROCYTE [DISTWIDTH] IN BLOOD BY AUTOMATED COUNT: 20 % (ref 10–15)
ERYTHROCYTE [DISTWIDTH] IN BLOOD BY AUTOMATED COUNT: 20.2 % (ref 10–15)
ERYTHROCYTE [DISTWIDTH] IN BLOOD BY AUTOMATED COUNT: 20.3 % (ref 10–15)
FIBRINOGEN PPP-MCNC: 265 MG/DL (ref 200–420)
GFR SERPL CREATININE-BSD FRML MDRD: 71 ML/MIN/1.7M2
GLUCOSE BLDC GLUCOMTR-MCNC: 103 MG/DL (ref 70–99)
GLUCOSE BLDC GLUCOMTR-MCNC: 112 MG/DL (ref 70–99)
GLUCOSE BLDC GLUCOMTR-MCNC: 114 MG/DL (ref 70–99)
GLUCOSE BLDC GLUCOMTR-MCNC: 115 MG/DL (ref 70–99)
GLUCOSE BLDC GLUCOMTR-MCNC: 116 MG/DL (ref 70–99)
GLUCOSE BLDC GLUCOMTR-MCNC: 116 MG/DL (ref 70–99)
GLUCOSE BLDC GLUCOMTR-MCNC: 122 MG/DL (ref 70–99)
GLUCOSE BLDC GLUCOMTR-MCNC: 127 MG/DL (ref 70–99)
GLUCOSE BLDC GLUCOMTR-MCNC: 133 MG/DL (ref 70–99)
GLUCOSE BLDC GLUCOMTR-MCNC: 135 MG/DL (ref 70–99)
GLUCOSE BLDC GLUCOMTR-MCNC: 139 MG/DL (ref 70–99)
GLUCOSE BLDC GLUCOMTR-MCNC: 139 MG/DL (ref 70–99)
GLUCOSE BLDC GLUCOMTR-MCNC: 171 MG/DL (ref 70–99)
GLUCOSE BLDC GLUCOMTR-MCNC: 177 MG/DL (ref 70–99)
GLUCOSE BLDC GLUCOMTR-MCNC: 178 MG/DL (ref 70–99)
GLUCOSE SERPL-MCNC: 119 MG/DL (ref 70–99)
HCT VFR BLD AUTO: 20.6 % (ref 40–53)
HCT VFR BLD AUTO: 22.3 % (ref 40–53)
HCT VFR BLD AUTO: 22.7 % (ref 40–53)
HGB BLD-MCNC: 7.1 G/DL (ref 13.3–17.7)
HGB BLD-MCNC: 7.7 G/DL (ref 13.3–17.7)
HGB BLD-MCNC: 7.8 G/DL (ref 13.3–17.7)
HGB BLD-MCNC: 8.6 G/DL (ref 13.3–17.7)
IMM GRANULOCYTES # BLD: 0 10E9/L (ref 0–0.4)
IMM GRANULOCYTES NFR BLD: 0.6 %
INR PPP: 1.27 (ref 0.86–1.14)
LACTATE BLD-SCNC: 1.5 MMOL/L (ref 0.7–2.1)
LYMPHOCYTES # BLD AUTO: 0.5 10E9/L (ref 0.8–5.3)
LYMPHOCYTES NFR BLD AUTO: 8.2 %
Lab: NORMAL
MAGNESIUM SERPL-MCNC: 2.9 MG/DL (ref 1.6–2.3)
MCH RBC QN AUTO: 32.8 PG (ref 26.5–33)
MCH RBC QN AUTO: 33.3 PG (ref 26.5–33)
MCH RBC QN AUTO: 34.1 PG (ref 26.5–33)
MCHC RBC AUTO-ENTMCNC: 33.9 G/DL (ref 31.5–36.5)
MCHC RBC AUTO-ENTMCNC: 34.5 G/DL (ref 31.5–36.5)
MCHC RBC AUTO-ENTMCNC: 35 G/DL (ref 31.5–36.5)
MCV RBC AUTO: 97 FL (ref 78–100)
MICRO REPORT STATUS: NORMAL
MONOCYTES # BLD AUTO: 0.7 10E9/L (ref 0–1.3)
MONOCYTES NFR BLD AUTO: 10.8 %
NEUTROPHILS # BLD AUTO: 4.9 10E9/L (ref 1.6–8.3)
NEUTROPHILS NFR BLD AUTO: 79.2 %
NRBC # BLD AUTO: 0 10*3/UL
NRBC BLD AUTO-RTO: 0 /100
NUM BPU REQUESTED: 1
PHOSPHATE SERPL-MCNC: 6.7 MG/DL (ref 2.5–4.5)
PLATELET # BLD AUTO: 34 10E9/L (ref 150–450)
PLATELET # BLD AUTO: 34 10E9/L (ref 150–450)
PLATELET # BLD AUTO: 41 10E9/L (ref 150–450)
POTASSIUM SERPL-SCNC: 4.2 MMOL/L (ref 3.4–5.3)
PROT SERPL-MCNC: 5.5 G/DL (ref 6.8–8.8)
RBC # BLD AUTO: 2.13 10E12/L (ref 4.4–5.9)
RBC # BLD AUTO: 2.29 10E12/L (ref 4.4–5.9)
RBC # BLD AUTO: 2.35 10E12/L (ref 4.4–5.9)
SODIUM SERPL-SCNC: 137 MMOL/L (ref 133–144)
SPECIMEN SOURCE: NORMAL
TRANSFUSION STATUS PATIENT QL: NORMAL
WBC # BLD AUTO: 5 10E9/L (ref 4–11)
WBC # BLD AUTO: 5.9 10E9/L (ref 4–11)
WBC # BLD AUTO: 6.2 10E9/L (ref 4–11)

## 2017-06-30 PROCEDURE — 00000146 ZZHCL STATISTIC GLUCOSE BY METER IP

## 2017-06-30 PROCEDURE — 25000128 H RX IP 250 OP 636: Performed by: STUDENT IN AN ORGANIZED HEALTH CARE EDUCATION/TRAINING PROGRAM

## 2017-06-30 PROCEDURE — 12000025 ZZH R&B TRANSPLANT INTERMEDIATE

## 2017-06-30 PROCEDURE — 86900 BLOOD TYPING SEROLOGIC ABO: CPT | Performed by: STUDENT IN AN ORGANIZED HEALTH CARE EDUCATION/TRAINING PROGRAM

## 2017-06-30 PROCEDURE — P9037 PLATE PHERES LEUKOREDU IRRAD: HCPCS | Performed by: STUDENT IN AN ORGANIZED HEALTH CARE EDUCATION/TRAINING PROGRAM

## 2017-06-30 PROCEDURE — 97116 GAIT TRAINING THERAPY: CPT | Mod: GP

## 2017-06-30 PROCEDURE — 25000132 ZZH RX MED GY IP 250 OP 250 PS 637: Performed by: TRANSPLANT SURGERY

## 2017-06-30 PROCEDURE — P9016 RBC LEUKOCYTES REDUCED: HCPCS | Performed by: PHYSICIAN ASSISTANT

## 2017-06-30 PROCEDURE — 85027 COMPLETE CBC AUTOMATED: CPT | Performed by: STUDENT IN AN ORGANIZED HEALTH CARE EDUCATION/TRAINING PROGRAM

## 2017-06-30 PROCEDURE — 40000014 ZZH STATISTIC ARTERIAL MONITORING DAILY

## 2017-06-30 PROCEDURE — 25000132 ZZH RX MED GY IP 250 OP 250 PS 637: Performed by: PHYSICIAN ASSISTANT

## 2017-06-30 PROCEDURE — 97530 THERAPEUTIC ACTIVITIES: CPT | Mod: GP

## 2017-06-30 PROCEDURE — 25000131 ZZH RX MED GY IP 250 OP 636 PS 637: Performed by: STUDENT IN AN ORGANIZED HEALTH CARE EDUCATION/TRAINING PROGRAM

## 2017-06-30 PROCEDURE — 25000125 ZZHC RX 250: Performed by: STUDENT IN AN ORGANIZED HEALTH CARE EDUCATION/TRAINING PROGRAM

## 2017-06-30 PROCEDURE — 83605 ASSAY OF LACTIC ACID: CPT | Performed by: SURGERY

## 2017-06-30 PROCEDURE — 85018 HEMOGLOBIN: CPT | Performed by: NURSE PRACTITIONER

## 2017-06-30 PROCEDURE — 40000196 ZZH STATISTIC RAPCV CVP MONITORING

## 2017-06-30 PROCEDURE — 25000128 H RX IP 250 OP 636: Performed by: TRANSPLANT SURGERY

## 2017-06-30 PROCEDURE — 40000193 ZZH STATISTIC PT WARD VISIT

## 2017-06-30 PROCEDURE — 86923 COMPATIBILITY TEST ELECTRIC: CPT | Performed by: STUDENT IN AN ORGANIZED HEALTH CARE EDUCATION/TRAINING PROGRAM

## 2017-06-30 PROCEDURE — 82330 ASSAY OF CALCIUM: CPT | Performed by: SURGERY

## 2017-06-30 PROCEDURE — 25000132 ZZH RX MED GY IP 250 OP 250 PS 637: Performed by: STUDENT IN AN ORGANIZED HEALTH CARE EDUCATION/TRAINING PROGRAM

## 2017-06-30 PROCEDURE — 25000131 ZZH RX MED GY IP 250 OP 636 PS 637: Performed by: TRANSPLANT SURGERY

## 2017-06-30 PROCEDURE — 74000 XR ABDOMEN PORT F1 VW: CPT

## 2017-06-30 PROCEDURE — 86901 BLOOD TYPING SEROLOGIC RH(D): CPT | Performed by: STUDENT IN AN ORGANIZED HEALTH CARE EDUCATION/TRAINING PROGRAM

## 2017-06-30 PROCEDURE — 86850 RBC ANTIBODY SCREEN: CPT | Performed by: STUDENT IN AN ORGANIZED HEALTH CARE EDUCATION/TRAINING PROGRAM

## 2017-06-30 RX ORDER — BISACODYL 10 MG
10 SUPPOSITORY, RECTAL RECTAL DAILY PRN
Status: DISCONTINUED | OUTPATIENT
Start: 2017-06-30 | End: 2017-07-01

## 2017-06-30 RX ORDER — AMOXICILLIN 250 MG
2 CAPSULE ORAL 2 TIMES DAILY
Status: DISCONTINUED | OUTPATIENT
Start: 2017-06-30 | End: 2017-07-05

## 2017-06-30 RX ORDER — POLYETHYLENE GLYCOL 3350 17 G/17G
17 POWDER, FOR SOLUTION ORAL DAILY
Status: DISCONTINUED | OUTPATIENT
Start: 2017-06-30 | End: 2017-07-05

## 2017-06-30 RX ORDER — FUROSEMIDE 10 MG/ML
40 INJECTION INTRAMUSCULAR; INTRAVENOUS ONCE
Status: COMPLETED | OUTPATIENT
Start: 2017-06-30 | End: 2017-06-30

## 2017-06-30 RX ORDER — OXYCODONE HYDROCHLORIDE 5 MG/1
5-10 TABLET ORAL EVERY 4 HOURS PRN
Status: DISCONTINUED | OUTPATIENT
Start: 2017-06-30 | End: 2017-07-06 | Stop reason: HOSPADM

## 2017-06-30 RX ORDER — HYDROMORPHONE HYDROCHLORIDE 1 MG/ML
.3-.5 INJECTION, SOLUTION INTRAMUSCULAR; INTRAVENOUS; SUBCUTANEOUS
Status: DISCONTINUED | OUTPATIENT
Start: 2017-06-30 | End: 2017-07-06 | Stop reason: HOSPADM

## 2017-06-30 RX ORDER — DOCUSATE SODIUM 100 MG/1
100 CAPSULE, LIQUID FILLED ORAL 2 TIMES DAILY PRN
Status: DISCONTINUED | OUTPATIENT
Start: 2017-06-30 | End: 2017-06-30

## 2017-06-30 RX ORDER — AMOXICILLIN 250 MG
1 CAPSULE ORAL AT BEDTIME
Status: DISCONTINUED | OUTPATIENT
Start: 2017-06-30 | End: 2017-06-30

## 2017-06-30 RX ORDER — FUROSEMIDE 10 MG/ML
40 INJECTION INTRAMUSCULAR; INTRAVENOUS 4 TIMES DAILY
Status: COMPLETED | OUTPATIENT
Start: 2017-06-30 | End: 2017-06-30

## 2017-06-30 RX ADMIN — MYCOPHENOLATE MOFETIL 1000 MG: 250 CAPSULE ORAL at 18:09

## 2017-06-30 RX ADMIN — INSULIN ASPART 4 UNITS: 100 INJECTION, SOLUTION INTRAVENOUS; SUBCUTANEOUS at 14:14

## 2017-06-30 RX ADMIN — FENTANYL CITRATE 75 MCG/HR: 50 INJECTION, SOLUTION INTRAMUSCULAR; INTRAVENOUS at 03:39

## 2017-06-30 RX ADMIN — TACROLIMUS 2 MG: 0.5 CAPSULE ORAL at 18:09

## 2017-06-30 RX ADMIN — PIPERACILLIN AND TAZOBACTAM 3.38 G: 3; .375 INJECTION, POWDER, LYOPHILIZED, FOR SOLUTION INTRAVENOUS; PARENTERAL at 08:18

## 2017-06-30 RX ADMIN — OXYCODONE HYDROCHLORIDE 5 MG: 5 TABLET ORAL at 16:07

## 2017-06-30 RX ADMIN — PIPERACILLIN AND TAZOBACTAM 3.38 G: 3; .375 INJECTION, POWDER, LYOPHILIZED, FOR SOLUTION INTRAVENOUS; PARENTERAL at 02:29

## 2017-06-30 RX ADMIN — SENNOSIDES AND DOCUSATE SODIUM 2 TABLET: 8.6; 5 TABLET ORAL at 20:18

## 2017-06-30 RX ADMIN — FUROSEMIDE 40 MG: 10 INJECTION, SOLUTION INTRAVENOUS at 04:21

## 2017-06-30 RX ADMIN — FUROSEMIDE 40 MG: 10 INJECTION, SOLUTION INTRAVENOUS at 21:08

## 2017-06-30 RX ADMIN — OXYCODONE HYDROCHLORIDE 5 MG: 5 TABLET ORAL at 18:08

## 2017-06-30 RX ADMIN — POLYETHYLENE GLYCOL 3350 17 G: 17 POWDER, FOR SOLUTION ORAL at 11:52

## 2017-06-30 RX ADMIN — MYCOPHENOLATE MOFETIL 1000 MG: 200 POWDER, FOR SUSPENSION ORAL at 08:23

## 2017-06-30 RX ADMIN — FUROSEMIDE 40 MG: 10 INJECTION, SOLUTION INTRAVENOUS at 12:22

## 2017-06-30 RX ADMIN — VALGANCICLOVIR HYDROCHLORIDE 450 MG: 450 TABLET ORAL at 08:22

## 2017-06-30 RX ADMIN — FLUCONAZOLE 200 MG: 200 TABLET ORAL at 08:22

## 2017-06-30 RX ADMIN — FUROSEMIDE 40 MG: 10 INJECTION, SOLUTION INTRAVENOUS at 16:09

## 2017-06-30 RX ADMIN — METHYLPREDNISOLONE SODIUM SUCCINATE 100 MG: 125 INJECTION, POWDER, LYOPHILIZED, FOR SOLUTION INTRAMUSCULAR; INTRAVENOUS at 08:19

## 2017-06-30 RX ADMIN — SULFAMETHOXAZOLE AND TRIMETHOPRIM 1 TABLET: 400; 80 TABLET ORAL at 08:22

## 2017-06-30 RX ADMIN — TACROLIMUS 2 MG: 0.5 CAPSULE ORAL at 08:30

## 2017-06-30 RX ADMIN — MULTIPLE VITAMINS W/ MINERALS TAB 1 TABLET: TAB at 11:53

## 2017-06-30 RX ADMIN — OXYCODONE HYDROCHLORIDE 5 MG: 5 TABLET ORAL at 20:18

## 2017-06-30 RX ADMIN — HUMAN INSULIN 3 UNITS/HR: 100 INJECTION, SOLUTION SUBCUTANEOUS at 12:16

## 2017-06-30 ASSESSMENT — PAIN DESCRIPTION - DESCRIPTORS
DESCRIPTORS: ACHING;SORE
DESCRIPTORS: ACHING;SORE

## 2017-06-30 NOTE — PROGRESS NOTES
SURGICAL ICU Progress Note  6/30/2017    CO-MORBIDITIES:   No diagnosis found.    ASSESSMENT: Joe Sosa is a 27 year old male with alpha 1 antitrypsin and alcoholic cirrhosis POD #2 s/p DDLT.  He continues to do well and improve in the post-operative period.    Plan today/Major changes:  - D/c fentanyl drip  - oxycodone PO 5-10mg Q4H PRN, PRN dilaudid 0.3-0.5   - Continue insulin ggt   - Novolog for carb coverage    - Scheduled colace and senna.   - Dulcolax   - Lasix 40mg QID per transplant team  - No chemical DVT PPx per transplant team  - Possible transfer to floor - 7A     PLAN:  Neuro/ pain/ sedation:  - Monitor neurological status. Notify the MD for any acute changes in exam.  - Oxycodone PO 5-10 mg q4h PRN  - IV dilaudid 0.3-0.5 PRN    Pulmonary care: Respiratory insufficiency requiring ventilation  - Extubated, satting well on room air.  - d/c PPI    Cardiovascular: No issues  - Monitor hemodynamic status, vital signs stable but requiring blood products to maintain Hgb/Plt goals  - off pressors  - goal CVP 8-10    GI care: S/p liver transplant  - Advance diet as tolerated  - LFTs in AM  - INR/lactate qd  - immunosuppression regimen per txp  - Dulcolax suppository prn  - Scheduled colace and senna    Fluids/ Electrolytes/ Nutrition:   - restart maintenance fluids after CVP 8-10.    Renal/ Fluid Balance:   -Continue to diurese with Lasix 40 mg QID  -Will continue to monitor intake and output via maya  -BMP q12h    Endocrine:   - insulin gtt  - start novolog  - Prednisone taper    ID/ Antibiotics:  - Continue IV vanc/zosyn/fluconazole     Heme:  - Goals: INR <2; Fibrinogen >200; Hgb >8; plt >50  - Hemoglobin stable, requiring products to remain >8  - Recheck labs q6h in perioperative period; replace with products PRN  - Abdominal LAM putting out small volume of deep red serosanguinous fluid, continue to monitor hemoglobin    Prophylaxis:    - Post transplant ppx: Valcyte/nystatin   - Mechanical  prophylaxis for DVT.     Lines/ tubes/ drains:  - L subclavian mac with PA catheter, PIV, abdominal LAM     Disposition:  - Continues to improve in the post-op period.  Transfer to floor today when bed available.     Patient findings and plan discussed with surgical ICU staff Dr. Kuhn.    Duke Osman MD  PGY-1  #: [id number]      - - - - - - - - - - - - - - - - - - - - - - - - - - - - - - - - - - - - - - - - - - - - - - - - - - - - - - - - - - - - - - - - - - - - - - - -   Subjective:  Mr. Sosa continues to improve today.  Overnight he required multiple blood products to meet post-op goals. Received 1 u PBRC and 2 units of platelets. Today he has been sitting up in his chair and walking with nursing staff's help. Denies any fevers, chills, abdominal pain, shortness of breath, nausea. Has been experiencing some constipation.     PAST MEDICAL HISTORY:   Past Medical History:   Diagnosis Date     Alcoholic cirrhosis of liver with ascites (H) 3/9/2017     Alpha-1-antitrypsin deficiency (H) 3/9/2017     Anasarca      Chronic hyponatremia      Hepatic encephalopathy (H)      SBP (spontaneous bacterial peritonitis) (H)        SURGICAL HISTORY:   Past Surgical History:   Procedure Laterality Date     TRANSPLANT LIVER RECIPIENT  DONOR N/A 2017    Procedure: TRANSPLANT LIVER RECIPIENT  DONOR;  TRANSPLANT LIVER RECIPIENT  DONOR with bile duct stenting;  Surgeon: Chino Martin MD;  Location:  OR       SOCIAL HISTORY:   Social History     Social History     Marital status: Single     Spouse name: N/A     Number of children: N/A     Years of education: N/A     Occupational History     Not on file.     Social History Main Topics     Smoking status: Never Smoker     Smokeless tobacco: Former User     Types: Chew     Alcohol use No      Comment: Quit 2016     Drug use: No     Sexual activity: Not on file     Other Topics Concern     Not on file     Social History Narrative          FAMILY HISTORY: No bleeding/clotting disorders nor problems with anesthesia.    ALLERGIES:    No Known Allergies    MEDICATIONS:    No current facility-administered medications on file prior to encounter.   Current Outpatient Prescriptions on File Prior to Encounter:  vitamin D (ERGOCALCIFEROL) 47496 UNIT capsule Take 1 capsule (50,000 Units) by mouth once a week   torsemide (DEMADEX) 20 MG tablet Take 20 mg by mouth daily    spironolactone (ALDACTONE) 50 MG tablet Take 50 mg by mouth daily    lactulose (CHRONULAC) 10 GM/15ML solution Take 45 mLs by mouth 3 times daily    rifaximin (XIFAXAN) 550 MG TABS tablet Take 550 mg by mouth 2 times daily   omeprazole (PRILOSEC) 20 MG CR capsule Take 20 mg by mouth daily        PHYSICAL EXAMINATION:  Temp:  [97.9  F (36.6  C)-99.3  F (37.4  C)] 98.1  F (36.7  C)  Heart Rate:  [] 95  Resp:  [7-33] 14  BP: (137-143)/(79-93) 137/79  MAP:  [93 mmHg-115 mmHg] 114 mmHg  Arterial Line BP: (127-166)/(71-93) 166/93  SpO2:  [89 %-100 %] 92 %  General: Calm and cooperative, no acute distress.  Breathing comfortably on room air.  HEENT: Normocephalic, atraumatic. Patent nares.   Chest wall: Symmetric thorax. No masses or tenderness to palpation.    Respiratory: Non-labored breathing. Lung sounds clear to auscultation bilaterally.   Cardiovascular: Regular rate and rhythm, no murmurs/rubs/gallops.  Gastrointestinal: Abdomen soft, non-distended, incision closed with staples and is c/d/i. Abdominal LAM drain putting out small amount of bright red blood  Extremities: Moving all four extremities spontaneously.  No peripheral edema.   Skin: No rashes or lesions appreciated.    LABS: Reviewed.   Arterial Blood Gases     Recent Labs  Lab 06/29/17  0641 06/29/17  0311 06/28/17  1430 06/28/17  1330   PH 7.38 7.41 7.41 Quantity not sufficientNOTIFIED JOEL AT 1350 6/28/17 JSD   PCO2 41 39 38 Quantity not sufficientNOTIFIED JOEL AT 1350 6/28/17 JSD   PO2 120* 117* 52* Quantity not  sufficientNOTIFIED JOEL AT 1350 17 JSD   HCO3 24 25 24 Quantity not sufficientNOTIFIED JOEL AT 1350 17 JSD     Complete Blood Count     Recent Labs  Lab 17  0618 17  0430 17  2147   WBC 5.0 6.2 5.9 7.4   HGB 7.8* 7.7* 7.1* 7.6*   PLT 34* 41* 34* 40*     Basic Metabolic Panel    Recent Labs  Lab 17  04317  1633 06/29/17  0308 06/28/17  2129 06/28/17  1550    142 142  --  140   POTASSIUM 4.2 4.0 4.2 4.0 3.9   CHLORIDE 104 107 109  --  108   CO2 23 26 25  --  25   BUN 40* 38* 24  --  20   CR 1.21 1.21 1.05  --  0.74   * 147* 147*  --  197*     Liver Function Tests    Recent Labs  Lab 17  14217  0923   *  --  1282*  --   --  2612*  --  88*   *  --  556*  --   --  836*  --  51   ALKPHOS 63  --  56  --   --  84  --  280*   BILITOTAL 2.4*  --  2.2*  --   --  7.5*  --  7.1*   ALBUMIN 2.8*  --  2.4*  --   --  2.2*  --  2.4*   INR 1.27* 1.27* 1.54* 1.64*  < > 2.49*  < > 2.33*   < > = values in this interval not displayed.  Pancreatic Enzymes    Recent Labs  Lab 17  0923   LIPASE 113 327  --    AMYLASE 30  --  62     Coagulation Profile    Recent Labs  Lab 17  0430 17  1633 06/29/17  03017  14217  1330 17  1145  17  0923   INR 1.27* 1.27* 1.54* 1.64*  < > 2.49* 2.78* 3.40*  < > 2.33*   PTT  --   --   --   --   --  45* 59* 103*  --  45*   < > = values in this interval not displayed.  Lactate  Invalid input(s): LACTATE    IMAGING:  Results for orders placed or performed during the hospital encounter of 17   XR Chest 2 Views    Narrative    XR CHEST 2 VW  2017 9:37 AM      HISTORY:  donor transplant going to OR NOW    COMPARISON: 3/27/2017    FINDINGS: Upper abdomen is unremarkable. Cardiac silhouette is not  enlarged. No pneumothorax or pleural effusion. No  focal airspace  opacities. No acute osseous abnormalities.      Impression    IMPRESSION: Clear lungs. No acute findings.    I have personally reviewed the examination and initial interpretation  and I agree with the findings.    NED HINDS MD   XR Chest Port 1 View    Narrative    XR CHEST PORT 1 VW  6/28/2017 2:52 PM      HISTORY: Check ET tube placement - Liver transplant    COMPARISON: 6/27/2017    FINDINGS: Single portable AP view of the chest upright. Endotracheal  tube 3.5 cm superior to the medardo. Right IJ Magnolia-Stephanie catheter tip  projects over the main pulmonary artery outflow tract. Enteric tube  proceeds below the level of the diaphragm and the inferior margin of  the field-of-view. The cardiomediastinal silhouette and pulmonary  vasculature are stable and within normal limits. Low lung volumes.  There is no focal airspace opacity, pleural effusion, or pneumothorax.  The visualized upper abdomen, osseous structures, and soft tissues are  unremarkable.      Impression    IMPRESSION:   1. Endotracheal tube 3.5 cm superior to the medardo.  2. No focal airspace consolidation.  3. Low lung volumes.     I have personally reviewed the examination and initial interpretation  and I agree with the findings.    LEONARDO HOLMAN MD

## 2017-06-30 NOTE — PROVIDER NOTIFICATION
RN paged SICU MD about increased LAM output. 300 cc of dark red blood, MD came to bedside. Abdomen still soft on palpation. RN kira 2200 CBC, Hemoglobin dropped from 9.2 to 7.6. And Plts dropped from 65 to 40. MD notified. Per SICU MD, transplant would be informed and transfusion orders will be placed. RN awaiting orders.

## 2017-06-30 NOTE — PLAN OF CARE
Problem: Goal Outcome Summary  Goal: Goal Outcome Summary  Outcome: Improving  Neuro: Intact, pupils equal and reactive.   CV: NSR 80-90s HR overnight. MAP goal >60 met overnight without intervention. CVP line does not function despite trouble shooting efforts by RNs. MD aware, per SICU MD okay to not record CVP data overnight. 40 of lasix was given. MIV @ 10. All pulses palpable.   Resp:Room air, 2L NC when sleeping  GI: Clear liquid diet, adequate intake overnight. Insulin drip 1-2 overnight. No BM  : Uses bedside commode to urinate, about 800ccUO overnight.   Skin:   Abdominal incision, dried drainage on dressing. LAM in RQ about 40 ccdark blood UO/2 hrs. LAM dumped about 300cc MD aware, ordered CBC and blood products were given accordingly. Abdomen soft and round.   Labs: Overnight a total of 1 RBC (MD notified about 7.9 and 7.1 hemoglobin drop) , 2 units of platelets (for plts of 40 and 34 MD notified about both drops in labs) RBC and Plts were transfusing at the time of 0430 lab draw, will redraw CBC with plts at 0600.

## 2017-06-30 NOTE — PLAN OF CARE
Problem: Goal Outcome Summary  Goal: Goal Outcome Summary  PT 4A: Pt requires min A for bed mobility and transfers, ambulated from room on 4A to room on 7A with FWW and CGA, very slow but steady throughout. Overall pt tolerated session well, says walking improved his pain.      REC: Anticipate with continued progress in therapy pt will be appropriate to discharge home with assist from mother when medically appropriate.

## 2017-06-30 NOTE — PROGRESS NOTES
Immunosuppression Note:    Joe Sosa is a 27 year old male who is seen today  for immunosuppression management     IChino MD, I have examined the patient with the resident/PA/Fellow, discussed and agree with the note and findings.  I have reviewed today's vital signs, medications, labs and imaging. I reviewed the immunosuppression medications and levels. I spoke to the patient/family and explained below clinical details and answered all the questions      Transplant Surgery  Inpatient Daily Progress Note  06/30/2017    Assessment & Plan: 27 year old male with PMH significant for ESLD secondary to EtOH/alpha 1 antitrypsin now s/p DD OLT on 6/28/17 with Dr. Martin. ESLD complicated by hepatic encephalopathy, ascites (paracentesis x 1-2x), SBP x 1 episode, chronic hyponatremia and anasarca.    Graft function: good, improved. LFTs improving, Tbili 2.4 from 2.2, will monitor closely. INR 1.3. Fg stable. LA WNL. Pain well controlled with Dilaudid pca, will plan to transition to oral analgesics today.   Immunosuppression management: Solu-medrol taper per protocol. Cellcept 1000 mg BID. Tacrolimus, started 2 mg BID on POD 1, will check level tomorrow. Goal 10-15. Complexity of management:Medium. Contributing factors: thrombocytopenia, anemia and induction  Hematology: HGB 7.8 this AM, transfused PRBC this AM for HGB 7.1. Partially dilutional. PLT 34, transfuse today. PLT, FFP, PRBC and cryo transfusions since transplant.  Cardiorespiratory: Stable on room air to 2 L oxygen.    GI/Nutrition: CLD, tolerating well. Plan to advance diet as tolerated. No flatus or BM. Will start bowel regimen today: senna/colace and MOM.   Endocrine: Hyperglycemia due to steroids, continue insulin gtt  Fluid/Electrolytes: MIVF: LR@ 10 cc/hour. Lasix 40 mg IV this AM, will schedule 40 mg IV QID.   : Major discontinued on POD 1, voiding  Infectious disease: Tmax 99.3. Continue Zosyn x 3 days per protocol. D/c'ed Micafungin  and started Fluconazole on POD 1. Discontinued Vanco on POD 1.  Prophylaxis: GI, fungal  Disposition: SICU, plan to transfer to     Medical Decision Making: Medium  Subsequent visit 34674 (moderate level decision making)    DENISE/Fellow/Resident Provider: BRENDA MartinezC [id number]    Faculty: Chino Martin M.D.    __________________________________________________________________  Transplant History: Admitted 6/27/2017 for liver transplant.   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    6/28/2017 (Liver), Postoperative day: 2     Interval History: History is obtained from the patient  Overnight events: No flatus/BM. Tolerating CLD well. No complaints.     ROS:   A 10-point review of systems was negative except as noted above.    Curent Meds:    furosemide  40 mg Intravenous 4x Daily     polyethylene glycol  17 g Oral Daily     senna-docusate  1 tablet Oral At Bedtime     [START ON 7/1/2017] insulin aspart   Subcutaneous QAM AC     insulin aspart   Subcutaneous Daily with lunch     insulin aspart   Subcutaneous Daily with supper     fluconazole  200 mg Oral Daily     multivitamin, therapeutic with minerals  1 tablet Per Feeding Tube Daily     tacrolimus  2 mg Oral BID IS     sodium chloride (PF)  3 mL Intravenous Q8H     [START ON 7/1/2017] methylPREDNISolone  50 mg Intravenous Once    Followed by     [START ON 7/2/2017] predniSONE  25 mg Oral Once    Followed by     [START ON 7/3/2017] predniSONE  10 mg Oral Once     valGANciclovir  450 mg Oral Daily    Or     valGANciclovir  450 mg Oral or NG Tube Daily     sulfamethoxazole-trimethoprim  1 tablet Oral Daily     mycophenolate  1,000 mg Oral BID IS    Or     mycophenolate  1,000 mg Oral or NG Tube BID IS     protein modular  1 packet Per Feeding Tube TID       Physical Exam:     Admit Weight: 98.8 kg (217 lb 14.4 oz)    Current Vitals:   /90 (BP Location: Left arm)  Pulse 83  Temp 97.7  F (36.5  C) (Oral)  Resp 18  Ht 1.88 m  "(6' 2\")  Wt 107.4 kg (236 lb 12.4 oz)  SpO2 94%  BMI 30.4 kg/m2    CVP (mmHg): 10 mmHg    Vital sign ranges:    Temp:  [97.7  F (36.5  C)-99.3  F (37.4  C)] 97.7  F (36.5  C)  Heart Rate:  [] 86  Resp:  [7-33] 18  BP: (137-143)/(79-90) 140/90  MAP:  [93 mmHg-135 mmHg] 135 mmHg  Arterial Line BP: (127-166)/(73-93) 150/84  SpO2:  [89 %-100 %] 94 %  Patient Vitals for the past 24 hrs:   BP Temp Temp src Heart Rate Resp SpO2   06/30/17 1200 140/90 97.7  F (36.5  C) Oral 86 18 94 %   06/30/17 1100 - - - 92 16 94 %   06/30/17 1000 - - - 95 14 92 %   06/30/17 0900 - - - 87 16 93 %   06/30/17 0800 - 98.1  F (36.7  C) Oral 88 18 95 %   06/30/17 0700 - - - 78 13 97 %   06/30/17 0635 - - - 78 12 96 %   06/30/17 0600 - - - 80 12 96 %   06/30/17 0530 - - - 79 11 95 %   06/30/17 0500 - 98  F (36.7  C) - 88 14 95 %   06/30/17 0430 - - - 87 13 99 %   06/30/17 0425 - - - 86 (!) 7 97 %   06/30/17 0420 - - - 89 9 98 %   06/30/17 0415 - 97.9  F (36.6  C) Oral 104 27 92 %   06/30/17 0400 - 97.9  F (36.6  C) Oral 83 17 94 %   06/30/17 0315 - 97.9  F (36.6  C) Oral 83 10 98 %   06/30/17 0230 - - - 84 11 96 %   06/30/17 0215 - - - 83 11 97 %   06/30/17 0200 - - - 80 12 -   06/30/17 0130 - - - 87 10 98 %   06/30/17 0115 - - - 86 13 97 %   06/30/17 0100 - - - 85 11 97 %   06/30/17 0045 - - - 82 13 97 %   06/30/17 0030 - - - 83 13 98 %   06/30/17 0015 - - - 86 11 99 %   06/30/17 0010 - - - 84 15 100 %   06/30/17 0005 - - - 88 14 98 %   06/30/17 0000 - 97.9  F (36.6  C) Oral 83 14 99 %   06/29/17 2300 - - - 89 (!) 33 91 %   06/29/17 2230 - - - 98 10 96 %   06/29/17 2200 - - - 95 17 94 %   06/29/17 2130 - - - 96 14 93 %   06/29/17 2100 - - - 93 19 93 %   06/29/17 2000 - 99.3  F (37.4  C) Axillary 93 18 93 %   06/29/17 1915 - - - 89 15 (!) 89 %   06/29/17 1900 - - - 93 15 94 %   06/29/17 1800 - - - 93 19 91 %   06/29/17 1700 - - - 92 12 97 %   06/29/17 1600 137/79 98.9  F (37.2  C) Oral 104 20 91 %   06/29/17 1500 - - - 92 14 94 % "   06/29/17 1400 143/81 - - 99 26 95 %     General Appearance: in no apparent distress.   Skin: normal  Heart: regular rate and rhythm  Lungs: NLB, stable on room air to 2 L O2  Abdomen: rounded, and  appropriately tender, generalized. The wound is c/d/i. LAM with sero sang output.  : Major in place.   Extremities: edema: absent. There is no skin breakdown.  Neurologic: awake, alert and oriented. Tremor absent. Asterixis: absent.    Data:   CMP  Recent Labs  Lab 06/30/17  0430 06/29/17  1633 06/29/17  0311 06/29/17  0308  06/28/17  1421  06/27/17  0923    142  --  142  < > 140  < > 135   POTASSIUM 4.2 4.0  --  4.2  < > 4.0  < > 4.3   CHLORIDE 104 107  --  109  < > 108  --  100   CO2 23 26  --  25  < > 24  --  30   * 147*  --  147*  < > 250*  < > 107*   BUN 40* 38*  --  24  < > 16  --  12   CR 1.21 1.21  --  1.05  < > 0.64*  --  0.63*   GFRESTIMATED 71 71  --  84  < > >90Non  GFR Calc  --  >90Non  GFR Calc   GFRESTBLACK 86 86  --  >90African American GFR Calc  < > >90African American GFR Calc  --  >90African American GFR Calc   BRITNI 7.9* 7.8*  --  8.2*  < > 9.0  --  8.3*   ICAW 4.4  --  5.0  --   < >  --   < >  --    MAG 2.9*  --   --  2.5*  < > 1.8  --  2.0   PHOS 6.7*  --   --  5.4*  --  3.7  --  3.8   AMYLASE  --   --   --  30  --   --   --  62   LIPASE  --   --   --  113  --  327  --   --    ALBUMIN 2.8*  --   --  2.4*  --  2.2*  --  2.4*   BILITOTAL 2.4*  --   --  2.2*  --  7.5*  --  7.1*   ALKPHOS 63  --   --  56  --  84  --  280*   *  --   --  1282*  --  2612*  --  88*   *  --   --  556*  --  836*  --  51   < > = values in this interval not displayed.  CBC  Recent Labs  Lab 06/30/17  0618 06/30/17  0430  06/29/17  0308   HGB 7.8* 7.7*  < > 6.8*   WBC 5.0 6.2  < > 8.3   PLT 34* 41*  < > 46*   A1C  --   --   --  Canceled, Test credited Below Assay Range   < > = values in this interval not displayed.  COAGS  Recent Labs  Lab 06/30/17  0430 06/29/17  6315   06/28/17  1421 06/28/17  1330   INR 1.27* 1.27*  < > 2.49* 2.78*   PTT  --   --   --  45* 59*   < > = values in this interval not displayed.   Urinalysis  Recent Labs   Lab Test  03/27/17   0738   COLOR  Ana Cristina   APPEARANCE  Clear   URINEGLC  Negative   URINEBILI  Negative   URINEKETONE  Negative   SG  1.018   UBLD  Negative   URINEPH  6.0   PROTEIN  Negative   NITRITE  Negative   LEUKEST  Negative   RBCU  1   WBCU  2   UTPG  Unable to calculate due to low value     Virology:  Hepatitis C Antibody   Date Value Ref Range Status   06/27/2017  NR Final    Nonreactive   Assay performance characteristics have not been established for newborns,   infants, and children

## 2017-06-30 NOTE — PLAN OF CARE
Problem: Liver Transplant (Adult)  Goal: Signs and Symptoms of Listed Potential Problems Will be Absent or Manageable (Liver Transplant)  Signs and symptoms of listed potential problems will be absent or manageable by discharge/transition of care (reference Liver Transplant (Adult) CPG).   Outcome: Improving  VSS and pain has been tolerable. Has not required any prn analgesics today. Tolerating clear liquid diet with no problems. Tolerating sitting in chair with no problems. Is able to transfer in room with assist of 1. Will continue to encourage activity as tolerated. Plan to transfer to floor when bed becomes available.

## 2017-07-01 LAB
ALBUMIN SERPL-MCNC: 3 G/DL (ref 3.4–5)
ALP SERPL-CCNC: 81 U/L (ref 40–150)
ALT SERPL W P-5'-P-CCNC: 378 U/L (ref 0–70)
ANION GAP SERPL CALCULATED.3IONS-SCNC: 8 MMOL/L (ref 3–14)
AST SERPL W P-5'-P-CCNC: 235 U/L (ref 0–45)
BACTERIA SPEC CULT: ABNORMAL
BASOPHILS # BLD AUTO: 0 10E9/L (ref 0–0.2)
BASOPHILS NFR BLD AUTO: 0.2 %
BILIRUB DIRECT SERPL-MCNC: 1.4 MG/DL (ref 0–0.2)
BILIRUB SERPL-MCNC: 2.4 MG/DL (ref 0.2–1.3)
BLD PROD TYP BPU: NORMAL
BLD UNIT ID BPU: 0
BLOOD PRODUCT CODE: NORMAL
BPU ID: NORMAL
BUN SERPL-MCNC: 46 MG/DL (ref 7–30)
CALCIUM SERPL-MCNC: 8.1 MG/DL (ref 8.5–10.1)
CHLORIDE SERPL-SCNC: 100 MMOL/L (ref 94–109)
CO2 SERPL-SCNC: 28 MMOL/L (ref 20–32)
COPATH REPORT: NORMAL
CREAT SERPL-MCNC: 1.28 MG/DL (ref 0.66–1.25)
DIFFERENTIAL METHOD BLD: ABNORMAL
EOSINOPHIL # BLD AUTO: 0.1 10E9/L (ref 0–0.7)
EOSINOPHIL NFR BLD AUTO: 2.3 %
ERYTHROCYTE [DISTWIDTH] IN BLOOD BY AUTOMATED COUNT: 18.5 % (ref 10–15)
GFR SERPL CREATININE-BSD FRML MDRD: 66 ML/MIN/1.7M2
GLUCOSE BLDC GLUCOMTR-MCNC: 103 MG/DL (ref 70–99)
GLUCOSE BLDC GLUCOMTR-MCNC: 104 MG/DL (ref 70–99)
GLUCOSE BLDC GLUCOMTR-MCNC: 104 MG/DL (ref 70–99)
GLUCOSE BLDC GLUCOMTR-MCNC: 110 MG/DL (ref 70–99)
GLUCOSE BLDC GLUCOMTR-MCNC: 112 MG/DL (ref 70–99)
GLUCOSE BLDC GLUCOMTR-MCNC: 112 MG/DL (ref 70–99)
GLUCOSE BLDC GLUCOMTR-MCNC: 146 MG/DL (ref 70–99)
GLUCOSE BLDC GLUCOMTR-MCNC: 174 MG/DL (ref 70–99)
GLUCOSE BLDC GLUCOMTR-MCNC: 190 MG/DL (ref 70–99)
GLUCOSE BLDC GLUCOMTR-MCNC: 99 MG/DL (ref 70–99)
GLUCOSE SERPL-MCNC: 104 MG/DL (ref 70–99)
HCT VFR BLD AUTO: 23.7 % (ref 40–53)
HGB BLD-MCNC: 8.2 G/DL (ref 13.3–17.7)
IMM GRANULOCYTES # BLD: 0 10E9/L (ref 0–0.4)
IMM GRANULOCYTES NFR BLD: 0.4 %
INR PPP: 1.2 (ref 0.86–1.14)
LYMPHOCYTES # BLD AUTO: 0.5 10E9/L (ref 0.8–5.3)
LYMPHOCYTES NFR BLD AUTO: 11.5 %
MAGNESIUM SERPL-MCNC: 2.7 MG/DL (ref 1.6–2.3)
MCH RBC QN AUTO: 33.7 PG (ref 26.5–33)
MCHC RBC AUTO-ENTMCNC: 34.6 G/DL (ref 31.5–36.5)
MCV RBC AUTO: 98 FL (ref 78–100)
MICRO REPORT STATUS: ABNORMAL
MICROORGANISM SPEC CULT: ABNORMAL
MONOCYTES # BLD AUTO: 0.7 10E9/L (ref 0–1.3)
MONOCYTES NFR BLD AUTO: 15.7 %
NEUTROPHILS # BLD AUTO: 3.3 10E9/L (ref 1.6–8.3)
NEUTROPHILS NFR BLD AUTO: 69.9 %
NRBC # BLD AUTO: 0 10*3/UL
NRBC BLD AUTO-RTO: 0 /100
PHOSPHATE SERPL-MCNC: 5.6 MG/DL (ref 2.5–4.5)
PLATELET # BLD AUTO: 35 10E9/L (ref 150–450)
POTASSIUM SERPL-SCNC: 3.8 MMOL/L (ref 3.4–5.3)
PROT SERPL-MCNC: 5.9 G/DL (ref 6.8–8.8)
RBC # BLD AUTO: 2.43 10E12/L (ref 4.4–5.9)
SODIUM SERPL-SCNC: 136 MMOL/L (ref 133–144)
SPECIMEN SOURCE: ABNORMAL
TACROLIMUS BLD-MCNC: 4.2 UG/L (ref 5–15)
TME LAST DOSE: ABNORMAL H
TRANSFUSION STATUS PATIENT QL: NORMAL
WBC # BLD AUTO: 4.7 10E9/L (ref 4–11)

## 2017-07-01 PROCEDURE — 85610 PROTHROMBIN TIME: CPT | Performed by: PHYSICIAN ASSISTANT

## 2017-07-01 PROCEDURE — 25000132 ZZH RX MED GY IP 250 OP 250 PS 637: Performed by: STUDENT IN AN ORGANIZED HEALTH CARE EDUCATION/TRAINING PROGRAM

## 2017-07-01 PROCEDURE — 80053 COMPREHEN METABOLIC PANEL: CPT | Performed by: PHYSICIAN ASSISTANT

## 2017-07-01 PROCEDURE — 00000146 ZZHCL STATISTIC GLUCOSE BY METER IP

## 2017-07-01 PROCEDURE — 25000132 ZZH RX MED GY IP 250 OP 250 PS 637: Performed by: PHYSICIAN ASSISTANT

## 2017-07-01 PROCEDURE — 25000128 H RX IP 250 OP 636: Performed by: TRANSPLANT SURGERY

## 2017-07-01 PROCEDURE — 83735 ASSAY OF MAGNESIUM: CPT | Performed by: PHYSICIAN ASSISTANT

## 2017-07-01 PROCEDURE — 25000131 ZZH RX MED GY IP 250 OP 636 PS 637: Performed by: TRANSPLANT SURGERY

## 2017-07-01 PROCEDURE — 12000025 ZZH R&B TRANSPLANT INTERMEDIATE

## 2017-07-01 PROCEDURE — 82248 BILIRUBIN DIRECT: CPT | Performed by: PHYSICIAN ASSISTANT

## 2017-07-01 PROCEDURE — 84100 ASSAY OF PHOSPHORUS: CPT | Performed by: PHYSICIAN ASSISTANT

## 2017-07-01 PROCEDURE — 25000132 ZZH RX MED GY IP 250 OP 250 PS 637: Performed by: TRANSPLANT SURGERY

## 2017-07-01 PROCEDURE — 85025 COMPLETE CBC W/AUTO DIFF WBC: CPT | Performed by: PHYSICIAN ASSISTANT

## 2017-07-01 PROCEDURE — 36592 COLLECT BLOOD FROM PICC: CPT | Performed by: PHYSICIAN ASSISTANT

## 2017-07-01 PROCEDURE — 80197 ASSAY OF TACROLIMUS: CPT | Performed by: PHYSICIAN ASSISTANT

## 2017-07-01 RX ORDER — DEXTROSE MONOHYDRATE 25 G/50ML
25-50 INJECTION, SOLUTION INTRAVENOUS
Status: DISCONTINUED | OUTPATIENT
Start: 2017-07-01 | End: 2017-07-06 | Stop reason: HOSPADM

## 2017-07-01 RX ORDER — FUROSEMIDE 10 MG/ML
40 INJECTION INTRAMUSCULAR; INTRAVENOUS 3 TIMES DAILY
Status: COMPLETED | OUTPATIENT
Start: 2017-07-01 | End: 2017-07-02

## 2017-07-01 RX ORDER — TRAMADOL HYDROCHLORIDE 50 MG/1
50 TABLET ORAL
Status: DISCONTINUED | OUTPATIENT
Start: 2017-07-01 | End: 2017-07-06 | Stop reason: HOSPADM

## 2017-07-01 RX ORDER — NICOTINE POLACRILEX 4 MG
15-30 LOZENGE BUCCAL
Status: DISCONTINUED | OUTPATIENT
Start: 2017-07-01 | End: 2017-07-06 | Stop reason: HOSPADM

## 2017-07-01 RX ORDER — URSODIOL 300 MG/1
300 CAPSULE ORAL 2 TIMES DAILY
Status: DISCONTINUED | OUTPATIENT
Start: 2017-07-01 | End: 2017-07-06 | Stop reason: HOSPADM

## 2017-07-01 RX ADMIN — SENNOSIDES AND DOCUSATE SODIUM 2 TABLET: 8.6; 5 TABLET ORAL at 08:17

## 2017-07-01 RX ADMIN — TACROLIMUS 2 MG: 0.5 CAPSULE ORAL at 08:15

## 2017-07-01 RX ADMIN — OXYCODONE HYDROCHLORIDE 10 MG: 5 TABLET ORAL at 08:09

## 2017-07-01 RX ADMIN — OXYCODONE HYDROCHLORIDE 10 MG: 5 TABLET ORAL at 12:12

## 2017-07-01 RX ADMIN — MULTIPLE VITAMINS W/ MINERALS TAB 1 TABLET: TAB at 12:12

## 2017-07-01 RX ADMIN — SENNOSIDES AND DOCUSATE SODIUM 2 TABLET: 8.6; 5 TABLET ORAL at 20:07

## 2017-07-01 RX ADMIN — METHYLPREDNISOLONE SODIUM SUCCINATE 50 MG: 125 INJECTION, POWDER, LYOPHILIZED, FOR SOLUTION INTRAMUSCULAR; INTRAVENOUS at 08:30

## 2017-07-01 RX ADMIN — FLUCONAZOLE 200 MG: 200 TABLET ORAL at 08:15

## 2017-07-01 RX ADMIN — OXYCODONE HYDROCHLORIDE 10 MG: 5 TABLET ORAL at 04:15

## 2017-07-01 RX ADMIN — FUROSEMIDE 40 MG: 10 INJECTION, SOLUTION INTRAVENOUS at 14:18

## 2017-07-01 RX ADMIN — FUROSEMIDE 40 MG: 10 INJECTION, SOLUTION INTRAVENOUS at 20:07

## 2017-07-01 RX ADMIN — OXYCODONE HYDROCHLORIDE 5 MG: 5 TABLET ORAL at 16:17

## 2017-07-01 RX ADMIN — VALGANCICLOVIR HYDROCHLORIDE 450 MG: 450 TABLET ORAL at 08:16

## 2017-07-01 RX ADMIN — OXYCODONE HYDROCHLORIDE 5 MG: 5 TABLET ORAL at 20:20

## 2017-07-01 RX ADMIN — INSULIN ASPART 2 UNITS: 100 INJECTION, SOLUTION INTRAVENOUS; SUBCUTANEOUS at 13:50

## 2017-07-01 RX ADMIN — POLYETHYLENE GLYCOL 3350 17 G: 17 POWDER, FOR SOLUTION ORAL at 08:17

## 2017-07-01 RX ADMIN — URSODIOL 300 MG: 300 CAPSULE ORAL at 12:11

## 2017-07-01 RX ADMIN — MYCOPHENOLATE MOFETIL 1000 MG: 250 CAPSULE ORAL at 17:43

## 2017-07-01 RX ADMIN — URSODIOL 300 MG: 300 CAPSULE ORAL at 20:07

## 2017-07-01 RX ADMIN — TACROLIMUS 2 MG: 0.5 CAPSULE ORAL at 17:43

## 2017-07-01 RX ADMIN — SULFAMETHOXAZOLE AND TRIMETHOPRIM 1 TABLET: 400; 80 TABLET ORAL at 08:16

## 2017-07-01 RX ADMIN — OXYCODONE HYDROCHLORIDE 10 MG: 5 TABLET ORAL at 00:03

## 2017-07-01 RX ADMIN — TRAMADOL HYDROCHLORIDE 50 MG: 50 TABLET, COATED ORAL at 15:55

## 2017-07-01 RX ADMIN — MYCOPHENOLATE MOFETIL 1000 MG: 250 CAPSULE ORAL at 08:15

## 2017-07-01 NOTE — PLAN OF CARE
Problem: Individualization  Goal: Patient Preferences  Outcome: No Change     RN:  AVSS, O2 sat 88% on room air, 96-99% @2L/NC, abdominal pain controlled with Oxycodone, given x2 with relief, denies nausea. Patient on clear liquid diet, blood glucose 100-110's, at present, Insulin gtt@1unit/hr. Right LAM with small amount of bloody output. Adequate urine output, no BM during the shift, not passing gas. Up to bathroom with one person assist. Father at bedside, very helpful with patient's cares. Patient c/o that bed not comfortable, up in a chair most of the shift, will continue to monitor.

## 2017-07-01 NOTE — PLAN OF CARE
Problem: Goal Outcome Summary  Goal: Goal Outcome Summary  Outcome: No Change  Transferred to  at 1700. Pt A/Ox4. VSS on RA. Up with assist x1. Clear liquid diet; tolerating well. Insulin drip running between 1-2 units/hr on Algorithm #2; BGs 114-139. Pt also has sub-q insulin for carb coverage. Denies nausea. Pain primarily in incisional area. Oxycodone 5mg given x3. Pt also utilizing ice packs and repositioning frequently. Not passing gas yet, but burping frequently. No BM. R LAM with 35 mL bright red/bloody output. Pt is ambulating to bathroom and voiding spontaneously with adequate urine output. Will continue to monitor.

## 2017-07-01 NOTE — PLAN OF CARE
Problem: Goal Outcome Summary  Goal: Goal Outcome Summary  Outcome: Improving  AVSS on room air. BG ; insulin gtt stopped and BG AC/HS (lunch ). Pt also has carb coverage insulin orders. Pt up SBA and ambulated in halls and room with minimal assistance using walker. Pt voiding with no difficulty. LAM had 65 ml bloody output for shift. Oxy x 2 for pain and tramadol added to start this afternoon. Pt denied nausea but reported having symptoms of heartburn when eating but stated it improved on own. Med card and lab book made for pt; reviewed with pt and family. Pt got washed up at bedside and in chair throughout shift. All care explained and questions answered. Will continue to monitor and notify team of changes.

## 2017-07-01 NOTE — PROGRESS NOTES
Immunosuppression Note:    Joe Sosa is a 27 year old male who is seen today  for immunosuppression management     IChino MD, I have examined the patient with the resident/PA/Fellow, discussed and agree with the note and findings.  I have reviewed today's vital signs, medications, labs and imaging. I reviewed the immunosuppression medications and levels. I spoke to the patient/family and explained below clinical details and answered all the questions      Transplant Surgery  Inpatient Daily Progress Note  07/01/2017    Assessment & Plan: 27 year old male with PMH significant for ESLD secondary to EtOH/alpha 1 antitrypsin now s/p DD OLT on 6/28/17 with Dr. Martin. ESLD complicated by hepatic encephalopathy, ascites (paracentesis x 1-2x), SBP x 1 episode, chronic hyponatremia and anasarca.    Graft function: good, improved. LFTs improving, Tbili 2.4 from 2.4 yesterday, ALT, AST down trending, ALP slightly up from 63 to 81, will add ursodiol 300mg PO BID. INR 1.2 from 1.3. Fg stable. LA WNL. Will continue to follow closely. Pain is not well controlled with oral oxycodone, will add tramadol 50mg PO Q8h scheduled.   Immunosuppression management: Solu-medrol taper per protocol. Cellcept 1000 mg BID. Tacrolimus, started 2 mg BID on POD 1, will check level today. Goal 10-15. Complexity of management:Medium. Contributing factors: thrombocytopenia, anemia and induction  Hematology: HGB 8.4 this AM, transfused PRBC yesterday for HGB 7.1. PLT 34, . PLT, FFP, PRBC and cryo transfusions since transplant.  Cardiorespiratory: Stable on room air to 2 L oxygen.    GI/Nutrition: CLD, tolerating well. Plan to advance diet as tolerated. No flatus or BM. Will start bowel regimen today: senna/colace and MOM.   Endocrine: Hyperglycemia due to steroids, will transition from insulin gtt to subcutaneous sliding scale  Fluid/Electrolytes: MIVF: LR@ 10 cc/hour. Lasix 40 mg IV this AM, will reduce 40 mg IV TID.   : Moriah  discontinued on POD 1, voiding  Infectious disease: Tmax 99.3. Continue Zosyn x 3 days per protocol. D/c'ed Micafungin and started Fluconazole on POD 1. Discontinued Vanco on POD 1.  Prophylaxis: GI, fungal  Disposition: SICU, plan to transfer to     Medical Decision Making: Medium  Subsequent visit 96468 (moderate level decision making)    DENISE/Fellow/Resident Provider: Ailyn Rodriguez    Faculty: Chino Martin M.D.    __________________________________________________________________  Transplant History: Admitted 6/27/2017 for liver transplant.   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    6/28/2017 (Liver), Postoperative day: 3     Interval History: History is obtained from the patient  Overnight events: No flatus/BM. Tolerating CLD well. No complaints.     ROS:   A 10-point review of systems was negative except as noted above.    Curent Meds:    furosemide  40 mg Intravenous TID     traMADol  50 mg Oral Q8H     insulin aspart  1-3 Units Subcutaneous TID AC     insulin aspart  1-3 Units Subcutaneous At Bedtime     ursodiol  300 mg Oral BID     polyethylene glycol  17 g Oral Daily     insulin aspart   Subcutaneous QAM AC     insulin aspart   Subcutaneous Daily with lunch     insulin aspart   Subcutaneous Daily with supper     senna-docusate  2 tablet Oral BID     fluconazole  200 mg Oral Daily     multivitamin, therapeutic with minerals  1 tablet Per Feeding Tube Daily     tacrolimus  2 mg Oral BID IS     [START ON 7/2/2017] predniSONE  25 mg Oral Once    Followed by     [START ON 7/3/2017] predniSONE  10 mg Oral Once     valGANciclovir  450 mg Oral Daily    Or     valGANciclovir  450 mg Oral or NG Tube Daily     sulfamethoxazole-trimethoprim  1 tablet Oral Daily     mycophenolate  1,000 mg Oral BID IS    Or     mycophenolate  1,000 mg Oral or NG Tube BID IS       Physical Exam:     Admit Weight: 98.8 kg (217 lb 14.4 oz)    Current Vitals:   /80 (BP Location: Right arm)   "Pulse 83  Temp 98.4  F (36.9  C) (Oral)  Resp 16  Ht 1.88 m (6' 2\")  Wt 106.5 kg (234 lb 14.4 oz)  SpO2 97%  BMI 30.16 kg/m2    CVP (mmHg): 10 mmHg    Vital sign ranges:    Temp:  [97.9  F (36.6  C)-98.4  F (36.9  C)] 98.4  F (36.9  C)  Heart Rate:  [] 80  Resp:  [12-22] 16  BP: (127-138)/(71-85) 131/80  SpO2:  [88 %-99 %] 97 %  Patient Vitals for the past 24 hrs:   BP Temp Temp src Heart Rate Resp SpO2 Weight   07/01/17 0859 131/80 98.4  F (36.9  C) Oral 80 16 97 % -   07/01/17 0400 127/83 98.4  F (36.9  C) Oral 77 16 99 % -   07/01/17 0311 - - - - - - 106.5 kg (234 lb 14.4 oz)   07/01/17 0000 132/85 97.9  F (36.6  C) Oral 80 12 98 % -   06/30/17 2355 - - - - - (!) 88 % -   06/30/17 1931 130/79 98  F (36.7  C) Oral 81 16 96 % -   06/30/17 1655 129/79 97.9  F (36.6  C) Oral 89 16 97 % -   06/30/17 1600 127/71 - - - - 97 % -   06/30/17 1400 138/82 - - 79 16 95 % -   06/30/17 1300 135/84 - - 102 22 93 % -     General Appearance: in no apparent distress.   Skin: normal  Heart: regular rate and rhythm  Lungs: NLB, stable on room air to 2 L O2  Abdomen: rounded, and  appropriately tender, generalized. The wound is c/d/i. LAM with sero sang output.  : Major in place.   Extremities: edema: absent. There is no skin breakdown.  Neurologic: awake, alert and oriented. Tremor absent. Asterixis: absent.    Data:   CMP  Recent Labs  Lab 07/01/17  0657 06/30/17  0430  06/29/17  0311 06/29/17  0308  06/28/17  1421  06/27/17  0923    137  < >  --  142  < > 140  < > 135   POTASSIUM 3.8 4.2  < >  --  4.2  < > 4.0  < > 4.3   CHLORIDE 100 104  < >  --  109  < > 108  --  100   CO2 28 23  < >  --  25  < > 24  --  30   * 119*  < >  --  147*  < > 250*  < > 107*   BUN 46* 40*  < >  --  24  < > 16  --  12   CR 1.28* 1.21  < >  --  1.05  < > 0.64*  --  0.63*   GFRESTIMATED 66 71  < >  --  84  < > >90Non  GFR Calc  --  >90Non  GFR Calc   GFRESTBLACK 80 86  < >  --  >90African American " GFR Calc  < > >90African American GFR Calc  --  >90African American GFR Calc   BRITNI 8.1* 7.9*  < >  --  8.2*  < > 9.0  --  8.3*   ICAW  --  4.4  --  5.0  --   < >  --   < >  --    MAG 2.7* 2.9*  --   --  2.5*  < > 1.8  --  2.0   PHOS 5.6* 6.7*  --   --  5.4*  --  3.7  --  3.8   AMYLASE  --   --   --   --  30  --   --   --  62   LIPASE  --   --   --   --  113  --  327  --   --    ALBUMIN 3.0* 2.8*  --   --  2.4*  --  2.2*  --  2.4*   BILITOTAL 2.4* 2.4*  --   --  2.2*  --  7.5*  --  7.1*   ALKPHOS 81 63  --   --  56  --  84  --  280*   * 509*  --   --  1282*  --  2612*  --  88*   * 472*  --   --  556*  --  836*  --  51   < > = values in this interval not displayed.  CBC  Recent Labs  Lab 07/01/17  0657 06/30/17  1224 06/30/17  0618  06/29/17  0308   HGB 8.2* 8.6* 7.8*  < > 6.8*   WBC 4.7  --  5.0  < > 8.3   PLT 35*  --  34*  < > 46*   A1C  --   --   --   --  Canceled, Test credited Below Assay Range   < > = values in this interval not displayed.  COAGS  Recent Labs  Lab 07/01/17  0657 06/30/17  0430  06/28/17  1421 06/28/17  1330   INR 1.20* 1.27*  < > 2.49* 2.78*   PTT  --   --   --  45* 59*   < > = values in this interval not displayed.   Urinalysis  Recent Labs   Lab Test  03/27/17   0738   COLOR  Ana Cristina   APPEARANCE  Clear   URINEGLC  Negative   URINEBILI  Negative   URINEKETONE  Negative   SG  1.018   UBLD  Negative   URINEPH  6.0   PROTEIN  Negative   NITRITE  Negative   LEUKEST  Negative   RBCU  1   WBCU  2   UTPG  Unable to calculate due to low value     Virology:  Hepatitis C Antibody   Date Value Ref Range Status   06/27/2017  NR Final    Nonreactive   Assay performance characteristics have not been established for newborns,   infants, and children

## 2017-07-01 NOTE — PROGRESS NOTES
Transplant Surgery  Inpatient Daily Progress Note  07/01/2017    Assessment & Plan: 27 year old male with PMH significant for ESLD secondary to EtOH/alpha 1 antitrypsin now s/p DD OLT on 6/28/17 with Dr. Martin. ESLD complicated by hepatic encephalopathy, ascites (paracentesis x 1-2x), SBP x 1 episode, chronic hyponatremia and anasarca.    Graft function: good, improved. LFTs improving, Tbili 2.4 from 2.4 yesterday, ALT, AST down trending, ALP slightly up from 63 to 81, will add ursodiol 300mg PO BID. INR 1.2 from 1.3. Fg stable. LA WNL. Will continue to follow closely. Pain is not well controlled with oral oxycodone, will add tramadol 50mg PO Q8h scheduled.   Immunosuppression management: Solu-medrol taper per protocol. Cellcept 1000 mg BID. Tacrolimus, started 2 mg BID on POD 1, will check level today. Goal 10-15. Complexity of management:Medium. Contributing factors: thrombocytopenia, anemia and induction  Hematology: HGB 8.4 this AM, transfused PRBC yesterday for HGB 7.1. PLT 34, . PLT, FFP, PRBC and cryo transfusions since transplant.  Cardiorespiratory: Stable on room air to 2 L oxygen.    GI/Nutrition: CLD, tolerating well. Plan to advance diet as tolerated. No flatus or BM. Will start bowel regimen today: senna/colace and MOM.   Endocrine: Hyperglycemia due to steroids, will transition from insulin gtt to subcutaneous sliding scale  Fluid/Electrolytes: MIVF: LR@ 10 cc/hour. Lasix 40 mg IV this AM, will reduce 40 mg IV TID.   : Major discontinued on POD 1, voiding  Infectious disease: Tmax 99.3. Continue Zosyn x 3 days per protocol. D/c'ed Micafungin and started Fluconazole on POD 1. Discontinued Vanco on POD 1.  Prophylaxis: GI, fungal  Disposition: SICU, plan to transfer to     Medical Decision Making: Medium  Subsequent visit 07519 (moderate level decision making)    DENISE/Fellow/Resident Provider: Ailyn Rodriguez    Faculty: Chino Martin,  "M.D.    __________________________________________________________________  Transplant History: Admitted 6/27/2017 for liver transplant.   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    6/28/2017 (Liver), Postoperative day: 3     Interval History: History is obtained from the patient  Overnight events: No flatus/BM. Tolerating CLD well. No complaints.     ROS:   A 10-point review of systems was negative except as noted above.    Curent Meds:    furosemide  40 mg Intravenous TID     traMADol  50 mg Oral Q8H     insulin aspart  1-3 Units Subcutaneous TID AC     insulin aspart  1-3 Units Subcutaneous At Bedtime     polyethylene glycol  17 g Oral Daily     insulin aspart   Subcutaneous QAM AC     insulin aspart   Subcutaneous Daily with lunch     insulin aspart   Subcutaneous Daily with supper     senna-docusate  2 tablet Oral BID     fluconazole  200 mg Oral Daily     multivitamin, therapeutic with minerals  1 tablet Per Feeding Tube Daily     tacrolimus  2 mg Oral BID IS     [START ON 7/2/2017] predniSONE  25 mg Oral Once    Followed by     [START ON 7/3/2017] predniSONE  10 mg Oral Once     valGANciclovir  450 mg Oral Daily    Or     valGANciclovir  450 mg Oral or NG Tube Daily     sulfamethoxazole-trimethoprim  1 tablet Oral Daily     mycophenolate  1,000 mg Oral BID IS    Or     mycophenolate  1,000 mg Oral or NG Tube BID IS       Physical Exam:     Admit Weight: 98.8 kg (217 lb 14.4 oz)    Current Vitals:   /80 (BP Location: Right arm)  Pulse 83  Temp 98.4  F (36.9  C) (Oral)  Resp 16  Ht 1.88 m (6' 2\")  Wt 106.5 kg (234 lb 14.4 oz)  SpO2 97%  BMI 30.16 kg/m2    CVP (mmHg): 10 mmHg    Vital sign ranges:    Temp:  [97.7  F (36.5  C)-98.4  F (36.9  C)] 98.4  F (36.9  C)  Heart Rate:  [] 80  Resp:  [12-22] 16  BP: (127-140)/(71-90) 131/80  MAP:  [135 mmHg] 135 mmHg  Arterial Line BP: (150)/(84) 150/84  SpO2:  [88 %-99 %] 97 %  Patient Vitals for the past 24 hrs:   BP Temp Temp src " Heart Rate Resp SpO2 Weight   07/01/17 0859 131/80 98.4  F (36.9  C) Oral 80 16 97 % -   07/01/17 0400 127/83 98.4  F (36.9  C) Oral 77 16 99 % -   07/01/17 0311 - - - - - - 106.5 kg (234 lb 14.4 oz)   07/01/17 0000 132/85 97.9  F (36.6  C) Oral 80 12 98 % -   06/30/17 2355 - - - - - (!) 88 % -   06/30/17 1931 130/79 98  F (36.7  C) Oral 81 16 96 % -   06/30/17 1655 129/79 97.9  F (36.6  C) Oral 89 16 97 % -   06/30/17 1600 127/71 - - - - 97 % -   06/30/17 1400 138/82 - - 79 16 95 % -   06/30/17 1300 135/84 - - 102 22 93 % -   06/30/17 1200 140/90 97.7  F (36.5  C) Oral 86 18 94 % -   06/30/17 1100 - - - 92 16 94 % -     General Appearance: in no apparent distress.   Skin: normal  Heart: regular rate and rhythm  Lungs: NLB, stable on room air to 2 L O2  Abdomen: rounded, and  appropriately tender, generalized. The wound is c/d/i. LAM with sero sang output.  : Major in place.   Extremities: edema: absent. There is no skin breakdown.  Neurologic: awake, alert and oriented. Tremor absent. Asterixis: absent.    Data:   CMP  Recent Labs  Lab 07/01/17  0657 06/30/17  0430  06/29/17  0311 06/29/17  0308  06/28/17  1421  06/27/17  0923    137  < >  --  142  < > 140  < > 135   POTASSIUM 3.8 4.2  < >  --  4.2  < > 4.0  < > 4.3   CHLORIDE 100 104  < >  --  109  < > 108  --  100   CO2 28 23  < >  --  25  < > 24  --  30   * 119*  < >  --  147*  < > 250*  < > 107*   BUN 46* 40*  < >  --  24  < > 16  --  12   CR 1.28* 1.21  < >  --  1.05  < > 0.64*  --  0.63*   GFRESTIMATED 66 71  < >  --  84  < > >90Non  GFR Calc  --  >90Non  GFR Calc   GFRESTBLACK 80 86  < >  --  >90African American GFR Calc  < > >90African American GFR Calc  --  >90African American GFR Calc   BRITNI 8.1* 7.9*  < >  --  8.2*  < > 9.0  --  8.3*   ICAW  --  4.4  --  5.0  --   < >  --   < >  --    MAG 2.7* 2.9*  --   --  2.5*  < > 1.8  --  2.0   PHOS 5.6* 6.7*  --   --  5.4*  --  3.7  --  3.8   AMYLASE  --   --   --   --   30  --   --   --  62   LIPASE  --   --   --   --  113  --  327  --   --    ALBUMIN 3.0* 2.8*  --   --  2.4*  --  2.2*  --  2.4*   BILITOTAL 2.4* 2.4*  --   --  2.2*  --  7.5*  --  7.1*   ALKPHOS 81 63  --   --  56  --  84  --  280*   * 509*  --   --  1282*  --  2612*  --  88*   * 472*  --   --  556*  --  836*  --  51   < > = values in this interval not displayed.  CBC  Recent Labs  Lab 07/01/17  0657 06/30/17  1224 06/30/17  0618  06/29/17  0308   HGB 8.2* 8.6* 7.8*  < > 6.8*   WBC 4.7  --  5.0  < > 8.3   PLT 35*  --  34*  < > 46*   A1C  --   --   --   --  Canceled, Test credited Below Assay Range   < > = values in this interval not displayed.  COAGS  Recent Labs  Lab 07/01/17  0657 06/30/17  0430  06/28/17  1421 06/28/17  1330   INR 1.20* 1.27*  < > 2.49* 2.78*   PTT  --   --   --  45* 59*   < > = values in this interval not displayed.   Urinalysis  Recent Labs   Lab Test  03/27/17   0738   COLOR  Ana Cristina   APPEARANCE  Clear   URINEGLC  Negative   URINEBILI  Negative   URINEKETONE  Negative   SG  1.018   UBLD  Negative   URINEPH  6.0   PROTEIN  Negative   NITRITE  Negative   LEUKEST  Negative   RBCU  1   WBCU  2   UTPG  Unable to calculate due to low value     Virology:  Hepatitis C Antibody   Date Value Ref Range Status   06/27/2017  NR Final    Nonreactive   Assay performance characteristics have not been established for newborns,   infants, and children

## 2017-07-02 ENCOUNTER — APPOINTMENT (OUTPATIENT)
Dept: PHYSICAL THERAPY | Facility: CLINIC | Age: 27
DRG: 006 | End: 2017-07-02
Attending: TRANSPLANT SURGERY
Payer: COMMERCIAL

## 2017-07-02 LAB
ALBUMIN SERPL-MCNC: 2.6 G/DL (ref 3.4–5)
ALP SERPL-CCNC: 95 U/L (ref 40–150)
ALT SERPL W P-5'-P-CCNC: 268 U/L (ref 0–70)
ANION GAP SERPL CALCULATED.3IONS-SCNC: 5 MMOL/L (ref 3–14)
AST SERPL W P-5'-P-CCNC: 113 U/L (ref 0–45)
BASOPHILS # BLD AUTO: 0 10E9/L (ref 0–0.2)
BASOPHILS NFR BLD AUTO: 0.2 %
BILIRUB DIRECT SERPL-MCNC: 1.2 MG/DL (ref 0–0.2)
BILIRUB SERPL-MCNC: 2 MG/DL (ref 0.2–1.3)
BLD PROD TYP BPU: NORMAL
BLD UNIT ID BPU: 0
BLOOD PRODUCT CODE: NORMAL
BPU ID: NORMAL
BUN SERPL-MCNC: 41 MG/DL (ref 7–30)
CALCIUM SERPL-MCNC: 8 MG/DL (ref 8.5–10.1)
CHLORIDE SERPL-SCNC: 102 MMOL/L (ref 94–109)
CO2 SERPL-SCNC: 28 MMOL/L (ref 20–32)
CREAT SERPL-MCNC: 1.18 MG/DL (ref 0.66–1.25)
DIFFERENTIAL METHOD BLD: ABNORMAL
EOSINOPHIL # BLD AUTO: 0.2 10E9/L (ref 0–0.7)
EOSINOPHIL NFR BLD AUTO: 3.3 %
ERYTHROCYTE [DISTWIDTH] IN BLOOD BY AUTOMATED COUNT: 17.6 % (ref 10–15)
FIBRINOGEN PPP-MCNC: 352 MG/DL (ref 200–420)
GFR SERPL CREATININE-BSD FRML MDRD: 73 ML/MIN/1.7M2
GLUCOSE BLDC GLUCOMTR-MCNC: 120 MG/DL (ref 70–99)
GLUCOSE BLDC GLUCOMTR-MCNC: 127 MG/DL (ref 70–99)
GLUCOSE BLDC GLUCOMTR-MCNC: 152 MG/DL (ref 70–99)
GLUCOSE BLDC GLUCOMTR-MCNC: 175 MG/DL (ref 70–99)
GLUCOSE BLDC GLUCOMTR-MCNC: 99 MG/DL (ref 70–99)
GLUCOSE SERPL-MCNC: 106 MG/DL (ref 70–99)
HCT VFR BLD AUTO: 20.4 % (ref 40–53)
HGB BLD-MCNC: 7.3 G/DL (ref 13.3–17.7)
HGB BLD-MCNC: 8 G/DL (ref 13.3–17.7)
IMM GRANULOCYTES # BLD: 0 10E9/L (ref 0–0.4)
IMM GRANULOCYTES NFR BLD: 0.8 %
INR PPP: 1.21 (ref 0.86–1.14)
LYMPHOCYTES # BLD AUTO: 0.7 10E9/L (ref 0.8–5.3)
LYMPHOCYTES NFR BLD AUTO: 13.2 %
MAGNESIUM SERPL-MCNC: 2.5 MG/DL (ref 1.6–2.3)
MCH RBC QN AUTO: 34.4 PG (ref 26.5–33)
MCHC RBC AUTO-ENTMCNC: 35.8 G/DL (ref 31.5–36.5)
MCV RBC AUTO: 96 FL (ref 78–100)
MONOCYTES # BLD AUTO: 0.8 10E9/L (ref 0–1.3)
MONOCYTES NFR BLD AUTO: 15.9 %
NEUTROPHILS # BLD AUTO: 3.4 10E9/L (ref 1.6–8.3)
NEUTROPHILS NFR BLD AUTO: 66.6 %
NRBC # BLD AUTO: 0 10*3/UL
NRBC BLD AUTO-RTO: 0 /100
PHOSPHATE SERPL-MCNC: 3.8 MG/DL (ref 2.5–4.5)
PLATELET # BLD AUTO: 52 10E9/L (ref 150–450)
POTASSIUM SERPL-SCNC: 3.8 MMOL/L (ref 3.4–5.3)
PROT SERPL-MCNC: 5.4 G/DL (ref 6.8–8.8)
RBC # BLD AUTO: 2.12 10E12/L (ref 4.4–5.9)
SODIUM SERPL-SCNC: 135 MMOL/L (ref 133–144)
TACROLIMUS BLD-MCNC: 3.7 UG/L (ref 5–15)
TME LAST DOSE: ABNORMAL H
TRANSFUSION STATUS PATIENT QL: NORMAL
TRANSFUSION STATUS PATIENT QL: NORMAL
WBC # BLD AUTO: 5.2 10E9/L (ref 4–11)

## 2017-07-02 PROCEDURE — 25000125 ZZHC RX 250: Performed by: TRANSPLANT SURGERY

## 2017-07-02 PROCEDURE — 82248 BILIRUBIN DIRECT: CPT | Performed by: PHYSICIAN ASSISTANT

## 2017-07-02 PROCEDURE — 85610 PROTHROMBIN TIME: CPT | Performed by: PHYSICIAN ASSISTANT

## 2017-07-02 PROCEDURE — 36592 COLLECT BLOOD FROM PICC: CPT | Performed by: PHYSICIAN ASSISTANT

## 2017-07-02 PROCEDURE — 00000146 ZZHCL STATISTIC GLUCOSE BY METER IP

## 2017-07-02 PROCEDURE — 86850 RBC ANTIBODY SCREEN: CPT | Performed by: TRANSPLANT SURGERY

## 2017-07-02 PROCEDURE — 85384 FIBRINOGEN ACTIVITY: CPT | Performed by: PHYSICIAN ASSISTANT

## 2017-07-02 PROCEDURE — 25000132 ZZH RX MED GY IP 250 OP 250 PS 637: Performed by: TRANSPLANT SURGERY

## 2017-07-02 PROCEDURE — 25000132 ZZH RX MED GY IP 250 OP 250 PS 637: Performed by: PHYSICIAN ASSISTANT

## 2017-07-02 PROCEDURE — 40000193 ZZH STATISTIC PT WARD VISIT

## 2017-07-02 PROCEDURE — 25000131 ZZH RX MED GY IP 250 OP 636 PS 637: Performed by: TRANSPLANT SURGERY

## 2017-07-02 PROCEDURE — 97110 THERAPEUTIC EXERCISES: CPT | Mod: GP

## 2017-07-02 PROCEDURE — 85018 HEMOGLOBIN: CPT | Performed by: TRANSPLANT SURGERY

## 2017-07-02 PROCEDURE — 80053 COMPREHEN METABOLIC PANEL: CPT | Performed by: PHYSICIAN ASSISTANT

## 2017-07-02 PROCEDURE — 25000132 ZZH RX MED GY IP 250 OP 250 PS 637: Performed by: STUDENT IN AN ORGANIZED HEALTH CARE EDUCATION/TRAINING PROGRAM

## 2017-07-02 PROCEDURE — 86901 BLOOD TYPING SEROLOGIC RH(D): CPT | Performed by: TRANSPLANT SURGERY

## 2017-07-02 PROCEDURE — 83735 ASSAY OF MAGNESIUM: CPT | Performed by: PHYSICIAN ASSISTANT

## 2017-07-02 PROCEDURE — 25000128 H RX IP 250 OP 636: Performed by: TRANSPLANT SURGERY

## 2017-07-02 PROCEDURE — P9016 RBC LEUKOCYTES REDUCED: HCPCS | Performed by: STUDENT IN AN ORGANIZED HEALTH CARE EDUCATION/TRAINING PROGRAM

## 2017-07-02 PROCEDURE — 84100 ASSAY OF PHOSPHORUS: CPT | Performed by: PHYSICIAN ASSISTANT

## 2017-07-02 PROCEDURE — 36592 COLLECT BLOOD FROM PICC: CPT | Performed by: TRANSPLANT SURGERY

## 2017-07-02 PROCEDURE — 86900 BLOOD TYPING SEROLOGIC ABO: CPT | Performed by: TRANSPLANT SURGERY

## 2017-07-02 PROCEDURE — 97116 GAIT TRAINING THERAPY: CPT | Mod: GP

## 2017-07-02 PROCEDURE — 80197 ASSAY OF TACROLIMUS: CPT | Performed by: PHYSICIAN ASSISTANT

## 2017-07-02 PROCEDURE — 12000025 ZZH R&B TRANSPLANT INTERMEDIATE

## 2017-07-02 PROCEDURE — 85025 COMPLETE CBC W/AUTO DIFF WBC: CPT | Performed by: PHYSICIAN ASSISTANT

## 2017-07-02 RX ORDER — TACROLIMUS 1 MG/1
1 CAPSULE ORAL ONCE
Status: COMPLETED | OUTPATIENT
Start: 2017-07-02 | End: 2017-07-02

## 2017-07-02 RX ORDER — TACROLIMUS 1 MG/1
3 CAPSULE ORAL
Status: DISCONTINUED | OUTPATIENT
Start: 2017-07-02 | End: 2017-07-03

## 2017-07-02 RX ORDER — FUROSEMIDE 10 MG/ML
40 INJECTION INTRAMUSCULAR; INTRAVENOUS
Status: DISPENSED | OUTPATIENT
Start: 2017-07-02 | End: 2017-07-03

## 2017-07-02 RX ADMIN — PREDNISONE 25 MG: 5 TABLET ORAL at 07:49

## 2017-07-02 RX ADMIN — VALGANCICLOVIR HYDROCHLORIDE 450 MG: 450 TABLET ORAL at 07:49

## 2017-07-02 RX ADMIN — OXYCODONE HYDROCHLORIDE 5 MG: 5 TABLET ORAL at 23:52

## 2017-07-02 RX ADMIN — SULFAMETHOXAZOLE AND TRIMETHOPRIM 1 TABLET: 400; 80 TABLET ORAL at 07:49

## 2017-07-02 RX ADMIN — FUROSEMIDE 40 MG: 10 INJECTION, SOLUTION INTRAVENOUS at 07:50

## 2017-07-02 RX ADMIN — OXYCODONE HYDROCHLORIDE 5 MG: 5 TABLET ORAL at 21:52

## 2017-07-02 RX ADMIN — URSODIOL 300 MG: 300 CAPSULE ORAL at 20:31

## 2017-07-02 RX ADMIN — POLYETHYLENE GLYCOL 3350 17 G: 17 POWDER, FOR SOLUTION ORAL at 07:49

## 2017-07-02 RX ADMIN — TRAMADOL HYDROCHLORIDE 50 MG: 50 TABLET, COATED ORAL at 15:49

## 2017-07-02 RX ADMIN — FLUCONAZOLE 200 MG: 200 TABLET ORAL at 07:49

## 2017-07-02 RX ADMIN — TACROLIMUS 1 MG: 1 CAPSULE ORAL at 09:35

## 2017-07-02 RX ADMIN — FUROSEMIDE 40 MG: 10 INJECTION, SOLUTION INTRAVENOUS at 15:49

## 2017-07-02 RX ADMIN — TACROLIMUS 2 MG: 0.5 CAPSULE ORAL at 07:50

## 2017-07-02 RX ADMIN — TRAMADOL HYDROCHLORIDE 50 MG: 50 TABLET, COATED ORAL at 23:52

## 2017-07-02 RX ADMIN — MYCOPHENOLATE MOFETIL 1000 MG: 250 CAPSULE ORAL at 07:50

## 2017-07-02 RX ADMIN — TACROLIMUS 3 MG: 1 CAPSULE ORAL at 18:03

## 2017-07-02 RX ADMIN — OXYCODONE HYDROCHLORIDE 10 MG: 5 TABLET ORAL at 05:40

## 2017-07-02 RX ADMIN — OXYCODONE HYDROCHLORIDE 5 MG: 5 TABLET ORAL at 12:29

## 2017-07-02 RX ADMIN — MYCOPHENOLATE MOFETIL 1000 MG: 250 CAPSULE ORAL at 18:03

## 2017-07-02 RX ADMIN — SENNOSIDES AND DOCUSATE SODIUM 2 TABLET: 8.6; 5 TABLET ORAL at 20:31

## 2017-07-02 RX ADMIN — URSODIOL 300 MG: 300 CAPSULE ORAL at 07:50

## 2017-07-02 RX ADMIN — MULTIPLE VITAMINS W/ MINERALS TAB 1 TABLET: TAB at 12:23

## 2017-07-02 RX ADMIN — OXYCODONE HYDROCHLORIDE 5 MG: 5 TABLET ORAL at 18:03

## 2017-07-02 RX ADMIN — TRAMADOL HYDROCHLORIDE 50 MG: 50 TABLET, COATED ORAL at 07:58

## 2017-07-02 RX ADMIN — INSULIN ASPART 3 UNITS: 100 INJECTION, SOLUTION INTRAVENOUS; SUBCUTANEOUS at 13:17

## 2017-07-02 RX ADMIN — SENNOSIDES AND DOCUSATE SODIUM 2 TABLET: 8.6; 5 TABLET ORAL at 07:49

## 2017-07-02 RX ADMIN — TRAMADOL HYDROCHLORIDE 50 MG: 50 TABLET, COATED ORAL at 00:29

## 2017-07-02 RX ADMIN — OXYCODONE HYDROCHLORIDE 10 MG: 5 TABLET ORAL at 00:29

## 2017-07-02 NOTE — PLAN OF CARE
Problem: Goal Outcome Summary  Goal: Goal Outcome Summary  Outcome: Improving  VSS on RA. Blood sugars 120 and 175 this shift. Has sliding scale insulin with carb coverage. Required 4 units at lunch time. Hgb was 7.3 this morning. Transfused one unit of PRBC this afternoon. Patient tolerated the transfusion well. C/o pain in RUQ at times. States he is getting relief from his scheduled Tramadol and PRN oxycodone. Denies nausea. Tolerates a full liquid diet without issue. Triple lumen central line in left chest flushes without issue. Voiding adequately on demand. BM noted this shift. Abdominal incision well approximated with staples. No drainage noted. LAM intact. Draining large amounts of dark red bloody drainage. Has had 800mL out this shift. Dressing to LAM changed today. Took a walk outside with his family this afternoon without issue. Voices questions and concerns appropriately.

## 2017-07-02 NOTE — PLAN OF CARE
Problem: Goal Outcome Summary  Goal: Goal Outcome Summary  PT 7A: Facilitated gait with and without FWW, 300+ ft, SBA. Pt with slow but steady gait, improved gait with cues to increase pace to normalize gait pattern. Standing exercises completed for LE strengthening, good tolerance demonstrated. All VSS stable throughout session.  Recommend home with assist

## 2017-07-02 NOTE — PROGRESS NOTES
Immunosuppression Note:    Joe Sosa is a 27 year old male who is seen today  for immunosuppression management     IChino MD, I have examined the patient with the resident/PA/Fellow, discussed and agree with the note and findings.  I have reviewed today's vital signs, medications, labs and imaging. I reviewed the immunosuppression medications and levels. I spoke to the patient/family and explained below clinical details and answered all the questions      Transplant Surgery  Inpatient Daily Progress Note  07/02/2017    Assessment & Plan: 27 year old male with PMH significant for ESLD secondary to EtOH/alpha 1 antitrypsin now s/p DD OLT on 6/28/17 with Dr. Martin. ESLD complicated by hepatic encephalopathy, ascites (paracentesis x 1-2x), SBP x 1 episode, chronic hyponatremia and anasarca.    Graft function: good, improved. LFTs improving, Tbili 2 from 2.4 yesterday, ALT, AST down trending, ALP slightly up from 63 to 81 to 95, started ursodiol 300mg PO BID. INR 1.2. Fg stable. LA WNL. Will continue to follow closely. Pain is better well controlled with oral PRN oxycodone, and scheduled tramadol 50mg PO Q8h scheduled.   Immunosuppression management: Solu-medrol taper per protocol. Cellcept 1000 mg BID. Tacrolimus, level 4.2, Increased dose to 3 mg BID , will check level today. Goal 10-15. Complexity of management:Medium. Contributing factors: thrombocytopenia, anemia and induction  Hematology: HGB 7.3 from 8.4 this AM, will transfuse 1 unit of PRBC today, . PLT, FFP, PRBC and cryo transfusions since transplant.  Cardiorespiratory: Stable on room air to 2 L oxygen.    GI/Nutrition: full liquid diet, tolerating well. Plan to advance diet as tolerated. No flatus or BM. Will start bowel regimen today: senna/colace and MOM.   Endocrine: Hyperglycemia due to steroids, transitioned from insulin gtt to subcutaneous sliding scale  Fluid/Electrolytes: MIVF: LR@ 10 cc/hour. Continue lasix 40 mg IV TID.  "  : Major discontinued on POD 1, voiding  Infectious disease: Tmax 98.3. Continue Zosyn x 3 days per protocol. D/c'ed Micafungin and started Fluconazole on POD 1. Discontinued Vanco on POD 1.  Prophylaxis: GI, fungal  Disposition: SICU, plan to transfer to     Medical Decision Making: Medium  Subsequent visit 87063 (moderate level decision making)    DENISE/Fellow/Resident Provider: Ailyn Rodriguez    Faculty: Chino Martin M.D.    __________________________________________________________________  Transplant History: Admitted 6/27/2017 for liver transplant.   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    6/28/2017 (Liver), Postoperative day: 4     Interval History: History is obtained from the patient  Overnight events: Had BM. Tolerating full liquid diet well. No complaints.     ROS:   A 10-point review of systems was negative except as noted above.    Curent Meds:    tacrolimus  3 mg Oral BID IS     traMADol  50 mg Oral Q8H     insulin aspart  1-3 Units Subcutaneous TID AC     insulin aspart  1-3 Units Subcutaneous At Bedtime     ursodiol  300 mg Oral BID     polyethylene glycol  17 g Oral Daily     insulin aspart   Subcutaneous QAM AC     insulin aspart   Subcutaneous Daily with lunch     insulin aspart   Subcutaneous Daily with supper     senna-docusate  2 tablet Oral BID     fluconazole  200 mg Oral Daily     multivitamin, therapeutic with minerals  1 tablet Per Feeding Tube Daily     [START ON 7/3/2017] predniSONE  10 mg Oral Once     valGANciclovir  450 mg Oral Daily     sulfamethoxazole-trimethoprim  1 tablet Oral Daily     mycophenolate  1,000 mg Oral BID IS       Physical Exam:     Admit Weight: 98.8 kg (217 lb 14.4 oz)    Current Vitals:   /79 (BP Location: Right arm)  Pulse 83  Temp 98.3  F (36.8  C) (Oral)  Resp 16  Ht 1.88 m (6' 2\")  Wt 105.6 kg (232 lb 12.8 oz)  SpO2 94%  BMI 29.89 kg/m2    CVP (mmHg): 10 mmHg    Vital sign ranges:    Temp:  [97.6  F (36.4 "  C)-98.6  F (37  C)] 98.3  F (36.8  C)  Heart Rate:  [76-90] 90  Resp:  [16] 16  BP: (122-134)/(79-85) 128/79  SpO2:  [93 %-100 %] 94 %  Patient Vitals for the past 24 hrs:   BP Temp Temp src Heart Rate Resp SpO2 Weight   07/02/17 0744 128/79 98.3  F (36.8  C) Oral 90 16 94 % -   07/02/17 0556 - - - - - - 105.6 kg (232 lb 12.8 oz)   07/02/17 0400 129/85 98.6  F (37  C) Oral 84 16 93 % -   07/01/17 2244 134/81 97.6  F (36.4  C) Oral 77 16 100 % -   07/01/17 1936 133/85 98.3  F (36.8  C) Oral 81 16 96 % -   07/01/17 1604 122/80 97.7  F (36.5  C) Oral 76 16 95 % -   07/01/17 1200 133/84 98.4  F (36.9  C) Oral 79 16 97 % -     General Appearance: in no apparent distress.   Skin: normal  Heart: regular rate and rhythm  Lungs: NLB, stable on room air to 2 L O2  Abdomen: rounded, and  appropriately tender, generalized. The wound is c/d/i. LAM with sero sang output.  : Major in place.   Extremities: edema: absent. There is no skin breakdown.  Neurologic: awake, alert and oriented. Tremor absent. Asterixis: absent.    Data:   CMP  Recent Labs  Lab 07/02/17  0613 07/01/17  0657 06/30/17  0430  06/29/17  0311 06/29/17  0308  06/28/17  1421  06/27/17  0923    136 137  < >  --  142  < > 140  < > 135   POTASSIUM 3.8 3.8 4.2  < >  --  4.2  < > 4.0  < > 4.3   CHLORIDE 102 100 104  < >  --  109  < > 108  --  100   CO2 28 28 23  < >  --  25  < > 24  --  30   * 104* 119*  < >  --  147*  < > 250*  < > 107*   BUN 41* 46* 40*  < >  --  24  < > 16  --  12   CR 1.18 1.28* 1.21  < >  --  1.05  < > 0.64*  --  0.63*   GFRESTIMATED 73 66 71  < >  --  84  < > >90Non  GFR Calc  --  >90Non  GFR Calc   GFRESTBLACK 88 80 86  < >  --  >90African American GFR Calc  < > >90African American GFR Calc  --  >90African American GFR Calc   BRITNI 8.0* 8.1* 7.9*  < >  --  8.2*  < > 9.0  --  8.3*   ICAW  --   --  4.4  --  5.0  --   < >  --   < >  --    MAG 2.5* 2.7* 2.9*  --   --  2.5*  < > 1.8  --  2.0   PHOS 3.8  5.6* 6.7*  --   --  5.4*  --  3.7  --  3.8   AMYLASE  --   --   --   --   --  30  --   --   --  62   LIPASE  --   --   --   --   --  113  --  327  --   --    ALBUMIN 2.6* 3.0* 2.8*  --   --  2.4*  --  2.2*  --  2.4*   BILITOTAL 2.0* 2.4* 2.4*  --   --  2.2*  --  7.5*  --  7.1*   ALKPHOS 95 81 63  --   --  56  --  84  --  280*   * 235* 509*  --   --  1282*  --  2612*  --  88*   * 378* 472*  --   --  556*  --  836*  --  51   < > = values in this interval not displayed.  CBC  Recent Labs  Lab 07/02/17  0613 07/01/17  0657  06/29/17  0308   HGB 7.3* 8.2*  < > 6.8*   WBC 5.2 4.7  < > 8.3   PLT 52* 35*  < > 46*   A1C  --   --   --  Canceled, Test credited Below Assay Range   < > = values in this interval not displayed.  COAGS  Recent Labs  Lab 07/02/17  0613 07/01/17  0657  06/28/17  1421 06/28/17  1330   INR 1.21* 1.20*  < > 2.49* 2.78*   PTT  --   --   --  45* 59*   < > = values in this interval not displayed.   Urinalysis  Recent Labs   Lab Test  03/27/17   0738   COLOR  Ana Cristina   APPEARANCE  Clear   URINEGLC  Negative   URINEBILI  Negative   URINEKETONE  Negative   SG  1.018   UBLD  Negative   URINEPH  6.0   PROTEIN  Negative   NITRITE  Negative   LEUKEST  Negative   RBCU  1   WBCU  2   UTPG  Unable to calculate due to low value     Virology:  Hepatitis C Antibody   Date Value Ref Range Status   06/27/2017  NR Final    Nonreactive   Assay performance characteristics have not been established for newborns,   infants, and children

## 2017-07-02 NOTE — PLAN OF CARE
Problem: Goal Outcome Summary  Goal: Goal Outcome Summary  Outcome: Improving  Pt A/Ox4. VSS on RA. Up with SBA + walker; ambulated in torres x2. Advanced to full liquid diet and tolerating well. BGs 174, 127; SSI and carb coverage insulin administered per orders. Pain well controlled with scheduled Tramadol + PRN oxycodone. R PIV and L subclavian triple lumen all saline locked. R LAM with bloody output; small amount of bleeding at site, MD paged but no response, oncoming nurse aware. Pt is voiding spontaneously with adequate urine output. Burping, but not passing gas; no BM yet. Lab book and med card reviewed with patient and family. Brother planning to stay at the bedside throughout the night. Will continue to monitor.

## 2017-07-02 NOTE — PLAN OF CARE
Problem: Individualization  Goal: Patient Preferences  Outcome: Improving     RN:  AVSS, abdominal pain controlled with scheduled Ultram and PRN Oxycodone, denies nausea. Adequate urine output, small formed BM x1, up to bathroom with one person assist. LAM with small amount bloody output. Patient resting between cares, will continue to monitor.

## 2017-07-03 ENCOUNTER — APPOINTMENT (OUTPATIENT)
Dept: PHYSICAL THERAPY | Facility: CLINIC | Age: 27
DRG: 006 | End: 2017-07-03
Attending: TRANSPLANT SURGERY
Payer: COMMERCIAL

## 2017-07-03 ENCOUNTER — DOCUMENTATION ONLY (OUTPATIENT)
Dept: TRANSPLANT | Facility: CLINIC | Age: 27
End: 2017-07-03

## 2017-07-03 LAB
ABO + RH BLD: NORMAL
ABO + RH BLD: NORMAL
ALBUMIN SERPL-MCNC: 2.6 G/DL (ref 3.4–5)
ALP SERPL-CCNC: 99 U/L (ref 40–150)
ALT SERPL W P-5'-P-CCNC: 191 U/L (ref 0–70)
ANION GAP SERPL CALCULATED.3IONS-SCNC: 6 MMOL/L (ref 3–14)
AST SERPL W P-5'-P-CCNC: 64 U/L (ref 0–45)
BASOPHILS # BLD AUTO: 0 10E9/L (ref 0–0.2)
BASOPHILS NFR BLD AUTO: 0.2 %
BILIRUB DIRECT SERPL-MCNC: 1.2 MG/DL (ref 0–0.2)
BILIRUB SERPL-MCNC: 2.1 MG/DL (ref 0.2–1.3)
BLD GP AB SCN SERPL QL: NORMAL
BLD PROD TYP BPU: NORMAL
BLD UNIT ID BPU: 0
BLOOD BANK CMNT PATIENT-IMP: NORMAL
BLOOD PRODUCT CODE: NORMAL
BPU ID: NORMAL
BUN SERPL-MCNC: 32 MG/DL (ref 7–30)
CALCIUM SERPL-MCNC: 7.9 MG/DL (ref 8.5–10.1)
CHLORIDE SERPL-SCNC: 99 MMOL/L (ref 94–109)
CO2 SERPL-SCNC: 29 MMOL/L (ref 20–32)
CREAT SERPL-MCNC: 1.04 MG/DL (ref 0.66–1.25)
DIFFERENTIAL METHOD BLD: ABNORMAL
EOSINOPHIL # BLD AUTO: 0.2 10E9/L (ref 0–0.7)
EOSINOPHIL NFR BLD AUTO: 4 %
ERYTHROCYTE [DISTWIDTH] IN BLOOD BY AUTOMATED COUNT: 18.3 % (ref 10–15)
GFR SERPL CREATININE-BSD FRML MDRD: 84 ML/MIN/1.7M2
GLUCOSE BLDC GLUCOMTR-MCNC: 103 MG/DL (ref 70–99)
GLUCOSE BLDC GLUCOMTR-MCNC: 108 MG/DL (ref 70–99)
GLUCOSE BLDC GLUCOMTR-MCNC: 151 MG/DL (ref 70–99)
GLUCOSE BLDC GLUCOMTR-MCNC: 167 MG/DL (ref 70–99)
GLUCOSE BLDC GLUCOMTR-MCNC: 230 MG/DL (ref 70–99)
GLUCOSE SERPL-MCNC: 101 MG/DL (ref 70–99)
HCT VFR BLD AUTO: 20.2 % (ref 40–53)
HGB BLD-MCNC: 6.9 G/DL (ref 13.3–17.7)
HGB BLD-MCNC: 8.7 G/DL (ref 13.3–17.7)
IMM GRANULOCYTES # BLD: 0 10E9/L (ref 0–0.4)
IMM GRANULOCYTES NFR BLD: 0.9 %
INR PPP: 1.19 (ref 0.86–1.14)
LYMPHOCYTES # BLD AUTO: 0.6 10E9/L (ref 0.8–5.3)
LYMPHOCYTES NFR BLD AUTO: 13.4 %
MAGNESIUM SERPL-MCNC: 2.1 MG/DL (ref 1.6–2.3)
MCH RBC QN AUTO: 32.2 PG (ref 26.5–33)
MCHC RBC AUTO-ENTMCNC: 34.2 G/DL (ref 31.5–36.5)
MCV RBC AUTO: 94 FL (ref 78–100)
MONOCYTES # BLD AUTO: 0.7 10E9/L (ref 0–1.3)
MONOCYTES NFR BLD AUTO: 15.4 %
NEUTROPHILS # BLD AUTO: 3 10E9/L (ref 1.6–8.3)
NEUTROPHILS NFR BLD AUTO: 66.1 %
NRBC # BLD AUTO: 0 10*3/UL
NRBC BLD AUTO-RTO: 0 /100
NUM BPU REQUESTED: 1
NUM BPU REQUESTED: 3
PHOSPHATE SERPL-MCNC: 2.7 MG/DL (ref 2.5–4.5)
PLATELET # BLD AUTO: 44 10E9/L (ref 150–450)
POTASSIUM SERPL-SCNC: 3.7 MMOL/L (ref 3.4–5.3)
PROT SERPL-MCNC: 5.2 G/DL (ref 6.8–8.8)
RBC # BLD AUTO: 2.14 10E12/L (ref 4.4–5.9)
SODIUM SERPL-SCNC: 135 MMOL/L (ref 133–144)
SPECIMEN EXP DATE BLD: NORMAL
TACROLIMUS BLD-MCNC: 3.5 UG/L (ref 5–15)
TME LAST DOSE: ABNORMAL H
TRANSFUSION STATUS PATIENT QL: NORMAL
WBC # BLD AUTO: 4.5 10E9/L (ref 4–11)

## 2017-07-03 PROCEDURE — 25000128 H RX IP 250 OP 636: Performed by: TRANSPLANT SURGERY

## 2017-07-03 PROCEDURE — 85610 PROTHROMBIN TIME: CPT | Performed by: PHYSICIAN ASSISTANT

## 2017-07-03 PROCEDURE — 25000132 ZZH RX MED GY IP 250 OP 250 PS 637: Performed by: STUDENT IN AN ORGANIZED HEALTH CARE EDUCATION/TRAINING PROGRAM

## 2017-07-03 PROCEDURE — 97116 GAIT TRAINING THERAPY: CPT | Mod: GP

## 2017-07-03 PROCEDURE — 25000131 ZZH RX MED GY IP 250 OP 636 PS 637: Performed by: TRANSPLANT SURGERY

## 2017-07-03 PROCEDURE — 25000125 ZZHC RX 250: Performed by: TRANSPLANT SURGERY

## 2017-07-03 PROCEDURE — 82248 BILIRUBIN DIRECT: CPT | Performed by: PHYSICIAN ASSISTANT

## 2017-07-03 PROCEDURE — 25000132 ZZH RX MED GY IP 250 OP 250 PS 637: Performed by: TRANSPLANT SURGERY

## 2017-07-03 PROCEDURE — 36592 COLLECT BLOOD FROM PICC: CPT | Performed by: PHYSICIAN ASSISTANT

## 2017-07-03 PROCEDURE — P9016 RBC LEUKOCYTES REDUCED: HCPCS | Performed by: STUDENT IN AN ORGANIZED HEALTH CARE EDUCATION/TRAINING PROGRAM

## 2017-07-03 PROCEDURE — 84100 ASSAY OF PHOSPHORUS: CPT | Performed by: PHYSICIAN ASSISTANT

## 2017-07-03 PROCEDURE — 83735 ASSAY OF MAGNESIUM: CPT | Performed by: PHYSICIAN ASSISTANT

## 2017-07-03 PROCEDURE — 40000193 ZZH STATISTIC PT WARD VISIT

## 2017-07-03 PROCEDURE — 25000128 H RX IP 250 OP 636: Performed by: PHYSICIAN ASSISTANT

## 2017-07-03 PROCEDURE — P9037 PLATE PHERES LEUKOREDU IRRAD: HCPCS | Performed by: PHYSICIAN ASSISTANT

## 2017-07-03 PROCEDURE — 97110 THERAPEUTIC EXERCISES: CPT | Mod: GP

## 2017-07-03 PROCEDURE — 25000128 H RX IP 250 OP 636: Performed by: STUDENT IN AN ORGANIZED HEALTH CARE EDUCATION/TRAINING PROGRAM

## 2017-07-03 PROCEDURE — 80197 ASSAY OF TACROLIMUS: CPT | Performed by: PHYSICIAN ASSISTANT

## 2017-07-03 PROCEDURE — 00000146 ZZHCL STATISTIC GLUCOSE BY METER IP

## 2017-07-03 PROCEDURE — 80053 COMPREHEN METABOLIC PANEL: CPT | Performed by: PHYSICIAN ASSISTANT

## 2017-07-03 PROCEDURE — 97530 THERAPEUTIC ACTIVITIES: CPT | Mod: GP

## 2017-07-03 PROCEDURE — 25000131 ZZH RX MED GY IP 250 OP 636 PS 637: Performed by: PHYSICIAN ASSISTANT

## 2017-07-03 PROCEDURE — 85018 HEMOGLOBIN: CPT | Performed by: PHYSICIAN ASSISTANT

## 2017-07-03 PROCEDURE — 85025 COMPLETE CBC W/AUTO DIFF WBC: CPT | Performed by: PHYSICIAN ASSISTANT

## 2017-07-03 PROCEDURE — 25000132 ZZH RX MED GY IP 250 OP 250 PS 637: Performed by: PHYSICIAN ASSISTANT

## 2017-07-03 PROCEDURE — 12000025 ZZH R&B TRANSPLANT INTERMEDIATE

## 2017-07-03 RX ORDER — FUROSEMIDE 10 MG/ML
40 INJECTION INTRAMUSCULAR; INTRAVENOUS ONCE
Status: COMPLETED | OUTPATIENT
Start: 2017-07-03 | End: 2017-07-03

## 2017-07-03 RX ORDER — FUROSEMIDE 10 MG/ML
40 INJECTION INTRAMUSCULAR; INTRAVENOUS
Status: DISCONTINUED | OUTPATIENT
Start: 2017-07-03 | End: 2017-07-04

## 2017-07-03 RX ORDER — TACROLIMUS 1 MG/1
4 CAPSULE ORAL
Status: DISCONTINUED | OUTPATIENT
Start: 2017-07-03 | End: 2017-07-05

## 2017-07-03 RX ORDER — PANTOPRAZOLE SODIUM 40 MG/1
40 TABLET, DELAYED RELEASE ORAL
Status: DISCONTINUED | OUTPATIENT
Start: 2017-07-03 | End: 2017-07-05

## 2017-07-03 RX ORDER — TACROLIMUS 1 MG/1
1 CAPSULE ORAL ONCE
Status: COMPLETED | OUTPATIENT
Start: 2017-07-03 | End: 2017-07-03

## 2017-07-03 RX ADMIN — OXYCODONE HYDROCHLORIDE 10 MG: 5 TABLET ORAL at 04:51

## 2017-07-03 RX ADMIN — TACROLIMUS 1 MG: 1 CAPSULE ORAL at 10:27

## 2017-07-03 RX ADMIN — PANTOPRAZOLE SODIUM 40 MG: 40 TABLET, DELAYED RELEASE ORAL at 18:00

## 2017-07-03 RX ADMIN — TRAMADOL HYDROCHLORIDE 50 MG: 50 TABLET, COATED ORAL at 18:01

## 2017-07-03 RX ADMIN — SULFAMETHOXAZOLE AND TRIMETHOPRIM 1 TABLET: 400; 80 TABLET ORAL at 08:41

## 2017-07-03 RX ADMIN — TRAMADOL HYDROCHLORIDE 50 MG: 50 TABLET, COATED ORAL at 08:43

## 2017-07-03 RX ADMIN — POLYETHYLENE GLYCOL 3350 17 G: 17 POWDER, FOR SOLUTION ORAL at 08:41

## 2017-07-03 RX ADMIN — MYCOPHENOLATE MOFETIL 1000 MG: 250 CAPSULE ORAL at 18:00

## 2017-07-03 RX ADMIN — FUROSEMIDE 40 MG: 10 INJECTION, SOLUTION INTRAVENOUS at 18:01

## 2017-07-03 RX ADMIN — MULTIPLE VITAMINS W/ MINERALS TAB 1 TABLET: TAB at 11:39

## 2017-07-03 RX ADMIN — URSODIOL 300 MG: 300 CAPSULE ORAL at 08:41

## 2017-07-03 RX ADMIN — FUROSEMIDE 40 MG: 10 INJECTION, SOLUTION INTRAVENOUS at 13:56

## 2017-07-03 RX ADMIN — OXYCODONE HYDROCHLORIDE 10 MG: 5 TABLET ORAL at 22:17

## 2017-07-03 RX ADMIN — FUROSEMIDE 40 MG: 10 INJECTION, SOLUTION INTRAVENOUS at 11:37

## 2017-07-03 RX ADMIN — HYDROMORPHONE HYDROCHLORIDE 0.5 MG: 1 INJECTION, SOLUTION INTRAMUSCULAR; INTRAVENOUS; SUBCUTANEOUS at 04:51

## 2017-07-03 RX ADMIN — MYCOPHENOLATE MOFETIL 1000 MG: 250 CAPSULE ORAL at 08:41

## 2017-07-03 RX ADMIN — SENNOSIDES AND DOCUSATE SODIUM 2 TABLET: 8.6; 5 TABLET ORAL at 08:41

## 2017-07-03 RX ADMIN — OXYCODONE HYDROCHLORIDE 10 MG: 5 TABLET ORAL at 18:01

## 2017-07-03 RX ADMIN — INSULIN ASPART 7 UNITS: 100 INJECTION, SOLUTION INTRAVENOUS; SUBCUTANEOUS at 14:06

## 2017-07-03 RX ADMIN — HYDROMORPHONE HYDROCHLORIDE 0.5 MG: 1 INJECTION, SOLUTION INTRAMUSCULAR; INTRAVENOUS; SUBCUTANEOUS at 01:59

## 2017-07-03 RX ADMIN — TACROLIMUS 4 MG: 1 CAPSULE ORAL at 18:00

## 2017-07-03 RX ADMIN — PREDNISONE 10 MG: 10 TABLET ORAL at 08:41

## 2017-07-03 RX ADMIN — FLUCONAZOLE 200 MG: 200 TABLET ORAL at 08:41

## 2017-07-03 RX ADMIN — PANTOPRAZOLE SODIUM 40 MG: 40 TABLET, DELAYED RELEASE ORAL at 08:47

## 2017-07-03 RX ADMIN — TACROLIMUS 3 MG: 1 CAPSULE ORAL at 08:46

## 2017-07-03 RX ADMIN — URSODIOL 300 MG: 300 CAPSULE ORAL at 20:11

## 2017-07-03 RX ADMIN — VALGANCICLOVIR HYDROCHLORIDE 450 MG: 450 TABLET ORAL at 08:40

## 2017-07-03 NOTE — PLAN OF CARE
Problem: Goal Outcome Summary  Goal: Goal Outcome Summary  1. Pain well managed  2. Return of normal liver enzymes  3. Education for liver transplant in preparation for home care  Tmax 99.5 (axillary) at end of 2nd unit of RBC's, OVSS. Hgb 6.9 and has received two units of RBC's today. Plt count 55 and has yet to get 1 unit of platelets. Has gotten two doses of IV lasix 40mg between and after blood units. Good UO (see I/O), one loose brown BM, no blood noted in stool. LAM putting out fair amts of dark bloody drainage, less volume then yesterday.  Pain well managed with scheduled ultram. Eating well, drinking supplements, calorie counts started. , 230, 167, covered with sliding scale and carb counting. Education done with mother at bedside, she viewed all of the MTP videos, pt has yet to view them. Has been resting in between cares.

## 2017-07-03 NOTE — PLAN OF CARE
Problem: Goal Outcome Summary  Goal: Goal Outcome Summary  1. Pain well managed  2. Return of normal liver enzymes  3. Education for liver transplant in preparation for home care   PT-7A: Good tolerance for therapy this day.  Performed sit->stand transfer independently.  Amb ~ 800' with no AD + SBA, demonstrating quick guera and no loss of balance.  Performed standing LE exercises to increase LE strength.     Discharge home with continued home exercise program when medically stable.

## 2017-07-03 NOTE — PROGRESS NOTES
Care Coordinator Progress Note     Admission Date/Time:  6/27/2017  Attending MD:  Chino Martin MD     Data  Chart reviewed, discussed with interdisciplinary team.   Patient was admitted for: Liver transplant recipient (H).    Concerns with insurance coverage for discharge needs: None.  Current Living Situation: Patient lives with family   Mom, Therese, is his primary care giver.  Support System: Supportive and Involved  Services Involved: no services involved prior to admission  Transportation: Family or Friend will provide  Barriers to Discharge: medical clearance    Coordination of Care and Referrals: Provided patient/family with options for Home Care.        Assessment  Patient is a 27 year old male with PMH of ESLD secondary to ETOH complicated by hepatic ncephalopathy, ascites, chronic hyponatremia and anasarca now s/p DD liver transplant on 6/28.  Per MD team patient will likely be ready for discharge 7/5.  Met with patient at bedside to introduce RNCC role and discuss discharge planning.  Explained to the patient that he will need labs drawn M/Th, and on those days he will need to have his labs drawn prior to taking his medications.  Discussed getting home care arranged for assessment and labs.  Patient agreeable.  After giving patient choice of medications patient chose [facility](P: [phone], F: [phone]).  Referral made and orders placed.  Patient will need ATC visit set up for 0900 7/10 at time of discharge.  Patient lives with his Mom, Therese.  Family will be able to provide rides to appointments.  RNCC will continue to follow and assist with discharge planning as needed.       Plan  Anticipated Discharge Date:  7/5/2017  Anticipated Discharge Plan:  Home with home care and f/u in ATC.      Vanesa Kim, CINTHIA  [phone]

## 2017-07-03 NOTE — PLAN OF CARE
Problem: Goal Outcome Summary  Goal: Goal Outcome Summary  Outcome: No Change  [id number]: Pt A/O, VSS on RA, up with SBA, regular diet; tolerating well. Urine output 675mL. One loose stool. Pain primarily located in RLQ and RUQ; oxycodone 5mg given x1. R LAM with 100 mL of bright red/bloody output. Brother at the bedside. Will continue with plan of care.

## 2017-07-03 NOTE — PLAN OF CARE
Problem: Goal Outcome Summary  Goal: Goal Outcome Summary  Outcome: No Change  Since 1900: AVSS on room air. Oxy x 1 for pain; denies nausea.  and treated per MAR. LAM with moderate output; voiding adequate amounts. Hgb recheck 8.0. IJ saline locked and PIV saline locked. Med card and lab book up to date from day shift. All care explained and questions answered. Pt up SBA. Family at bedside. All care explained and questions answered. Will continue to monitor and notify team of changes.

## 2017-07-03 NOTE — PROGRESS NOTES
10:59 AM  July 3, 2017  Final positive donor sputum culture results have been uploaded into [facility]. Donor ID [id number]. Daniel Jurado and Jennifer notified of results.  Yancy Landin RN, CCTC  On Call Organ Coordinator

## 2017-07-03 NOTE — PROGRESS NOTES
Transplant Surgery  Inpatient Daily Progress Note  07/03/2017    Assessment & Plan: 27 year old male with PMH significant for ESLD secondary to EtOH/alpha 1 antitrypsin now s/p DD OLT on 6/28/17 with Dr. Martin. ESLD complicated by hepatic encephalopathy, ascites (paracentesis x 1-2x), SBP x 1 episode, chronic hyponatremia and anasarca.    Graft function: Good, improved. LFTs improving, Tbili 2.1. Started ursodiol 300mg PO BID. INR 1.19. Pain is better well controlled with oral PRN oxycodone, and scheduled tramadol 50mg PO Q8h scheduled. Dilaudid IV for pain refractory to oral regimen.   Immunosuppression management: Solu-medrol taper per protocol. Cellcept 1000 mg BID. Tacrolimus, level 3.5, Increase dose to 4 mg BID. Goal 10-15. Complexity of management:Medium. Contributing factors: thrombocytopenia, anemia and induction  Hematology: HGB 6.9 down from 8, will transfuse 2 unit of PRBC today. PLT 44, transfuse PLTs.   Drain output sanguinous. Suspect some bleeding due to surgery. Recheck Hb after transfusion. Monitor stool for signs of GI bleeding, per RN report last bowel movement dark.   Cardiorespiratory: Stable. Ambulate. Continue IS.  GI/Nutrition: Regular diet. Add supplements. Continue senna/colace and miralax.   Endocrine: Hyperglycemia due to steroids, improved.  Fluid/Electrolytes: No MIV. Schedule lasix 40 mg IV BID. Add lasix 40 mg IV x 1 today due to blood products. RN to give lasix doses after RBCs and PLT.   : Major discontinued on POD 1, voiding  Infectious disease: Afebrile. Continue Zosyn x 3 days per protocol. ppx Vanco stopped. D/c'ed Micafungin and started Fluconazole on POD 1. Continue fluconazole today.   Prophylaxis: GI, fungal  Disposition: 7A    Medical Decision Making: Medium  Subsequent visit 67004 (moderate level decision making)    DENISE/Fellow/Resident Provider: ИВАН Aguila    Faculty: Gian Jurado  "M.D.      __________________________________________________________________  Transplant History: Admitted 6/27/2017 for liver transplant.   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    6/28/2017 (Liver), Postoperative day: 5     Interval History: History is obtained from the patient  Overnight events: Tolerating diet, though poor appetite. Ambulating. +BM, soft x ~4. C/o incisional pain.     ROS:   A 10-point review of systems was negative except as noted above.    Curent Meds:    furosemide  40 mg Intravenous BID     furosemide  40 mg Intravenous Once     pantoprazole  40 mg Oral BID AC     tacrolimus  4 mg Oral BID IS     furosemide  40 mg Intravenous BID     traMADol  50 mg Oral Q8H     insulin aspart  1-3 Units Subcutaneous TID AC     insulin aspart  1-3 Units Subcutaneous At Bedtime     ursodiol  300 mg Oral BID     polyethylene glycol  17 g Oral Daily     insulin aspart   Subcutaneous QAM AC     insulin aspart   Subcutaneous Daily with lunch     insulin aspart   Subcutaneous Daily with supper     senna-docusate  2 tablet Oral BID     fluconazole  200 mg Oral Daily     multivitamin, therapeutic with minerals  1 tablet Per Feeding Tube Daily     valGANciclovir  450 mg Oral Daily     sulfamethoxazole-trimethoprim  1 tablet Oral Daily     mycophenolate  1,000 mg Oral BID IS       Physical Exam:     Admit Weight: 98.8 kg (217 lb 14.4 oz)    Current Vitals:   /90 (BP Location: Left arm)  Pulse 82  Temp 98.2  F (36.8  C) (Oral)  Resp 18  Ht 1.88 m (6' 2\")  Wt 103 kg (227 lb 1.6 oz)  SpO2 98%  BMI 29.16 kg/m2        Vital sign ranges:    Temp:  [97.5  F (36.4  C)-98.9  F (37.2  C)] 98.2  F (36.8  C)  Pulse:  [77-88] 82  Heart Rate:  [77-93] 87  Resp:  [16-18] 18  BP: (126-147)/(71-90) 147/90  SpO2:  [93 %-100 %] 98 %  Patient Vitals for the past 24 hrs:   BP Temp Temp src Pulse Heart Rate Resp SpO2 Weight   07/03/17 1159 147/90 98.2  F (36.8  C) Oral 82 - 18 98 % -   07/03/17 1144 " 144/90 98.9  F (37.2  C) Oral - 87 16 98 % -   07/03/17 1043 140/79 98.3  F (36.8  C) Oral - 93 16 99 % -   07/03/17 0940 138/87 98.4  F (36.9  C) Oral 85 - 18 98 % -   07/03/17 0915 140/85 98.5  F (36.9  C) - - 83 18 - -   07/03/17 0900 - - - - - - - 103 kg (227 lb 1.6 oz)   07/03/17 0716 133/77 98.5  F (36.9  C) Oral 88 - 16 93 % -   07/03/17 0400 126/79 97.5  F (36.4  C) Oral - 87 16 - -   07/02/17 2349 140/82 98.4  F (36.9  C) Oral - 79 16 99 % -   07/02/17 1947 129/80 98.3  F (36.8  C) Oral 83 - 16 98 % -   07/02/17 1545 132/71 98.3  F (36.8  C) Oral 77 77 16 100 % -   07/02/17 1348 140/79 98.1  F (36.7  C) Oral - 84 18 97 % -   07/02/17 1259 133/87 98  F (36.7  C) Oral - 83 18 98 % -     General Appearance: in no apparent distress.   Skin: normal  Heart: RRR  Lungs: BCTA, NLB, stable on room air  Abdomen: rounded, and  appropriately tender, generalized. The wound is c/d/i. LAM with dark sang output.  : Major removed  Extremities: edema: trace-1+.  Neurologic: awake, alert and oriented. Tremor absent.   CVC    Data:   CMP  Recent Labs  Lab 07/03/17  0700 07/02/17  0613  06/30/17  0430  06/29/17  0311 06/29/17  0308  06/28/17  1421  06/27/17  0923    135  < > 137  < >  --  142  < > 140  < > 135   POTASSIUM 3.7 3.8  < > 4.2  < >  --  4.2  < > 4.0  < > 4.3   CHLORIDE 99 102  < > 104  < >  --  109  < > 108  --  100   CO2 29 28  < > 23  < >  --  25  < > 24  --  30   * 106*  < > 119*  < >  --  147*  < > 250*  < > 107*   BUN 32* 41*  < > 40*  < >  --  24  < > 16  --  12   CR 1.04 1.18  < > 1.21  < >  --  1.05  < > 0.64*  --  0.63*   GFRESTIMATED 84 73  < > 71  < >  --  84  < > >90Non  GFR Calc  --  >90Non  GFR Calc   GFRESTBLACK >90African American GFR Calc 88  < > 86  < >  --  >90African American GFR Calc  < > >90African American GFR Calc  --  >90African American GFR Calc   BRITNI 7.9* 8.0*  < > 7.9*  < >  --  8.2*  < > 9.0  --  8.3*   ICAW  --   --   --  4.4  --  5.0  --    < >  --   < >  --    MAG 2.1 2.5*  < > 2.9*  --   --  2.5*  < > 1.8  --  2.0   PHOS 2.7 3.8  < > 6.7*  --   --  5.4*  --  3.7  --  3.8   AMYLASE  --   --   --   --   --   --  30  --   --   --  62   LIPASE  --   --   --   --   --   --  113  --  327  --   --    ALBUMIN 2.6* 2.6*  < > 2.8*  --   --  2.4*  --  2.2*  --  2.4*   BILITOTAL 2.1* 2.0*  < > 2.4*  --   --  2.2*  --  7.5*  --  7.1*   ALKPHOS 99 95  < > 63  --   --  56  --  84  --  280*   AST 64* 113*  < > 509*  --   --  1282*  --  2612*  --  88*   * 268*  < > 472*  --   --  556*  --  836*  --  51   < > = values in this interval not displayed.  CBC  Recent Labs  Lab 07/03/17  0700 07/02/17  1740 07/02/17  0613  06/29/17  0308   HGB 6.9* 8.0* 7.3*  < > 6.8*   WBC 4.5  --  5.2  < > 8.3   PLT 44*  --  52*  < > 46*   A1C  --   --   --   --  Canceled, Test credited Below Assay Range   < > = values in this interval not displayed.  COAGS  Recent Labs  Lab 07/03/17  0700 07/02/17  0613  06/28/17  1421 06/28/17  1330   INR 1.19* 1.21*  < > 2.49* 2.78*   PTT  --   --   --  45* 59*   < > = values in this interval not displayed.   Urinalysis  Recent Labs   Lab Test  03/27/17   0738   COLOR  Ana Cristina   APPEARANCE  Clear   URINEGLC  Negative   URINEBILI  Negative   URINEKETONE  Negative   SG  1.018   UBLD  Negative   URINEPH  6.0   PROTEIN  Negative   NITRITE  Negative   LEUKEST  Negative   RBCU  1   WBCU  2   UTPG  Unable to calculate due to low value     Virology:  Hepatitis C Antibody   Date Value Ref Range Status   06/27/2017  NR Final    Nonreactive   Assay performance characteristics have not been established for newborns,   infants, and children

## 2017-07-03 NOTE — PROGRESS NOTES
Transplant Social Work Service Discharge Note      Patient Name:  Joe Sosa     Anticipated Discharge Date:  7/5/17    Discharge Disposition: to mother's home in [place]    Additional Services/Equipment Arranged: [facility] (see care coordinator note)     Patient / Family response to discharge plan: positive, agreeable     Persons notified of above discharge plan: Liver transplant team    Education provided by SW at discharge:  Coping post transplant, Liver Transplant Support Group, Writing to the Donor Family (brochure provided), resume weekly individual therapy with CD counselor and AA meetings when able.    Plan: I will remain available to assist with psychosocial needs throughout patient's hospitalization and outpatient.   Patient/mother are aware of how to reach me.        TIFFANY Paul, [facility]  Liver Transplant   Phone [phone]  Pager [phone]

## 2017-07-03 NOTE — PLAN OF CARE
Problem: Individualization  Goal: Patient Preferences  Outcome: Improving     RN:  AVSS, abdominal pain controlled with Oxycodone and Dilaudid IV given x2 with relief, denies nausea. Right LAM with moderate amount of bloody output. Adequate urine output, small loose black BM x1, up to the bathroom independently. Will continue to monitor.

## 2017-07-03 NOTE — PROGRESS NOTES
CLINICAL NUTRITION SERVICES - REASSESSMENT NOTE     Nutrition Prescription    RECOMMENDATIONS FOR MDs/PROVIDERS TO ORDER:  Continue at least 50,000 IU Vitamin D2 weekly d/t severe vitamin D deficiency.  Consider increasing to 2-3x/week d/t lack of improvement on this amount of supplementation.     Malnutrition Status:    Non-severe malnutrition in the context of acute illness    Recommendations already ordered by Registered Dietitian (RD):  Ensure Plus Shakes - 2 with meals (480 kcal, 15 grams protein each), Ensure Plus supplement (360 kcal, 13 grams pro each) for HS snack time    Calorie counts (7/3-7/10)    Future/Additional Recommendations:  Minimize diet restrictions as able d/t high calorie/protein needs post-transplant.  Oral supplements as needed to help meet nutritional needs.     High protein food choices with meals to help meet high needs post-transplant over the next 6-8 weeks.     Heart-healthy diet (low saturated fat, low sodium, high fiber) and food safety precautions long term due to immunosuppression regimen post-transplant         EVALUATION OF THE PROGRESS TOWARD GOALS   Diet: Regular    Intake: Patient started eating solid food yesterday, notes poor appetite and early satiety after only a few bites.  Has been trying to focus on good protein sources.  Feels like the Ensure Plus supplement is going down well, however.      TF was started post-op via NGT, however only minimal received and this was stopped with loss of enteral access upon extubation.     MALNUTRITION  % Intake: </= 50% for >/= 5 days (severe)  % Weight Loss: None noted  Subcutaneous Fat Loss: None observed  Muscle Loss: None observed  Fluid Accumulation/Edema: Mild  Malnutrition Diagnosis: Non-severe malnutrition in the context of acute illness    Previous Goals   Diet adv v nutrition support within 2-3 days.  Evaluation: Not met (NPO/CL x 4 days)    Previous Nutrition Diagnosis  Predicted inadequate nutrient intake  (kcal/pro)  Evaluation: Declining    CURRENT NUTRITION DIAGNOSIS  Inadequate oral intake related to early satiety, poor appetite as evidenced by patient eating bites of food with meals.     INTERVENTIONS  Implementation  Medical food supplement therapy - discussed supplements, patient willing to do 2 supplements/meal + HS snack time    Nutrition education for recommended modifications - provided post-transplant diet guidelines education to patient, mother and brother.     Goals  Patient to consume at least ~2000 kcal, 100 grams protein daily (~2/3 of assessed needs)    Monitoring/Evaluation  Progress toward goals will be monitored and evaluated per protocol.    Yamilex Jay MS, RD, LD  Pager [phone]

## 2017-07-04 LAB
ALBUMIN SERPL-MCNC: 2.6 G/DL (ref 3.4–5)
ALP SERPL-CCNC: 93 U/L (ref 40–150)
ALT SERPL W P-5'-P-CCNC: 144 U/L (ref 0–70)
ANION GAP SERPL CALCULATED.3IONS-SCNC: 7 MMOL/L (ref 3–14)
AST SERPL W P-5'-P-CCNC: 40 U/L (ref 0–45)
BASOPHILS # BLD AUTO: 0 10E9/L (ref 0–0.2)
BASOPHILS NFR BLD AUTO: 0.3 %
BILIRUB DIRECT SERPL-MCNC: 1.1 MG/DL (ref 0–0.2)
BILIRUB SERPL-MCNC: 1.7 MG/DL (ref 0.2–1.3)
BUN SERPL-MCNC: 24 MG/DL (ref 7–30)
CALCIUM SERPL-MCNC: 8 MG/DL (ref 8.5–10.1)
CHLORIDE SERPL-SCNC: 98 MMOL/L (ref 94–109)
CO2 SERPL-SCNC: 30 MMOL/L (ref 20–32)
CREAT SERPL-MCNC: 1.01 MG/DL (ref 0.66–1.25)
DIFFERENTIAL METHOD BLD: ABNORMAL
EOSINOPHIL # BLD AUTO: 0.2 10E9/L (ref 0–0.7)
EOSINOPHIL NFR BLD AUTO: 3 %
ERYTHROCYTE [DISTWIDTH] IN BLOOD BY AUTOMATED COUNT: 18.6 % (ref 10–15)
GFR SERPL CREATININE-BSD FRML MDRD: 87 ML/MIN/1.7M2
GLUCOSE BLDC GLUCOMTR-MCNC: 114 MG/DL (ref 70–99)
GLUCOSE BLDC GLUCOMTR-MCNC: 121 MG/DL (ref 70–99)
GLUCOSE BLDC GLUCOMTR-MCNC: 126 MG/DL (ref 70–99)
GLUCOSE SERPL-MCNC: 107 MG/DL (ref 70–99)
HCT VFR BLD AUTO: 25.7 % (ref 40–53)
HGB BLD-MCNC: 9 G/DL (ref 13.3–17.7)
IMM GRANULOCYTES # BLD: 0.1 10E9/L (ref 0–0.4)
IMM GRANULOCYTES NFR BLD: 1.2 %
INR PPP: 1.19 (ref 0.86–1.14)
LYMPHOCYTES # BLD AUTO: 0.8 10E9/L (ref 0.8–5.3)
LYMPHOCYTES NFR BLD AUTO: 12.9 %
MAGNESIUM SERPL-MCNC: 1.9 MG/DL (ref 1.6–2.3)
MCH RBC QN AUTO: 33 PG (ref 26.5–33)
MCHC RBC AUTO-ENTMCNC: 35 G/DL (ref 31.5–36.5)
MCV RBC AUTO: 94 FL (ref 78–100)
MONOCYTES # BLD AUTO: 0.8 10E9/L (ref 0–1.3)
MONOCYTES NFR BLD AUTO: 13.9 %
NEUTROPHILS # BLD AUTO: 4.1 10E9/L (ref 1.6–8.3)
NEUTROPHILS NFR BLD AUTO: 68.7 %
NRBC # BLD AUTO: 0 10*3/UL
NRBC BLD AUTO-RTO: 0 /100
PHOSPHATE SERPL-MCNC: 3 MG/DL (ref 2.5–4.5)
PLATELET # BLD AUTO: 45 10E9/L (ref 150–450)
POTASSIUM SERPL-SCNC: 3.7 MMOL/L (ref 3.4–5.3)
PROT SERPL-MCNC: 5.2 G/DL (ref 6.8–8.8)
RBC # BLD AUTO: 2.73 10E12/L (ref 4.4–5.9)
SODIUM SERPL-SCNC: 135 MMOL/L (ref 133–144)
TACROLIMUS BLD-MCNC: 6.1 UG/L (ref 5–15)
TME LAST DOSE: NORMAL H
WBC # BLD AUTO: 5.9 10E9/L (ref 4–11)

## 2017-07-04 PROCEDURE — 25000131 ZZH RX MED GY IP 250 OP 636 PS 637: Performed by: PHYSICIAN ASSISTANT

## 2017-07-04 PROCEDURE — 36592 COLLECT BLOOD FROM PICC: CPT | Performed by: PHYSICIAN ASSISTANT

## 2017-07-04 PROCEDURE — 25000132 ZZH RX MED GY IP 250 OP 250 PS 637: Performed by: PHYSICIAN ASSISTANT

## 2017-07-04 PROCEDURE — 25000132 ZZH RX MED GY IP 250 OP 250 PS 637: Performed by: TRANSPLANT SURGERY

## 2017-07-04 PROCEDURE — 00000146 ZZHCL STATISTIC GLUCOSE BY METER IP

## 2017-07-04 PROCEDURE — 84100 ASSAY OF PHOSPHORUS: CPT | Performed by: PHYSICIAN ASSISTANT

## 2017-07-04 PROCEDURE — 25000132 ZZH RX MED GY IP 250 OP 250 PS 637: Performed by: STUDENT IN AN ORGANIZED HEALTH CARE EDUCATION/TRAINING PROGRAM

## 2017-07-04 PROCEDURE — 82248 BILIRUBIN DIRECT: CPT | Performed by: PHYSICIAN ASSISTANT

## 2017-07-04 PROCEDURE — 83735 ASSAY OF MAGNESIUM: CPT | Performed by: PHYSICIAN ASSISTANT

## 2017-07-04 PROCEDURE — 85025 COMPLETE CBC W/AUTO DIFF WBC: CPT | Performed by: PHYSICIAN ASSISTANT

## 2017-07-04 PROCEDURE — 25000128 H RX IP 250 OP 636: Performed by: PHYSICIAN ASSISTANT

## 2017-07-04 PROCEDURE — 85610 PROTHROMBIN TIME: CPT | Performed by: PHYSICIAN ASSISTANT

## 2017-07-04 PROCEDURE — 25000131 ZZH RX MED GY IP 250 OP 636 PS 637: Performed by: TRANSPLANT SURGERY

## 2017-07-04 PROCEDURE — 80053 COMPREHEN METABOLIC PANEL: CPT | Performed by: PHYSICIAN ASSISTANT

## 2017-07-04 PROCEDURE — 12000025 ZZH R&B TRANSPLANT INTERMEDIATE

## 2017-07-04 PROCEDURE — 80197 ASSAY OF TACROLIMUS: CPT | Performed by: PHYSICIAN ASSISTANT

## 2017-07-04 RX ORDER — FUROSEMIDE 40 MG
40 TABLET ORAL DAILY
Status: DISCONTINUED | OUTPATIENT
Start: 2017-07-05 | End: 2017-07-06 | Stop reason: HOSPADM

## 2017-07-04 RX ADMIN — MYCOPHENOLATE MOFETIL 1000 MG: 250 CAPSULE ORAL at 08:35

## 2017-07-04 RX ADMIN — MYCOPHENOLATE MOFETIL 1000 MG: 250 CAPSULE ORAL at 18:39

## 2017-07-04 RX ADMIN — OXYCODONE HYDROCHLORIDE 10 MG: 5 TABLET ORAL at 02:19

## 2017-07-04 RX ADMIN — TRAMADOL HYDROCHLORIDE 50 MG: 50 TABLET, COATED ORAL at 08:33

## 2017-07-04 RX ADMIN — FUROSEMIDE 40 MG: 10 INJECTION, SOLUTION INTRAVENOUS at 08:36

## 2017-07-04 RX ADMIN — MULTIPLE VITAMINS W/ MINERALS TAB 1 TABLET: TAB at 11:53

## 2017-07-04 RX ADMIN — FLUCONAZOLE 200 MG: 200 TABLET ORAL at 08:35

## 2017-07-04 RX ADMIN — URSODIOL 300 MG: 300 CAPSULE ORAL at 19:23

## 2017-07-04 RX ADMIN — TACROLIMUS 4 MG: 1 CAPSULE ORAL at 18:39

## 2017-07-04 RX ADMIN — PANTOPRAZOLE SODIUM 40 MG: 40 TABLET, DELAYED RELEASE ORAL at 16:47

## 2017-07-04 RX ADMIN — SULFAMETHOXAZOLE AND TRIMETHOPRIM 1 TABLET: 400; 80 TABLET ORAL at 08:34

## 2017-07-04 RX ADMIN — TRAMADOL HYDROCHLORIDE 50 MG: 50 TABLET, COATED ORAL at 00:43

## 2017-07-04 RX ADMIN — OXYCODONE HYDROCHLORIDE 10 MG: 5 TABLET ORAL at 19:23

## 2017-07-04 RX ADMIN — VALGANCICLOVIR HYDROCHLORIDE 450 MG: 450 TABLET ORAL at 08:35

## 2017-07-04 RX ADMIN — OXYCODONE HYDROCHLORIDE 10 MG: 5 TABLET ORAL at 06:37

## 2017-07-04 RX ADMIN — URSODIOL 300 MG: 300 CAPSULE ORAL at 08:35

## 2017-07-04 RX ADMIN — SENNOSIDES AND DOCUSATE SODIUM 2 TABLET: 8.6; 5 TABLET ORAL at 08:36

## 2017-07-04 RX ADMIN — SENNOSIDES AND DOCUSATE SODIUM 2 TABLET: 8.6; 5 TABLET ORAL at 19:23

## 2017-07-04 RX ADMIN — OXYCODONE HYDROCHLORIDE 10 MG: 5 TABLET ORAL at 10:20

## 2017-07-04 RX ADMIN — PANTOPRAZOLE SODIUM 40 MG: 40 TABLET, DELAYED RELEASE ORAL at 08:34

## 2017-07-04 RX ADMIN — INSULIN ASPART 7 UNITS: 100 INJECTION, SOLUTION INTRAVENOUS; SUBCUTANEOUS at 11:56

## 2017-07-04 RX ADMIN — TRAMADOL HYDROCHLORIDE 50 MG: 50 TABLET, COATED ORAL at 16:47

## 2017-07-04 RX ADMIN — TACROLIMUS 4 MG: 1 CAPSULE ORAL at 08:34

## 2017-07-04 RX ADMIN — OXYCODONE HYDROCHLORIDE 10 MG: 5 TABLET ORAL at 15:01

## 2017-07-04 NOTE — PLAN OF CARE
Problem: Goal Outcome Summary  Goal: Goal Outcome Summary  1. Pain well managed  2. Return of normal liver enzymes  3. Education for liver transplant in preparation for home care   OT/7A: Evaluation orders received and appreciated. Per discussion with PT, pt does not have acute OT needs at this time. OT will defer, PT will continue to follow and address remaining strength and mobility needs. Please re-consult if change in medical status.

## 2017-07-04 NOTE — PLAN OF CARE
"Problem: Goal Outcome Summary  Goal: Goal Outcome Summary  1. Pain well managed  2. Return of normal liver enzymes  3. Education for liver transplant in preparation for home care   Outcome: No Change  Blood pressure 135/86, pulse 80, temperature 98.3  F (36.8  C), temperature source Oral, resp. rate 16, height 1.88 m (6' 2\"), weight 103 kg (227 lb 1.6 oz), SpO2 93 %.  Pt resting throughout most of shift without complaints. No BM this shift. Occult stool to be collected r/t previous reports of dark stool by staff. Pt A/Ox4. VSS on RA. Up with SBA. Last Hgb 8.7. Next check with morning labs. R LAM with moderate bloody output. Oxy 10mg given x2; pain well controlled. Possible d/c 7/5 with home care. Call light in reach. Able to voice needs and wants. Hourly rounding preformed.      "

## 2017-07-04 NOTE — PLAN OF CARE
Problem: Goal Outcome Summary  Goal: Goal Outcome Summary  1. Pain well managed  2. Return of normal liver enzymes  3. Education for liver transplant in preparation for home care   Outcome: No Change  Pt A/Ox4. VSS on RA. Regular diet; tolerating well. Up with SBA. Platelet transfusion completed; pt tolerated well. Hgb 8.7 (6.9) after 2 units PRBCs transfused this AM. R LAM with 230 mL bloody output. Oxy 10mg given x2; pain well controlled. Urine output 1900 mL. One loose, brown stool. Pt aware RN needs to continue to assess BMs and will call appropriately. BGs 103, 151; only carb coverage insulin required. Plan for possible discharge on 7/5. Will continue to monitor.

## 2017-07-04 NOTE — PROGRESS NOTES
Calorie Counts     Intake recorder for: 7/3                   Kcals:1668                                            Protein: 55g     # Meals Recorded: 100% sausage ian     # Supplements recorded: 225% Ensure Clear, 200% Ensure Plus Shake, 25% Ensure Plus

## 2017-07-04 NOTE — PLAN OF CARE
Problem: Goal Outcome Summary  Goal: Goal Outcome Summary  1. Pain well managed  2. Return of normal liver enzymes  3. Education for liver transplant in preparation for home care   VSS, afebrile.      Overall, pt is doing very well. Pain is manageable on 10mg oxycodone q4h PRN as well as scheduled tramadol. BGs AC & HS, 107 and 114. Not requiring SSI, but still covering carbs. Pt is not planning on discharging w/ insulin since his hyperglycemia is solumedrol induced. Perhaps it would be beneficial to not cover carbs this evening and trend BG to mimic being at home. Incision is cdi. LAM intact, moderate to high bright red/ bloody drainage, team aware (received blood and platelets yesterday), total 250cc off. Hbg 9.0, Plt 45. Urine output adequate, IV lasix changed to PO lasix. Pt had 1 medium loose brown BM today. Mirlax was held, senokot given. No evidence of GIB. Started gathering drainage supplies in anticipation for pt to d/c home. Lab book is up to date, will take a look at med card. MTP education complete.

## 2017-07-05 ENCOUNTER — APPOINTMENT (OUTPATIENT)
Dept: PHYSICAL THERAPY | Facility: CLINIC | Age: 27
DRG: 006 | End: 2017-07-05
Attending: TRANSPLANT SURGERY
Payer: COMMERCIAL

## 2017-07-05 LAB
ALBUMIN SERPL-MCNC: 2.4 G/DL (ref 3.4–5)
ALP SERPL-CCNC: 94 U/L (ref 40–150)
ALT SERPL W P-5'-P-CCNC: 113 U/L (ref 0–70)
ANION GAP SERPL CALCULATED.3IONS-SCNC: 4 MMOL/L (ref 3–14)
AST SERPL W P-5'-P-CCNC: 33 U/L (ref 0–45)
BASOPHILS # BLD AUTO: 0 10E9/L (ref 0–0.2)
BASOPHILS NFR BLD AUTO: 0.2 %
BILIRUB DIRECT SERPL-MCNC: 1 MG/DL (ref 0–0.2)
BILIRUB SERPL-MCNC: 1.5 MG/DL (ref 0.2–1.3)
BUN SERPL-MCNC: 21 MG/DL (ref 7–30)
CALCIUM SERPL-MCNC: 8.3 MG/DL (ref 8.5–10.1)
CHLORIDE SERPL-SCNC: 100 MMOL/L (ref 94–109)
CO2 SERPL-SCNC: 31 MMOL/L (ref 20–32)
CREAT SERPL-MCNC: 1.04 MG/DL (ref 0.66–1.25)
DIFFERENTIAL METHOD BLD: ABNORMAL
EOSINOPHIL # BLD AUTO: 0.2 10E9/L (ref 0–0.7)
EOSINOPHIL NFR BLD AUTO: 3.4 %
ERYTHROCYTE [DISTWIDTH] IN BLOOD BY AUTOMATED COUNT: 18.7 % (ref 10–15)
GFR SERPL CREATININE-BSD FRML MDRD: 84 ML/MIN/1.7M2
GLUCOSE BLDC GLUCOMTR-MCNC: 108 MG/DL (ref 70–99)
GLUCOSE BLDC GLUCOMTR-MCNC: 128 MG/DL (ref 70–99)
GLUCOSE BLDC GLUCOMTR-MCNC: 141 MG/DL (ref 70–99)
GLUCOSE BLDC GLUCOMTR-MCNC: 164 MG/DL (ref 70–99)
GLUCOSE SERPL-MCNC: 138 MG/DL (ref 70–99)
HCT VFR BLD AUTO: 27.9 % (ref 40–53)
HGB BLD-MCNC: 9.4 G/DL (ref 13.3–17.7)
IMM GRANULOCYTES # BLD: 0.1 10E9/L (ref 0–0.4)
IMM GRANULOCYTES NFR BLD: 1.3 %
INR PPP: 1.18 (ref 0.86–1.14)
LYMPHOCYTES # BLD AUTO: 1 10E9/L (ref 0.8–5.3)
LYMPHOCYTES NFR BLD AUTO: 16.7 %
MAGNESIUM SERPL-MCNC: 1.9 MG/DL (ref 1.6–2.3)
MCH RBC QN AUTO: 31.8 PG (ref 26.5–33)
MCHC RBC AUTO-ENTMCNC: 33.7 G/DL (ref 31.5–36.5)
MCV RBC AUTO: 94 FL (ref 78–100)
MONOCYTES # BLD AUTO: 0.9 10E9/L (ref 0–1.3)
MONOCYTES NFR BLD AUTO: 14.2 %
NEUTROPHILS # BLD AUTO: 4 10E9/L (ref 1.6–8.3)
NEUTROPHILS NFR BLD AUTO: 64.2 %
NRBC # BLD AUTO: 0 10*3/UL
NRBC BLD AUTO-RTO: 0 /100
PHOSPHATE SERPL-MCNC: 3.1 MG/DL (ref 2.5–4.5)
PLATELET # BLD AUTO: 64 10E9/L (ref 150–450)
POTASSIUM SERPL-SCNC: 3.9 MMOL/L (ref 3.4–5.3)
PROT SERPL-MCNC: 5.3 G/DL (ref 6.8–8.8)
RBC # BLD AUTO: 2.96 10E12/L (ref 4.4–5.9)
SODIUM SERPL-SCNC: 135 MMOL/L (ref 133–144)
TACROLIMUS BLD-MCNC: 8.1 UG/L (ref 5–15)
TME LAST DOSE: NORMAL H
WBC # BLD AUTO: 6.2 10E9/L (ref 4–11)

## 2017-07-05 PROCEDURE — 25000132 ZZH RX MED GY IP 250 OP 250 PS 637: Performed by: TRANSPLANT SURGERY

## 2017-07-05 PROCEDURE — 36592 COLLECT BLOOD FROM PICC: CPT | Performed by: PHYSICIAN ASSISTANT

## 2017-07-05 PROCEDURE — 25000132 ZZH RX MED GY IP 250 OP 250 PS 637: Performed by: STUDENT IN AN ORGANIZED HEALTH CARE EDUCATION/TRAINING PROGRAM

## 2017-07-05 PROCEDURE — 25000131 ZZH RX MED GY IP 250 OP 636 PS 637: Performed by: TRANSPLANT SURGERY

## 2017-07-05 PROCEDURE — 12000026 ZZH R&B TRANSPLANT

## 2017-07-05 PROCEDURE — 00000146 ZZHCL STATISTIC GLUCOSE BY METER IP

## 2017-07-05 PROCEDURE — 97530 THERAPEUTIC ACTIVITIES: CPT | Mod: GP

## 2017-07-05 PROCEDURE — 25000131 ZZH RX MED GY IP 250 OP 636 PS 637: Performed by: PHYSICIAN ASSISTANT

## 2017-07-05 PROCEDURE — 40000193 ZZH STATISTIC PT WARD VISIT

## 2017-07-05 PROCEDURE — 85025 COMPLETE CBC W/AUTO DIFF WBC: CPT | Performed by: PHYSICIAN ASSISTANT

## 2017-07-05 PROCEDURE — 84100 ASSAY OF PHOSPHORUS: CPT | Performed by: PHYSICIAN ASSISTANT

## 2017-07-05 PROCEDURE — 85610 PROTHROMBIN TIME: CPT | Performed by: PHYSICIAN ASSISTANT

## 2017-07-05 PROCEDURE — 80053 COMPREHEN METABOLIC PANEL: CPT | Performed by: PHYSICIAN ASSISTANT

## 2017-07-05 PROCEDURE — 80197 ASSAY OF TACROLIMUS: CPT | Performed by: PHYSICIAN ASSISTANT

## 2017-07-05 PROCEDURE — 97116 GAIT TRAINING THERAPY: CPT | Mod: GP

## 2017-07-05 PROCEDURE — 97110 THERAPEUTIC EXERCISES: CPT | Mod: GP

## 2017-07-05 PROCEDURE — 25000132 ZZH RX MED GY IP 250 OP 250 PS 637: Performed by: PHYSICIAN ASSISTANT

## 2017-07-05 PROCEDURE — 83735 ASSAY OF MAGNESIUM: CPT | Performed by: PHYSICIAN ASSISTANT

## 2017-07-05 PROCEDURE — 82248 BILIRUBIN DIRECT: CPT | Performed by: PHYSICIAN ASSISTANT

## 2017-07-05 RX ORDER — TACROLIMUS 5 MG/1
5 CAPSULE ORAL
Status: DISCONTINUED | OUTPATIENT
Start: 2017-07-05 | End: 2017-07-06 | Stop reason: HOSPADM

## 2017-07-05 RX ORDER — PANTOPRAZOLE SODIUM 40 MG/1
40 TABLET, DELAYED RELEASE ORAL EVERY MORNING
Status: DISCONTINUED | OUTPATIENT
Start: 2017-07-05 | End: 2017-07-06 | Stop reason: HOSPADM

## 2017-07-05 RX ORDER — AMOXICILLIN 250 MG
2 CAPSULE ORAL 2 TIMES DAILY PRN
Status: DISCONTINUED | OUTPATIENT
Start: 2017-07-05 | End: 2017-07-06 | Stop reason: HOSPADM

## 2017-07-05 RX ADMIN — OXYCODONE HYDROCHLORIDE 10 MG: 5 TABLET ORAL at 00:51

## 2017-07-05 RX ADMIN — VALGANCICLOVIR HYDROCHLORIDE 450 MG: 450 TABLET ORAL at 08:50

## 2017-07-05 RX ADMIN — CLOTRIMAZOLE 1 TROCHE: 10 LOZENGE ORAL at 16:10

## 2017-07-05 RX ADMIN — OXYCODONE HYDROCHLORIDE 10 MG: 5 TABLET ORAL at 11:23

## 2017-07-05 RX ADMIN — FUROSEMIDE 40 MG: 40 TABLET ORAL at 08:50

## 2017-07-05 RX ADMIN — TACROLIMUS 5 MG: 5 CAPSULE ORAL at 18:03

## 2017-07-05 RX ADMIN — TRAMADOL HYDROCHLORIDE 50 MG: 50 TABLET, COATED ORAL at 00:51

## 2017-07-05 RX ADMIN — MYCOPHENOLATE MOFETIL 1000 MG: 250 CAPSULE ORAL at 18:03

## 2017-07-05 RX ADMIN — CLOTRIMAZOLE 1 TROCHE: 10 LOZENGE ORAL at 19:59

## 2017-07-05 RX ADMIN — PANTOPRAZOLE SODIUM 40 MG: 40 TABLET, DELAYED RELEASE ORAL at 08:50

## 2017-07-05 RX ADMIN — MULTIPLE VITAMINS W/ MINERALS TAB 1 TABLET: TAB at 13:04

## 2017-07-05 RX ADMIN — TACROLIMUS 4 MG: 1 CAPSULE ORAL at 08:49

## 2017-07-05 RX ADMIN — TRAMADOL HYDROCHLORIDE 50 MG: 50 TABLET, COATED ORAL at 16:10

## 2017-07-05 RX ADMIN — FLUCONAZOLE 200 MG: 200 TABLET ORAL at 08:50

## 2017-07-05 RX ADMIN — OXYCODONE HYDROCHLORIDE 10 MG: 5 TABLET ORAL at 16:10

## 2017-07-05 RX ADMIN — SULFAMETHOXAZOLE AND TRIMETHOPRIM 1 TABLET: 400; 80 TABLET ORAL at 08:50

## 2017-07-05 RX ADMIN — MYCOPHENOLATE MOFETIL 1000 MG: 250 CAPSULE ORAL at 08:49

## 2017-07-05 RX ADMIN — URSODIOL 300 MG: 300 CAPSULE ORAL at 08:50

## 2017-07-05 RX ADMIN — TRAMADOL HYDROCHLORIDE 50 MG: 50 TABLET, COATED ORAL at 23:43

## 2017-07-05 RX ADMIN — OXYCODONE HYDROCHLORIDE 10 MG: 5 TABLET ORAL at 05:00

## 2017-07-05 RX ADMIN — URSODIOL 300 MG: 300 CAPSULE ORAL at 19:59

## 2017-07-05 RX ADMIN — OXYCODONE HYDROCHLORIDE 10 MG: 5 TABLET ORAL at 21:48

## 2017-07-05 RX ADMIN — TRAMADOL HYDROCHLORIDE 50 MG: 50 TABLET, COATED ORAL at 08:50

## 2017-07-05 ASSESSMENT — PAIN DESCRIPTION - DESCRIPTORS: DESCRIPTORS: SORE

## 2017-07-05 NOTE — PLAN OF CARE
Problem: Goal Outcome Summary  Goal: Goal Outcome Summary  1. Pain well managed  2. Return of normal liver enzymes  3. Education for liver transplant in preparation for home care   PT / 7A: Pt completes bed mobility and sit <> stand transfers within precautions independently. Pt ambulates >500ft with no AD independently. Pt ascends/descends a total of 12 stairs with single handrail + Mod I. Pt engaged in standing LE exercises.  REC: Discharge home with assist + continued HEP once medically stable.

## 2017-07-05 NOTE — PLAN OF CARE
POST TRANSPLANT COORDINATOR VISIT BEFORE DISCHARGE    Important Dates:    Transplant Date: 6.27.2017    Anticipated date of discharge: possibly 7/06    Care Team:    Care Provider at Home: living w/his mom, who will be available to assist    Post Transplant Coordinator: Millie Snider     Transplant Surgeon:     Transplant : Ngoc Echeverria      Medications:    Immunosuppression plan: tacrolimus, cellcept    Knows the names of immunosuppressive medications: learning,needs further  instruction    Pharmacy to be used after discharge: pt states does not know, suggested that he  Consider using [facility] pharmacy     CMV/EBV Status: CMV/EBV matched/mismatched Valcyte for 6 months        Post Transplant Education Needs:    Watched post transplant DVD's: has started watching, has lab book, med card, given transplant handbook, pamphlet on writing to the donor, and going home letter from coordinators.    Future educational needs: continued instruction, review    Post Discharge Appointments:    Return appointments are made: no     [facility] Visit: no       Post Discharge Labs Schedule:         Lab Letter in EPIC: to be placed       Post Discharge Transition Plan:    Transition plan: will likely go home, not rehab, mom to help    Home Health Care: yes

## 2017-07-05 NOTE — PLAN OF CARE
Problem: Goal Outcome Summary  Goal: Goal Outcome Summary  1. Pain well managed  2. Return of normal liver enzymes  3. Education for liver transplant in preparation for home care   Physical Therapy Discharge Summary     Reason for therapy discharge:    All goals and outcomes met, no further needs identified.     Progress towards therapy goal(s). See goals on Care Plan in [facility] electronic health record for goal details.  Goals met     Therapy recommendation(s):    Continue home exercise program, walk 3-4x/day, sit up in chair.

## 2017-07-05 NOTE — PROGRESS NOTES
Transplant Surgery  Inpatient Daily Progress Note  07/05/2017    Assessment & Plan: 27 year old male with PMH significant for ESLD secondary to EtOH/alpha 1 antitrypsin now s/p DD OLT on 6/28/17 with Dr. Martin. ESLD complicated by hepatic encephalopathy, ascites (paracentesis x 1-2x), SBP x 1 episode, chronic hyponatremia and anasarca.     Graft function: Good, improved. LFTs improving, Tbili 1.5, ALT, AST ALP down trending, .  Continue ursodiol 300mg PO BID. INR 1.1. Pain controlled with oral PRN oxycodone, and scheduled tramadol 50mg PO Q8h scheduled.   Immunosuppression management: Solu-medrol taper per protocol completed. Cellcept 1000 mg BID. Tacrolimus, level 8.1, Increase to 5 mg BID. Goal 10-15. Stop diflucan 200 mg. Complexity of management:Medium. Contributing factors: thrombocytopenia, anemia and induction, diflucan drug interaction.   Hematology: HGB 9.4 <-9.0<-8.7<-6.9<-8. Multiple blood transfusions post op, last transfusion of 2 Units RBCs on 7.3 for Hbg of 6.9. Thrombocytopenia, improved, today 64.  Cardiorespiratory: Stable on room air   GI/Nutrition: Regular diet, tolerating well. +Loose BMs. Change bowel regimen to PRN, patient educated about restarting if needed.   Endocrine: Hyperglycemia due to steroids. Improved. Stop SSI/CC.   Fluid/Electrolytes: Drain output > 500cc. Will give lasix 40 mg PO today. Electrolytes WNL.    : Major discontinued on POD 1, voiding  Infectious disease: Afebrile. Continue Zosyn x 3 days per protocol. D/c'ed Micafungin and started Fluconazole on POD 1. Discontinued Vanco on POD 1. Stop diflucan today. Ppx: Valcyte x 6 months (CMV D+R- and EBV D+R+), bactrim x 6 months, start clotrimazole vince x 12 weeks.   Prophylaxis: GI, fungal  Disposition: 7A, plan for d/c tomorrow     Medical Decision Making: Medium  Subsequent visit 79158 (moderate level decision making)    DENISE/Fellow/Resident Provider: Tennille Peña, PAC 5198    Faculty: Gian Jurado  "MD    __________________________________________________________________  Transplant History: Admitted 6/27/2017 for liver transplant.   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    6/28/2017 (Liver), Postoperative day: 7     Interval History: History is obtained from the patient  Overnight events:   Tolerating diet, good intake. +Loose stools, multiple. Pain worse with activity, adequate control with medications.      ROS:   A 10-point review of systems was negative except as noted above.    Curent Meds:    pantoprazole  40 mg Oral QAM     sodium chloride (PF)  3 mL Intracatheter Q8H     furosemide  40 mg Oral Daily     tacrolimus  4 mg Oral BID IS     traMADol  50 mg Oral Q8H     ursodiol  300 mg Oral BID     fluconazole  200 mg Oral Daily     multivitamin, therapeutic with minerals  1 tablet Per Feeding Tube Daily     valGANciclovir  450 mg Oral Daily     sulfamethoxazole-trimethoprim  1 tablet Oral Daily     mycophenolate  1,000 mg Oral BID IS       Physical Exam:     Admit Weight: 98.8 kg (217 lb 14.4 oz)    Current Vitals:   /84 (BP Location: Left arm)  Pulse 84  Temp 98.4  F (36.9  C) (Oral)  Resp 16  Ht 1.88 m (6' 2\")  Wt 101.2 kg (223 lb 1.6 oz)  SpO2 96%  BMI 28.64 kg/m2        Vital sign ranges:    Temp:  [98.3  F (36.8  C)-98.6  F (37  C)] 98.4  F (36.9  C)  Pulse:  [72-87] 84  Heart Rate:  [72-84] 74  Resp:  [15-16] 16  BP: (129-137)/(77-84) 135/84  SpO2:  [93 %-98 %] 96 %  Patient Vitals for the past 24 hrs:   BP Temp Temp src Pulse Heart Rate Resp SpO2 Weight   07/05/17 0646 135/84 98.4  F (36.9  C) Oral - 74 16 96 % -   07/05/17 0337 136/81 98.3  F (36.8  C) Oral - 80 16 98 % -   07/05/17 0041 137/84 98.6  F (37  C) Oral - 82 16 93 % -   07/04/17 2014 129/82 98.6  F (37  C) Oral 84 84 15 98 % -   07/04/17 1446 133/83 - - 87 - - 98 % -   07/04/17 1229 134/77 98.3  F (36.8  C) Oral 72 72 16 98 % -   07/04/17 0900 - - - - - - - 101.2 kg (223 lb 1.6 oz)     General " Appearance: in no apparent distress.   Skin: normal, dry  Heart: RRR  Lungs: BCTA, NLB, stable on room air  Abdomen: rounded, and mildly ttp, > around LAM drain. The wound is c/d/i. LAM with red sero sang output, 750 cc.  Extremities: edema: absent.  Neurologic: awake, alert and oriented. Tremor absent.   CVC    Data:   CMP  Recent Labs  Lab 07/05/17  0625 07/04/17  0536  06/30/17  0430  06/29/17  0311 06/29/17  0308  06/28/17  1421    135  < > 137  < >  --  142  < > 140   POTASSIUM 3.9 3.7  < > 4.2  < >  --  4.2  < > 4.0   CHLORIDE 100 98  < > 104  < >  --  109  < > 108   CO2 31 30  < > 23  < >  --  25  < > 24   * 107*  < > 119*  < >  --  147*  < > 250*   BUN 21 24  < > 40*  < >  --  24  < > 16   CR 1.04 1.01  < > 1.21  < >  --  1.05  < > 0.64*   GFRESTIMATED 84 87  < > 71  < >  --  84  < > >90Non  GFR Calc   GFRESTBLACK >90African American GFR Calc >90African American GFR Calc  < > 86  < >  --  >90African American GFR Calc  < > >90African American GFR Calc   BRITNI 8.3* 8.0*  < > 7.9*  < >  --  8.2*  < > 9.0   ICAW  --   --   --  4.4  --  5.0  --   < >  --    MAG 1.9 1.9  < > 2.9*  --   --  2.5*  < > 1.8   PHOS 3.1 3.0  < > 6.7*  --   --  5.4*  --  3.7   AMYLASE  --   --   --   --   --   --  30  --   --    LIPASE  --   --   --   --   --   --  113  --  327   ALBUMIN 2.4* 2.6*  < > 2.8*  --   --  2.4*  --  2.2*   BILITOTAL 1.5* 1.7*  < > 2.4*  --   --  2.2*  --  7.5*   ALKPHOS 94 93  < > 63  --   --  56  --  84   AST 33 40  < > 509*  --   --  1282*  --  2612*   * 144*  < > 472*  --   --  556*  --  836*   < > = values in this interval not displayed.  CBC  Recent Labs  Lab 07/05/17  0625 07/04/17  0536  06/29/17  0308   HGB 9.4* 9.0*  < > 6.8*   WBC 6.2 5.9  < > 8.3   PLT 64* 45*  < > 46*   A1C  --   --   --  Canceled, Test credited Below Assay Range   < > = values in this interval not displayed.  COAGS  Recent Labs  Lab 07/05/17  0625 07/04/17  0536  06/28/17  1421 06/28/17  1330   INR  1.18* 1.19*  < > 2.49* 2.78*   PTT  --   --   --  45* 59*   < > = values in this interval not displayed.   Urinalysis  Recent Labs   Lab Test  03/27/17   0738   COLOR  Ana Cristina   APPEARANCE  Clear   URINEGLC  Negative   URINEBILI  Negative   URINEKETONE  Negative   SG  1.018   UBLD  Negative   URINEPH  6.0   PROTEIN  Negative   NITRITE  Negative   LEUKEST  Negative   RBCU  1   WBCU  2   UTPG  Unable to calculate due to low value     Virology:  Hepatitis C Antibody   Date Value Ref Range Status   06/27/2017  NR Final    Nonreactive   Assay performance characteristics have not been established for newborns,   infants, and children

## 2017-07-05 NOTE — DISCHARGE SUMMARY
[facility], [place]    Discharge Summary  Transplant Surgery    Date of Admission:  2017  Date of Discharge:  2017  Discharging Provider: Tennille Peña PA-C  Date of Service (when I saw the patient): 17    Discharge Diagnoses   Active Problems:    End stage liver disease (H)    Liver transplant recipient (H)    S/P liver transplant (H)    Immunosuppressed status (H)      Procedure/Surgery Information   Procedure: Procedure(s):  TRANSPLANT LIVER RECIPIENT  DONOR with bile duct stenting - Wound Class: II-Clean Contaminated   Surgeon(s): Surgeon(s) and Role:     * Chino Martin MD - Primary     * Emil Chappell MD - Assisting     * Ailyn Rodriguez MD - Assisting     * Jose A Lafleur MD - Assisting       Non-operative procedures None performed     History of Present Illness   Joe Sosa is a 27 year old male with past medical history significant for end stage liver disease secondary to alcohol/alpha 1 antitrypsin now status post  donor OLT on 17 with Dr. Martin. End stage liver disease complicated by hepatic encephalopathy, ascites (paracentesis x 1-2x), SBP x 1 episode, chronic hyponatremia and anasarca.     Hospital Course   Joe Sosa was admitted on 2017.  The following problems were addressed during his hospitalization:    Graft function: Good, improved. LFTs improving, ALT, AST ALP down trending. Total bilirubin 2.1 (1.5) and direct bilirubin 1.1 (1). Known perihepatic hematoma, LAM output now dark red. Continue drain until output < 500 cc x 3 days. Continue lasix 40 mg daily x 7 days to decrease drain output, monitor for dehydration. Continue ursodiol 300mg PO BID.   Pain controlled with oral oxycodone as needed. Recommended patient decrease to every 6 hours as needed for several days (3-5 days) then decrease to every 8 hours as needed as pain improves. Consider switching to tramadol if pain persists longer  than a couple of weeks.   Immunosuppression management: Solu-medrol taper per protocol completed. Cellcept 1000 mg BID. 7/5 Tacrolimus, level 8.1, Increase to 5 mg BID yesterday and diflucan stopped, last dose on 7/5. Goal 10-15. Level today 20.4, ~12.5 hour trough. Patient reports HA starting today. Tacrolimus 1 mg for 7/6 evening dose. Restart tacrolimus 3 mg oral twice a day on 7/7 morning dose. Repeat level tomorrow.   Hematology: HGB 9<-9.4 <-9.0<-8.7<-6.9<-8. Multiple blood transfusions post op, last transfusion of 2 Units RBCs on 7/3 for Hbg of 6.9. Thrombocytopenia, improving, today 72.  Cardiorespiratory: Stable on room air. VSS. No antihypertensive medications started.   GI/Nutrition: Regular diet, tolerating well. Formed BMs. Changed bowel regimen to as needed due to loose stools, patient educated about restarting if needed.   Endocrine: Hyperglycemia due to steroids. Improved. Stop SSI/CC.  Renal/Fluid/Electrolytes: Drain output > 500cc. Continue lasix 40 mg PO x 7 days to decrease drain output. Monitor for dehydration. Maintain good oral fluid intake. Electrolytes WNL, no supplements started.  Creatinine 1. Repeat level tomorrow due to elevated tac level.   : Major discontinued on POD 1. No issues,  Infectious disease: Afebrile. Continue Zosyn x 3 days per protocol. Stopped Micafungin and started Fluconazole on POD 1. Discontinued Vanco on POD 1. Diflucan x 7 days, completed. Ppx: Valcyte x 6 months (CMV D+R- and EBV D+R+), bactrim x 6 months, start clotrimazole vince x 12 weeks.     Tennille Peña PA-C    Discharge Disposition   Discharged to home   Condition at discharge: Stable    Final pathology results:   Copath Report 06/28/2017 10:01 AM 88      Patient Name: LYDIA GIBBS   MR#: [id number]   Specimen #: [id number]   Collected: 6/28/2017   Received: 6/28/2017   Reported: 7/1/2017 16:30   Ordering Phy(s): TENISHA ALBRECHT     For improved result formatting, select 'View Enhanced Report  Format'   under Linked Documents section.     SPECIMEN(S):   A: Liver   B: Gallbladder and contents     FINAL DIAGNOSIS:   A. LIVER, HEPATECTOMY:   - Advanced liver disease (clinically alcoholic cirrhosis) with mild to   moderate septal inflammation, Stage IV fibrosis and no evidence of   active steatohepatitis   - PASD stain positive for intracytoplasmic hyaline globules (see   comment)   - Three unremarkable lymph nodes   - Gallbladder with no significant histological abnormality     B. GALLBLADDER AND CONTENTS, CHOLECYSTECTOMY:   - Mild chronic inflammation     COMMENT:   PAS/PASD cytochemical stains show diastase resistant intracytoplasmic   pink hyaline globules within the hepatocytes suggestive of underlying of   alpha-1 antitrypsin deficiency.     This case was seen in consultation with Dr. Herron.   I have personally reviewed all specimens and or slides, including the   listed special stains, and used them with my medical judgement to   determine the final diagnosis.     Electronically signed out by:     Kevin Mancini M.D., [facility]        Primary Care Physician   Jeffery Azul    General Appearance: in no apparent distress.   Skin: normal, dry  Heart: RRR  Lungs: BCTA, NLB, stable on room air  Abdomen: rounded, and mildly ttp, > around LAM drain. The wound is c/d/i. LAM with dark red sero sang output, 615 cc.  Extremities: edema: absent.  Neurologic: awake, alert and oriented. Tremor absent.       Consultations This Hospital Stay   SOCIAL WORK IP CONSULT  NUTRITION SERVICES ADULT IP CONSULT  PHYSICAL THERAPY ADULT IP CONSULT  OCCUPATIONAL THERAPY ADULT IP CONSULT  VASCULAR ACCESS CARE ADULT IP CONSULT  NUTRITION SERVICES ADULT IP CONSULT  OCCUPATIONAL THERAPY ADULT IP CONSULT  PHYSICAL THERAPY ADULT IP CONSULT  PHARMACY IP CONSULT  PHYSICAL THERAPY ADULT IP CONSULT  OCCUPATIONAL THERAPY ADULT IP CONSULT  PHARMACY IP CONSULT    Time Spent on this Encounter   I have spent less than 30 minutes on this  discharge.    Discharge Orders   Discharge Medications   Current Discharge Medication List      START taking these medications    Details   oxyCODONE (ROXICODONE) 5 MG IR tablet Take 1-2 tablets (5-10 mg) by mouth every 6 hours as needed for moderate to severe pain  Qty: 40 tablet, Refills: 0    Comments: Take every 6 hours as needed for 3-5 days then decrease to every 8 hours as needed.  Associated Diagnoses: Liver transplant recipient (H)      valGANciclovir (VALCYTE) 450 MG tablet Take 1 tablet (450 mg) by mouth daily  Qty: 30 tablet, Refills: 5    Comments: X 6 months  Associated Diagnoses: Liver transplant recipient (H)      mycophenolate (CELLCEPT - GENERIC EQUIVALENT) 250 MG capsule Take 4 capsules (1,000 mg) by mouth 2 times daily  Qty: 240 capsule, Refills: 11    Associated Diagnoses: Liver transplant recipient (H)      senna-docusate (SENOKOT-S;PERICOLACE) 8.6-50 MG per tablet Take 2 tablets by mouth 2 times daily as needed for constipation  Qty: 30 tablet, Refills: 0    Associated Diagnoses: Liver transplant recipient (H)      sulfamethoxazole-trimethoprim (BACTRIM/SEPTRA) 400-80 MG per tablet Take 1 tablet by mouth daily  Qty: 30 tablet, Refills: 5    Comments: X 6 months  Associated Diagnoses: Liver transplant recipient (H)      ursodiol (ACTIGALL) 300 MG capsule Take 1 capsule (300 mg) by mouth 2 times daily  Qty: 60 capsule, Refills: 11    Associated Diagnoses: Liver transplant recipient (H)      clotrimazole (MYCELEX) 10 MG LOZG lozenge Place 1 lozenge (1 Nona) inside cheek 3 times daily  Qty: 90 each, Refills: 0    Associated Diagnoses: Liver transplant recipient (H)      multivitamin, therapeutic with minerals (THERA-VIT-M) TABS tablet 1 tablet by Per Feeding Tube route daily  Qty: 30 each, Refills: 0    Associated Diagnoses: Liver transplant recipient (H)      furosemide (LASIX) 20 MG tablet Take 1 tablet (20 mg) by mouth daily for 7 days  Qty: 7 tablet, Refills: 1    Associated Diagnoses: Liver  transplant recipient (H)      tacrolimus (PROGRAF - GENERIC EQUIVALENT) 1 MG capsule Take 3 capsules (3 mg) by mouth 2 times daily  Qty: 180 capsule, Refills: 11    Comments: Hold PM dose on 7/6 then start 3 mg PO BID on 7/7. This was discussed with pt.  Associated Diagnoses: Liver transplant recipient (H)         CONTINUE these medications which have NOT CHANGED    Details   vitamin D (ERGOCALCIFEROL) 56964 UNIT capsule Take 1 capsule (50,000 Units) by mouth once a week  Qty: 8 capsule, Refills: 0    Comments: For 8 weeks  Associated Diagnoses: Vitamin D deficiency      omeprazole (PRILOSEC) 20 MG CR capsule Take 20 mg by mouth daily     Associated Diagnoses: Vitamin D deficiency         STOP taking these medications       torsemide (DEMADEX) 20 MG tablet Comments:   Reason for Stopping:         spironolactone (ALDACTONE) 50 MG tablet Comments:   Reason for Stopping:         lactulose (CHRONULAC) 10 GM/15ML solution Comments:   Reason for Stopping:         rifaximin (XIFAXAN) 550 MG TABS tablet Comments:   Reason for Stopping:                 Home care nursing referral     MD face to face encounter   Documentation of Face to Face and Certification for Home Health Services    I certify that patient: Joe Sosa is under my care and that I, or a nurse practitioner or physician's assistant working with me, had a face-to-face encounter that meets the physician face-to-face encounter requirements with this patient on: 7/6/2017.    This encounter with the patient was in whole, or in part, for the following medical condition, which is the primary reason for home health care: s/p liver transplant.    I certify that, based on my findings, the following services are medically necessary home health services: Nursing.    My clinical findings support the need for the above services because: Nurse is needed: To assess labs after changes in medications or other medical regimen..    Further, I certify that my clinical findings  support that this patient is homebound (i.e. absences from home require considerable and taxing effort and are for medical reasons or Worship services or infrequently or of short duration when for other reasons) because: Leaving home is medically contraindicated for the following reason(s): Infection risk / immunocompromised state where it is safer for them to receive services in the home...    Based on the above findings. I certify that this patient is confined to the home and needs intermittent skilled nursing care, physical therapy and/or speech therapy.  The patient is under my care, and I have initiated the establishment of the plan of care.  This patient will be followed by a physician who will periodically review the plan of care.  Physician/Provider to provide follow up care: Jeffery Azul    Attending hospital physician (the Medicare certified [place] provider): Dr. Chino Martin  Physician Signature: See electronic signature associated with these discharge orders.  Date: 2017     Reason for your hospital stay    donor liver transplant, duct to duct anastomosis of bile duct over biliary stent. Surgeon: Dr. Martin     Adult [facility] Follow-up and recommended labs and tests   [facility] (ph. [phone]) next Monday, 7/10.   Your labs will be drawn at 9:00am just prior to your appointment. DO NOT take tacrolimus (Prograf) until after your labs are drawn. Remember to bring all of your medications with you to this appointment.      LABS:  Transplant labs (CBC, BMP, hepatic panel, mag, phos, and tacrolimus levels) to be drawn every Monday and Thursday for 4 weeks.  Check Tacrolimus level and BMP on . Page results to transplant fellow, [phone]).   FOLLOW UP APPOINTMENTS:  Remember to always bring an updated medication list to all appointments.     Follow up with your transplant surgeon, Dr. Martin, in 2 weeks.  Follow up with transplant  hepatology at 1 month.    Follow up with primary care provider in 2-4 weeks. (Pt to schedule). Follow up blood glucose levels.   Your staples will be removed 3 weeks after your operation.  You have a biliary stent in place.  Your coordinator will follow up in 6-8 weeks.  Call scheduling at [phone] (option 1), if you have not heard about your appointments within 48 hours after discharge.    WHEN TO CONTACT YOUR  COORDINATOR:  Transplant Coordinator [phone]  Notify your coordinator if you have abdominal pain, increased redness or drainage from your incision, or fever greater than 100.5F.  Notify your coordinator immediately if you are ever unable to take your immunosuppressive medications for any reason.  If it is outside of office hours, please call the hospital switchboard at [phone] and ask to have the liver transplant surgery fellow paged for urgent medical questions, or present to the emergency department.    OTHER DISCHARGE INSTRUCTIONS:  LAM plan: Please monitor color and amount of output. Drain will remain in place until output is < 500 cc x 3 days.   Monitor blood glucose levels with scheduled labs (fasting labs).   Walk at least four times a day, lift no greater than 10 pounds for 6-8 weeks from the time of surgery.  No driving while taking narcotics or 3 weeks after surgery.    Diet recommendations post-transplant: Heart healthy dietary habits long term (low saturated/trans fat, low sodium). High protein diet x 8 weeks. Practice food safety precautions.     Full Code           Data   Most Recent 3 CBC's:  Recent Labs   Lab Test  07/06/17   0644  07/05/17   0625  07/04/17   0536   WBC  5.2  6.2  5.9   HGB  9.0*  9.4*  9.0*   MCV  94  94  94   PLT  72*  64*  45*      Most Recent 3 BMP's:  Recent Labs   Lab Test  07/06/17   0644  07/05/17   0625  07/04/17   0536   NA  134  135  135   POTASSIUM  4.0  3.9  3.7   CHLORIDE  99  100  98   CO2  29  31  30   BUN  22  21  24   CR  1.03  1.04  1.01    ANIONGAP  5  4  7   BRITNI  8.0*  8.3*  8.0*   GLC  106*  138*  107*     Most Recent 2 LFT's:  Recent Labs   Lab Test  07/06/17   0644  07/05/17   0625   AST  32  33   ALT  86*  113*   ALKPHOS  97  94   BILITOTAL  2.1*  1.5*     Most Recent INR's and Anticoagulation Dosing History:  Anticoagulation Dose History     Recent Dosing and Labs Latest Ref Rng & Units 6/30/2017 7/1/2017 7/2/2017 7/3/2017 7/4/2017 7/5/2017 7/6/2017    INR 0.86 - 1.14 1.27(H) 1.20(H) 1.21(H) 1.19(H) 1.19(H) 1.18(H) 1.17(H)        Most Recent 3 Troponin's:No lab results found.  Most Recent Cholesterol Panel:  Recent Labs   Lab Test  03/27/17   0628   CHOL  86   LDL  42   HDL  34*   TRIG  52     Most Recent 6 Bacteria Isolates From Any Culture (See EPIC Reports for Culture Details):  Recent Labs   Lab Test  06/28/17   1531  06/28/17   0308   CULT  Moderate growth Staphylococcus aureus NOT MRSA*  No VRE isolated     Most Recent TSH, T4 and A1c Labs:  Recent Labs   Lab Test  06/29/17   0308  03/27/17   0628   TSH   --   2.46   A1C  Canceled, Test credited   Below Assay Range     --        Lab Results   Component Value Date    LIPASE 113 06/29/2017    LIPASE 327 06/28/2017     Lab Results   Component Value Date    AMYLASE 30 06/29/2017    AMYLASE 62 06/27/2017

## 2017-07-05 NOTE — PROGRESS NOTES
CLINICAL NUTRITION SERVICES    2 day calorie count average = 2161 kcal (73% needs), 75 grams protein (50% needs)    Patient feels he is slowly tolerating more solid food.  Tolerating supplements well.  Prefers apple flavored Ensure Clear and strawberry Ensure Plus Shakes.      Interventions:  Encouraged protein powder/homemade shakes 2-3x/day at home (each providing 20-30 grams protein).  Discussed appropriate protein powders.     Discussed calorie counts.  Modified supplement order to provide supplements per patient's preference (1 Ensure Clear + 1 Ensure Shake with every meal + 1 Ensure Plus for HS snack time).     Yamilex Jay MS, RD, LD  Pager [phone]

## 2017-07-05 NOTE — PLAN OF CARE
Problem: Goal Outcome Summary  Goal: Goal Outcome Summary  1. Pain well managed  2. Return of normal liver enzymes  3. Education for liver transplant in preparation for home care   Outcome: No Change  AVSS on RA. 0200 . Complains of incisional pain, oxycodone given x2 and scheduled tramadol with some relief. Denies pain and nausea. LAM with bloody/red OP 200cc. Voiding adequate amounts, no reported BM. Up ad gilberto. Possible DC today. Will continue to monitor. Call light in reach, continue with POC.

## 2017-07-05 NOTE — PLAN OF CARE
Problem: Goal Outcome Summary  Goal: Goal Outcome Summary  1. Pain well managed  2. Return of normal liver enzymes  3. Education for liver transplant in preparation for home care   Outcome: No Change  Afebrile, VSS. Pain well managed with ultram and oxycodone X 10mg. LAM output 180cc this shift. Liver enzymes improving, hgb 9.4. Continue monitoring LAM output. Likely to discharge tomorrow.

## 2017-07-05 NOTE — PROGRESS NOTES
Calorie Counts  Intake recorded for: 7/4  Kcals: 2653  Protein: 94g  # Meals Recorded: 100% sausage ian, 50% meatloaf and mashed potatoes with gravy, 25% chocolate chip cookie, bites of chicken caesar salad  # Supplements Recorded: 100% 3 Ensure Clears, 100% 2 Ensure Plus, 100% 2 Ensure Shakes

## 2017-07-05 NOTE — PLAN OF CARE
Problem: Goal Outcome Summary  Goal: Goal Outcome Summary  1. Pain well managed  2. Return of normal liver enzymes  3. Education for liver transplant in preparation for home care   Outcome: Improving  [id number]: Pt VSS on RA, afebrile. No c/o nausea, pain is well-controlled with scheduled Tramadol and PRN Oxycodone. Blood glucose 121 and 126, sliding scale insulin not needed. Pt fairly tolerating regular diet, calorie counts, carb coverage not given per pt request d/t possible discharge to home without insulin. LAM drain with 250 mL bloody/bright red output throughout shift. No evidence of a GI bleed per previous shift RN report. Pt is up ad gilberto, independent, needs encouragement to ambulate. LAM care education began, some supplies gathered, please review with pt prior to discharge. Possible discharge to home with home care on Wednesday. Continue POC, notify provider with concerns.

## 2017-07-06 ENCOUNTER — TELEPHONE (OUTPATIENT)
Dept: PHARMACY | Facility: CLINIC | Age: 27
End: 2017-07-06

## 2017-07-06 ENCOUNTER — TELEPHONE (OUTPATIENT)
Dept: TRANSPLANT | Facility: CLINIC | Age: 27
End: 2017-07-06

## 2017-07-06 VITALS
TEMPERATURE: 98.2 F | HEART RATE: 77 BPM | BODY MASS INDEX: 28.54 KG/M2 | SYSTOLIC BLOOD PRESSURE: 130 MMHG | RESPIRATION RATE: 16 BRPM | HEIGHT: 74 IN | OXYGEN SATURATION: 97 % | DIASTOLIC BLOOD PRESSURE: 87 MMHG | WEIGHT: 222.4 LBS

## 2017-07-06 PROBLEM — D84.9 IMMUNOSUPPRESSED STATUS (H): Status: ACTIVE | Noted: 2017-07-06

## 2017-07-06 LAB
ALBUMIN SERPL-MCNC: 2.6 G/DL (ref 3.4–5)
ALP SERPL-CCNC: 97 U/L (ref 40–150)
ALT SERPL W P-5'-P-CCNC: 86 U/L (ref 0–70)
ANION GAP SERPL CALCULATED.3IONS-SCNC: 5 MMOL/L (ref 3–14)
AST SERPL W P-5'-P-CCNC: 32 U/L (ref 0–45)
BASOPHILS # BLD AUTO: 0 10E9/L (ref 0–0.2)
BASOPHILS NFR BLD AUTO: 0.2 %
BILIRUB DIRECT SERPL-MCNC: 1.1 MG/DL (ref 0–0.2)
BILIRUB SERPL-MCNC: 2.1 MG/DL (ref 0.2–1.3)
BUN SERPL-MCNC: 22 MG/DL (ref 7–30)
CALCIUM SERPL-MCNC: 8 MG/DL (ref 8.5–10.1)
CHLORIDE SERPL-SCNC: 99 MMOL/L (ref 94–109)
CO2 SERPL-SCNC: 29 MMOL/L (ref 20–32)
CREAT SERPL-MCNC: 1.03 MG/DL (ref 0.66–1.25)
DIFFERENTIAL METHOD BLD: ABNORMAL
EOSINOPHIL # BLD AUTO: 0.2 10E9/L (ref 0–0.7)
EOSINOPHIL NFR BLD AUTO: 3.2 %
ERYTHROCYTE [DISTWIDTH] IN BLOOD BY AUTOMATED COUNT: 18.4 % (ref 10–15)
GFR SERPL CREATININE-BSD FRML MDRD: 85 ML/MIN/1.7M2
GLUCOSE SERPL-MCNC: 106 MG/DL (ref 70–99)
HCT VFR BLD AUTO: 26.8 % (ref 40–53)
HGB BLD-MCNC: 9 G/DL (ref 13.3–17.7)
IMM GRANULOCYTES # BLD: 0.1 10E9/L (ref 0–0.4)
IMM GRANULOCYTES NFR BLD: 1.3 %
INR PPP: 1.17 (ref 0.86–1.14)
LYMPHOCYTES # BLD AUTO: 1.1 10E9/L (ref 0.8–5.3)
LYMPHOCYTES NFR BLD AUTO: 20.8 %
MAGNESIUM SERPL-MCNC: 1.8 MG/DL (ref 1.6–2.3)
MCH RBC QN AUTO: 31.5 PG (ref 26.5–33)
MCHC RBC AUTO-ENTMCNC: 33.6 G/DL (ref 31.5–36.5)
MCV RBC AUTO: 94 FL (ref 78–100)
MONOCYTES # BLD AUTO: 0.7 10E9/L (ref 0–1.3)
MONOCYTES NFR BLD AUTO: 13.9 %
NEUTROPHILS # BLD AUTO: 3.2 10E9/L (ref 1.6–8.3)
NEUTROPHILS NFR BLD AUTO: 60.6 %
NRBC # BLD AUTO: 0 10*3/UL
NRBC BLD AUTO-RTO: 0 /100
PHOSPHATE SERPL-MCNC: 3.9 MG/DL (ref 2.5–4.5)
PLATELET # BLD AUTO: 72 10E9/L (ref 150–450)
POTASSIUM SERPL-SCNC: 4 MMOL/L (ref 3.4–5.3)
PROT SERPL-MCNC: 5.4 G/DL (ref 6.8–8.8)
RBC # BLD AUTO: 2.86 10E12/L (ref 4.4–5.9)
SODIUM SERPL-SCNC: 134 MMOL/L (ref 133–144)
TACROLIMUS BLD-MCNC: 20.4 UG/L (ref 5–15)
TME LAST DOSE: ABNORMAL H
WBC # BLD AUTO: 5.2 10E9/L (ref 4–11)

## 2017-07-06 PROCEDURE — 25000132 ZZH RX MED GY IP 250 OP 250 PS 637: Performed by: TRANSPLANT SURGERY

## 2017-07-06 PROCEDURE — 83735 ASSAY OF MAGNESIUM: CPT | Performed by: PHYSICIAN ASSISTANT

## 2017-07-06 PROCEDURE — 25000132 ZZH RX MED GY IP 250 OP 250 PS 637: Performed by: STUDENT IN AN ORGANIZED HEALTH CARE EDUCATION/TRAINING PROGRAM

## 2017-07-06 PROCEDURE — 36592 COLLECT BLOOD FROM PICC: CPT | Performed by: PHYSICIAN ASSISTANT

## 2017-07-06 PROCEDURE — 85610 PROTHROMBIN TIME: CPT | Performed by: PHYSICIAN ASSISTANT

## 2017-07-06 PROCEDURE — 80197 ASSAY OF TACROLIMUS: CPT | Performed by: PHYSICIAN ASSISTANT

## 2017-07-06 PROCEDURE — 25000131 ZZH RX MED GY IP 250 OP 636 PS 637: Performed by: PHYSICIAN ASSISTANT

## 2017-07-06 PROCEDURE — 25000131 ZZH RX MED GY IP 250 OP 636 PS 637: Performed by: TRANSPLANT SURGERY

## 2017-07-06 PROCEDURE — 84100 ASSAY OF PHOSPHORUS: CPT | Performed by: PHYSICIAN ASSISTANT

## 2017-07-06 PROCEDURE — 80053 COMPREHEN METABOLIC PANEL: CPT | Performed by: PHYSICIAN ASSISTANT

## 2017-07-06 PROCEDURE — 82248 BILIRUBIN DIRECT: CPT | Performed by: PHYSICIAN ASSISTANT

## 2017-07-06 PROCEDURE — 25000132 ZZH RX MED GY IP 250 OP 250 PS 637: Performed by: PHYSICIAN ASSISTANT

## 2017-07-06 PROCEDURE — 85025 COMPLETE CBC W/AUTO DIFF WBC: CPT | Performed by: PHYSICIAN ASSISTANT

## 2017-07-06 RX ORDER — TACROLIMUS 1 MG/1
3 CAPSULE ORAL 2 TIMES DAILY
Qty: 180 CAPSULE | Refills: 11 | Status: SHIPPED | OUTPATIENT
Start: 2017-07-07 | End: 2017-07-06

## 2017-07-06 RX ORDER — FUROSEMIDE 40 MG
40 TABLET ORAL DAILY
Qty: 7 TABLET | Refills: 2 | Status: SHIPPED | OUTPATIENT
Start: 2017-07-06 | End: 2017-07-06

## 2017-07-06 RX ORDER — FUROSEMIDE 20 MG
20 TABLET ORAL DAILY
Qty: 7 TABLET | Refills: 1 | Status: SHIPPED | OUTPATIENT
Start: 2017-07-06 | End: 2017-07-13

## 2017-07-06 RX ORDER — AMOXICILLIN 250 MG
2 CAPSULE ORAL 2 TIMES DAILY PRN
Qty: 30 TABLET | Refills: 0 | Status: SHIPPED | OUTPATIENT
Start: 2017-07-06 | End: 2017-08-10

## 2017-07-06 RX ORDER — TACROLIMUS 1 MG/1
4 CAPSULE ORAL 2 TIMES DAILY
Qty: 240 CAPSULE | Refills: 11 | Status: SHIPPED | OUTPATIENT
Start: 2017-07-06 | End: 2017-07-06

## 2017-07-06 RX ORDER — TACROLIMUS 1 MG/1
3 CAPSULE ORAL 2 TIMES DAILY
Qty: 180 CAPSULE | Refills: 11 | Status: SHIPPED | OUTPATIENT
Start: 2017-07-07 | End: 2017-07-11

## 2017-07-06 RX ORDER — TACROLIMUS 5 MG/1
5 CAPSULE ORAL 2 TIMES DAILY
Qty: 60 CAPSULE | Refills: 11 | Status: SHIPPED | OUTPATIENT
Start: 2017-07-06 | End: 2017-07-06

## 2017-07-06 RX ORDER — VALGANCICLOVIR 450 MG/1
450 TABLET, FILM COATED ORAL DAILY
Qty: 30 TABLET | Refills: 5 | Status: SHIPPED | OUTPATIENT
Start: 2017-07-06 | End: 2017-09-19

## 2017-07-06 RX ORDER — MYCOPHENOLATE MOFETIL 250 MG/1
1000 CAPSULE ORAL 2 TIMES DAILY
Qty: 240 CAPSULE | Refills: 11 | Status: SHIPPED | OUTPATIENT
Start: 2017-07-06 | End: 2017-09-11

## 2017-07-06 RX ORDER — MULTIPLE VITAMINS W/ MINERALS TAB 9MG-400MCG
1 TAB ORAL DAILY
Qty: 30 EACH | Refills: 0 | Status: SHIPPED | OUTPATIENT
Start: 2017-07-06 | End: 2017-07-26

## 2017-07-06 RX ORDER — URSODIOL 300 MG/1
300 CAPSULE ORAL 2 TIMES DAILY
Qty: 60 CAPSULE | Refills: 11 | Status: SHIPPED | OUTPATIENT
Start: 2017-07-06 | End: 2017-12-27

## 2017-07-06 RX ORDER — SULFAMETHOXAZOLE AND TRIMETHOPRIM 400; 80 MG/1; MG/1
1 TABLET ORAL DAILY
Qty: 30 TABLET | Refills: 5 | Status: SHIPPED | OUTPATIENT
Start: 2017-07-06 | End: 2017-09-11

## 2017-07-06 RX ORDER — OXYCODONE HYDROCHLORIDE 5 MG/1
5-10 TABLET ORAL EVERY 6 HOURS PRN
Qty: 40 TABLET | Refills: 0 | Status: SHIPPED | OUTPATIENT
Start: 2017-07-06 | End: 2017-07-11

## 2017-07-06 RX ORDER — TACROLIMUS 1 MG/1
3 CAPSULE ORAL 2 TIMES DAILY
Qty: 180 CAPSULE | Refills: 11 | Status: SHIPPED | OUTPATIENT
Start: 2017-07-06 | End: 2017-07-06

## 2017-07-06 RX ADMIN — URSODIOL 300 MG: 300 CAPSULE ORAL at 08:27

## 2017-07-06 RX ADMIN — FUROSEMIDE 40 MG: 40 TABLET ORAL at 08:27

## 2017-07-06 RX ADMIN — TACROLIMUS 5 MG: 5 CAPSULE ORAL at 08:27

## 2017-07-06 RX ADMIN — CLOTRIMAZOLE 1 TROCHE: 10 LOZENGE ORAL at 14:33

## 2017-07-06 RX ADMIN — OXYCODONE HYDROCHLORIDE 10 MG: 5 TABLET ORAL at 14:32

## 2017-07-06 RX ADMIN — OXYCODONE HYDROCHLORIDE 10 MG: 5 TABLET ORAL at 06:23

## 2017-07-06 RX ADMIN — PANTOPRAZOLE SODIUM 40 MG: 40 TABLET, DELAYED RELEASE ORAL at 08:27

## 2017-07-06 RX ADMIN — MYCOPHENOLATE MOFETIL 1000 MG: 250 CAPSULE ORAL at 08:27

## 2017-07-06 RX ADMIN — TRAMADOL HYDROCHLORIDE 50 MG: 50 TABLET, COATED ORAL at 08:34

## 2017-07-06 RX ADMIN — MULTIPLE VITAMINS W/ MINERALS TAB 1 TABLET: TAB at 14:32

## 2017-07-06 RX ADMIN — OXYCODONE HYDROCHLORIDE 10 MG: 5 TABLET ORAL at 11:01

## 2017-07-06 RX ADMIN — VALGANCICLOVIR HYDROCHLORIDE 450 MG: 450 TABLET ORAL at 08:27

## 2017-07-06 RX ADMIN — SULFAMETHOXAZOLE AND TRIMETHOPRIM 1 TABLET: 400; 80 TABLET ORAL at 08:27

## 2017-07-06 RX ADMIN — CLOTRIMAZOLE 1 TROCHE: 10 LOZENGE ORAL at 08:27

## 2017-07-06 RX ADMIN — OXYCODONE HYDROCHLORIDE 10 MG: 5 TABLET ORAL at 01:44

## 2017-07-06 NOTE — PROGRESS NOTES
[facility]  Met with pt and family to discuss plans for HC.  Pt to be discharged home 7/6  and has agreed to have [facility] follow with services of .  Patient care support center processing referral.  Pt and family verbalized understanding that initial visit is scheduled for Saturday 7/8, he will go into [facility] for labs on 7/7.    Pt has 24 hour phone number for [facility] for any questions or concerns.    Maria Luz Fuentes RN, BSN  [facility] Liaison  [phone]

## 2017-07-06 NOTE — PROGRESS NOTES
Care Coordinator  D/I: ---pt will go to [facility] on Friday, 7/7 @ 0840 for labs (as [facility] cannot see him until Saturday 7/8)--pt and bedside nurse Roxie aware.  P: OP CC: ailyn knight CTS handoff done.

## 2017-07-06 NOTE — PLAN OF CARE
Problem: Goal Outcome Summary  Goal: Goal Outcome Summary  1. Pain well managed  2. Return of normal liver enzymes  3. Education for liver transplant in preparation for home care   Outcome: Improving  AVSS on RA. Pt complains of abdominal discomfort, oxycodone given x2 with relief. Denies nausea. LAM with 200cc of dark red OP. Incision c/d/i approximated with staples. Voiding adequate amounts, no reported BM. Possible DC today, pt is currently watching [facility] videos. Up ad gilberto. Regular diet. Will continue to monitor. Call light in reach, continue with POC.

## 2017-07-06 NOTE — PLAN OF CARE
Problem: Goal Outcome Summary  Goal: Goal Outcome Summary  1. Pain well managed  2. Return of normal liver enzymes  3. Education for liver transplant in preparation for home care   Pt discharged from [facility] 7A around 1445. AVS reviewed w/ pt and pt's mom. All questions answered. Drain teaching was done and pt demonstrated the ability to care for his drain himself. MTP videos were also watched this morning for a second time. Med card was updated. Pt will have a BMP and tac level drawn tomorrow at [facility] since is level was 20.4 from this AM's labs. Pt to take 1mg prograf tonight and 3mg after AM labs tomorrow. Pt declares understanding.

## 2017-07-06 NOTE — DISCHARGE INSTRUCTIONS
________________________________________________________  Discharge RN please fax discharge orders to home care agency: [facility]  ________________________________________________________         Take tacrolimus 1 mg on 7/6 evening dose. Restart tacrolimus 3 mg oral twice a day on 7/7 morning dose.

## 2017-07-06 NOTE — TELEPHONE ENCOUNTER
Please place lab orders, post liver transplant, tacrolimus level,  into EPIC and generate lab letter, to send to pt and to fax to homecare.

## 2017-07-06 NOTE — PLAN OF CARE
Problem: Goal Outcome Summary  Goal: Goal Outcome Summary  1. Pain well managed  2. Return of normal liver enzymes  3. Education for liver transplant in preparation for home care   Outcome: Improving  Pt A/Ox4. VSS on RA. Up independently. Regular diet; tolerating well. BGs 141, 164. Oxycodone 10mg given x2 for RLQ pain. R LAM with 125 dark red output. Urine output 825 mL. No BM this shift. Med card and lab book updated and reviewed with patient.

## 2017-07-06 NOTE — PROGRESS NOTES
Calorie Counts  Intake recorded for: 7/5  Kcals: 2945  Protein: 106g  # Meals Recorded: 100% rice krispy bar, banana, sausage ian, chicken noodle soup, 50% grilled ham and cheese sandwich, fries, scrambled eggs, 25% pineapple, mac and cheese  # Supplements Recorded: 100% 3 Ensure Shakes, 100% 3 Ensure Clears

## 2017-07-06 NOTE — PROVIDER NOTIFICATION
DATE:  7/6/2017   TIME OF RECEIPT FROM LAB:  1134  LAB TEST:  Tacrolimus   LAB VALUE:  20.4  RESULTS GIVEN WITH READ-BACK TO (PROVIDER):  Tennille Peña paged via text page   TIME LAB VALUE REPORTED TO PROVIDER:   1131

## 2017-07-07 ENCOUNTER — CARE COORDINATION (OUTPATIENT)
Dept: CARE COORDINATION | Facility: CLINIC | Age: 27
End: 2017-07-07

## 2017-07-07 DIAGNOSIS — Z94.4 LIVER REPLACED BY TRANSPLANT (H): Primary | ICD-10-CM

## 2017-07-07 DIAGNOSIS — K70.31 ALCOHOLIC CIRRHOSIS OF LIVER WITH ASCITES (H): ICD-10-CM

## 2017-07-07 DIAGNOSIS — K76.82 HEPATIC ENCEPHALOPATHY (H): ICD-10-CM

## 2017-07-07 DIAGNOSIS — Z20.828 CONTACT WITH OR EXPOSURE TO VIRAL DISEASE: ICD-10-CM

## 2017-07-07 LAB
ALBUMIN SERPL-MCNC: 2.7 G/DL (ref 3.4–5)
ALP SERPL-CCNC: 119 U/L (ref 40–150)
ALT SERPL W P-5'-P-CCNC: 79 U/L (ref 0–70)
ANION GAP SERPL CALCULATED.3IONS-SCNC: 5 MMOL/L (ref 3–14)
AST SERPL W P-5'-P-CCNC: 29 U/L (ref 0–45)
BILIRUB DIRECT SERPL-MCNC: 1 MG/DL (ref 0–0.2)
BILIRUB SERPL-MCNC: 1.7 MG/DL (ref 0.2–1.3)
BUN SERPL-MCNC: 28 MG/DL (ref 7–30)
CALCIUM SERPL-MCNC: 8.7 MG/DL (ref 8.5–10.1)
CHLORIDE SERPL-SCNC: 101 MMOL/L (ref 94–109)
CO2 SERPL-SCNC: 30 MMOL/L (ref 20–32)
CREAT SERPL-MCNC: 1.45 MG/DL (ref 0.66–1.25)
ERYTHROCYTE [DISTWIDTH] IN BLOOD BY AUTOMATED COUNT: 18.4 % (ref 10–15)
GFR SERPL CREATININE-BSD FRML MDRD: 58 ML/MIN/1.7M2
GLUCOSE SERPL-MCNC: 108 MG/DL (ref 70–99)
HCT VFR BLD AUTO: 29.7 % (ref 40–53)
HGB BLD-MCNC: 10.4 G/DL (ref 13.3–17.7)
INR PPP: 1.07 (ref 0.86–1.14)
MCH RBC QN AUTO: 33.5 PG (ref 26.5–33)
MCHC RBC AUTO-ENTMCNC: 35 G/DL (ref 31.5–36.5)
MCV RBC AUTO: 96 FL (ref 78–100)
PLATELET # BLD AUTO: 123 10E9/L (ref 150–450)
POTASSIUM SERPL-SCNC: 5.1 MMOL/L (ref 3.4–5.3)
PROT SERPL-MCNC: 6 G/DL (ref 6.8–8.8)
RBC # BLD AUTO: 3.1 10E12/L (ref 4.4–5.9)
SODIUM SERPL-SCNC: 136 MMOL/L (ref 133–144)
WBC # BLD AUTO: 6 10E9/L (ref 4–11)

## 2017-07-07 PROCEDURE — 85610 PROTHROMBIN TIME: CPT | Performed by: FAMILY MEDICINE

## 2017-07-07 PROCEDURE — 99000 SPECIMEN HANDLING OFFICE-LAB: CPT | Performed by: FAMILY MEDICINE

## 2017-07-07 PROCEDURE — 80321 ALCOHOLS BIOMARKERS 1OR 2: CPT | Mod: 90 | Performed by: FAMILY MEDICINE

## 2017-07-07 PROCEDURE — 80048 BASIC METABOLIC PNL TOTAL CA: CPT | Performed by: FAMILY MEDICINE

## 2017-07-07 PROCEDURE — 36415 COLL VENOUS BLD VENIPUNCTURE: CPT | Performed by: FAMILY MEDICINE

## 2017-07-07 PROCEDURE — 85027 COMPLETE CBC AUTOMATED: CPT | Performed by: FAMILY MEDICINE

## 2017-07-07 PROCEDURE — 80076 HEPATIC FUNCTION PANEL: CPT | Performed by: FAMILY MEDICINE

## 2017-07-07 NOTE — PROGRESS NOTES
Patient is a transplant patient and will be followed by the transplant team for follow up            DBD Liver (Transplant 1)                                                  Transplant surgery: Surgery (6/28/2017)

## 2017-07-08 LAB — ETHYL GLUCURONIDE UR QL: NORMAL

## 2017-07-10 ENCOUNTER — OFFICE VISIT (OUTPATIENT)
Dept: INFUSION THERAPY | Facility: CLINIC | Age: 27
End: 2017-07-10
Attending: TRANSPLANT SURGERY
Payer: COMMERCIAL

## 2017-07-10 ENCOUNTER — TELEPHONE (OUTPATIENT)
Dept: PHARMACY | Facility: CLINIC | Age: 27
End: 2017-07-10

## 2017-07-10 VITALS — WEIGHT: 220.4 LBS | RESPIRATION RATE: 18 BRPM | TEMPERATURE: 98.4 F | BODY MASS INDEX: 28.3 KG/M2

## 2017-07-10 DIAGNOSIS — G89.18 POST-OPERATIVE PAIN: ICD-10-CM

## 2017-07-10 DIAGNOSIS — R60.9 EDEMA, UNSPECIFIED TYPE: Primary | ICD-10-CM

## 2017-07-10 DIAGNOSIS — Z94.4 STATUS POST LIVER TRANSPLANTATION (H): Primary | ICD-10-CM

## 2017-07-10 LAB
ALBUMIN SERPL-MCNC: 2.6 G/DL (ref 3.4–5)
ALP SERPL-CCNC: 94 U/L (ref 40–150)
ALT SERPL W P-5'-P-CCNC: 36 U/L (ref 0–70)
ANION GAP SERPL CALCULATED.3IONS-SCNC: 7 MMOL/L (ref 3–14)
AST SERPL W P-5'-P-CCNC: 19 U/L (ref 0–45)
BASOPHILS # BLD AUTO: 0 10E9/L (ref 0–0.2)
BASOPHILS NFR BLD AUTO: 0.5 %
BILIRUB DIRECT SERPL-MCNC: 0.8 MG/DL (ref 0–0.2)
BILIRUB SERPL-MCNC: 1.6 MG/DL (ref 0.2–1.3)
BUN SERPL-MCNC: 41 MG/DL (ref 7–30)
CALCIUM SERPL-MCNC: 8.5 MG/DL (ref 8.5–10.1)
CHLORIDE SERPL-SCNC: 100 MMOL/L (ref 94–109)
CO2 SERPL-SCNC: 27 MMOL/L (ref 20–32)
CREAT SERPL-MCNC: 1.98 MG/DL (ref 0.66–1.25)
DIFFERENTIAL METHOD BLD: ABNORMAL
EOSINOPHIL # BLD AUTO: 0.1 10E9/L (ref 0–0.7)
EOSINOPHIL NFR BLD AUTO: 0.9 %
ERYTHROCYTE [DISTWIDTH] IN BLOOD BY AUTOMATED COUNT: 17.4 % (ref 10–15)
GFR SERPL CREATININE-BSD FRML MDRD: 40 ML/MIN/1.7M2
GLUCOSE SERPL-MCNC: 108 MG/DL (ref 70–99)
HCT VFR BLD AUTO: 27.2 % (ref 40–53)
HGB BLD-MCNC: 9.3 G/DL (ref 13.3–17.7)
IMM GRANULOCYTES # BLD: 0.1 10E9/L (ref 0–0.4)
IMM GRANULOCYTES NFR BLD: 1.1 %
LYMPHOCYTES # BLD AUTO: 1 10E9/L (ref 0.8–5.3)
LYMPHOCYTES NFR BLD AUTO: 12.2 %
MAGNESIUM SERPL-MCNC: 2 MG/DL (ref 1.6–2.3)
MCH RBC QN AUTO: 32.3 PG (ref 26.5–33)
MCHC RBC AUTO-ENTMCNC: 34.2 G/DL (ref 31.5–36.5)
MCV RBC AUTO: 94 FL (ref 78–100)
MONOCYTES # BLD AUTO: 0.8 10E9/L (ref 0–1.3)
MONOCYTES NFR BLD AUTO: 10.4 %
NEUTROPHILS # BLD AUTO: 5.9 10E9/L (ref 1.6–8.3)
NEUTROPHILS NFR BLD AUTO: 74.9 %
NRBC # BLD AUTO: 0 10*3/UL
NRBC BLD AUTO-RTO: 0 /100
PHOSPHATE SERPL-MCNC: 5.4 MG/DL (ref 2.5–4.5)
PLATELET # BLD AUTO: 151 10E9/L (ref 150–450)
POTASSIUM SERPL-SCNC: 5 MMOL/L (ref 3.4–5.3)
PROT SERPL-MCNC: 6.1 G/DL (ref 6.8–8.8)
RBC # BLD AUTO: 2.88 10E12/L (ref 4.4–5.9)
SODIUM SERPL-SCNC: 133 MMOL/L (ref 133–144)
TACROLIMUS BLD-MCNC: 18.3 UG/L (ref 5–15)
TME LAST DOSE: ABNORMAL H
WBC # BLD AUTO: 7.9 10E9/L (ref 4–11)

## 2017-07-10 PROCEDURE — 85025 COMPLETE CBC W/AUTO DIFF WBC: CPT | Performed by: TRANSPLANT SURGERY

## 2017-07-10 PROCEDURE — 84100 ASSAY OF PHOSPHORUS: CPT | Performed by: TRANSPLANT SURGERY

## 2017-07-10 PROCEDURE — 80197 ASSAY OF TACROLIMUS: CPT | Performed by: TRANSPLANT SURGERY

## 2017-07-10 PROCEDURE — 80048 BASIC METABOLIC PNL TOTAL CA: CPT | Performed by: TRANSPLANT SURGERY

## 2017-07-10 PROCEDURE — 36415 COLL VENOUS BLD VENIPUNCTURE: CPT | Performed by: TRANSPLANT SURGERY

## 2017-07-10 PROCEDURE — 99215 OFFICE O/P EST HI 40 MIN: CPT

## 2017-07-10 PROCEDURE — 80076 HEPATIC FUNCTION PANEL: CPT | Performed by: TRANSPLANT SURGERY

## 2017-07-10 PROCEDURE — 83735 ASSAY OF MAGNESIUM: CPT | Performed by: TRANSPLANT SURGERY

## 2017-07-10 RX ORDER — FUROSEMIDE 40 MG
40 TABLET ORAL 2 TIMES DAILY
Qty: 60 TABLET | Refills: 1 | Status: SHIPPED | OUTPATIENT
Start: 2017-07-10 | End: 2017-07-27

## 2017-07-10 RX ORDER — TRAMADOL HYDROCHLORIDE 50 MG/1
50-100 TABLET ORAL EVERY 6 HOURS PRN
Qty: 20 TABLET | Refills: 0 | Status: SHIPPED | OUTPATIENT
Start: 2017-07-10 | End: 2017-07-27

## 2017-07-10 NOTE — PATIENT INSTRUCTIONS
Dear Joe Sosa    Thank you for choosing [facility] ([facility]) for your transplant cares.  The following information is a summary of our appointment as well as important reminders.      Please make sure your phone is available today or tomorrow morning because your Transplant Coordinator Millie will call to update you with your anti-rejection drug levels and possibly make changes to your anti-rejection dosages.    Additional information:   1.) You may  your prescriptions and refills at the 1st floor pharmacy upon discharge today.   2.) Once your Oxycodone is gone, you may start taking Tramadol (Ultram) 50 mg tab up to every 6 hours as needed for pain. Supplement with recommended dose of Tylenol to help manage pain.   3.) PLEASE, PLEASE, PLEASE DRINK MORE WATER!!! It will help with your healing process, blood pressure, energy level, labs and drug levels. DRINK AT LEAST 8-10 WATER BOTTLES EVERY DAY!  4.) Anticipate having your staples removed in about a week in the Transplant Office. If you do not hear from Transplant Clinic scheduler first, you may contact them at [phone] and just follow the phone prompts.    5.) Increase your lasix (Furosemide) from 20 mg daily to 40 mg 2x/day. Your medication card has been updated. REMEMBER TO REMOVE THE 20 MG LASIX PILL FROM YOUR PILL BOX AND REPLACE IT WITH THE 40 MG PILL IN THE MORNING AND THE EVENING.      We look forward in seeing you on your next appointment here at [facility].  Please don t hesitate to call us at [phone] to reschedule any of your appointments or to speak with one of the [facility] registered nurses.  It was a pleasure taking care of you today.    Sincerely,  Vanesa Maddox RN  [facility]  [address] [place] [address]  [id number]  Phone:  [phone]

## 2017-07-10 NOTE — PROGRESS NOTES
"POST LIVER TRANSPLANT NUTRITION ASSESSMENT  Medical Nutrition Therapy    Visit Type: F/U Assessment    referred by Dr. Martin for MNT related to Liver Transplant 6/28/17    Patient accompanied by his mom, Therese    Nutrition Assessment:  Anthropometrics  Height: 74 in    BMI: 28.3       Weight: 220 lbs       IBW: 190 lbs  % IBW: 116  Adj/Dosing wt: 220 lbs/100 kg Wt hx: Wt during eval (w/o fluid per pt report) of 205 lbs 3/2017.        Nutrition Prescription  Energy:  5115-4125 kcal (25-30 kcal/kg)  Justification: post txp   Protein:  120-150 grams protein (1.2-1.5 g/kg)  Justification: increased needs post txp pending GFR         Fluid:   1ml/kcal or per MD        Diet Recall:  Pt reports improved appetite since d/c from hospital. He is tracking protein intake via my fitness pal, average of 120 g/day. Drinking 3 Ensure/day. Pt/mom report inpatient RD suggested parikh clean protein powder to purchase and make homemade protein shakes, yet that she had a list of \"extracts\" to avoid post transplant. This protein powder does have stevia leaf extract in it, which should not be a problem.     Yesterday:  B- pancakes and lala   L- 4 oz chix + 1 Ensure  D- 2 Ensure + ice cream  Vernell- water, milk, some OJ     Nutrition Diagnosis:  Predicted inadequate protein intake related to increased protein needs s/p transplant.    Nutrition Intervention/Recommendations:  1. Discussed importance of consuming adequate calories and protein for 2 months post-txp to help heal and reduce muscle/fat wasting. Discussed sources of protein and ways to ensure he is increasing his protein intake.  - Ok for protein powder vs Ensure. Aim for 120-150 g protein/day with kidney levels. Avoid going much >150 g/day. Also reviewed Fair Life milk (additional protein).     2. Reviewed importance of food safety and increased risk for food-borne illness post-txp. Also discussed potential need to monitor K+, CHO, and Na+ intakes d/t medication side effects. "   3. Instructed pt to avoid herbal supplements and alternative medicine therapies d/t interaction with immunosuppression and risk for rejection.     Nutrition Goals:  1. 120-150 g protein/day in acute post txp phase  2. Weight maintenance     Nutrition Follow Up / Monitorin. Ongoing PO intake adequacy  2. Weight trends     Patient has RD contact information to call/email if needed.  Time spent with patient: 15  minutes    Uma Machado RD, LD

## 2017-07-10 NOTE — MR AVS SNAPSHOT
After Visit Summary   7/10/2017    Joe Sosa    MRN: [id number]           Patient Information     Date Of Birth          1990        Visit Information        Provider Department      7/10/2017 12:00 PM Uma Machado RD [facility]        Today's Diagnoses     Status post liver transplantation (H)    -  1       Follow-ups after your visit        Your next 10 appointments already scheduled     Jul 10, 2017 12:00 PM CDT   NUTRITION VISIT with DESTINY [facility] Specialty and Procedure ([facility])    [address] [place] [address]  2nd Floor  [facility] [id number] [phone]            Sep 19, 2017 10:15 AM CDT   Lab with  LAB   [facility] Lab ([facility])    [address] [place] [address]  1st [facility] [id number] [phone]            Sep 19, 2017 11:10 AM CDT   (Arrive by 10:55 AM)   Return Liver Transplant with Andreina Templeton MD   [facility] Hepatology ([facility])    [address] [place] [address]  3rd [facility] [id number] [phone]              Who to contact     If you have questions or need follow up information about today's clinic visit or your schedule please contact [facility] SPECIALTY AND PROCEDURE directly at [phone].  Normal or non-critical lab and imaging results will be communicated to you by MyChart, letter or phone within 4 business days after the clinic has received the results. If you do not hear from us within 7 days, please contact the clinic through MyChart or phone. If you have a critical or abnormal lab result, we will notify you by phone as soon as possible.  Submit refill requests through [email or web address] or call your pharmacy and they will forward the refill request to us. Please allow 3 business days for your refill to be completed.          Additional  Information About Your Visit        [employer] Information     [employer] gives you secure access to your electronic health record. If you see a primary care provider, you can also send messages to your care team and make appointments. If you have questions, please call your primary care clinic.  If you do not have a primary care provider, please call [phone] and they will assist you.        Care EveryWhere ID     This is your Care EveryWhere ID. This could be used by other organizations to access your [place] medical records  [id number]         Blood Pressure from Last 3 Encounters:   07/06/17 130/87   06/20/17 124/75   04/18/17 117/75    Weight from Last 3 Encounters:   07/10/17 100 kg (220 lb 6.4 oz)   07/05/17 100.9 kg (222 lb 6.4 oz)   06/20/17 99.9 kg (220 lb 4.8 oz)              Today, you had the following     No orders found for display       Primary Care Provider Office Phone # Fax #    Jeffery Azul [phone]       [facility] [address] [place] [address]        Equal Access to Services     [facility]: Hadii aad ku hadasho Somaddyali, waaxda luqadaha, qaybta kaalmada adeegyada, vahe arguellon dejah eaton . So [facility] [phone].    ATENCIÓN: Si habla español, tiene a chun disposición servicios gratuitos de asistencia lingüística. Llame al [phone].    We comply with applicable federal civil rights laws and [place] laws. We do not discriminate on the basis of race, color, national origin, age, disability sex, sexual orientation or gender identity.            Thank you!     Thank you for choosing [facility] SPECIALTY AND PROCEDURE  for your care. Our goal is always to provide you with excellent care. Hearing back from our patients is one way we can continue to improve our services. Please take a few minutes to complete the written survey that you may receive in the mail after your visit with us. Thank you!              Your Updated Medication List - Protect others around you: Learn how to safely use, store and throw away your medicines at www.disposemymeds.org.          This list is accurate as of: 7/10/17 11:59 AM.  Always use your most recent med list.                   Brand Name Dispense Instructions for use Diagnosis    clotrimazole 10 MG Lozg lozenge    MYCELEX    90 each    Place 1 lozenge (1 Nona) inside cheek 3 times daily    Liver transplant recipient (H)       furosemide 20 MG tablet    LASIX    7 tablet    Take 1 tablet (20 mg) by mouth daily for 7 days    Liver transplant recipient (H)       multivitamin, therapeutic with minerals Tabs tablet     30 each    1 tablet by Per Feeding Tube route daily    Liver transplant recipient (H)       mycophenolate 250 MG capsule    CELLCEPT - GENERIC EQUIVALENT    240 capsule    Take 4 capsules (1,000 mg) by mouth 2 times daily    Liver transplant recipient (H)       omeprazole 20 MG CR capsule    priLOSEC     Take 20 mg by mouth daily    Vitamin D deficiency       oxyCODONE 5 MG IR tablet    ROXICODONE    40 tablet    Take 1-2 tablets (5-10 mg) by mouth every 6 hours as needed for moderate to severe pain    Liver transplant recipient (H)       senna-docusate 8.6-50 MG per tablet    SENOKOT-S;PERICOLACE    30 tablet    Take 2 tablets by mouth 2 times daily as needed for constipation    Liver transplant recipient (H)       sulfamethoxazole-trimethoprim 400-80 MG per tablet    BACTRIM/SEPTRA    30 tablet    Take 1 tablet by mouth daily    Liver transplant recipient (H)       tacrolimus 1 MG capsule    PROGRAF - GENERIC EQUIVALENT    180 capsule    Take 3 capsules (3 mg) by mouth 2 times daily    Liver transplant recipient (H)       ursodiol 300 MG capsule    ACTIGALL    60 capsule    Take 1 capsule (300 mg) by mouth 2 times daily    Liver transplant recipient (H)       valGANciclovir 450 MG tablet    VALCYTE    30 tablet    Take 1 tablet (450 mg) by mouth daily    Liver  transplant recipient (H)       vitamin D 25225 UNIT capsule    ERGOCALCIFEROL    8 capsule    Take 1 capsule (50,000 Units) by mouth once a week    Vitamin D deficiency

## 2017-07-10 NOTE — PROGRESS NOTES
"Joe Sosa came to [facility] today for a lab and assess following a  donor liver transplant on 17.      Discharge date: 17  Transplant coordinator: Millie Snider  Phone number patient can be reached at: Joe cell: [phone] may leave a message if pt not available      Physical Assessment:  See physical assessment located under \"Document Flowsheets\".  Incision site: txp incision to abd C/D/I and secured with staples; cleansed with Microklenz  Lines: LAM drain to right lower abd patent and draining serousanguinous fluid; approximately output of 280 ml daily  Major: n/a  Urine clarity: pt reports clear yellow urine without odor  Hydration: pt reported intake of almost 2 L liters daily; encouraged increased intake of water closer to 3 L daily  Nutrition: appetite and intake adequate, no N or V; eating 3 meals daily   Last BM: soft formed BM yesterday; encouraged to continue senna while taking narcotics  Pain: pt reported pain rated \"7-8/10\" during activity and position changes; typically becomes more pronounced in the evening; pain predominantly to LAM drain insertion site, rated as \"2/10\" consistently; managing with 2 tabs Oxycodone q 6 hours; encouraged to stretch out time between doses to q 8 hours if possible    Labs drawn by [facility] staff Yes at 0915; Prograf (3mg) last taken at 8pm last evening    Plan of care for today: lab and assessment, review of lab results, rounding by Hepatology, visit by Txp Coordinator, visit with Specialty Pharmacy, Dietician visit, removal of LAM drain by Joanie Handy, CNP    Medication changes: Once Oxycodone is gone, start taking Tramadol (Ultram) 50 mg tab up to every 6 hours as needed for pain; Increase lasix (Furosemide) from 20 mg daily to 40 mg 2x/day; pt encouraged to take senna daily, instead of PRN, while of narcotics     Medications administered: pt took all scheudled AM medications following lab draw      Patient education:    The following teaching topics were " addressed: Importance of drinking 2L of non-caffeinated fluids daily, Incisional care, Signs/symptoms of infection, Good handwashing, Medications (purposes, doses and times of administration), Phone numbers to call with concenrs (Transplant coordinator, Unit [address] and [facility]), 6D discharge check list, Plan of care and Drain care   Patient and Mother verbalized understanding and all questions answered.    Drug level:  TAC level to be reviewed by Transplant Coordinator, Millie Snider, who will call pt with updated orders. Patient was updated with this information and verbalized understanding.    Face to face time: 30 minutes  Discharge Plan    Pt will follow up with Txp Coordinator and [facility] q Monday and Thursday for lab draw. Per SETH Handy, anticipate staple removal in about one week; CNP to advise Transplant Clinic Nurse to contact pt to arrange future time.   Discharge instructions reviewed with patient: YES  Patient/Representative verbalized understanding, all questions answered: YES    Discharged from unit at 1315 with whom: mother to home.    Vanesa Maddox RN

## 2017-07-10 NOTE — MR AVS SNAPSHOT
After Visit Summary   7/10/2017    Joe Sosa    MRN: [id number]           Patient Information     Date Of Birth          1990        Visit Information        Provider Department      7/10/2017 9:00 AM  43 ATC;  [facility] Specialty and Procedure        Today's Diagnoses     Status post liver transplantation (H)    -  1      Care Instructions    Dear Joe Sosa    Thank you for choosing [facility] ([facility]) for your transplant cares.  The following information is a summary of our appointment as well as important reminders.      Please make sure your phone is available today or tomorrow morning because your Transplant Coordinator Millie will call to update you with your anti-rejection drug levels and possibly make changes to your anti-rejection dosages.    Additional information:   1.) You may  your prescriptions and refills at the 1st floor pharmacy upon discharge today.   2.) Once your Oxycodone is gone, you may start taking Tramadol (Ultram) 50 mg tab up to every 6 hours as needed for pain. Supplement with recommended dose of Tylenol to help manage pain.   3.) PLEASE, PLEASE, PLEASE DRINK MORE WATER!!! It will help with your healing process, blood pressure, energy level, labs and drug levels. DRINK AT LEAST 8-10 WATER BOTTLES EVERY DAY!  4.) Anticipate having your staples removed in about a week in the Transplant Office. If you do not hear from Transplant Clinic scheduler first, you may contact them at [phone] and just follow the phone prompts.    5.) Increase your lasix (Furosemide) from 20 mg daily to 40 mg 2x/day. Your medication card has been updated. REMEMBER TO REMOVE THE 20 MG LASIX PILL FROM YOUR PILL BOX AND REPLACE IT WITH THE 40 MG PILL IN THE MORNING AND THE EVENING.      We look forward in seeing you on your next appointment here at [facility].  Please don t hesitate to call  us at [phone] to reschedule any of your appointments or to speak with one of the [facility] registered nurses.  It was a pleasure taking care of you today.    Sincerely,  Vanesa Maddox RN  [facility]  Specialty Infusion & Procedure Center  [address] [place] [address]  [address]  Phone:  [phone]            Follow-ups after your visit        Your next 10 appointments already scheduled     Sep 19, 2017 10:15 AM CDT   Lab with  LAB   [facility] Lab ([facility])    [address] [place] [id number] [phone]            Sep 19, 2017 11:10 AM CDT   (Arrive by 10:55 AM)   Return Liver Transplant with Andreina Templeton MD   [facility] Hepatology ([facility])    [address] [place] [id number] [phone]              Who to contact     If you have questions or need follow up information about today's clinic visit or your schedule please contact [facility] SPECIAL[facility] PROCED[facility] directly at [phone].  Normal or non-critical lab and imaging results will be communicated to you by [employer], letter or phone within 4 business days after the clinic has received the results. If you do not hear from us within 7 days, please contact the clinic through [employer] or phone. If you have a critical or abnormal lab result, we will notify you by phone as soon as possible.  Submit refill requests through [employer] or call your pharmacy and they will forward the refill request to us. Please allow 3 business days for your refill to be completed.          Additional Information About Your Visit        [employer] Information     [employer] gives you secure access to your electronic health record. If you see a primary care provider, you can also send messages to your care team and make appointments. If you have questions, please call your primary care clinic.  If you do  not have a primary care provider, please call [phone] and they will assist you.        Care EveryWhere ID     This is your Care EveryWhere ID. This could be used by other organizations to access your [place] medical records  [id number]        Your Vitals Were     Temperature Respirations BMI (Body Mass Index)             98.4  F (36.9  C) (Oral) 18 28.3 kg/m2          Blood Pressure from Last 3 Encounters:   07/06/17 130/87   06/20/17 124/75   04/18/17 117/75    Weight from Last 3 Encounters:   07/10/17 100 kg (220 lb 6.4 oz)   07/05/17 100.9 kg (222 lb 6.4 oz)   06/20/17 99.9 kg (220 lb 4.8 oz)              We Performed the Following     Basic metabolic panel     CBC with platelets differential     Hepatic panel     Magnesium     Phosphorus     Tacrolimus level          Today's Medication Changes          These changes are accurate as of: 7/10/17 12:32 PM.  If you have any questions, ask your nurse or doctor.               Start taking these medicines.        Dose/Directions    traMADol 50 MG tablet   Commonly known as:  ULTRAM   Used for:  Post-operative pain   Started by:  Chino Martin MD        Dose:   mg   Take 1-2 tablets ( mg) by mouth every 6 hours as needed for pain maximum 6 tablet(s) per day   Quantity:  20 tablet   Refills:  0         These medicines have changed or have updated prescriptions.        Dose/Directions    * furosemide 20 MG tablet   Commonly known as:  LASIX   This may have changed:  Another medication with the same name was added. Make sure you understand how and when to take each.   Used for:  Liver transplant recipient (H)        Dose:  20 mg   Take 1 tablet (20 mg) by mouth daily for 7 days   Quantity:  7 tablet   Refills:  1       * furosemide 40 MG tablet   Commonly known as:  LASIX   This may have changed:  You were already taking a medication with the same name, and this prescription was added. Make sure you understand how and when to take each.   Used  for:  Edema, unspecified type   Changed by:  Chino Martin MD        Dose:  40 mg   Take 1 tablet (40 mg) by mouth 2 times daily   Quantity:  60 tablet   Refills:  1       * Notice:  This list has 2 medication(s) that are the same as other medications prescribed for you. Read the directions carefully, and ask your doctor or other care provider to review them with you.         Where to get your medicines      These medications were sent to [facility] [address] [facility] [address]  [address], [facility] [address]    Hours:  TRANSPLANT PHONE NUMBER [phone] Phone:  [phone]     furosemide 40 MG tablet         Some of these will need a paper prescription and others can be bought over the counter.  Ask your nurse if you have questions.     Bring a paper prescription for each of these medications     traMADol 50 MG tablet                Primary Care Provider Office Phone # Fax #    Jeffery Azul [phone]       [facility] [address] [place] [address]        Equal Access to Services     [facility]: Hadii hermilo ku hadasho Soomaali, waaxda luqadaha, qaybta kaalmada adefatuma, vahe lee. So [facility] [phone].    ATENCIÓN: Si habla español, tiene a chun disposición servicios gratuitos de asistencia lingüística. Luis al [phone].    We comply with applicable federal civil rights laws and [place] laws. We do not discriminate on the basis of race, color, national origin, age, disability sex, sexual orientation or gender identity.            Thank you!     Thank you for choosing [facility] SPECIALTY AND PROCEDURE  for your care. Our goal is always to provide you with excellent care. Hearing back from our patients is one way we can continue to improve our services. Please take a few minutes to complete the written survey that you may  receive in the mail after your visit with us. Thank you!             Your Updated Medication List - Protect others around you: Learn how to safely use, store and throw away your medicines at www.disposemymeds.org.          This list is accurate as of: 7/10/17 12:32 PM.  Always use your most recent med list.                   Brand Name Dispense Instructions for use Diagnosis    clotrimazole 10 MG Lozg lozenge    MYCELEX    90 each    Place 1 lozenge (1 Nona) inside cheek 3 times daily    Liver transplant recipient (H)       * furosemide 20 MG tablet    LASIX    7 tablet    Take 1 tablet (20 mg) by mouth daily for 7 days    Liver transplant recipient (H)       * furosemide 40 MG tablet    LASIX    60 tablet    Take 1 tablet (40 mg) by mouth 2 times daily    Edema, unspecified type       multivitamin, therapeutic with minerals Tabs tablet     30 each    1 tablet by Per Feeding Tube route daily    Liver transplant recipient (H)       mycophenolate 250 MG capsule    CELLCEPT - GENERIC EQUIVALENT    240 capsule    Take 4 capsules (1,000 mg) by mouth 2 times daily    Liver transplant recipient (H)       omeprazole 20 MG CR capsule    priLOSEC     Take 20 mg by mouth daily    Vitamin D deficiency       oxyCODONE 5 MG IR tablet    ROXICODONE    40 tablet    Take 1-2 tablets (5-10 mg) by mouth every 6 hours as needed for moderate to severe pain    Liver transplant recipient (H)       senna-docusate 8.6-50 MG per tablet    SENOKOT-S;PERICOLACE    30 tablet    Take 2 tablets by mouth 2 times daily as needed for constipation    Liver transplant recipient (H)       sulfamethoxazole-trimethoprim 400-80 MG per tablet    BACTRIM/SEPTRA    30 tablet    Take 1 tablet by mouth daily    Liver transplant recipient (H)       tacrolimus 1 MG capsule    PROGRAF - GENERIC EQUIVALENT    180 capsule    Take 3 capsules (3 mg) by mouth 2 times daily    Liver transplant recipient (H)       traMADol 50 MG tablet    ULTRAM    20 tablet    Take  1-2 tablets ( mg) by mouth every 6 hours as needed for pain maximum 6 tablet(s) per day    Post-operative pain       ursodiol 300 MG capsule    ACTIGALL    60 capsule    Take 1 capsule (300 mg) by mouth 2 times daily    Liver transplant recipient (H)       valGANciclovir 450 MG tablet    VALCYTE    30 tablet    Take 1 tablet (450 mg) by mouth daily    Liver transplant recipient (H)       vitamin D 00673 UNIT capsule    ERGOCALCIFEROL    8 capsule    Take 1 capsule (50,000 Units) by mouth once a week    Vitamin D deficiency       * Notice:  This list has 2 medication(s) that are the same as other medications prescribed for you. Read the directions carefully, and ask your doctor or other care provider to review them with you.

## 2017-07-10 NOTE — MR AVS SNAPSHOT
After Visit Summary   7/10/2017    Joe Sosa    MRN: [id number]           Patient Information     Date Of Birth          1990        Visit Information        Provider Department      7/10/2017 9:30 AM Chino Martin MD [facility] Specialty and Procedure        Today's Diagnoses     Edema, unspecified type    -  1    Post-operative pain           Follow-ups after your visit        Your next 10 appointments already scheduled     Jul 17, 2017 10:30 AM CDT   Lab with UC LAB   [facility] ([facility])    [address] [place] [id number]   [phone]            Jul 17, 2017 11:30 AM CDT   (Arrive by 11:15 AM)   Liver Return Post Op with Chino Martin MD   [facility] Transplant ([facility])    [address] [place] [id number] [phone]            Sep 19, 2017 10:15 AM CDT   Lab with UC LAB   [facility] ([facility])    [address] [place] [id number] [phone]            Sep 19, 2017 11:10 AM CDT   (Arrive by 10:55 AM)   Return Liver Transplant with Andreina Templeton MD   [facility] Hepatology ([facility])    [address] [place] [id number] [phone]              Who to contact     If you have questions or need follow up information about today's clinic visit or your schedule please contact [facility] SPECIALTY AND PROCEDURE directly at [phone].  Normal or non-critical lab and imaging results will be communicated to you by MyChart, letter or phone within 4 business days after the clinic has received the results. If you do not hear from us within 7 days, please contact the clinic through MyChart or phone. If you have a critical or abnormal lab result, we will notify you by phone as soon as  possible.  Submit refill requests through [employer] or call your pharmacy and they will forward the refill request to us. Please allow 3 business days for your refill to be completed.          Additional Information About Your Visit        [employer] Information     [employer] gives you secure access to your electronic health record. If you see a primary care provider, you can also send messages to your care team and make appointments. If you have questions, please call your primary care clinic.  If you do not have a primary care provider, please call [phone] and they will assist you.        Care EveryWhere ID     This is your Care EveryWhere ID. This could be used by other organizations to access your [place] medical records  [id number]         Blood Pressure from Last 3 Encounters:   07/06/17 130/87   06/20/17 124/75   04/18/17 117/75    Weight from Last 3 Encounters:   07/10/17 100 kg (220 lb 6.4 oz)   07/05/17 100.9 kg (222 lb 6.4 oz)   06/20/17 99.9 kg (220 lb 4.8 oz)              Today, you had the following     No orders found for display         Today's Medication Changes          These changes are accurate as of: 7/10/17 11:59 PM.  If you have any questions, ask your nurse or doctor.               Start taking these medicines.        Dose/Directions    traMADol 50 MG tablet   Commonly known as:  ULTRAM   Used for:  Post-operative pain   Started by:  Chino Martin MD        Dose:   mg   Take 1-2 tablets ( mg) by mouth every 6 hours as needed for pain maximum 6 tablet(s) per day   Quantity:  20 tablet   Refills:  0         These medicines have changed or have updated prescriptions.        Dose/Directions    * furosemide 20 MG tablet   Commonly known as:  LASIX   This may have changed:  Another medication with the same name was added. Make sure you understand how and when to take each.   Used for:  Liver transplant recipient (H)        Dose:  20 mg   Take 1 tablet (20 mg) by mouth daily for  7 days   Quantity:  7 tablet   Refills:  1       * furosemide 40 MG tablet   Commonly known as:  LASIX   This may have changed:  You were already taking a medication with the same name, and this prescription was added. Make sure you understand how and when to take each.   Used for:  Edema, unspecified type   Changed by:  Chino Martin MD        Dose:  40 mg   Take 1 tablet (40 mg) by mouth 2 times daily   Quantity:  60 tablet   Refills:  1       * Notice:  This list has 2 medication(s) that are the same as other medications prescribed for you. Read the directions carefully, and ask your doctor or other care provider to review them with you.         Where to get your medicines      These medications were sent to [address] [place] [address] [address] [place] [id number]    Hours:  TRANSPLANT PHONE NUMBER [phone] Phone:  [phone]     furosemide 40 MG tablet         Some of these will need a paper prescription and others can be bought over the counter.  Ask your nurse if you have questions.     Bring a paper prescription for each of these medications     traMADol 50 MG tablet                Primary Care Provider Office Phone # Fax #    Jeffery Azul [phone]       [facility] [address] [place] [address]        Equal Access to Services     JENAE VAUGHN AH: Hadii hermilo turner hadasho Somerline, waaxda luqadaha, qaybta kaalmada adeegyada, vahe mendieta hayelijah lee. So [facility] [phone].    ATENCIÓN: Si habla español, tiene a chun disposición servicios gratuitos de asistencia lingüística. Llame al [phone].    We comply with applicable federal civil rights laws and [place] laws. We do not discriminate on the basis of race, color, national origin, age, disability sex, sexual orientation or gender identity.            Thank you!     Thank you for choosing [facility] SPECIALTY AND PROCEDURE  for your care. Our goal is always to provide you with excellent care. Hearing back from our patients is one way we can continue to improve our services. Please take a few minutes to complete the written survey that you may receive in the mail after your visit with us. Thank you!             Your Updated Medication List - Protect others around you: Learn how to safely use, store and throw away your medicines at www.disposemymeds.org.          This list is accurate as of: 7/10/17 11:59 PM.  Always use your most recent med list.                   Brand Name Dispense Instructions for use Diagnosis    clotrimazole 10 MG Lozg lozenge    MYCELEX    90 each    Place 1 lozenge (1 Nona) inside cheek 3 times daily    Liver transplant recipient (H)       * furosemide 20 MG tablet    LASIX    7 tablet    Take 1 tablet (20 mg) by mouth daily for 7 days    Liver transplant recipient (H)       * furosemide 40 MG tablet    LASIX    60 tablet    Take 1 tablet (40 mg) by mouth 2 times daily    Edema, unspecified type       multivitamin, therapeutic with minerals Tabs tablet     30 each    1 tablet by Per Feeding Tube route daily    Liver transplant recipient (H)       mycophenolate 250 MG capsule    CELLCEPT - GENERIC EQUIVALENT    240 capsule    Take 4 capsules (1,000 mg) by mouth 2 times daily    Liver transplant recipient (H)       omeprazole 20 MG CR capsule    priLOSEC     Take 20 mg by mouth daily    Vitamin D deficiency       senna-docusate 8.6-50 MG per tablet    SENOKOT-S;PERICOLACE    30 tablet    Take 2 tablets by mouth 2 times daily as needed for constipation    Liver transplant recipient (H)       sulfamethoxazole-trimethoprim 400-80 MG per tablet    BACTRIM/SEPTRA    30 tablet    Take 1 tablet by mouth daily    Liver transplant recipient (H)       traMADol 50 MG tablet    ULTRAM    20 tablet    Take 1-2 tablets ( mg) by mouth every 6 hours as needed for pain  maximum 6 tablet(s) per day    Post-operative pain       ursodiol 300 MG capsule    ACTIGALL    60 capsule    Take 1 capsule (300 mg) by mouth 2 times daily    Liver transplant recipient (H)       valGANciclovir 450 MG tablet    VALCYTE    30 tablet    Take 1 tablet (450 mg) by mouth daily    Liver transplant recipient (H)       vitamin D 69506 UNIT capsule    ERGOCALCIFEROL    8 capsule    Take 1 capsule (50,000 Units) by mouth once a week    Vitamin D deficiency       * Notice:  This list has 2 medication(s) that are the same as other medications prescribed for you. Read the directions carefully, and ask your doctor or other care provider to review them with you.

## 2017-07-11 ENCOUNTER — TELEPHONE (OUTPATIENT)
Dept: TRANSPLANT | Facility: CLINIC | Age: 27
End: 2017-07-11

## 2017-07-11 DIAGNOSIS — Z94.4 LIVER TRANSPLANT RECIPIENT (H): ICD-10-CM

## 2017-07-11 RX ORDER — OXYCODONE HYDROCHLORIDE 5 MG/1
5 TABLET ORAL EVERY 8 HOURS PRN
Qty: 25 TABLET | Refills: 0 | Status: SHIPPED | OUTPATIENT
Start: 2017-07-11 | End: 2017-08-10

## 2017-07-11 RX ORDER — TACROLIMUS 1 MG/1
2 CAPSULE ORAL EVERY 12 HOURS
Qty: 120 CAPSULE | Refills: 11 | Status: SHIPPED | OUTPATIENT
Start: 2017-07-11 | End: 2017-07-18

## 2017-07-11 NOTE — NURSING NOTE
Here today for post liver transplant follow-up.  Accompanied by mom, Therese.     Has lab book, knows how to obtain and record labs.  Reviewed lab results and assisted with interpretation / understanding.    Appetite fair.  Encouraged small, frequent meals.    Activity:  Increasing activity.  Reminded of lifting restriction, encouraged to increase activity as tolerated.    Pain management:  Using oxycodone.    Staples: present    Biliary stent: placed at the time of transplant    Lab frequency:  2x/week    Current Immunosuppression: Prograf 3 mg po bid, Cellcept 1000 mg po bid,  Doing well with medications.    Med changes:  No change in immunosuppression.   All changes documented on med card.    Homecare:  Started on Sat 7/08/17    Recommended follow-up:  1.)  PCP   Knows to make an appt,  Has a f/u appt with his counsellor  2.)  Surgeon:   RTC to see  next Monday   3.)  Hepatology  Has previously made appt with  in Sept.       Has set up his medication box, doing well with this.  I have addressed all questions.  Patient has my contact information and knows how to contact afterhours for assistance / questions.

## 2017-07-11 NOTE — TELEPHONE ENCOUNTER
Per message from [facility] PT who saw pt, Josiah has been having pain, has found that the tramadol has not been as effective for pain.    Spoke with Josiah,  He tried using tramadol only, this didn't relieve especially with moving around, walking etc.  Today he took an oxycodone and tramadol together, this worked much better.  He requests to continue with oxycodone for short while, knows that he will be reducing his use.  Reviewed tacrolimus level done yesterday, 18.3.  Instructed Josiah to decrease tacrolimus dose to 2mg every 12hours.  Plan for labs to be repeated with homecare on Thursday.  Informed Josiah that  would be informed about his pain, will let him know recommendations.    Per , okay for pt to be given prescription for oxycodone.

## 2017-07-13 DIAGNOSIS — Z94.4 LIVER REPLACED BY TRANSPLANT (H): ICD-10-CM

## 2017-07-13 LAB
ALBUMIN SERPL-MCNC: 2.7 G/DL (ref 3.4–5)
ALP SERPL-CCNC: 93 U/L (ref 40–150)
ALT SERPL W P-5'-P-CCNC: 26 U/L (ref 0–70)
ANION GAP SERPL CALCULATED.3IONS-SCNC: 10 MMOL/L (ref 3–14)
AST SERPL W P-5'-P-CCNC: 16 U/L (ref 0–45)
BILIRUB DIRECT SERPL-MCNC: 0.6 MG/DL (ref 0–0.2)
BILIRUB SERPL-MCNC: 1.2 MG/DL (ref 0.2–1.3)
BUN SERPL-MCNC: 42 MG/DL (ref 7–30)
CALCIUM SERPL-MCNC: 9 MG/DL (ref 8.5–10.1)
CHLORIDE SERPL-SCNC: 98 MMOL/L (ref 94–109)
CO2 SERPL-SCNC: 27 MMOL/L (ref 20–32)
CREAT SERPL-MCNC: 1.8 MG/DL (ref 0.66–1.25)
ERYTHROCYTE [DISTWIDTH] IN BLOOD BY AUTOMATED COUNT: 16.5 % (ref 10–15)
GFR SERPL CREATININE-BSD FRML MDRD: 45 ML/MIN/1.7M2
GLUCOSE SERPL-MCNC: 123 MG/DL (ref 70–99)
HCT VFR BLD AUTO: 26.5 % (ref 40–53)
HGB BLD-MCNC: 9.1 G/DL (ref 13.3–17.7)
MAGNESIUM SERPL-MCNC: 1.7 MG/DL (ref 1.6–2.3)
MCH RBC QN AUTO: 32.4 PG (ref 26.5–33)
MCHC RBC AUTO-ENTMCNC: 34.3 G/DL (ref 31.5–36.5)
MCV RBC AUTO: 94 FL (ref 78–100)
PHOSPHATE SERPL-MCNC: 5.6 MG/DL (ref 2.5–4.5)
PLATELET # BLD AUTO: 235 10E9/L (ref 150–450)
POTASSIUM SERPL-SCNC: 4.8 MMOL/L (ref 3.4–5.3)
PROT SERPL-MCNC: 6.4 G/DL (ref 6.8–8.8)
RBC # BLD AUTO: 2.81 10E12/L (ref 4.4–5.9)
SODIUM SERPL-SCNC: 135 MMOL/L (ref 133–144)
TACROLIMUS BLD-MCNC: 11.5 UG/L (ref 5–15)
TME LAST DOSE: NORMAL H
WBC # BLD AUTO: 6.4 10E9/L (ref 4–11)

## 2017-07-13 PROCEDURE — 84100 ASSAY OF PHOSPHORUS: CPT | Performed by: FAMILY MEDICINE

## 2017-07-13 PROCEDURE — 36415 COLL VENOUS BLD VENIPUNCTURE: CPT | Performed by: FAMILY MEDICINE

## 2017-07-13 PROCEDURE — 83735 ASSAY OF MAGNESIUM: CPT | Performed by: FAMILY MEDICINE

## 2017-07-13 PROCEDURE — 80197 ASSAY OF TACROLIMUS: CPT | Performed by: FAMILY MEDICINE

## 2017-07-13 PROCEDURE — 80076 HEPATIC FUNCTION PANEL: CPT | Performed by: FAMILY MEDICINE

## 2017-07-13 PROCEDURE — 80048 BASIC METABOLIC PNL TOTAL CA: CPT | Performed by: FAMILY MEDICINE

## 2017-07-13 PROCEDURE — 85027 COMPLETE CBC AUTOMATED: CPT | Performed by: FAMILY MEDICINE

## 2017-07-14 NOTE — PROGRESS NOTES
Immunosuppression Note:    Joe Sosa is a 27 year old male who is seen today  for immunosuppression management     IChino MD, I have examined the patient with the resident/PA/Fellow, discussed and agree with the note and findings.  I have reviewed today's vital signs, medications, labs and imaging. I reviewed the immunosuppression medications and levels. I spoke to the patient/family and explained below clinical details and answered all the questions      Transplant Surgery -OUTPATIENT IMMUNOSUPPRESSION PROGRESS NOTE    Date of Visit: 07/10/2017    Transplants:  6/28/2017 (Liver); Postoperative day:  16  ASSESMENT AND PLAN:   1.Graft Function: Functioning well  2.Immunosuppression Management:no change  3.Hypertension:well controlled  4.Renal Function: cr stable.  5.Lab frequency:twice weekly  6.Other:pain:  Add ultram 50mg every 6 hours for pain control  Edema:  Lasix 40mg BID. Elevate legs.  Drain:  Keep area clean.  Cover if needed.        Date: July 14, 2017    Transplant:  [x]                             Liver [x]                              Kidney []                             Pancreas []                              Other:             Chief Complaint:No chief complaint on file.    History of Present Illness: 27 year old male with PMH significant for ESLD secondary to EtOH/alpha 1 antitrypsin now s/p DD OLT on 6/28/17 with Dr. Martin. ESLD complicated by hepatic encephalopathy, ascites (paracentesis x 1-2x), SBP x 1 episode, chronic hyponatremia and anasarca.      Patient is here to follow-up in [facility] after transplant.  Patient complains of poor pain control.  Also states he is worried about taking the oxycodone too frequently.    He also notes BLE edema.  He states it is very uncomfortable.        Patient Active Problem List   Diagnosis     Alpha-1-antitrypsin deficiency (H)     Alcoholic cirrhosis of liver with ascites (H)     Vitamin D deficiency     Hepatic encephalopathy (H)      Alcoholism (H)     End stage liver disease (H)     Liver transplant recipient (H)     S/P liver transplant (H)     Immunosuppressed status (H)     SOCIAL /FAMILY HISTORY: [x]                  No recent change    Past Medical History:   Diagnosis Date     Alcoholic cirrhosis of liver with ascites (H) 3/9/2017     Alpha-1-antitrypsin deficiency (H) 3/9/2017     Anasarca      Chronic hyponatremia      Hepatic encephalopathy (H)      SBP (spontaneous bacterial peritonitis) (H)      Past Surgical History:   Procedure Laterality Date     TRANSPLANT LIVER RECIPIENT  DONOR N/A 2017    Procedure: TRANSPLANT LIVER RECIPIENT  DONOR;  TRANSPLANT LIVER RECIPIENT  DONOR with bile duct stenting;  Surgeon: Chino Martin MD;  Location:  OR     Social History     Social History     Marital status: Single     Spouse name: N/A     Number of children: N/A     Years of education: N/A     Occupational History     Not on file.     Social History Main Topics     Smoking status: Never Smoker     Smokeless tobacco: Former User     Types: Chew     Alcohol use No      Comment: Quit 2016     Drug use: No     Sexual activity: Not on file     Other Topics Concern     Not on file     Social History Narrative     Prescription Medications as of 2017             tacrolimus (PROGRAF - GENERIC EQUIVALENT) 1 MG capsule Take 2 capsules (2 mg) by mouth every 12 hours    oxyCODONE (ROXICODONE) 5 MG IR tablet Take 1 tablet (5 mg) by mouth every 8 hours as needed for moderate to severe pain    furosemide (LASIX) 40 MG tablet Take 1 tablet (40 mg) by mouth 2 times daily    traMADol (ULTRAM) 50 MG tablet Take 1-2 tablets ( mg) by mouth every 6 hours as needed for pain maximum 6 tablet(s) per day    valGANciclovir (VALCYTE) 450 MG tablet Take 1 tablet (450 mg) by mouth daily    mycophenolate (CELLCEPT - GENERIC EQUIVALENT) 250 MG capsule Take 4 capsules (1,000 mg) by mouth 2 times daily    senna-docusate  (SENOKOT-S;PERICOLACE) 8.6-50 MG per tablet Take 2 tablets by mouth 2 times daily as needed for constipation    sulfamethoxazole-trimethoprim (BACTRIM/SEPTRA) 400-80 MG per tablet Take 1 tablet by mouth daily    ursodiol (ACTIGALL) 300 MG capsule Take 1 capsule (300 mg) by mouth 2 times daily    clotrimazole (MYCELEX) 10 MG LOZG lozenge Place 1 lozenge (1 Nona) inside cheek 3 times daily    multivitamin, therapeutic with minerals (THERA-VIT-M) TABS tablet 1 tablet by Per Feeding Tube route daily    furosemide (LASIX) 20 MG tablet Take 1 tablet (20 mg) by mouth daily for 7 days    vitamin D (ERGOCALCIFEROL) 08055 UNIT capsule Take 1 capsule (50,000 Units) by mouth once a week    omeprazole (PRILOSEC) 20 MG CR capsule Take 20 mg by mouth daily         Review of patient's allergies indicates no known allergies.   REVIEW OF SYSTEMS (check box if normal)  [x]               GENERAL  [x]                 PULMONARY [x]                GENITOURINARY  [x]                CNS                 [x]                 CARDIAC  [x]                 ENDOCRINE  [x]                EARS,NOSE,THROAT [x]                 GASTROINTESTINAL [x]                 NEUROLOGIC    [x]                MUSCLOSKELTAL  [x]                  HEMATOLOGY      PHYSICAL EXAM (check box if normal)There were no vitals taken for this visit.        [x]            GENERAL:    [x]       EYES:  ICTERIC   []        YES  []                    NO  [x]           EXTREMITIES: Clubbing []       Y     [x]           N    [x]           EARS, NOSE, THROAT: Membranes Moist    YES   [x]                   NO []                  [x]           LUNGS:  CLEAR    YES       [x]                  NO    []                                [x]           SKIN: Jaundice           YES       []                  NO    [x]                   Rash: YES       []                  NO    [x]                                     [x]             HEART: Regular Rate          YES       [x]                  NO     []                   Incision Clean:  YES       [x]                  NO    []                                [x]                    ABDOMEN: Organomegaly YES       []                  NO    [x]                       [x]                    NEUROLOGICAL:  Nonfocal  YES       [x]                  NO    []                       [x]                    Hernia YES       []                  NO    [x]                   PSYCHIATRIC:  Appropriate  YES       [x]                  NO    []                       OTHER: Drain removed and stitch placed per surgeon.  BLE edema + 3                                                                                       PAIN SCALE:: 5

## 2017-07-17 ENCOUNTER — OFFICE VISIT (OUTPATIENT)
Dept: TRANSPLANT | Facility: CLINIC | Age: 27
End: 2017-07-17
Attending: TRANSPLANT SURGERY
Payer: COMMERCIAL

## 2017-07-17 VITALS
RESPIRATION RATE: 16 BRPM | SYSTOLIC BLOOD PRESSURE: 132 MMHG | WEIGHT: 209.2 LBS | OXYGEN SATURATION: 98 % | BODY MASS INDEX: 26.85 KG/M2 | HEART RATE: 95 BPM | DIASTOLIC BLOOD PRESSURE: 79 MMHG | TEMPERATURE: 98.2 F | HEIGHT: 74 IN

## 2017-07-17 DIAGNOSIS — Z94.4 LIVER REPLACED BY TRANSPLANT (H): ICD-10-CM

## 2017-07-17 DIAGNOSIS — Z94.4 LIVER TRANSPLANT RECIPIENT (H): Primary | ICD-10-CM

## 2017-07-17 LAB
ALBUMIN SERPL-MCNC: 2.9 G/DL (ref 3.4–5)
ALP SERPL-CCNC: 86 U/L (ref 40–150)
ALT SERPL W P-5'-P-CCNC: 18 U/L (ref 0–70)
ANION GAP SERPL CALCULATED.3IONS-SCNC: 8 MMOL/L (ref 3–14)
AST SERPL W P-5'-P-CCNC: 12 U/L (ref 0–45)
BILIRUB DIRECT SERPL-MCNC: 0.6 MG/DL (ref 0–0.2)
BILIRUB SERPL-MCNC: 1 MG/DL (ref 0.2–1.3)
BUN SERPL-MCNC: 34 MG/DL (ref 7–30)
CALCIUM SERPL-MCNC: 9.2 MG/DL (ref 8.5–10.1)
CHLORIDE SERPL-SCNC: 102 MMOL/L (ref 94–109)
CO2 SERPL-SCNC: 26 MMOL/L (ref 20–32)
CREAT SERPL-MCNC: 1.48 MG/DL (ref 0.66–1.25)
ERYTHROCYTE [DISTWIDTH] IN BLOOD BY AUTOMATED COUNT: 16.6 % (ref 10–15)
GFR SERPL CREATININE-BSD FRML MDRD: 56 ML/MIN/1.7M2
GLUCOSE SERPL-MCNC: 172 MG/DL (ref 70–99)
HCT VFR BLD AUTO: 27.3 % (ref 40–53)
HGB BLD-MCNC: 8.9 G/DL (ref 13.3–17.7)
MAGNESIUM SERPL-MCNC: 1.7 MG/DL (ref 1.6–2.3)
MCH RBC QN AUTO: 31.2 PG (ref 26.5–33)
MCHC RBC AUTO-ENTMCNC: 32.6 G/DL (ref 31.5–36.5)
MCV RBC AUTO: 96 FL (ref 78–100)
PHOSPHATE SERPL-MCNC: 4.2 MG/DL (ref 2.5–4.5)
PLATELET # BLD AUTO: 174 10E9/L (ref 150–450)
POTASSIUM SERPL-SCNC: 4.5 MMOL/L (ref 3.4–5.3)
PROT SERPL-MCNC: 6.9 G/DL (ref 6.8–8.8)
RBC # BLD AUTO: 2.85 10E12/L (ref 4.4–5.9)
SODIUM SERPL-SCNC: 136 MMOL/L (ref 133–144)
TACROLIMUS BLD-MCNC: 6.2 UG/L (ref 5–15)
TME LAST DOSE: NORMAL H
WBC # BLD AUTO: 5.2 10E9/L (ref 4–11)

## 2017-07-17 PROCEDURE — 80197 ASSAY OF TACROLIMUS: CPT | Performed by: TRANSPLANT SURGERY

## 2017-07-17 PROCEDURE — 83735 ASSAY OF MAGNESIUM: CPT | Performed by: TRANSPLANT SURGERY

## 2017-07-17 PROCEDURE — 80048 BASIC METABOLIC PNL TOTAL CA: CPT | Performed by: TRANSPLANT SURGERY

## 2017-07-17 PROCEDURE — 85027 COMPLETE CBC AUTOMATED: CPT | Performed by: TRANSPLANT SURGERY

## 2017-07-17 PROCEDURE — 99211 OFF/OP EST MAY X REQ PHY/QHP: CPT

## 2017-07-17 PROCEDURE — 36415 COLL VENOUS BLD VENIPUNCTURE: CPT | Performed by: TRANSPLANT SURGERY

## 2017-07-17 PROCEDURE — 84100 ASSAY OF PHOSPHORUS: CPT | Performed by: TRANSPLANT SURGERY

## 2017-07-17 PROCEDURE — 80076 HEPATIC FUNCTION PANEL: CPT | Performed by: TRANSPLANT SURGERY

## 2017-07-17 RX ORDER — TRAMADOL HYDROCHLORIDE 50 MG/1
50 TABLET ORAL EVERY 6 HOURS PRN
Qty: 60 TABLET | Refills: 0 | Status: SHIPPED | OUTPATIENT
Start: 2017-07-17 | End: 2017-07-27

## 2017-07-17 RX ORDER — CYCLOBENZAPRINE HCL 5 MG
5 TABLET ORAL 3 TIMES DAILY PRN
Qty: 42 TABLET | Refills: 1 | Status: SHIPPED | OUTPATIENT
Start: 2017-07-17 | End: 2017-12-15

## 2017-07-17 NOTE — MR AVS SNAPSHOT
After Visit Summary   7/17/2017    Joe Sosa    MRN: [id number]           Patient Information     Date Of Birth          1990        Visit Information        Provider Department      7/17/2017 11:30 AM Cihno Martin MD [facility] Solid Organ Transplant        Today's Diagnoses     Liver transplant recipient (H)    -  1       Follow-ups after your visit        Your next 10 appointments already scheduled     Jul 24, 2017  9:15 AM CDT   LAB with  LAB   [facility] Lab ([facility])    [address] [place] [address] [id number] [phone]           Patient must bring picture ID.  Patient should be prepared to give a urine specimen  Please do not eat 10-12 hours before your appointment if you are coming in fasting for labs on lipids, cholesterol, or glucose (sugar).  Pregnant women should follow their Care Team instructions. Water with medications is okay. Do not drink coffee or other fluids.   If you have concerns about taking  your medications, please ask at office or if scheduling via [email or web address], send a message by clicking on Secure Messaging, Message Your Care Team.            Jul 24, 2017 10:10 AM CDT   (Arrive by 9:55 AM)   Liver Return Post Op with Chino Martin MD   [facility] Solid Organ Transplant ([facility])    [address] [place] [id number] [phone]            Sep 19, 2017 10:15 AM CDT   Lab with  LAB   [facility] ([facility])    [address] [place] [address] [id number] [phone]            Sep 19, 2017 11:10 AM CDT   (Arrive by 10:55 AM)   Return Liver Transplant with Andreina Templeton MD   [facility] Hepatology ([facility])    [address] [place] [id number] [phone]              Who to contact     If you have questions or need follow up information about  "today's clinic visit or your schedule please contact [facility] SOLID ORGAN TRANSPLANT directly at [phone].  Normal or non-critical lab and imaging results will be communicated to you by [employer], letter or phone within 4 business days after the clinic has received the results. If you do not hear from us within 7 days, please contact the clinic through dooyoot or phone. If you have a critical or abnormal lab result, we will notify you by phone as soon as possible.  Submit refill requests through [employer] or call your pharmacy and they will forward the refill request to us. Please allow 3 business days for your refill to be completed.          Additional Information About Your Visit        [employer] Information     [employer] gives you secure access to your electronic health record. If you see a primary care provider, you can also send messages to your care team and make appointments. If you have questions, please call your primary care clinic.  If you do not have a primary care provider, please call [phone] and they will assist you.        Care EveryWhere ID     This is your Care EveryWhere ID. This could be used by other organizations to access your [facility] medical records  [id number]        Your Vitals Were     Pulse Temperature Respirations Height Pulse Oximetry BMI (Body Mass Index)    95 98.2  F (36.8  C) (Oral) 16 1.88 m (6' 2\") 98% 26.86 kg/m2       Blood Pressure from Last 3 Encounters:   07/17/17 132/79   07/06/17 130/87   06/20/17 124/75    Weight from Last 3 Encounters:   07/17/17 94.9 kg (209 lb 3.2 oz)   07/10/17 100 kg (220 lb 6.4 oz)   07/05/17 100.9 kg (222 lb 6.4 oz)              Today, you had the following     No orders found for display         Today's Medication Changes          These changes are accurate as of: 7/17/17 11:51 AM.  If you have any questions, ask your nurse or doctor.               Start taking these medicines.        Dose/Directions    cyclobenzaprine 5 MG tablet   Commonly " known as:  FLEXERIL   Used for:  Liver transplant recipient (H)   Started by:  Chino Martin MD        Dose:  5 mg   Take 1 tablet (5 mg) by mouth 3 times daily as needed for muscle spasms   Quantity:  42 tablet   Refills:  1         These medicines have changed or have updated prescriptions.        Dose/Directions    * traMADol 50 MG tablet   Commonly known as:  ULTRAM   This may have changed:  Another medication with the same name was added. Make sure you understand how and when to take each.   Used for:  Post-operative pain   Changed by:  Chino Martin MD        Dose:   mg   Take 1-2 tablets ( mg) by mouth every 6 hours as needed for pain maximum 6 tablet(s) per day   Quantity:  20 tablet   Refills:  0       * traMADol 50 MG tablet   Commonly known as:  ULTRAM   This may have changed:  You were already taking a medication with the same name, and this prescription was added. Make sure you understand how and when to take each.   Used for:  Liver transplant recipient (H)   Changed by:  Chino Martin MD        Dose:  50 mg   Take 1 tablet (50 mg) by mouth every 6 hours as needed for pain maximum 3  tablet(s) per day   Quantity:  60 tablet   Refills:  0       * Notice:  This list has 2 medication(s) that are the same as other medications prescribed for you. Read the directions carefully, and ask your doctor or other care provider to review them with you.         Where to get your medicines      These medications were sent to [address] [address]    Hours:  TRANSPLANT PHONE NUMBER [phone] Phone:  [phone]     cyclobenzaprine 5 MG tablet         Some of these will need a paper prescription and others can be bought over the counter.  Ask your nurse if you have questions.     Bring a paper prescription for each of these medications     traMADol 50 MG tablet                 Primary Care Provider Office Phone # Fax #    Jeffery Azul [phone]       [facility] [address] [place] [address]        Equal Access to Services     JENAE VAUGHN : Hadii aad ku hadluiso Soomaali, waaxda luqadaha, qaybta kaalmada adeegyada, waxobdulio boogiein saundran adeangelina gonzales laRhiannaelijah lee. So [facility] [phone].    ATENCIÓN: Si habla español, tiene a chun disposición servicios gratuitos de asistencia lingüística. Llame al [phone].    We comply with applicable federal civil rights laws and [place] laws. We do not discriminate on the basis of race, color, national origin, age, disability sex, sexual orientation or gender identity.            Thank you!     Thank you for choosing [facility] SOLID ORGAN TRANSPLANT  for your care. Our goal is always to provide you with excellent care. Hearing back from our patients is one way we can continue to improve our services. Please take a few minutes to complete the written survey that you may receive in the mail after your visit with us. Thank you!             Your Updated Medication List - Protect others around you: Learn how to safely use, store and throw away your medicines at www.disposemymeds.org.          This list is accurate as of: 7/17/17 11:51 AM.  Always use your most recent med list.                   Brand Name Dispense Instructions for use Diagnosis    clotrimazole 10 MG Lozg lozenge    MYCELEX    90 each    Place 1 lozenge (1 Nona) inside cheek 3 times daily    Liver transplant recipient (H)       cyclobenzaprine 5 MG tablet    FLEXERIL    42 tablet    Take 1 tablet (5 mg) by mouth 3 times daily as needed for muscle spasms    Liver transplant recipient (H)       * furosemide 20 MG tablet    LASIX    7 tablet    Take 1 tablet (20 mg) by mouth daily for 7 days    Liver transplant recipient (H)       * furosemide 40 MG tablet    LASIX    60 tablet    Take 1 tablet (40 mg) by mouth 2 times daily    Edema,  unspecified type       multivitamin, therapeutic with minerals Tabs tablet     30 each    1 tablet by Per Feeding Tube route daily    Liver transplant recipient (H)       mycophenolate 250 MG capsule    CELLCEPT - GENERIC EQUIVALENT    240 capsule    Take 4 capsules (1,000 mg) by mouth 2 times daily    Liver transplant recipient (H)       omeprazole 20 MG CR capsule    priLOSEC     Take 20 mg by mouth daily    Vitamin D deficiency       oxyCODONE 5 MG IR tablet    ROXICODONE    25 tablet    Take 1 tablet (5 mg) by mouth every 8 hours as needed for moderate to severe pain    Liver transplant recipient (H)       senna-docusate 8.6-50 MG per tablet    SENOKOT-S;PERICOLACE    30 tablet    Take 2 tablets by mouth 2 times daily as needed for constipation    Liver transplant recipient (H)       sulfamethoxazole-trimethoprim 400-80 MG per tablet    BACTRIM/SEPTRA    30 tablet    Take 1 tablet by mouth daily    Liver transplant recipient (H)       tacrolimus 1 MG capsule    PROGRAF - GENERIC EQUIVALENT    120 capsule    Take 2 capsules (2 mg) by mouth every 12 hours    Liver transplant recipient (H)       * traMADol 50 MG tablet    ULTRAM    20 tablet    Take 1-2 tablets ( mg) by mouth every 6 hours as needed for pain maximum 6 tablet(s) per day    Post-operative pain       * traMADol 50 MG tablet    ULTRAM    60 tablet    Take 1 tablet (50 mg) by mouth every 6 hours as needed for pain maximum 3  tablet(s) per day    Liver transplant recipient (H)       ursodiol 300 MG capsule    ACTIGALL    60 capsule    Take 1 capsule (300 mg) by mouth 2 times daily    Liver transplant recipient (H)       valGANciclovir 450 MG tablet    VALCYTE    30 tablet    Take 1 tablet (450 mg) by mouth daily    Liver transplant recipient (H)       vitamin D 83623 UNIT capsule    ERGOCALCIFEROL    8 capsule    Take 1 capsule (50,000 Units) by mouth once a week    Vitamin D deficiency       * Notice:  This list has 4 medication(s) that are the  same as other medications prescribed for you. Read the directions carefully, and ask your doctor or other care provider to review them with you.

## 2017-07-17 NOTE — LETTER
2017       RE: Joe Sosa  [address]  [place] [address]     Dear Colleague,    Thank you for referring your patient, Joe Sosa, to the [facility] SOLID ORGAN TRANSPLANT at [facility]. Please see a copy of my visit note below.    HPI      ROS      Physical Exam    Transplant Surgery -OUTPATIENT IMMUNOSUPPRESSION PROGRESS NOTE    Date of Visit: 2017    Transplants:  2017 (Liver); Postoperative day:  19  ASSESMENT AND PLAN:  1.Graft Function: Liver allograft: no rejection or technical problems.    2.Immunosuppression Management: keep tacrolimus levels at 10  3.Hypertension: ok  4.Renal Function: good  5.Lab frequency: weekly  6.Other:  Wound healthy, will remove staples  Wound pain: recommend tramadol and flexoril    Date: 2017    Transplant:  [x]                             Liver [x]                              Kidney []                             Pancreas []                              Other:             Chief Complaint:Liver Transplant (POD 19)  Has back pain and low grade fever    History of Present Illness:  Patient Active Problem List   Diagnosis     Alpha-1-antitrypsin deficiency (H)     Alcoholic cirrhosis of liver with ascites (H)     Vitamin D deficiency     Hepatic encephalopathy (H)     Alcoholism (H)     End stage liver disease (H)     Liver transplant recipient (H)     S/P liver transplant (H)     Immunosuppressed status (H)     SOCIAL /FAMILY HISTORY: [x]                  No recent change    Past Medical History:   Diagnosis Date     Alcoholic cirrhosis of liver with ascites (H) 3/9/2017     Alpha-1-antitrypsin deficiency (H) 3/9/2017     Anasarca      Chronic hyponatremia      Hepatic encephalopathy (H)      SBP (spontaneous bacterial peritonitis) (H)      Past Surgical History:   Procedure Laterality Date     TRANSPLANT LIVER RECIPIENT  DONOR N/A 2017    Procedure: TRANSPLANT LIVER RECIPIENT   DONOR;  TRANSPLANT LIVER RECIPIENT  DONOR with bile duct stenting;  Surgeon: Chino Martin MD;  Location:  OR     Social History     Social History     Marital status: Single     Spouse name: N/A     Number of children: N/A     Years of education: N/A     Occupational History     Not on file.     Social History Main Topics     Smoking status: Never Smoker     Smokeless tobacco: Former User     Types: Chew     Alcohol use No      Comment: Quit 2016     Drug use: No     Sexual activity: Not on file     Other Topics Concern     Not on file     Social History Narrative     Prescription Medications as of 2017             tacrolimus (PROGRAF - GENERIC EQUIVALENT) 1 MG capsule Take 2 capsules (2 mg) by mouth every 12 hours    oxyCODONE (ROXICODONE) 5 MG IR tablet Take 1 tablet (5 mg) by mouth every 8 hours as needed for moderate to severe pain    furosemide (LASIX) 40 MG tablet Take 1 tablet (40 mg) by mouth 2 times daily    traMADol (ULTRAM) 50 MG tablet Take 1-2 tablets ( mg) by mouth every 6 hours as needed for pain maximum 6 tablet(s) per day    valGANciclovir (VALCYTE) 450 MG tablet Take 1 tablet (450 mg) by mouth daily    mycophenolate (CELLCEPT - GENERIC EQUIVALENT) 250 MG capsule Take 4 capsules (1,000 mg) by mouth 2 times daily    senna-docusate (SENOKOT-S;PERICOLACE) 8.6-50 MG per tablet Take 2 tablets by mouth 2 times daily as needed for constipation    sulfamethoxazole-trimethoprim (BACTRIM/SEPTRA) 400-80 MG per tablet Take 1 tablet by mouth daily    ursodiol (ACTIGALL) 300 MG capsule Take 1 capsule (300 mg) by mouth 2 times daily    clotrimazole (MYCELEX) 10 MG LOZG lozenge Place 1 lozenge (1 Nona) inside cheek 3 times daily    multivitamin, therapeutic with minerals (THERA-VIT-M) TABS tablet 1 tablet by Per Feeding Tube route daily    vitamin D (ERGOCALCIFEROL) 28345 UNIT capsule Take 1 capsule (50,000 Units) by mouth once a week    omeprazole (PRILOSEC) 20 MG CR capsule  "Take 20 mg by mouth daily         Review of patient's allergies indicates no known allergies.   REVIEW OF SYSTEMS (check box if normal)  [x]               GENERAL  [x]                 PULMONARY [x]                GENITOURINARY  [x]                CNS                 [x]                 CARDIAC  [x]                 ENDOCRINE  [x]                EARS,NOSE,THROAT [x]                 GASTROINTESTINAL [x]                 NEUROLOGIC    [x]                MUSCLOSKELTAL  [x]                  HEMATOLOGY      PHYSICAL EXAM (check box if normal)/79  Pulse 95  Temp 98.2  F (36.8  C) (Oral)  Resp 16  Ht 1.88 m (6' 2\")  Wt 94.9 kg (209 lb 3.2 oz)  SpO2 98%  BMI 26.86 kg/m2        [x]            GENERAL:    [x]       EYES:  ICTERIC   []        YES  []                    NO  [x]           EXTREMITIES: Clubbing []       Y     [x]           N    [x]           EARS, NOSE, THROAT: Membranes Moist    YES   [x]                   NO []                  [x]           LUNGS:  CLEAR    YES       [x]                  NO    []                                [x]           SKIN: Jaundice           YES       []                  NO    [x]                   Rash: YES       []                  NO    [x]                                     [x]             HEART: Regular Rate          YES       [x]                  NO    []                   Incision Clean:  YES       [x]                  NO    []                                [x]                    ABDOMEN: Wound healing well Organomegaly YES       []                  NO    [x]                       [x]                    NEUROLOGICAL:  Nonfocal  YES       [x]                  NO    []                       [x]                    Hernia YES       []                  NO    [x]                   PSYCHIATRIC:  Appropriate  YES       [x]                  NO    []                       OTHER:                                                                                               "     PAIN SCALE:: 3    Again, thank you for allowing me to participate in the care of your patient.      Sincerely,    Chino Martin MD

## 2017-07-17 NOTE — PROGRESS NOTES
HPI      ROS      Physical Exam    Transplant Surgery -OUTPATIENT IMMUNOSUPPRESSION PROGRESS NOTE    Date of Visit: 2017    Transplants:  2017 (Liver); Postoperative day:  19  ASSESMENT AND PLAN:  1.Graft Function: Liver allograft: no rejection or technical problems.    2.Immunosuppression Management: keep tacrolimus levels at 10  3.Hypertension: ok  4.Renal Function: good  5.Lab frequency: weekly  6.Other:  Wound healthy, will remove staples  Wound pain: recommend tramadol and flexoril    Date: 2017    Transplant:  [x]                             Liver [x]                              Kidney []                             Pancreas []                              Other:             Chief Complaint:Liver Transplant (POD 19)  Has back pain and low grade fever    History of Present Illness:  Patient Active Problem List   Diagnosis     Alpha-1-antitrypsin deficiency (H)     Alcoholic cirrhosis of liver with ascites (H)     Vitamin D deficiency     Hepatic encephalopathy (H)     Alcoholism (H)     End stage liver disease (H)     Liver transplant recipient (H)     S/P liver transplant (H)     Immunosuppressed status (H)     SOCIAL /FAMILY HISTORY: [x]                  No recent change    Past Medical History:   Diagnosis Date     Alcoholic cirrhosis of liver with ascites (H) 3/9/2017     Alpha-1-antitrypsin deficiency (H) 3/9/2017     Anasarca      Chronic hyponatremia      Hepatic encephalopathy (H)      SBP (spontaneous bacterial peritonitis) (H)      Past Surgical History:   Procedure Laterality Date     TRANSPLANT LIVER RECIPIENT  DONOR N/A 2017    Procedure: TRANSPLANT LIVER RECIPIENT  DONOR;  TRANSPLANT LIVER RECIPIENT  DONOR with bile duct stenting;  Surgeon: Chino Martin MD;  Location:  OR     Social History     Social History     Marital status: Single     Spouse name: N/A     Number of children: N/A     Years of education: N/A     Occupational History      Not on file.     Social History Main Topics     Smoking status: Never Smoker     Smokeless tobacco: Former User     Types: Chew     Alcohol use No      Comment: Quit Nov. 4, 2016     Drug use: No     Sexual activity: Not on file     Other Topics Concern     Not on file     Social History Narrative     Prescription Medications as of 7/17/2017             tacrolimus (PROGRAF - GENERIC EQUIVALENT) 1 MG capsule Take 2 capsules (2 mg) by mouth every 12 hours    oxyCODONE (ROXICODONE) 5 MG IR tablet Take 1 tablet (5 mg) by mouth every 8 hours as needed for moderate to severe pain    furosemide (LASIX) 40 MG tablet Take 1 tablet (40 mg) by mouth 2 times daily    traMADol (ULTRAM) 50 MG tablet Take 1-2 tablets ( mg) by mouth every 6 hours as needed for pain maximum 6 tablet(s) per day    valGANciclovir (VALCYTE) 450 MG tablet Take 1 tablet (450 mg) by mouth daily    mycophenolate (CELLCEPT - GENERIC EQUIVALENT) 250 MG capsule Take 4 capsules (1,000 mg) by mouth 2 times daily    senna-docusate (SENOKOT-S;PERICOLACE) 8.6-50 MG per tablet Take 2 tablets by mouth 2 times daily as needed for constipation    sulfamethoxazole-trimethoprim (BACTRIM/SEPTRA) 400-80 MG per tablet Take 1 tablet by mouth daily    ursodiol (ACTIGALL) 300 MG capsule Take 1 capsule (300 mg) by mouth 2 times daily    clotrimazole (MYCELEX) 10 MG LOZG lozenge Place 1 lozenge (1 Nona) inside cheek 3 times daily    multivitamin, therapeutic with minerals (THERA-VIT-M) TABS tablet 1 tablet by Per Feeding Tube route daily    vitamin D (ERGOCALCIFEROL) 10998 UNIT capsule Take 1 capsule (50,000 Units) by mouth once a week    omeprazole (PRILOSEC) 20 MG CR capsule Take 20 mg by mouth daily         Review of patient's allergies indicates no known allergies.   REVIEW OF SYSTEMS (check box if normal)  [x]               GENERAL  [x]                 PULMONARY [x]                GENITOURINARY  [x]                CNS                 [x]                  "CARDIAC  [x]                 ENDOCRINE  [x]                EARS,NOSE,THROAT [x]                 GASTROINTESTINAL [x]                 NEUROLOGIC    [x]                MUSCLOSKELTAL  [x]                  HEMATOLOGY      PHYSICAL EXAM (check box if normal)/79  Pulse 95  Temp 98.2  F (36.8  C) (Oral)  Resp 16  Ht 1.88 m (6' 2\")  Wt 94.9 kg (209 lb 3.2 oz)  SpO2 98%  BMI 26.86 kg/m2        [x]            GENERAL:    [x]       EYES:  ICTERIC   []        YES  []                    NO  [x]           EXTREMITIES: Clubbing []       Y     [x]           N    [x]           EARS, NOSE, THROAT: Membranes Moist    YES   [x]                   NO []                  [x]           LUNGS:  CLEAR    YES       [x]                  NO    []                                [x]           SKIN: Jaundice           YES       []                  NO    [x]                   Rash: YES       []                  NO    [x]                                     [x]             HEART: Regular Rate          YES       [x]                  NO    []                   Incision Clean:  YES       [x]                  NO    []                                [x]                    ABDOMEN: Wound healing well Organomegaly YES       []                  NO    [x]                       [x]                    NEUROLOGICAL:  Nonfocal  YES       [x]                  NO    []                       [x]                    Hernia YES       []                  NO    [x]                   PSYCHIATRIC:  Appropriate  YES       [x]                  NO    []                       OTHER:                                                                                                   PAIN SCALE:: 3  "

## 2017-07-17 NOTE — NURSING NOTE
"Chief Complaint   Patient presents with     Liver Transplant     POD 19       Initial /79  Pulse 95  Temp 98.2  F (36.8  C) (Oral)  Resp 16  Ht 1.88 m (6' 2\")  Wt 94.9 kg (209 lb 3.2 oz)  SpO2 98%  BMI 26.86 kg/m2 Estimated body mass index is 26.86 kg/(m^2) as calculated from the following:    Height as of this encounter: 1.88 m (6' 2\").    Weight as of this encounter: 94.9 kg (209 lb 3.2 oz).      "

## 2017-07-18 DIAGNOSIS — Z94.4 HISTORY OF LIVER TRANSPLANT (H): Primary | ICD-10-CM

## 2017-07-18 DIAGNOSIS — Z94.4 LIVER TRANSPLANT RECIPIENT (H): ICD-10-CM

## 2017-07-18 DIAGNOSIS — Z79.60 LONG-TERM USE OF IMMUNOSUPPRESSANT MEDICATION: ICD-10-CM

## 2017-07-18 RX ORDER — TACROLIMUS 1 MG/1
CAPSULE ORAL
Qty: 150 CAPSULE | Refills: 11 | Status: SHIPPED | OUTPATIENT
Start: 2017-07-18 | End: 2017-07-21

## 2017-07-18 NOTE — TELEPHONE ENCOUNTER
Please call Joe about tacrolimus level 6.2, too low.   Increase tacrolimus dose to 3mg in am, continue 2mg in pm.  Take every 12 hours.  Let him know that the tacrolimus 12 hour level goal is 10.   Plan to recheck labs on Thursday.

## 2017-07-18 NOTE — TELEPHONE ENCOUNTER
Spoke with pt to advise of the below instructions. Pt verbalized understanding and had no questions at this time.    Millie Snider RN Registered Nurse Signed    Date of Service: 07/18/2017 9:45 AM Creation Time: 07/18/2017 9:45 AM          Please call Joe about tacrolimus level 6.2, too low.   Increase tacrolimus dose to 3mg in am, continue 2mg in pm.  Take every 12 hours.  Let him know that the tacrolimus 12 hour level goal is 10.   Plan to recheck labs on Thursday.

## 2017-07-18 NOTE — PROGRESS NOTES
Spoke with patient at clinic visit about biliary stent, plan for removal is it is not passed.   Reviewed with him process of removal with plan to set up EGD for mid Aug, with abd xray to be done before EGD to assess for presence.

## 2017-07-20 ENCOUNTER — HOSPITAL ENCOUNTER (OUTPATIENT)
Facility: CLINIC | Age: 27
End: 2017-07-20
Attending: INTERNAL MEDICINE | Admitting: INTERNAL MEDICINE
Payer: COMMERCIAL

## 2017-07-20 DIAGNOSIS — Z94.4 LIVER REPLACED BY TRANSPLANT (H): ICD-10-CM

## 2017-07-20 LAB
ALBUMIN SERPL-MCNC: 3 G/DL (ref 3.4–5)
ALP SERPL-CCNC: 85 U/L (ref 40–150)
ALT SERPL W P-5'-P-CCNC: 21 U/L (ref 0–70)
ANION GAP SERPL CALCULATED.3IONS-SCNC: 9 MMOL/L (ref 3–14)
AST SERPL W P-5'-P-CCNC: 13 U/L (ref 0–45)
BILIRUB DIRECT SERPL-MCNC: 0.5 MG/DL (ref 0–0.2)
BILIRUB SERPL-MCNC: 0.8 MG/DL (ref 0.2–1.3)
BUN SERPL-MCNC: 33 MG/DL (ref 7–30)
CALCIUM SERPL-MCNC: 9.2 MG/DL (ref 8.5–10.1)
CHLORIDE SERPL-SCNC: 102 MMOL/L (ref 94–109)
CO2 SERPL-SCNC: 25 MMOL/L (ref 20–32)
CREAT SERPL-MCNC: 1.4 MG/DL (ref 0.66–1.25)
ERYTHROCYTE [DISTWIDTH] IN BLOOD BY AUTOMATED COUNT: 16.2 % (ref 10–15)
GFR SERPL CREATININE-BSD FRML MDRD: 60 ML/MIN/1.7M2
GLUCOSE SERPL-MCNC: 118 MG/DL (ref 70–99)
HCT VFR BLD AUTO: 25.9 % (ref 40–53)
HGB BLD-MCNC: 8.7 G/DL (ref 13.3–17.7)
MAGNESIUM SERPL-MCNC: 1.6 MG/DL (ref 1.6–2.3)
MCH RBC QN AUTO: 31.3 PG (ref 26.5–33)
MCHC RBC AUTO-ENTMCNC: 33.6 G/DL (ref 31.5–36.5)
MCV RBC AUTO: 93 FL (ref 78–100)
PHOSPHATE SERPL-MCNC: 5.3 MG/DL (ref 2.5–4.5)
PLATELET # BLD AUTO: 212 10E9/L (ref 150–450)
POTASSIUM SERPL-SCNC: 4.8 MMOL/L (ref 3.4–5.3)
PROT SERPL-MCNC: 6.9 G/DL (ref 6.8–8.8)
RBC # BLD AUTO: 2.78 10E12/L (ref 4.4–5.9)
SODIUM SERPL-SCNC: 136 MMOL/L (ref 133–144)
TACROLIMUS BLD-MCNC: 7 UG/L (ref 5–15)
TME LAST DOSE: NORMAL H
WBC # BLD AUTO: 5.5 10E9/L (ref 4–11)

## 2017-07-20 PROCEDURE — 84100 ASSAY OF PHOSPHORUS: CPT | Performed by: TRANSPLANT SURGERY

## 2017-07-20 PROCEDURE — 80197 ASSAY OF TACROLIMUS: CPT | Performed by: TRANSPLANT SURGERY

## 2017-07-20 PROCEDURE — 36415 COLL VENOUS BLD VENIPUNCTURE: CPT | Performed by: TRANSPLANT SURGERY

## 2017-07-20 PROCEDURE — 80076 HEPATIC FUNCTION PANEL: CPT | Performed by: TRANSPLANT SURGERY

## 2017-07-20 PROCEDURE — 85027 COMPLETE CBC AUTOMATED: CPT | Performed by: TRANSPLANT SURGERY

## 2017-07-20 PROCEDURE — 80048 BASIC METABOLIC PNL TOTAL CA: CPT | Performed by: TRANSPLANT SURGERY

## 2017-07-20 PROCEDURE — 83735 ASSAY OF MAGNESIUM: CPT | Performed by: TRANSPLANT SURGERY

## 2017-07-21 DIAGNOSIS — Z94.4 LIVER TRANSPLANT RECIPIENT (H): ICD-10-CM

## 2017-07-21 RX ORDER — TACROLIMUS 1 MG/1
3 CAPSULE ORAL EVERY 12 HOURS
Qty: 180 CAPSULE | Refills: 11 | Status: SHIPPED | OUTPATIENT
Start: 2017-07-21 | End: 2017-08-28

## 2017-07-21 NOTE — TELEPHONE ENCOUNTER
Spoke with pt and instructed to increase tacrolimus to 3 mg Q 12 hours. Pt read dose back correctly and confirmed he will take the increased dose this evening. Pt had already plans to have labs drawn on Monday when he comes for an appt.

## 2017-07-24 ENCOUNTER — OFFICE VISIT (OUTPATIENT)
Dept: TRANSPLANT | Facility: CLINIC | Age: 27
End: 2017-07-24
Attending: TRANSPLANT SURGERY
Payer: COMMERCIAL

## 2017-07-24 ENCOUNTER — OFFICE VISIT (OUTPATIENT)
Dept: TRANSPLANT | Facility: CLINIC | Age: 27
End: 2017-07-24

## 2017-07-24 VITALS
DIASTOLIC BLOOD PRESSURE: 71 MMHG | TEMPERATURE: 97.8 F | BODY MASS INDEX: 26.54 KG/M2 | RESPIRATION RATE: 18 BRPM | HEIGHT: 74 IN | SYSTOLIC BLOOD PRESSURE: 114 MMHG | WEIGHT: 206.8 LBS | HEART RATE: 89 BPM | OXYGEN SATURATION: 99 %

## 2017-07-24 DIAGNOSIS — Z94.4 LIVER REPLACED BY TRANSPLANT (H): ICD-10-CM

## 2017-07-24 DIAGNOSIS — Z94.4 LIVER REPLACED BY TRANSPLANT (H): Primary | ICD-10-CM

## 2017-07-24 DIAGNOSIS — Z94.4 LIVER TRANSPLANT RECIPIENT (H): Primary | ICD-10-CM

## 2017-07-24 LAB
ALBUMIN SERPL-MCNC: 3.2 G/DL (ref 3.4–5)
ALP SERPL-CCNC: 98 U/L (ref 40–150)
ALT SERPL W P-5'-P-CCNC: 17 U/L (ref 0–70)
ANION GAP SERPL CALCULATED.3IONS-SCNC: 9 MMOL/L (ref 3–14)
AST SERPL W P-5'-P-CCNC: 10 U/L (ref 0–45)
BILIRUB DIRECT SERPL-MCNC: 0.5 MG/DL (ref 0–0.2)
BILIRUB SERPL-MCNC: 0.8 MG/DL (ref 0.2–1.3)
BUN SERPL-MCNC: 40 MG/DL (ref 7–30)
CALCIUM SERPL-MCNC: 9.1 MG/DL (ref 8.5–10.1)
CHLORIDE SERPL-SCNC: 99 MMOL/L (ref 94–109)
CO2 SERPL-SCNC: 26 MMOL/L (ref 20–32)
CREAT SERPL-MCNC: 1.6 MG/DL (ref 0.66–1.25)
ERYTHROCYTE [DISTWIDTH] IN BLOOD BY AUTOMATED COUNT: 15.9 % (ref 10–15)
GFR SERPL CREATININE-BSD FRML MDRD: 51 ML/MIN/1.7M2
GLUCOSE SERPL-MCNC: 165 MG/DL (ref 70–99)
HCT VFR BLD AUTO: 28.2 % (ref 40–53)
HGB BLD-MCNC: 9.6 G/DL (ref 13.3–17.7)
MAGNESIUM SERPL-MCNC: 1.6 MG/DL (ref 1.6–2.3)
MCH RBC QN AUTO: 31.5 PG (ref 26.5–33)
MCHC RBC AUTO-ENTMCNC: 34 G/DL (ref 31.5–36.5)
MCV RBC AUTO: 93 FL (ref 78–100)
PHOSPHATE SERPL-MCNC: 4.9 MG/DL (ref 2.5–4.5)
PLATELET # BLD AUTO: 141 10E9/L (ref 150–450)
POTASSIUM SERPL-SCNC: 4.5 MMOL/L (ref 3.4–5.3)
PROT SERPL-MCNC: 6.9 G/DL (ref 6.8–8.8)
RBC # BLD AUTO: 3.05 10E12/L (ref 4.4–5.9)
SODIUM SERPL-SCNC: 134 MMOL/L (ref 133–144)
TACROLIMUS BLD-MCNC: 9.4 UG/L (ref 5–15)
TME LAST DOSE: NORMAL H
WBC # BLD AUTO: 5.3 10E9/L (ref 4–11)

## 2017-07-24 PROCEDURE — 84100 ASSAY OF PHOSPHORUS: CPT | Performed by: TRANSPLANT SURGERY

## 2017-07-24 PROCEDURE — 80197 ASSAY OF TACROLIMUS: CPT | Performed by: TRANSPLANT SURGERY

## 2017-07-24 PROCEDURE — 83735 ASSAY OF MAGNESIUM: CPT | Performed by: TRANSPLANT SURGERY

## 2017-07-24 PROCEDURE — 36415 COLL VENOUS BLD VENIPUNCTURE: CPT | Performed by: TRANSPLANT SURGERY

## 2017-07-24 PROCEDURE — 85027 COMPLETE CBC AUTOMATED: CPT | Performed by: TRANSPLANT SURGERY

## 2017-07-24 PROCEDURE — 80048 BASIC METABOLIC PNL TOTAL CA: CPT | Performed by: TRANSPLANT SURGERY

## 2017-07-24 PROCEDURE — 99211 OFF/OP EST MAY X REQ PHY/QHP: CPT

## 2017-07-24 PROCEDURE — 80076 HEPATIC FUNCTION PANEL: CPT | Performed by: TRANSPLANT SURGERY

## 2017-07-24 NOTE — MR AVS SNAPSHOT
After Visit Summary   7/24/2017    Joe Sosa    MRN: [id number]           Patient Information     Date Of Birth          1990        Visit Information        Provider Department      7/24/2017 10:10 AM Chino Martin MD [facility] Solid Organ Transplant        Today's Diagnoses     Liver transplant recipient (H)    -  1       Follow-ups after your visit        Your next 10 appointments already scheduled     Jul 24, 2017 10:10 AM CDT   (Arrive by 9:55 AM)   Liver Return Post Op with Chino Martin MD   [facility] Solid Organ Transplant ([facility])    [address]  [address] [facility] [id number] [phone]            Aug 11, 2017 10:30 AM CDT   XR ABDOMEN 2 VIEWS with UUXR3   [facility] ([facility] [place])    [address] [id number] [phone]           Please bring a list of your current medicines to your exam. (Include vitamins, minerals and over-thecounter medicines.) Leave your valuables at home.  Tell your doctor if there is a chance you may be pregnant.  You do not need to do anything special for this exam.            Aug 11, 2017   Procedure with Alia Kc MD   [facility] [place])    [address] [place] [id number] [phone]           The [facility] is located on the corner of [address] and [address] [facility] on the [facility]. It is easily accessible from virtually any point in the [place] area, via [address] and [address].            Aug 28, 2017  8:45 AM CDT   LAB with  LAB   [facility] Lab ([facility])    [address]  [address] [facility] [id number] [phone]           Patient must bring picture ID.  Patient should be prepared to give a urine specimen  Please do not eat 10-12 hours before your  appointment if you are coming in fasting for labs on lipids, cholesterol, or glucose (sugar).  Pregnant women should follow their Care Team instructions. Water with medications is okay. Do not drink coffee or other fluids.   If you have concerns about taking  your medications, please ask at office or if scheduling via foodjunky, send a message by clicking on Secure Messaging, Message Your Care Team.            Aug 28, 2017  9:30 AM CDT   (Arrive by 9:15 AM)   Liver Return Post Op with Chino Martin MD   [facility] Solid Organ Transplant ([facility])    [address] [place] [id number] [phone]            Sep 19, 2017 10:15 AM CDT   Lab with  LAB   [facility] Lab ([facility])    [address] [place] [id number] [phone]            Sep 19, 2017 11:10 AM CDT   (Arrive by 10:55 AM)   Return Liver Transplant with Andreina Templeton MD   [facility] Hepatology ([facility])    [address] [place] [id number] [phone]              Who to contact     If you have questions or need follow up information about today's clinic visit or your schedule please contact [facility] directly at [phone].  Normal or non-critical lab and imaging results will be communicated to you by Crocodochart, letter or phone within 4 business days after the clinic has received the results. If you do not hear from us within 7 days, please contact the clinic through Crocodochart or phone. If you have a critical or abnormal lab result, we will notify you by phone as soon as possible.  Submit refill requests through foodjunky or call your pharmacy and they will forward the refill request to us. Please allow 3 business days for your refill to be completed.          Additional Information About Your Visit        foodjunky Information     foodjunky gives you secure access  "to your electronic health record. If you see a primary care provider, you can also send messages to your care team and make appointments. If you have questions, please call your primary care clinic.  If you do not have a primary care provider, please call [phone] and they will assist you.        Care EveryWhere ID     This is your Care EveryWhere ID. This could be used by other organizations to access your Java medical records  [id number]        Your Vitals Were     Pulse Temperature Respirations Height Pulse Oximetry BMI (Body Mass Index)    89 97.8  F (36.6  C) (Oral) 18 1.88 m (6' 2\") 99% 26.55 kg/m2       Blood Pressure from Last 3 Encounters:   07/24/17 114/71   07/17/17 132/79   07/06/17 130/87    Weight from Last 3 Encounters:   07/24/17 93.8 kg (206 lb 12.8 oz)   07/17/17 94.9 kg (209 lb 3.2 oz)   07/10/17 100 kg (220 lb 6.4 oz)              Today, you had the following     No orders found for display       Primary Care Provider Office Phone # Fax #    Jeffery Azul [phone]       [facility] [address] [place] [address]        Equal Access to Services     JENAE VAUGHN : Hadii aad ku hadasho Soomaali, waaxda luqadaha, qaybta kaalmada adeegyada, waxay idiin hayelijah eaton . So [facility] [phone].    ATENCIÓN: Si habla español, tiene a chun disposición servicios gratuitos de asistencia lingüística. Llame al [phone].    We comply with applicable federal civil rights laws and [place] laws. We do not discriminate on the basis of race, color, national origin, age, disability sex, sexual orientation or gender identity.            Thank you!     Thank you for choosing [facility] SOLID ORGAN TRANSPLANT  for your care. Our goal is always to provide you with excellent care. Hearing back from our patients is one way we can continue to improve our services. Please take a few minutes to complete the written survey that you may receive in " the mail after your visit with us. Thank you!             Your Updated Medication List - Protect others around you: Learn how to safely use, store and throw away your medicines at www.disposemymeds.org.          This list is accurate as of: 7/24/17 10:05 AM.  Always use your most recent med list.                   Brand Name Dispense Instructions for use Diagnosis    clotrimazole 10 MG Lozg lozenge    MYCELEX    90 each    Place 1 lozenge (1 Nona) inside cheek 3 times daily    Liver transplant recipient (H)       cyclobenzaprine 5 MG tablet    FLEXERIL    42 tablet    Take 1 tablet (5 mg) by mouth 3 times daily as needed for muscle spasms    Liver transplant recipient (H)       furosemide 40 MG tablet    LASIX    60 tablet    Take 1 tablet (40 mg) by mouth 2 times daily    Edema, unspecified type       multivitamin, therapeutic with minerals Tabs tablet     30 each    1 tablet by Per Feeding Tube route daily    Liver transplant recipient (H)       mycophenolate 250 MG capsule    CELLCEPT - GENERIC EQUIVALENT    240 capsule    Take 4 capsules (1,000 mg) by mouth 2 times daily    Liver transplant recipient (H)       omeprazole 20 MG CR capsule    priLOSEC     Take 20 mg by mouth daily    Vitamin D deficiency       oxyCODONE 5 MG IR tablet    ROXICODONE    25 tablet    Take 1 tablet (5 mg) by mouth every 8 hours as needed for moderate to severe pain    Liver transplant recipient (H)       senna-docusate 8.6-50 MG per tablet    SENOKOT-S;PERICOLACE    30 tablet    Take 2 tablets by mouth 2 times daily as needed for constipation    Liver transplant recipient (H)       sulfamethoxazole-trimethoprim 400-80 MG per tablet    BACTRIM/SEPTRA    30 tablet    Take 1 tablet by mouth daily    Liver transplant recipient (H)       tacrolimus 1 MG capsule    PROGRAF - GENERIC EQUIVALENT    180 capsule    Take 3 capsules (3 mg) by mouth every 12 hours    Liver transplant recipient (H)       * traMADol 50 MG tablet    ULTRAM    20  tablet    Take 1-2 tablets ( mg) by mouth every 6 hours as needed for pain maximum 6 tablet(s) per day    Post-operative pain       * traMADol 50 MG tablet    ULTRAM    60 tablet    Take 1 tablet (50 mg) by mouth every 6 hours as needed for pain maximum 3  tablet(s) per day    Liver transplant recipient (H)       ursodiol 300 MG capsule    ACTIGALL    60 capsule    Take 1 capsule (300 mg) by mouth 2 times daily    Liver transplant recipient (H)       valGANciclovir 450 MG tablet    VALCYTE    30 tablet    Take 1 tablet (450 mg) by mouth daily    Liver transplant recipient (H)       vitamin D 62123 UNIT capsule    ERGOCALCIFEROL    8 capsule    Take 1 capsule (50,000 Units) by mouth once a week    Vitamin D deficiency       * Notice:  This list has 2 medication(s) that are the same as other medications prescribed for you. Read the directions carefully, and ask your doctor or other care provider to review them with you.

## 2017-07-24 NOTE — LETTER
7/24/2017       RE: Joe Sosa  [address]  [place] [address]     Dear Colleague,    Thank you for referring your patient, Joe Sosa, to the [facility] at [facility]. Please see a copy of my visit note below.    Referral received from Millie Snider, Transplant Coordinator to see patient and his mother regarding Social Security Disability.  I met with patient and his mother and addressed their questions regarding the SSDI application process.  Joe applied for SSDI about one month before his liver transplant.  He was told by Social Security that he should provide any updated records (i.e. Record of his liver transplant) to the Social Security Administration.  Patient did sign a CORKY and the following records were given to him today (H&P and Op report from 6/27/17, Discharge Summary from 7/6/17).  He plans to make an appointment at his local office to deliever these updated records.  Millie Snider also faxed a CORKY for records to be sent to Social Security.    I reminded Joe of Liver Transplant Support Group and encouraged his attendance. We also discussed Writing to the Donor Family, and adjustment to illness counseling was provided.        TIFFANY Paul, [facility]  Liver Transplant   Phone [phone]  Pager [phone]     Again, thank you for allowing me to participate in the care of your patient.      Sincerely,    TIFFANY Aguilera

## 2017-07-24 NOTE — NURSING NOTE
"Chief Complaint   Patient presents with     Liver Transplant     POD 26       Initial /71  Pulse 89  Temp 97.8  F (36.6  C) (Oral)  Resp 18  Ht 1.88 m (6' 2\")  Wt 93.8 kg (206 lb 12.8 oz)  SpO2 99%  BMI 26.55 kg/m2 Estimated body mass index is 26.55 kg/(m^2) as calculated from the following:    Height as of this encounter: 1.88 m (6' 2\").    Weight as of this encounter: 93.8 kg (206 lb 12.8 oz).      "

## 2017-07-24 NOTE — LETTER
2017       RE: Joe Sosa  [address]  [place] [address]     Dear Colleague,    Thank you for referring your patient, Joe Sosa, to the [facility] Please see a copy of my visit note below.    HPI      ROS      Physical Exam    Transplant Surgery -OUTPATIENT IMMUNOSUPPRESSION PROGRESS NOTE    Date of Visit: 2017    Transplants:  2017 (Liver); Postoperative day:  26  ASSESMENT AND PLAN:  1.Graft Function:Liver allograft: no rejection or technical problems.    2.Immunosuppression Management: keep tacrolimus levels at 10  3.Hypertension: ok  4.Renal Function: good  5.Lab frequency: twice weekly  6.Other:  Stop lasix    Date: 2017    Transplant:  [x]                             Liver [x]                              Kidney []                             Pancreas []                              Other:             Chief Complaint:Liver Transplant (POD 26)  Doing well, no fever or abdominal pain  History of Present Illness:  Patient Active Problem List   Diagnosis     Alpha-1-antitrypsin deficiency (H)     Alcoholic cirrhosis of liver with ascites (H)     Vitamin D deficiency     Hepatic encephalopathy (H)     Alcoholism (H)     End stage liver disease (H)     Liver transplant recipient (H)     S/P liver transplant (H)     Immunosuppressed status (H)     SOCIAL /FAMILY HISTORY: [x]                  No recent change    Past Medical History:   Diagnosis Date     Alcoholic cirrhosis of liver with ascites (H) 3/9/2017     Alpha-1-antitrypsin deficiency (H) 3/9/2017     Anasarca      Chronic hyponatremia      Hepatic encephalopathy (H)      SBP (spontaneous bacterial peritonitis) (H)      Past Surgical History:   Procedure Laterality Date     TRANSPLANT LIVER RECIPIENT  DONOR N/A 2017    Procedure: TRANSPLANT LIVER RECIPIENT  DONOR;  TRANSPLANT LIVER RECIPIENT  DONOR with bile  duct stenting;  Surgeon: Chino Martin MD;  Location:  OR     Social History     Social History     Marital status: Single     Spouse name: N/A     Number of children: N/A     Years of education: N/A     Occupational History     Not on file.     Social History Main Topics     Smoking status: Never Smoker     Smokeless tobacco: Former User     Types: Chew     Alcohol use No      Comment: Quit Nov. 4, 2016     Drug use: No     Sexual activity: Not on file     Other Topics Concern     Not on file     Social History Narrative     Prescription Medications as of 7/24/2017             tacrolimus (PROGRAF - GENERIC EQUIVALENT) 1 MG capsule Take 3 capsules (3 mg) by mouth every 12 hours    traMADol (ULTRAM) 50 MG tablet Take 1 tablet (50 mg) by mouth every 6 hours as needed for pain maximum 3  tablet(s) per day    cyclobenzaprine (FLEXERIL) 5 MG tablet Take 1 tablet (5 mg) by mouth 3 times daily as needed for muscle spasms    oxyCODONE (ROXICODONE) 5 MG IR tablet Take 1 tablet (5 mg) by mouth every 8 hours as needed for moderate to severe pain    furosemide (LASIX) 40 MG tablet Take 1 tablet (40 mg) by mouth 2 times daily    traMADol (ULTRAM) 50 MG tablet Take 1-2 tablets ( mg) by mouth every 6 hours as needed for pain maximum 6 tablet(s) per day    valGANciclovir (VALCYTE) 450 MG tablet Take 1 tablet (450 mg) by mouth daily    mycophenolate (CELLCEPT - GENERIC EQUIVALENT) 250 MG capsule Take 4 capsules (1,000 mg) by mouth 2 times daily    senna-docusate (SENOKOT-S;PERICOLACE) 8.6-50 MG per tablet Take 2 tablets by mouth 2 times daily as needed for constipation    sulfamethoxazole-trimethoprim (BACTRIM/SEPTRA) 400-80 MG per tablet Take 1 tablet by mouth daily    ursodiol (ACTIGALL) 300 MG capsule Take 1 capsule (300 mg) by mouth 2 times daily    clotrimazole (MYCELEX) 10 MG LOZG lozenge Place 1 lozenge (1 Nona) inside cheek 3 times daily    multivitamin, therapeutic with minerals (THERA-VIT-M) TABS tablet 1  "tablet by Per Feeding Tube route daily    vitamin D (ERGOCALCIFEROL) 18306 UNIT capsule Take 1 capsule (50,000 Units) by mouth once a week    omeprazole (PRILOSEC) 20 MG CR capsule Take 20 mg by mouth daily         Review of patient's allergies indicates no known allergies.   REVIEW OF SYSTEMS (check box if normal)  [x]               GENERAL  [x]                 PULMONARY [x]                GENITOURINARY  [x]                CNS                 [x]                 CARDIAC  [x]                 ENDOCRINE  [x]                EARS,NOSE,THROAT [x]                 GASTROINTESTINAL [x]                 NEUROLOGIC    [x]                MUSCLOSKELTAL  [x]                  HEMATOLOGY      PHYSICAL EXAM (check box if normal)/71  Pulse 89  Temp 97.8  F (36.6  C) (Oral)  Resp 18  Ht 1.88 m (6' 2\")  Wt 93.8 kg (206 lb 12.8 oz)  SpO2 99%  BMI 26.55 kg/m2        [x]            GENERAL:    [x]       EYES:  ICTERIC   []        YES  []                    NO  [x]           EXTREMITIES: Clubbing []       Y     [x]           N    [x]           EARS, NOSE, THROAT: Membranes Moist    YES   [x]                   NO []                  [x]           LUNGS:  CLEAR    YES       [x]                  NO    []                                [x]           SKIN: Jaundice           YES       []                  NO    [x]                   Rash: YES       []                  NO    [x]                                     [x]             HEART: Regular Rate          YES       [x]                  NO    []                   Incision Clean:  YES       [x]                  NO    []                                [x]                    ABDOMEN: Wound healthy Organomegaly YES       []                  NO    [x]                       [x]                    NEUROLOGICAL:  Nonfocal  YES       [x]                  NO    []                       [x]                    Hernia YES       []                  NO    [x]                   PSYCHIATRIC:  " Appropriate  YES       [x]                  NO    []                       OTHER:                                                                                                   PAIN SCALE:: 3    Again, thank you for allowing me to participate in the care of your patient.      Sincerely,    Chino Martin MD

## 2017-07-24 NOTE — PROGRESS NOTES
Referral received from Millie Snider, Transplant Coordinator to see patient and his mother regarding Social Security Disability.  I met with patient and his mother and addressed their questions regarding the SSDI application process.  Joe applied for SSDI about one month before his liver transplant.  He was told by Social Security that he should provide any updated records (i.e. Record of his liver transplant) to the Social Security Administration.  Patient did sign a CORKY and the following records were given to him today (H&P and Op report from 6/27/17, Discharge Summary from 7/6/17).  He plans to make an appointment at his local office to deliever these updated records.  Millie Snider also faxed a CORKY for records to be sent to Social Security.    I reminded Joe of Liver Transplant Support Group and encouraged his attendance. We also discussed Writing to the Donor Family, and adjustment to illness counseling was provided.        TIFFANY Paul, [facility]  Liver Transplant   Phone [phone]  Pager [phone]

## 2017-07-24 NOTE — PROGRESS NOTES
HPI      ROS      Physical Exam    Transplant Surgery -OUTPATIENT IMMUNOSUPPRESSION PROGRESS NOTE    Date of Visit: 2017    Transplants:  2017 (Liver); Postoperative day:  26  ASSESMENT AND PLAN:  1.Graft Function:Liver allograft: no rejection or technical problems.    2.Immunosuppression Management: keep tacrolimus levels at 10  3.Hypertension: ok  4.Renal Function: good  5.Lab frequency: twice weekly  6.Other:  Stop lasix    Date: 2017    Transplant:  [x]                             Liver [x]                              Kidney []                             Pancreas []                              Other:             Chief Complaint:Liver Transplant (POD 26)  Doing well, no fever or abdominal pain  History of Present Illness:  Patient Active Problem List   Diagnosis     Alpha-1-antitrypsin deficiency (H)     Alcoholic cirrhosis of liver with ascites (H)     Vitamin D deficiency     Hepatic encephalopathy (H)     Alcoholism (H)     End stage liver disease (H)     Liver transplant recipient (H)     S/P liver transplant (H)     Immunosuppressed status (H)     SOCIAL /FAMILY HISTORY: [x]                  No recent change    Past Medical History:   Diagnosis Date     Alcoholic cirrhosis of liver with ascites (H) 3/9/2017     Alpha-1-antitrypsin deficiency (H) 3/9/2017     Anasarca      Chronic hyponatremia      Hepatic encephalopathy (H)      SBP (spontaneous bacterial peritonitis) (H)      Past Surgical History:   Procedure Laterality Date     TRANSPLANT LIVER RECIPIENT  DONOR N/A 2017    Procedure: TRANSPLANT LIVER RECIPIENT  DONOR;  TRANSPLANT LIVER RECIPIENT  DONOR with bile duct stenting;  Surgeon: Chino Martin MD;  Location:  OR     Social History     Social History     Marital status: Single     Spouse name: N/A     Number of children: N/A     Years of education: N/A     Occupational History     Not on file.     Social History Main Topics     Smoking  status: Never Smoker     Smokeless tobacco: Former User     Types: Chew     Alcohol use No      Comment: Quit Nov. 4, 2016     Drug use: No     Sexual activity: Not on file     Other Topics Concern     Not on file     Social History Narrative     Prescription Medications as of 7/24/2017             tacrolimus (PROGRAF - GENERIC EQUIVALENT) 1 MG capsule Take 3 capsules (3 mg) by mouth every 12 hours    traMADol (ULTRAM) 50 MG tablet Take 1 tablet (50 mg) by mouth every 6 hours as needed for pain maximum 3  tablet(s) per day    cyclobenzaprine (FLEXERIL) 5 MG tablet Take 1 tablet (5 mg) by mouth 3 times daily as needed for muscle spasms    oxyCODONE (ROXICODONE) 5 MG IR tablet Take 1 tablet (5 mg) by mouth every 8 hours as needed for moderate to severe pain    furosemide (LASIX) 40 MG tablet Take 1 tablet (40 mg) by mouth 2 times daily    traMADol (ULTRAM) 50 MG tablet Take 1-2 tablets ( mg) by mouth every 6 hours as needed for pain maximum 6 tablet(s) per day    valGANciclovir (VALCYTE) 450 MG tablet Take 1 tablet (450 mg) by mouth daily    mycophenolate (CELLCEPT - GENERIC EQUIVALENT) 250 MG capsule Take 4 capsules (1,000 mg) by mouth 2 times daily    senna-docusate (SENOKOT-S;PERICOLACE) 8.6-50 MG per tablet Take 2 tablets by mouth 2 times daily as needed for constipation    sulfamethoxazole-trimethoprim (BACTRIM/SEPTRA) 400-80 MG per tablet Take 1 tablet by mouth daily    ursodiol (ACTIGALL) 300 MG capsule Take 1 capsule (300 mg) by mouth 2 times daily    clotrimazole (MYCELEX) 10 MG LOZG lozenge Place 1 lozenge (1 Nona) inside cheek 3 times daily    multivitamin, therapeutic with minerals (THERA-VIT-M) TABS tablet 1 tablet by Per Feeding Tube route daily    vitamin D (ERGOCALCIFEROL) 77535 UNIT capsule Take 1 capsule (50,000 Units) by mouth once a week    omeprazole (PRILOSEC) 20 MG CR capsule Take 20 mg by mouth daily         Review of patient's allergies indicates no known allergies.   REVIEW OF SYSTEMS  "(check box if normal)  [x]               GENERAL  [x]                 PULMONARY [x]                GENITOURINARY  [x]                CNS                 [x]                 CARDIAC  [x]                 ENDOCRINE  [x]                EARS,NOSE,THROAT [x]                 GASTROINTESTINAL [x]                 NEUROLOGIC    [x]                MUSCLOSKELTAL  [x]                  HEMATOLOGY      PHYSICAL EXAM (check box if normal)/71  Pulse 89  Temp 97.8  F (36.6  C) (Oral)  Resp 18  Ht 1.88 m (6' 2\")  Wt 93.8 kg (206 lb 12.8 oz)  SpO2 99%  BMI 26.55 kg/m2        [x]            GENERAL:    [x]       EYES:  ICTERIC   []        YES  []                    NO  [x]           EXTREMITIES: Clubbing []       Y     [x]           N    [x]           EARS, NOSE, THROAT: Membranes Moist    YES   [x]                   NO []                  [x]           LUNGS:  CLEAR    YES       [x]                  NO    []                                [x]           SKIN: Jaundice           YES       []                  NO    [x]                   Rash: YES       []                  NO    [x]                                     [x]             HEART: Regular Rate          YES       [x]                  NO    []                   Incision Clean:  YES       [x]                  NO    []                                [x]                    ABDOMEN: Wound healthy Organomegaly YES       []                  NO    [x]                       [x]                    NEUROLOGICAL:  Nonfocal  YES       [x]                  NO    []                       [x]                    Hernia YES       []                  NO    [x]                   PSYCHIATRIC:  Appropriate  YES       [x]                  NO    []                       OTHER:                                                                                                   PAIN SCALE:: 3  "

## 2017-07-24 NOTE — MR AVS SNAPSHOT
After Visit Summary   7/24/2017    Joe Sosa    MRN: [id number]           Patient Information     Date Of Birth          1990        Visit Information        Provider Department      7/24/2017 12:10 PM Ngoc Echeverria MSW [facility]        Today's Diagnoses     Liver replaced by transplant (H)    -  1       Follow-ups after your visit        Your next 10 appointments already scheduled     Aug 11, 2017 10:30 AM CDT   XR ABDOMEN 2 VIEWS with UUXR3   [facility], [place],  Radiology ([facility], [facility])    [address] [facility] [facility] [address] [phone]           Please bring a list of your current medicines to your exam. (Include vitamins, minerals and over-thecounter medicines.) Leave your valuables at home.  Tell your doctor if there is a chance you may be pregnant.  You do not need to do anything special for this exam.            Aug 11, 2017   Procedure with Alia Kc MD   [facility], [place], Endoscopy ([facility], [facility])    [address] [facility] [id number] [phone]           The [facility] is located on the corner of [facility] and [facility] on the [facility]. It is easily accessible from virtually any point in the [facility] area, via [address] and [address].            Aug 28, 2017  8:45 AM CDT   LAB with  LAB    Health Lab ([facility])    [address] [facility] [address] [facility]  1st Floor  [facility] [id number] [phone]           Patient must bring picture ID.  Patient should be prepared to give a urine specimen  Please do not eat 10-12 hours before your appointment if you are coming in fasting for labs on lipids, cholesterol, or glucose (sugar).  Pregnant women should follow their Care Team instructions. Water with medications is okay. Do not drink coffee or other fluids.   If you have concerns about taking  your  medications, please ask at office or if scheduling via Vopiumt, send a message by clicking on Secure Messaging, Message Your Care Team.            Aug 28, 2017  9:30 AM CDT   (Arrive by 9:15 AM)   Liver Return Post Op with Chino Martin MD   [facility] Transplant ([facility])    [address] [place] [address]  [address]rd [facility] [id number] [phone]            Sep 19, 2017 10:15 AM CDT   Lab with  LAB   [facility] ([facility])    [address] [place] [id number] [phone]            Sep 19, 2017 11:10 AM CDT   (Arrive by 10:55 AM)   Return Liver Transplant with Andreina Templeton MD   [facility] Hepatology ([facility])    [address] [place] [id number] [phone]              Who to contact     Please call your clinic at [phone] to:    Ask questions about your health    Make or cancel appointments    Discuss your medicines    Learn about your test results    Speak to your doctor   If you have compliments or concerns about an experience at your clinic, or if you wish to file a complaint, please contact [facility] Patient Relations at [phone] or email us at [email or web address]         Additional Information About Your Visit        [employer] Information     hipix gives you secure access to your electronic health record. If you see a primary care provider, you can also send messages to your care team and make appointments. If you have questions, please call your primary care clinic.  If you do not have a primary care provider, please call [phone] and they will assist you.      hipix is an electronic gateway that provides easy, online access to your medical records. With hipix, you can request a clinic appointment, read your test results, renew a prescription or communicate with your care team.      To access your existing account, please contact your [facility] or call [phone] for assistance.        Care EveryWhere ID     This is your Care EveryWhere ID. This could be used by other organizations to access your [place] medical records  [id number]         Blood Pressure from Last 3 Encounters:   07/24/17 114/71   07/17/17 132/79   07/06/17 130/87    Weight from Last 3 Encounters:   07/24/17 93.8 kg (206 lb 12.8 oz)   07/17/17 94.9 kg (209 lb 3.2 oz)   07/10/17 100 kg (220 lb 6.4 oz)              Today, you had the following     No orders found for display       Primary Care Provider Office Phone # Fax #    Jeffery Azul [phone]       [facility] [address] [place] [address]        Equal Access to Services     [facility] Hospital[facility]ACQUI : Hadii aad ku hadasho Soomaali, waaxda luqadaha, qaybta kaalmada adeegyada, waxay idiin hayeloyn dejah eaton . So [facility] [place] [phone].    ATENCIÓN: Si habla español, tiene a chun disposición servicios gratuitos de asistencia lingüística. Luis al [phone].    We comply with applicable federal civil rights laws and [place] laws. We do not discriminate on the basis of race, color, national origin, age, disability sex, sexual orientation or gender identity.            Thank you!     Thank you for choosing [facility]  for your care. Our goal is always to provide you with excellent care. Hearing back from our patients is one way we can continue to improve our services. Please take a few minutes to complete the written survey that you may receive in the mail after your visit with us. Thank you!             Your Updated Medication List - Protect others around you: Learn how to safely use, store and throw away your medicines at www.disposemymeds.org.          This list is accurate as of: 7/24/17 12:16 PM.  Always use your most recent med list.                   Brand Name Dispense  Instructions for use Diagnosis    clotrimazole 10 MG Lozg lozenge    MYCELEX    90 each    Place 1 lozenge (1 Nona) inside cheek 3 times daily    Liver transplant recipient (H)       cyclobenzaprine 5 MG tablet    FLEXERIL    42 tablet    Take 1 tablet (5 mg) by mouth 3 times daily as needed for muscle spasms    Liver transplant recipient (H)       furosemide 40 MG tablet    LASIX    60 tablet    Take 1 tablet (40 mg) by mouth 2 times daily    Edema, unspecified type       multivitamin, therapeutic with minerals Tabs tablet     30 each    1 tablet by Per Feeding Tube route daily    Liver transplant recipient (H)       mycophenolate 250 MG capsule    CELLCEPT - GENERIC EQUIVALENT    240 capsule    Take 4 capsules (1,000 mg) by mouth 2 times daily    Liver transplant recipient (H)       omeprazole 20 MG CR capsule    priLOSEC     Take 20 mg by mouth daily    Vitamin D deficiency       oxyCODONE 5 MG IR tablet    ROXICODONE    25 tablet    Take 1 tablet (5 mg) by mouth every 8 hours as needed for moderate to severe pain    Liver transplant recipient (H)       senna-docusate 8.6-50 MG per tablet    SENOKOT-S;PERICOLACE    30 tablet    Take 2 tablets by mouth 2 times daily as needed for constipation    Liver transplant recipient (H)       sulfamethoxazole-trimethoprim 400-80 MG per tablet    BACTRIM/SEPTRA    30 tablet    Take 1 tablet by mouth daily    Liver transplant recipient (H)       tacrolimus 1 MG capsule    PROGRAF - GENERIC EQUIVALENT    180 capsule    Take 3 capsules (3 mg) by mouth every 12 hours    Liver transplant recipient (H)       * traMADol 50 MG tablet    ULTRAM    20 tablet    Take 1-2 tablets ( mg) by mouth every 6 hours as needed for pain maximum 6 tablet(s) per day    Post-operative pain       * traMADol 50 MG tablet    ULTRAM    60 tablet    Take 1 tablet (50 mg) by mouth every 6 hours as needed for pain maximum 3  tablet(s) per day    Liver transplant recipient (H)       ursodiol 300 MG  capsule    ACTIGALL    60 capsule    Take 1 capsule (300 mg) by mouth 2 times daily    Liver transplant recipient (H)       valGANciclovir 450 MG tablet    VALCYTE    30 tablet    Take 1 tablet (450 mg) by mouth daily    Liver transplant recipient (H)       vitamin D 46086 UNIT capsule    ERGOCALCIFEROL    8 capsule    Take 1 capsule (50,000 Units) by mouth once a week    Vitamin D deficiency       * Notice:  This list has 2 medication(s) that are the same as other medications prescribed for you. Read the directions carefully, and ask your doctor or other care provider to review them with you.

## 2017-07-26 DIAGNOSIS — Z94.4 LIVER TRANSPLANT RECIPIENT (H): ICD-10-CM

## 2017-07-26 RX ORDER — MULTIPLE VITAMINS W/ MINERALS TAB 9MG-400MCG
1 TAB ORAL DAILY
Qty: 30 EACH | Refills: 11 | Status: SHIPPED | OUTPATIENT
Start: 2017-07-26

## 2017-07-26 NOTE — TELEPHONE ENCOUNTER
Drug Name: certavite/antioxidant  Last Fill Date: 7/6/17  Quantity: 472    Drug Name: clotrimazole 10mg lozg  Last Fill Date: 7/6/17  Quantity: 90    aMrtha Schaefer   [facility]  [phone]

## 2017-07-27 ENCOUNTER — APPOINTMENT (OUTPATIENT)
Dept: PHYSICAL THERAPY | Facility: CLINIC | Age: 27
End: 2017-07-27
Attending: EMERGENCY MEDICINE
Payer: COMMERCIAL

## 2017-07-27 ENCOUNTER — HOSPITAL ENCOUNTER (EMERGENCY)
Facility: CLINIC | Age: 27
Discharge: HOME | End: 2017-07-27
Attending: EMERGENCY MEDICINE | Admitting: EMERGENCY MEDICINE
Payer: COMMERCIAL

## 2017-07-27 ENCOUNTER — APPOINTMENT (OUTPATIENT)
Dept: GENERAL RADIOLOGY | Facility: CLINIC | Age: 27
End: 2017-07-27
Attending: EMERGENCY MEDICINE
Payer: COMMERCIAL

## 2017-07-27 VITALS
HEART RATE: 81 BPM | OXYGEN SATURATION: 100 % | DIASTOLIC BLOOD PRESSURE: 84 MMHG | TEMPERATURE: 97.8 F | RESPIRATION RATE: 14 BRPM | SYSTOLIC BLOOD PRESSURE: 140 MMHG

## 2017-07-27 DIAGNOSIS — M25.50 PAIN IN JOINT, MULTIPLE SITES: ICD-10-CM

## 2017-07-27 DIAGNOSIS — E55.9 VITAMIN D DEFICIENCY: ICD-10-CM

## 2017-07-27 DIAGNOSIS — M79.605 BILATERAL LOWER EXTREMITY PAIN: Primary | ICD-10-CM

## 2017-07-27 DIAGNOSIS — G89.18 POST-OPERATIVE PAIN: ICD-10-CM

## 2017-07-27 DIAGNOSIS — M79.604 BILATERAL LOWER EXTREMITY PAIN: Primary | ICD-10-CM

## 2017-07-27 LAB
ALBUMIN SERPL-MCNC: 3.1 G/DL (ref 3.4–5)
ALP SERPL-CCNC: 85 U/L (ref 40–150)
ALT SERPL W P-5'-P-CCNC: 14 U/L (ref 0–70)
ANION GAP SERPL CALCULATED.3IONS-SCNC: 9 MMOL/L (ref 3–14)
AST SERPL W P-5'-P-CCNC: 7 U/L (ref 0–45)
BASOPHILS # BLD AUTO: 0 10E9/L (ref 0–0.2)
BASOPHILS NFR BLD AUTO: 0.6 %
BILIRUB SERPL-MCNC: 0.8 MG/DL (ref 0.2–1.3)
BUN SERPL-MCNC: 40 MG/DL (ref 7–30)
CALCIUM SERPL-MCNC: 9.3 MG/DL (ref 8.5–10.1)
CHLORIDE SERPL-SCNC: 102 MMOL/L (ref 94–109)
CK SERPL-CCNC: 18 U/L (ref 30–300)
CO2 SERPL-SCNC: 24 MMOL/L (ref 20–32)
CREAT SERPL-MCNC: 1.49 MG/DL (ref 0.66–1.25)
CRP SERPL-MCNC: 39 MG/L (ref 0–8)
DIFFERENTIAL METHOD BLD: ABNORMAL
EOSINOPHIL # BLD AUTO: 0 10E9/L (ref 0–0.7)
EOSINOPHIL NFR BLD AUTO: 0.4 %
ERYTHROCYTE [DISTWIDTH] IN BLOOD BY AUTOMATED COUNT: 15.8 % (ref 10–15)
ERYTHROCYTE [SEDIMENTATION RATE] IN BLOOD BY WESTERGREN METHOD: 86 MM/H (ref 0–15)
GFR SERPL CREATININE-BSD FRML MDRD: 56 ML/MIN/1.7M2
GLUCOSE SERPL-MCNC: 116 MG/DL (ref 70–99)
HCT VFR BLD AUTO: 25.7 % (ref 40–53)
HGB BLD-MCNC: 8.8 G/DL (ref 13.3–17.7)
IMM GRANULOCYTES # BLD: 0 10E9/L (ref 0–0.4)
IMM GRANULOCYTES NFR BLD: 0.4 %
LYMPHOCYTES # BLD AUTO: 0.7 10E9/L (ref 0.8–5.3)
LYMPHOCYTES NFR BLD AUTO: 13.7 %
MCH RBC QN AUTO: 29.9 PG (ref 26.5–33)
MCHC RBC AUTO-ENTMCNC: 34.2 G/DL (ref 31.5–36.5)
MCV RBC AUTO: 87 FL (ref 78–100)
MONOCYTES # BLD AUTO: 0.5 10E9/L (ref 0–1.3)
MONOCYTES NFR BLD AUTO: 9.2 %
NEUTROPHILS # BLD AUTO: 3.8 10E9/L (ref 1.6–8.3)
NEUTROPHILS NFR BLD AUTO: 75.7 %
NRBC # BLD AUTO: 0 10*3/UL
NRBC BLD AUTO-RTO: 0 /100
PLATELET # BLD AUTO: 112 10E9/L (ref 150–450)
POTASSIUM SERPL-SCNC: 4.9 MMOL/L (ref 3.4–5.3)
PROT SERPL-MCNC: 7 G/DL (ref 6.8–8.8)
RBC # BLD AUTO: 2.94 10E12/L (ref 4.4–5.9)
SODIUM SERPL-SCNC: 135 MMOL/L (ref 133–144)
WBC # BLD AUTO: 5 10E9/L (ref 4–11)

## 2017-07-27 PROCEDURE — 82550 ASSAY OF CK (CPK): CPT | Performed by: EMERGENCY MEDICINE

## 2017-07-27 PROCEDURE — 25000131 ZZH RX MED GY IP 250 OP 636 PS 637: Performed by: EMERGENCY MEDICINE

## 2017-07-27 PROCEDURE — 73560 X-RAY EXAM OF KNEE 1 OR 2: CPT | Mod: RT

## 2017-07-27 PROCEDURE — 25000132 ZZH RX MED GY IP 250 OP 250 PS 637: Performed by: EMERGENCY MEDICINE

## 2017-07-27 PROCEDURE — G8978 MOBILITY CURRENT STATUS: HCPCS | Mod: GP,CK | Performed by: PHYSICAL THERAPIST

## 2017-07-27 PROCEDURE — 73610 X-RAY EXAM OF ANKLE: CPT | Mod: RT

## 2017-07-27 PROCEDURE — 85652 RBC SED RATE AUTOMATED: CPT | Performed by: EMERGENCY MEDICINE

## 2017-07-27 PROCEDURE — 86140 C-REACTIVE PROTEIN: CPT | Performed by: EMERGENCY MEDICINE

## 2017-07-27 PROCEDURE — 25000128 H RX IP 250 OP 636: Performed by: EMERGENCY MEDICINE

## 2017-07-27 PROCEDURE — 97116 GAIT TRAINING THERAPY: CPT | Mod: GP | Performed by: PHYSICAL THERAPIST

## 2017-07-27 PROCEDURE — 85025 COMPLETE CBC W/AUTO DIFF WBC: CPT | Performed by: EMERGENCY MEDICINE

## 2017-07-27 PROCEDURE — G8979 MOBILITY GOAL STATUS: HCPCS | Mod: GP,CK | Performed by: PHYSICAL THERAPIST

## 2017-07-27 PROCEDURE — 73030 X-RAY EXAM OF SHOULDER: CPT | Mod: LT

## 2017-07-27 PROCEDURE — 99285 EMERGENCY DEPT VISIT HI MDM: CPT | Mod: 25 | Performed by: EMERGENCY MEDICINE

## 2017-07-27 PROCEDURE — 97162 PT EVAL MOD COMPLEX 30 MIN: CPT | Mod: GP | Performed by: PHYSICAL THERAPIST

## 2017-07-27 PROCEDURE — 97530 THERAPEUTIC ACTIVITIES: CPT | Mod: GP | Performed by: PHYSICAL THERAPIST

## 2017-07-27 PROCEDURE — 80053 COMPREHEN METABOLIC PANEL: CPT | Performed by: EMERGENCY MEDICINE

## 2017-07-27 PROCEDURE — 40000193 ZZH STATISTIC PT WARD VISIT: Performed by: PHYSICAL THERAPIST

## 2017-07-27 PROCEDURE — G8980 MOBILITY D/C STATUS: HCPCS | Mod: GP,CK | Performed by: PHYSICAL THERAPIST

## 2017-07-27 PROCEDURE — 99284 EMERGENCY DEPT VISIT MOD MDM: CPT | Mod: Z6 | Performed by: EMERGENCY MEDICINE

## 2017-07-27 PROCEDURE — 96360 HYDRATION IV INFUSION INIT: CPT | Performed by: EMERGENCY MEDICINE

## 2017-07-27 RX ORDER — TACROLIMUS 1 MG/1
3 CAPSULE ORAL ONCE
Status: COMPLETED | OUTPATIENT
Start: 2017-07-27 | End: 2017-07-27

## 2017-07-27 RX ORDER — MYCOPHENOLATE MOFETIL 250 MG/1
1000 CAPSULE ORAL ONCE
Status: COMPLETED | OUTPATIENT
Start: 2017-07-27 | End: 2017-07-27

## 2017-07-27 RX ORDER — VALGANCICLOVIR 450 MG/1
450 TABLET, FILM COATED ORAL ONCE
Status: COMPLETED | OUTPATIENT
Start: 2017-07-27 | End: 2017-07-27

## 2017-07-27 RX ORDER — ERGOCALCIFEROL 1.25 MG/1
50000 CAPSULE, LIQUID FILLED ORAL WEEKLY
Qty: 8 CAPSULE | Refills: 0 | Status: CANCELLED | OUTPATIENT
Start: 2017-07-27

## 2017-07-27 RX ORDER — SULFAMETHOXAZOLE AND TRIMETHOPRIM 400; 80 MG/1; MG/1
1 TABLET ORAL ONCE
Status: COMPLETED | OUTPATIENT
Start: 2017-07-27 | End: 2017-07-27

## 2017-07-27 RX ORDER — TRAMADOL HYDROCHLORIDE 50 MG/1
50-100 TABLET ORAL EVERY 6 HOURS PRN
Qty: 30 TABLET | Refills: 0 | Status: SHIPPED | OUTPATIENT
Start: 2017-07-27 | End: 2017-12-15

## 2017-07-27 RX ADMIN — TACROLIMUS 3 MG: 1 CAPSULE ORAL at 11:45

## 2017-07-27 RX ADMIN — SODIUM CHLORIDE, POTASSIUM CHLORIDE, SODIUM LACTATE AND CALCIUM CHLORIDE 1000 ML: 600; 310; 30; 20 INJECTION, SOLUTION INTRAVENOUS at 11:24

## 2017-07-27 RX ADMIN — VALGANCICLOVIR 450 MG: 450 TABLET, FILM COATED ORAL at 11:46

## 2017-07-27 RX ADMIN — MYCOPHENOLATE MOFETIL 1000 MG: 250 CAPSULE ORAL at 11:46

## 2017-07-27 RX ADMIN — SULFAMETHOXAZOLE AND TRIMETHOPRIM 1 TABLET: 400; 80 TABLET ORAL at 11:47

## 2017-07-27 ASSESSMENT — ENCOUNTER SYMPTOMS
HEMATURIA: 0
BLOOD IN STOOL: 0
DYSURIA: 0
ARTHRALGIAS: 1
FEVER: 0
VOMITING: 0
MYALGIAS: 1

## 2017-07-27 NOTE — DISCHARGE INSTRUCTIONS
Follow-up in the transplant clinic in 2 weeks.  Call your transplant coordinator early next week to touch base with them and tell them how you're doing.    Tramadol for pain as instructed.    Wheelchair for transfers and mobility as needed, you may ambulate as tolerated.    Return to the emergency Department for any problems.

## 2017-07-27 NOTE — PROGRESS NOTES
Referral per PT at 1545 to assist pt with obtaining a wheelchair before discharge from the ED today.  Patient with severe leg and arm pain thought to be related to tacrolimus.  Needs to ambulate more than 150 feet to enter his apartment and was barely able to walk 20 feet with a walker in the ED.  His mother will be able to push him if he gets a manual wheelchair.      Met with pt and his mother in ED - no preference for wheelchair vendor.  I called [facility] and spoke with April, who requested I send the order immediately and stated that staff would call to let me know whether or not they can deliver today.  Faxed order and supporting documentation when available, awaiting response.      Handed off to LIA Wilkins in ED in case there is no response by the end of business day.      Elver Saleh, RN, PHN, ACM  RN Care Coordinator     [facility]   Emergency Department and 6D Observation Unit  [address] [place] [address]   [email or web address]  Formerly [email or web address]   Office: [phone]  Pager: [phone]  Please call or e-mail for fax contact information.

## 2017-07-27 NOTE — ED PROVIDER NOTES
History     Chief Complaint   Patient presents with     Leg Pain     HPI  Joe Sosa is a 27 year old male with a history of alpha-1-antitrypsin deficiency, alcoholic cirrhosis of liver with ascites (s/p liver transplant 17), SBP, hepatic encephalopathy, chronic hyponatremia, and anasarca who presents to the Emergency Department via EMS for evaluation of leg and arm pain. Patient complains of all over pain for the past 1.5 weeks. He reports that the pain is different everyday, but is generally in either his arms or legs. Pain has been worsening over the past 3 days, especially in his right leg; he reports difficulty walking last night. Today, he reports pain as sharp and concentrated to the knee. He notes that it increases with movement. When he is having arm pain, the pain begins in the hand and radiates to the elbow. He complains of difficulty moving fingers and feelings of extreme soreness. He does report occasional sharp pain with movement of his arm or hand. Patient's family member reports that he was seen 3 days ago and notes that they did mention the pain that he has been having, but he was only given more pain medication without further investigation. He denies fever, vomiting, dysuria, hematuria, blood in the stool, or recent new medications.    Past Medical History:   Diagnosis Date     Alcoholic cirrhosis of liver with ascites (H) 3/9/2017     Alpha-1-antitrypsin deficiency (H) 3/9/2017     Anasarca      Chronic hyponatremia      Hepatic encephalopathy (H)      SBP (spontaneous bacterial peritonitis) (H)        Past Surgical History:   Procedure Laterality Date     TRANSPLANT LIVER RECIPIENT  DONOR N/A 2017    Procedure: TRANSPLANT LIVER RECIPIENT  DONOR;  TRANSPLANT LIVER RECIPIENT  DONOR with bile duct stenting;  Surgeon: Cihno Martin MD;  Location:  OR       No family history on file.    Social History   Substance Use Topics     Smoking status: Never  Smoker     Smokeless tobacco: Former User     Types: Chew     Alcohol use No      Comment: Quit Nov. 4, 2016       No current facility-administered medications for this encounter.      Current Outpatient Prescriptions   Medication     ACETAMINOPHEN PO     order for DME     multivitamin, therapeutic with minerals (THERA-VIT-M) TABS tablet     clotrimazole (MYCELEX) 10 MG LOZG lozenge     tacrolimus (PROGRAF - GENERIC EQUIVALENT) 1 MG capsule     traMADol (ULTRAM) 50 MG tablet     valGANciclovir (VALCYTE) 450 MG tablet     mycophenolate (CELLCEPT - GENERIC EQUIVALENT) 250 MG capsule     senna-docusate (SENOKOT-S;PERICOLACE) 8.6-50 MG per tablet     sulfamethoxazole-trimethoprim (BACTRIM/SEPTRA) 400-80 MG per tablet     ursodiol (ACTIGALL) 300 MG capsule     vitamin D (ERGOCALCIFEROL) 88325 UNIT capsule     omeprazole (PRILOSEC) 20 MG CR capsule     cyclobenzaprine (FLEXERIL) 5 MG tablet     oxyCODONE (ROXICODONE) 5 MG IR tablet      No Known Allergies        I have reviewed the Medications, Allergies, Past Medical and Surgical History, and Social History in the Epic system.    Review of Systems   Constitutional: Negative for fever.   Gastrointestinal: Negative for blood in stool and vomiting.   Genitourinary: Negative for dysuria and hematuria.   Musculoskeletal: Positive for arthralgias and myalgias.       Physical Exam   BP: 130/79  Heart Rate: 81  Temp: 97.8  F (36.6  C)  SpO2: 100 %  Physical Exam   Constitutional: He is oriented to person, place, and time. Vital signs are normal. He appears well-developed and well-nourished.  Non-toxic appearance. He does not appear ill. No distress.   HENT:   Head: Normocephalic and atraumatic.   Mouth/Throat: Oropharynx is clear and moist. No oropharyngeal exudate.   Eyes: Conjunctivae and EOM are normal. Pupils are equal, round, and reactive to light. No scleral icterus.   Neck: Normal range of motion. Neck supple. No JVD present. No tracheal deviation present. No thyromegaly  present.   Cardiovascular: Normal rate, regular rhythm, normal heart sounds and intact distal pulses.  Exam reveals no gallop and no friction rub.    No murmur heard.  Pulmonary/Chest: Effort normal and breath sounds normal. No respiratory distress.   Abdominal: Soft. Bowel sounds are normal. He exhibits no distension and no mass. There is no tenderness.   Musculoskeletal: Normal range of motion. He exhibits no edema or tenderness.   Pain noted with movement of right leg at knee and ankle.  Also with right arm and shoulder and right wrist.  No obvious palpable joint effusions or overlying erythema.  Strongly palpable radial and dorsalis pedis pulse.   Lymphadenopathy:     He has no cervical adenopathy.   Neurological: He is alert and oriented to person, place, and time. He has normal strength. No cranial nerve deficit or sensory deficit.   Skin: Skin is warm and dry. No rash noted. No erythema. No pallor.   Psychiatric: He has a normal mood and affect. His behavior is normal.   Nursing note and vitals reviewed.      ED Course   9:06 AM  The patient was seen and examined by Eliud Collins MD in Room 10.     ED Course     Procedures        Results for orders placed or performed during the hospital encounter of 07/27/17   Ankle XR, G/E 3 views, right    Narrative    EXAMINATION: XR ANKLE RT G/E 3 VW  7/27/2017 12:54 PM     CLINICAL HISTORY:  Pain     COMPARISON: None available    FINDINGS: 3 views of the right ankle were obtained. There is no  comparison available. At the medial talar dome there is a cortical  irregularity. No definite evidence of displaced fracture. Prominent  posterior process of soft tissue swelling over the medial malleolus.      Impression    IMPRESSION:   1. No definite evidence of displaced fracture of the right ankle.  Subtle cortical irregularity about the medial aspect of the talar dome  and superficial soft tissue swelling at the medial malleolus.  Recommend clinical correlation for symptoms.  This could represent  ligamentous injury.  2. Should suspicion for fracture persist, repeat radiograph in 7-10  days could be performed.   Shoulder XR, G/E 3 views, left    Narrative    4 views left shoulder radiographs 7/27/2017 1:43 PM    History: Pain     Additional History from EMR: General pain for past one and half weeks.      Comparison: None available.    Findings:    AP, Grashey, transscapular Y, axillary  views of the right  shoulder  were obtained.     No acute osseous abnormality.  Glenohumeral and acromioclavicular  joints are congruent.    No substantial degenerative change of the glenohumeral joint.    Soft tissue is unremarkable.        Impression    Impression:  1. No acute osseous abnormality.  2. No substantial degenerative change.   XR Knee Right 1/2 Views    Narrative    EXAMINATION: XR KNEE RT 1 /2 VW  7/27/2017 12:54 PM     CLINICAL HISTORY:  pain     COMPARISON: None available    FINDINGS: AP, lateral views of the right knee were obtained. The joint  spaces are preserved. There is no joint effusion. No definite evidence  of acute osseous abnormalities.      Impression    IMPRESSION: No definite evidence of acute osseous abnormalities.    JUTTA ELLERMANN, MD   CBC with platelets differential   Result Value Ref Range    WBC 5.0 4.0 - 11.0 10e9/L    RBC Count 2.94 (L) 4.4 - 5.9 10e12/L    Hemoglobin 8.8 (L) 13.3 - 17.7 g/dL    Hematocrit 25.7 (L) 40.0 - 53.0 %    MCV 87 78 - 100 fl    MCH 29.9 26.5 - 33.0 pg    MCHC 34.2 31.5 - 36.5 g/dL    RDW 15.8 (H) 10.0 - 15.0 %    Platelet Count 112 (L) 150 - 450 10e9/L    Diff Method Automated Method     % Neutrophils 75.7 %    % Lymphocytes 13.7 %    % Monocytes 9.2 %    % Eosinophils 0.4 %    % Basophils 0.6 %    % Immature Granulocytes 0.4 %    Nucleated RBCs 0 0 /100    Absolute Neutrophil 3.8 1.6 - 8.3 10e9/L    Absolute Lymphocytes 0.7 (L) 0.8 - 5.3 10e9/L    Absolute Monocytes 0.5 0.0 - 1.3 10e9/L    Absolute Eosinophils 0.0 0.0 - 0.7 10e9/L     Absolute Basophils 0.0 0.0 - 0.2 10e9/L    Abs Immature Granulocytes 0.0 0 - 0.4 10e9/L    Absolute Nucleated RBC 0.0    Comprehensive metabolic panel   Result Value Ref Range    Sodium 135 133 - 144 mmol/L    Potassium 4.9 3.4 - 5.3 mmol/L    Chloride 102 94 - 109 mmol/L    Carbon Dioxide 24 20 - 32 mmol/L    Anion Gap 9 3 - 14 mmol/L    Glucose 116 (H) 70 - 99 mg/dL    Urea Nitrogen 40 (H) 7 - 30 mg/dL    Creatinine 1.49 (H) 0.66 - 1.25 mg/dL    GFR Estimate 56 (L) >60 mL/min/1.7m2    GFR Estimate If Black 67 >60 mL/min/1.7m2    Calcium 9.3 8.5 - 10.1 mg/dL    Bilirubin Total 0.8 0.2 - 1.3 mg/dL    Albumin 3.1 (L) 3.4 - 5.0 g/dL    Protein Total 7.0 6.8 - 8.8 g/dL    Alkaline Phosphatase 85 40 - 150 U/L    ALT 14 0 - 70 U/L    AST 7 0 - 45 U/L   Erythrocyte sedimentation rate auto   Result Value Ref Range    Sed Rate 86 (H) 0 - 15 mm/h   CRP inflammation   Result Value Ref Range    CRP Inflammation 39.0 (H) 0.0 - 8.0 mg/L   CK total   Result Value Ref Range    CK Total 18 (L) 30 - 300 U/L            Assessments & Plan (with Medical Decision Making)    This patient presented emergency department complaining of severe pain in his legs and arms.  He is  approximately 1 month status post liver transplant.  There are no joint effusions clinically or any other findings that would raise suspicion for septic joint emesis as this does seem to involve multiple joints seems unlikely that this would be a septic arthritis.  Patient is afebrile.  The blood work does not demonstrate any significant departure from his baseline studies.  Nothing in his presentation suggests liver rejection.  Possibilities include medication induced arthropathy as well as atypical infections.  Inflammatory indices are mildly elevated but nonspecific.  I did discuss the case with liver transplant and they evaluated the patient.  They are suspicious that this may be due to his transplant medications and suggested starting the patient on tramadol 50  mg every 6 hours and having him follow-up in the clinic.  Patient lives with his mother who assist him at home and she reports that the pain has gotten to the point where she is no longer able to physically assist him up and into the bathroom and they re concerned that he won t able to take care of his ADLs at home.  I did have physical therapy along with social work and care coordination evaluate the patient.  It was felt the patient could be safely sent home with a transfer wheelchair as he recuperates.  Order was written for this and we will attempt to obtain this as well as having physical therapy assessment as soon as possible at home to see if they need to increase the visits.  He will follow up with his transplant coordinator by phone if needed.   This part of the document was transcribed by Abdirahman Diaz Medical Scribe.       I have reviewed the nursing notes.    I have reviewed the findings, diagnosis, plan and need for follow up with the patient.    New Prescriptions    ORDER FOR DME    Equipment being ordered: Wheelchair, Manual  Treatment Diagnosis: Leg pain, severe    Patient height:  6 feet, 2 inches  Patient weight:  93.8 kg       Final diagnoses:   Pain in joint, multiple sites   I, Sarah Norton, am serving as a trained medical scribe to document services personally performed by Eliud Collins MD, based on the provider's statements to me.      I, Eliud Collins MD, was physically present and have reviewed and verified the accuracy of this note documented by Sarah Norton.       7/27/2017   [facility], [place], EMERGENCY DEPARTMENT     Jeffery Collins MD  07/27/17 1640

## 2017-07-27 NOTE — ED AVS SNAPSHOT
[facility], [place], Emergency Department    [address] [place] [address]    Phone:  [phone]                                       Joe Sosa   MRN: [id number]    Department:  [facility], Emergency Department   Date of Visit:  7/27/2017           After Visit Summary Signature Page     I have received my discharge instructions, and my questions have been answered. I have discussed any challenges I see with this plan with the nurse or doctor.    ..........................................................................................................................................  Patient/Patient Representative Signature      ..........................................................................................................................................  Patient Representative Print Name and Relationship to Patient    ..................................................               ................................................  Date                                            Time    ..........................................................................................................................................  Reviewed by Signature/Title    ...................................................              ..............................................  Date                                                            Time

## 2017-07-27 NOTE — ED NOTES
Bed: ED10  Expected date: 7/27/17  Expected time: 8:30 AM  Means of arrival:   Comments:  N 723 27M c/o untraumatic knee and arm pain yellow

## 2017-07-27 NOTE — PROGRESS NOTES
[facility]  Patient is currently open to home care services with [facility].  The patient is currently receiving PT  services.  [facility]  and team have been notified of patient admission.  [facility] liaison will continue to follow patient during stay.  If appropriate provide orders to resume home care at time of discharge.    Maria Luz Fuentes RN, BSN  [facility]ia[facility]  [phone]

## 2017-07-27 NOTE — ED NOTES
Pt arrives from home for a complaint of R leg pain and L arm pain. EMS reports that leg is cyanotic and cool to the touch but pulses are present. 0.5 of dilaudid given with some relief of pain. Upon arrival pt has +2 pulses bilaterally in both feet.

## 2017-07-27 NOTE — PHARMACY-ADMISSION MEDICATION HISTORY
Admission medication history interview status for the 7/27/2017 admission is complete. See Epic admission navigator for allergy information, pharmacy, prior to admission medications and immunization status.     Medication history interview sources:  Patient, mother, home medication list    Changes made to PTA medication list (reason)  Added: Acetaminophen 500mg QHS prn  Deleted: Furosemide 40mg BID (discontinued by provider)  Changed: Senna-docusate changed from 2 tab BID prn to 1 tab QAM    Additional medication history information (including reliability of information, actions taken by pharmacist): Patient and mother appear to be good historians regarding medication list. Patient unsure if he takes brand or generic tacrolimus and mycophenolate; verified from previous admission records and outpatient pharmacy fill that he has received generic in the past.      Prior to Admission medications    Medication Sig Last Dose Taking? Auth Provider   ACETAMINOPHEN PO Take 500 mg by mouth nightly as needed for pain 7/26/2017 at PM Yes Unknown, Entered By History   multivitamin, therapeutic with minerals (THERA-VIT-M) TABS tablet Take 1 tablet by mouth daily 7/26/2017 at Afternoon Yes Chino Martin MD   clotrimazole (MYCELEX) 10 MG LOZG lozenge Place 1 lozenge (1 Nona) inside cheek 3 times daily 7/26/2017 at PM Yes Chino Martin MD   tacrolimus (PROGRAF - GENERIC EQUIVALENT) 1 MG capsule Take 3 capsules (3 mg) by mouth every 12 hours 7/26/2017 at 2100 Yes Chino Martin MD   traMADol (ULTRAM) 50 MG tablet Take 1-2 tablets ( mg) by mouth every 6 hours as needed for pain maximum 6 tablet(s) per day 7/27/2017 at 100mg in AM Yes Joanie Handy NP   valGANciclovir (VALCYTE) 450 MG tablet Take 1 tablet (450 mg) by mouth daily 7/26/2017 at AM Yes Tennille Peña PA-C   mycophenolate (CELLCEPT - GENERIC EQUIVALENT) 250 MG capsule Take 4 capsules (1,000 mg) by mouth 2 times daily 7/26/2017 at 2100  Yes Tennille Peña PA-C   senna-docusate (SENOKOT-S;PERICOLACE) 8.6-50 MG per tablet Take 2 tablets by mouth 2 times daily as needed for constipation  Patient taking differently: Take 1 tablet by mouth daily  7/26/2017 at AM Yes Tennille Peña PA-C   sulfamethoxazole-trimethoprim (BACTRIM/SEPTRA) 400-80 MG per tablet Take 1 tablet by mouth daily 7/26/2017 at AM Yes Tennille Peña PA-C   ursodiol (ACTIGALL) 300 MG capsule Take 1 capsule (300 mg) by mouth 2 times daily 7/26/2017 at PM Yes Tennille Peña PA-C   vitamin D (ERGOCALCIFEROL) 57380 UNIT capsule Take 1 capsule (50,000 Units) by mouth once a week 7/26/2017 at AM Yes Andreina Templeton MD   omeprazole (PRILOSEC) 20 MG CR capsule Take 20 mg by mouth daily  7/26/2017 at AM Yes Reported, Patient   cyclobenzaprine (FLEXERIL) 5 MG tablet Take 1 tablet (5 mg) by mouth 3 times daily as needed for muscle spasms More than a month at Unknown time  Chino Martin MD   oxyCODONE (ROXICODONE) 5 MG IR tablet Take 1 tablet (5 mg) by mouth every 8 hours as needed for moderate to severe pain 7/25/2017 at Afternoon  Chino Martin MD         Medication history completed by: Neisha Delgado, PharmNorrisD.

## 2017-07-27 NOTE — PROGRESS NOTES
Social Work: Assessment with Discharge Plan    Patient Name:  Joe Sosa  :  1990  Age:  27 year old  MRN:  [id number]  Risk/Complexity Score:     Completed assessment with:  Pt and mother.    Presenting Information   Reason for Referral:  Discharge plan  Date of Intake:  2017  Referral Source:  Physician  Decision Maker:  Pt  Alternate Decision Maker:  Pt's mother  Health Care Directive:  Not discussed  Living Situation:  Apartment. Pt living with mom.  Previous Functional Status:  Assistance with Other:  Pt has been able to get up and go to the bathroom, get something to eat, bathe, and do exercises until night prior to ED visit.  Patient and family understanding of hospitalization:  Pt states he's had increasing pain since  night. Pt was unable to get up to do anything, I.e. Go to the bathroom or exercises.   Cultural/Language/Spiritual Considerations:  None identified.  Adjustment to Illness:  Pt's affect is appropriate for situation.    Physical Health  Reason for Admission:    1. Bilateral lower extremity pain    2. Pain in joint, multiple sites    3. Post-operative pain      Services Needed/Recommended:  Home with homecare    Mental Health/Chemical Dependency  Diagnosis:  Pt has alcohol-induced cirrhosis.  Support/Services in Place:  Pt sees counselor at [facility]. Name Joann Navarro. [phone].   Services Needed/Recommended:  No additional identified.    Support System  Significant relationship at present time:  Pt's mother is with him.  Family of origin is available for support:  Pt's mother available for support. Mother states she also receives support from her sister and brother; she sees them about once a week.  Other support available:  None identified.  Gaps in support system:  Wheelchair- being delivered prior to d/c  Patient is caregiver to:  None     Provider Information   Primary Care Physician:  Jeffery Azul   [phone]   Clinic:  [facility] [address]   / [place] [facility]      :  None    Financial   Income Source:  Pt recently applied for SSDI  Financial Concerns:  None identified.  Insurance:    Payor/Plan Subscriber Name Rel Member # Group #   UCARE - UC[employer] LYDIA SAINZ  [id number]       [id number]       Discharge Plan   Patient and family discharge goal:  Return home.  Provided education on discharge plan:  YES  Patient agreeable to discharge plan:  YES  A list of Medicare Certified Facilities was provided to the patient and/or family to encourage patient choice. Patient's choices for facility are:  N/A  Will NH provide Skilled rehabilitation or complex medical:  NO  General information regarding anticipated insurance coverage and possible out of pocket cost was discussed. Patient and patient's family are aware patient may incur the cost of transportation to the facility, pending insurance payment: YES  Barriers to discharge:  Wheelchair needed to discharge    Discharge Recommendations   Anticipated Disposition:  Home with services. Cont PT visits once a week.  Transportation Needs:  Medical:  Wheelchair  Name of Transportation Company and Phone:  [employer] [phone]    Additional comments   Pt is being seen for increased pain since Sunday post kidney transplant (1 month ago). Pt was unable to move due to pain since last night, not even to go to the bathroom. Pt lives at home with his mother who is disabled due to back pain and cannot assist him if he needs help with transfers. Mother is home nearly all the time as she does not work. After PT consult, advised pt ok to d/c with wheelchair from [employer] which was delivered to ED. Pt's medication was filled in pharmacy and was transported home via wheelchair transport with [employer].      Krista ALLEN, [facility]  ED   ED Pager: [phone]  On-call/After hours Pager: [phone]

## 2017-07-27 NOTE — TELEPHONE ENCOUNTER
Pt is requesting new rx for vitamin d [id number] , never filled this medication with     Martha Schaefer   [facility]  [phone]

## 2017-07-27 NOTE — PLAN OF CARE
Problem: Goal Outcome Summary  Goal: Goal Outcome Summary  PT ED: Evaluation completed, treatment initiated and completed.  Mobility limited by impaired balance due to pain.  Ambulation limited to 25' pushing wheelchair and requiring min assist.  By end of session, patient able to transfer bed <> wheelchair with SBA.   Patient needs to ambulate 150-200' to enter apartment from parking garage.      Recommend discharge to home setting with wheelchair and resumption of home PT to progress mobility in home setting.

## 2017-07-27 NOTE — ED AVS SNAPSHOT
[facility], Emergency Department    [address] [address]    Phone:  [phone]                                       Joe Sosa   MRN: [id number]    Department:  [facility], Emergency Department   Date of Visit:  7/27/2017           Patient Information     Date Of Birth          1990        Your diagnoses for this visit were:     Bilateral lower extremity pain     Pain in joint, multiple sites     Post-operative pain        You were seen by Jeffery Collins MD.        Discharge Instructions       Follow-up in the transplant clinic in 2 weeks.  Call your transplant coordinator early next week to touch base with them and tell them how you're doing.    Tramadol for pain as instructed.    Wheelchair for transfers and mobility as needed, you may ambulate as tolerated.    Return to the emergency Department for any problems.    Future Appointments        Provider Department Dept Phone Center    8/11/2017 10:30 AM [facility] ROOM 3 [facility],  Radiology [phone] [facility]    8/28/2017 8:45 AM Lab [facility] Lab [phone] [facility]    8/28/2017 9:30 AM Chino Martin MD [facility] Solid Organ Transplant [phone] [facility]    9/19/2017 10:15 AM [facility] Lab [phone] [facility]    9/19/2017 11:10 AM Andreina Templeton MD [facility] Hepatology [phone] [facility]      24 Hour Appointment Hotline       To make an appointment at any [facility], call [phone] ([phone]). If you don't have a family doctor or clinic, we will help you find one. [place] clinics are conveniently located to serve the needs of you and your family.             Review of your medicines      START taking        Dose / Directions Last dose taken    order for DME   Quantity:  1 Units        Equipment being ordered: Wheelchair, Manual Treatment Diagnosis: Leg pain, severe  Patient height:  6 feet, 2 inches Patient weight:  93.8 kg   Refills:  0          Our records  show that you are taking the medicines listed below. If these are incorrect, please call your family doctor or clinic.        Dose / Directions Last dose taken    ACETAMINOPHEN PO   Dose:  500 mg        Take 500 mg by mouth nightly as needed for pain   Refills:  0        clotrimazole 10 MG Lozg lozenge   Commonly known as:  MYCELEX   Dose:  1 Nona   Quantity:  90 each        Place 1 lozenge (1 Nona) inside cheek 3 times daily   Refills:  1        cyclobenzaprine 5 MG tablet   Commonly known as:  FLEXERIL   Dose:  5 mg   Quantity:  42 tablet        Take 1 tablet (5 mg) by mouth 3 times daily as needed for muscle spasms   Refills:  1        multivitamin, therapeutic with minerals Tabs tablet   Dose:  1 tablet   Quantity:  30 each        Take 1 tablet by mouth daily   Refills:  11        mycophenolate 250 MG capsule   Commonly known as:  CELLCEPT - GENERIC EQUIVALENT   Dose:  1000 mg   Quantity:  240 capsule        Take 4 capsules (1,000 mg) by mouth 2 times daily   Refills:  11        omeprazole 20 MG CR capsule   Commonly known as:  priLOSEC   Dose:  20 mg        Take 20 mg by mouth daily   Refills:  0        oxyCODONE 5 MG IR tablet   Commonly known as:  ROXICODONE   Dose:  5 mg   Quantity:  25 tablet        Take 1 tablet (5 mg) by mouth every 8 hours as needed for moderate to severe pain   Refills:  0        senna-docusate 8.6-50 MG per tablet   Commonly known as:  SENOKOT-S;PERICOLACE   Dose:  2 tablet   Quantity:  30 tablet        Take 2 tablets by mouth 2 times daily as needed for constipation   Refills:  0        sulfamethoxazole-trimethoprim 400-80 MG per tablet   Commonly known as:  BACTRIM/SEPTRA   Dose:  1 tablet   Indication:  PCP prophylaxis   Quantity:  30 tablet        Take 1 tablet by mouth daily   Refills:  5        tacrolimus 1 MG capsule   Commonly known as:  PROGRAF - GENERIC EQUIVALENT   Dose:  3 mg   Quantity:  180 capsule        Take 3 capsules (3 mg) by mouth every 12 hours   Refills:  11         traMADol 50 MG tablet   Commonly known as:  ULTRAM   Dose:   mg   Quantity:  30 tablet        Take 1-2 tablets ( mg) by mouth every 6 hours as needed for pain maximum 6 tablet(s) per day   Refills:  0        ursodiol 300 MG capsule   Commonly known as:  ACTIGALL   Dose:  300 mg   Quantity:  60 capsule        Take 1 capsule (300 mg) by mouth 2 times daily   Refills:  11        valGANciclovir 450 MG tablet   Commonly known as:  VALCYTE   Dose:  450 mg   Indication:  Treatment to Prevent Cytomegalovirus Disease   Quantity:  30 tablet        Take 1 tablet (450 mg) by mouth daily   Refills:  5        vitamin D 54444 UNIT capsule   Commonly known as:  ERGOCALCIFEROL   Dose:  36816 Units   Quantity:  8 capsule        Take 1 capsule (50,000 Units) by mouth once a week   Refills:  0                Prescriptions were sent or printed at these locations (2 Prescriptions)                   Other Prescriptions                Printed at Department/Unit printer (2 of 2)         order for DME               traMADol (ULTRAM) 50 MG tablet                Procedures and tests performed during your visit     Ankle XR, G/E 3 views, right    CBC with platelets differential    CK total    CRP inflammation    Comprehensive metabolic panel    Erythrocyte sedimentation rate auto    Medication History IP Pharmacy Consult    Shoulder XR, G/E 3 views, left    XR Knee Right 1/2 Views      Orders Needing Specimen Collection     None      Pending Results     No orders found from 7/25/2017 to 7/28/2017.            Pending Culture Results     No orders found from 7/25/2017 to 7/28/2017.            Pending Results Instructions     If you had any lab results that were not finalized at the time of your Discharge, you can call the ED Lab Result RN at [phone]. You will be contacted by this team for any positive Lab results or changes in treatment. The nurses are available 7 days a week from 10A to 6:30P.  You can leave a message 24  hours per day and they will return your call.        Thank you for choosing [facility]       Thank you for choosing [facility] for your care. Our goal is always to provide you with excellent care. Hearing back from our patients is one way we can continue to improve our services. Please take a few minutes to complete the written survey that you may receive in the mail after you visit with us. Thank you!        [facility] Information     [facility] gives you secure access to your electronic health record. If you see a primary care provider, you can also send messages to your care team and make appointments. If you have questions, please call your primary care clinic.  If you do not have a primary care provider, please call [phone] and they will assist you.        Care EveryWhere ID     This is your Care EveryWhere ID. This could be used by other organizations to access your [facility] medical records  [id number]        Equal Access to Services     JENAE VAUGHN : Aurora Liu, liliam case, vahe argueta. So [facility] [phone].    ATENCIÓN: Si habla español, tiene a chun disposición servicios gratuitos de asistencia lingüística. Luis al [phone].    We comply with applicable federal civil rights laws and [place] laws. We do not discriminate on the basis of race, color, national origin, age, disability sex, sexual orientation or gender identity.            After Visit Summary       This is your record. Keep this with you and show to your community pharmacist(s) and doctor(s) at your next visit.

## 2017-07-27 NOTE — PROGRESS NOTES
07/27/17 1600   Quick Adds   Type of Visit Initial PT Evaluation       Present no   Living Environment   Lives With parent(s)   Living Arrangements apartment   Home Accessibility no concerns   Number of Stairs to Enter Home 0   Number of Stairs Within Home 0   Transportation Available car;family or friend will provide   Living Environment Comment needs to ambulate ' to enter apartment from parking garage   Self-Care   Usual Activity Tolerance moderate   Current Activity Tolerance poor   Regular Exercise no   Equipment Currently Used at Home cane, straight   Activity/Exercise/Self-Care Comment currently has home PT   Functional Level Prior   Ambulation 0-->independent   Transferring 0-->independent   Toileting 0-->independent   Bathing 0-->independent   Dressing 0-->independent   Eating 0-->independent   Communication 0-->understands/communicates without difficulty   Swallowing 0-->swallows foods/liquids without difficulty   Cognition 0 - no cognition issues reported   Fall history within last six months no   Prior Functional Level Comment was ambulating independently a few days ago   General Information   Onset of Illness/Injury or Date of Surgery - Date 07/27/17   Referring Physician Dr. Jeffery Collins   Patient/Family Goals Statement return home   Pertinent History of Current Problem (include personal factors and/or comorbidities that impact the POC) Joe Sosa is a 27 year old male with a history of alpha-1-antitrypsin deficiency, alcoholic cirrhosis of liver with ascites (s/p liver transplant 6/27/17), SBP, hepatic encephalopathy, chronic hyponatremia, and anasarca who presents to the Emergency Department via EMS for evaluation of leg and arm pain. Patient complains of all over pain for the past 1.5 weeks. He reports that the pain is different everyday, but is generally in either his arms or legs. Pain has been worsening over the past 3 days, especially in his right leg; he reports  difficulty walking last night. Today, he reports pain as sharp and concentrated to the knee. He notes that it increases with movement. When he is having arm pain, the pain begins in the hand and radiates to the elbow. He complains of difficulty moving fingers and feelings of extreme soreness. He does report occasional sharp pain with movement of his arm or hand. Patient's family member reports that he was seen 3 days ago and notes that they did mention the pain that he has been having, but he was only given more pain medication without further investigation. He denies fever, vomiting, dysuria, hematuria, blood in the stool, or recent new medications. Per patient, all scans for acute injury negative.    Cognitive Status Examination   Orientation orientation to person, place and time   Level of Consciousness alert   Follows Commands and Answers Questions 100% of the time   Personal Safety and Judgment intact   Pain Assessment   Patient Currently in Pain Yes, see Vital Sign flowsheet   Range of Motion (ROM)   ROM Comment R LE WNL   Strength   Strength Comments R LE grossly 4+/5   Bed Mobility   Bed Mobility Comments supine > sit SBA   Transfer Skills   Transfer Comments sit > stand SBA   Gait   Gait Comments a few steps with UE support and min assist   Balance   Balance Comments UE support needed to maintain standing balance   Sensory Examination   Sensory Perception no deficits were identified   General Therapy Interventions   Planned Therapy Interventions gait training;transfer training;progressive activity/exercise   Clinical Impression   Criteria for Skilled Therapeutic Intervention yes, treatment indicated   PT Diagnosis impaired functional mobility   Influenced by the following impairments pain, impaired balance   Functional limitations due to impairments impaired transfers, impaired gait   Clinical Presentation Evolving/Changing   Clinical Presentation Rationale comorbidities, clinical judgment   Clinical  "Decision Making (Complexity) Moderate complexity   Therapy Frequency` other (see comments)  (1 visit)   Predicted Duration of Therapy Intervention (days/wks) 1 visit   Anticipated Equipment Needs at Discharge wheelchair   Anticipated Discharge Disposition Home with Home Therapy   Risk & Benefits of therapy have been explained Yes   Patient, Family & other staff in agreement with plan of care Yes   [facility] AM-PAC  \"6 Clicks\" V.2 Basic Mobility Inpatient Short Form   1. Turning from your back to your side while in a flat bed without using bedrails? 4 - None   2. Moving from lying on your back to sitting on the side of a flat bed without using bedrails? 4 - None   3. Moving to and from a bed to a chair (including a wheelchair)? 3 - A Little   4. Standing up from a chair using your arms (e.g., wheelchair, or bedside chair)? 3 - A Little   5. To walk in hospital room? 3 - A Little   6. Climbing 3-5 steps with a railing? 2 - A Lot   Basic Mobility Raw Score (Score out of 24.Lower scores equate to lower levels of function) 19   Total Evaluation Time   Total Evaluation Time (Minutes) 25     "

## 2017-07-27 NOTE — PROGRESS NOTES
[facility]      OUTPATIENT PHYSICAL THERAPY EVALUATION  PLAN OF TREATMENT FOR OUTPATIENT REHABILITATION  (COMPLETE FOR INITIAL CLAIMS ONLY)  Patient's Last Name, First Name, M.I.  YOB: 1990  Joe Sosa                        Provider's Name  [facility] Medical Record No.  [id number]                               Onset Date:  07/27/17   Start of Care Date:  07/27/17      Type:     _X_PT   ___OT   ___SLP Medical Diagnosis:  Bilateral LE pain                        PT Diagnosis:  impaired functional mobility   Visits from SOC:  1   _________________________________________________________________________________  Plan of Treatment/Functional Goals    Planned Interventions:  ,    gait training, transfer training, progressive activity/exercise,       Goals: See Physical Therapy Goals on Care Plan in [facility] electronic health record.    Therapy Frequency: other (see comments) (1 visit)  Predicted Duration of Therapy Intervention: 1 visit  _________________________________________________________________________________    I CERTIFY THE NEED FOR THESE SERVICES FURNISHED UNDER        THIS PLAN OF TREATMENT AND WHILE UNDER MY CARE     (Physician co-signature of this document indicates review and certification of the therapy plan).                  Certification date from: 07/27/17, Certification date to: 07/28/17    Referring Physician: Dr. Jeffery Collins            Initial Assessment        See Physical Therapy evaluation dated 07/27/17 in Epic electronic health record.

## 2017-07-28 ENCOUNTER — TELEPHONE (OUTPATIENT)
Dept: TRANSPLANT | Facility: CLINIC | Age: 27
End: 2017-07-28

## 2017-07-28 DIAGNOSIS — Z94.4 LIVER REPLACED BY TRANSPLANT (H): Primary | ICD-10-CM

## 2017-07-28 NOTE — TELEPHONE ENCOUNTER
Spoke with pt. States pain is better than yesterday and is tolerable. Using the wheelchair, reminded him to be aware of his surroundings when getting in and out to prevent falls. Pt has appt to see Dr. Martin on 8/28/17, would like to see him sooner. Instructed pt to call with any changes, questions, or concerns that arise before his appt. Pt verbalized understanding and had no further questions. Call transferred to .

## 2017-07-28 NOTE — TELEPHONE ENCOUNTER
Per note on 5/3/17, pt to take vitamin d 00447 units once a week for 8 weeks then have level rechecked. Called pt and advised him lab order will be placed to recheck vitamin d before refilling. Pt verbalized understanding and all questions answered.

## 2017-07-31 DIAGNOSIS — Z94.4 LIVER REPLACED BY TRANSPLANT (H): ICD-10-CM

## 2017-07-31 LAB
ALBUMIN SERPL-MCNC: 3.3 G/DL (ref 3.4–5)
ALP SERPL-CCNC: 91 U/L (ref 40–150)
ALT SERPL W P-5'-P-CCNC: 13 U/L (ref 0–70)
ANION GAP SERPL CALCULATED.3IONS-SCNC: 9 MMOL/L (ref 3–14)
AST SERPL W P-5'-P-CCNC: 10 U/L (ref 0–45)
BILIRUB DIRECT SERPL-MCNC: 0.4 MG/DL (ref 0–0.2)
BILIRUB SERPL-MCNC: 0.8 MG/DL (ref 0.2–1.3)
BUN SERPL-MCNC: 29 MG/DL (ref 7–30)
CALCIUM SERPL-MCNC: 9.2 MG/DL (ref 8.5–10.1)
CHLORIDE SERPL-SCNC: 103 MMOL/L (ref 94–109)
CO2 SERPL-SCNC: 26 MMOL/L (ref 20–32)
CREAT SERPL-MCNC: 1.43 MG/DL (ref 0.66–1.25)
ERYTHROCYTE [DISTWIDTH] IN BLOOD BY AUTOMATED COUNT: 15.3 % (ref 10–15)
GFR SERPL CREATININE-BSD FRML MDRD: 58 ML/MIN/1.7M2
GLUCOSE SERPL-MCNC: 145 MG/DL (ref 70–99)
HCT VFR BLD AUTO: 27.6 % (ref 40–53)
HGB BLD-MCNC: 9.5 G/DL (ref 13.3–17.7)
MAGNESIUM SERPL-MCNC: 1.6 MG/DL (ref 1.6–2.3)
MCH RBC QN AUTO: 30.7 PG (ref 26.5–33)
MCHC RBC AUTO-ENTMCNC: 34.4 G/DL (ref 31.5–36.5)
MCV RBC AUTO: 89 FL (ref 78–100)
PHOSPHATE SERPL-MCNC: 4.7 MG/DL (ref 2.5–4.5)
PLATELET # BLD AUTO: 144 10E9/L (ref 150–450)
POTASSIUM SERPL-SCNC: 4.7 MMOL/L (ref 3.4–5.3)
PROT SERPL-MCNC: 7 G/DL (ref 6.8–8.8)
RBC # BLD AUTO: 3.09 10E12/L (ref 4.4–5.9)
SODIUM SERPL-SCNC: 138 MMOL/L (ref 133–144)
TACROLIMUS BLD-MCNC: 10.8 UG/L (ref 5–15)
TME LAST DOSE: NORMAL H
WBC # BLD AUTO: 5.4 10E9/L (ref 4–11)

## 2017-07-31 PROCEDURE — 80197 ASSAY OF TACROLIMUS: CPT | Performed by: TRANSPLANT SURGERY

## 2017-07-31 PROCEDURE — 84100 ASSAY OF PHOSPHORUS: CPT | Performed by: TRANSPLANT SURGERY

## 2017-07-31 PROCEDURE — 85027 COMPLETE CBC AUTOMATED: CPT | Performed by: TRANSPLANT SURGERY

## 2017-07-31 PROCEDURE — 80048 BASIC METABOLIC PNL TOTAL CA: CPT | Performed by: TRANSPLANT SURGERY

## 2017-07-31 PROCEDURE — 80076 HEPATIC FUNCTION PANEL: CPT | Performed by: TRANSPLANT SURGERY

## 2017-07-31 PROCEDURE — 36415 COLL VENOUS BLD VENIPUNCTURE: CPT | Performed by: TRANSPLANT SURGERY

## 2017-07-31 PROCEDURE — 83735 ASSAY OF MAGNESIUM: CPT | Performed by: TRANSPLANT SURGERY

## 2017-08-04 ENCOUNTER — TELEPHONE (OUTPATIENT)
Dept: GASTROENTEROLOGY | Facility: CLINIC | Age: 27
End: 2017-08-04

## 2017-08-04 ENCOUNTER — TELEPHONE (OUTPATIENT)
Dept: PHARMACY | Facility: CLINIC | Age: 27
End: 2017-08-04

## 2017-08-04 NOTE — TELEPHONE ENCOUNTER
Patient scheduled for EGD    Indication for procedure. Stent Removal    Referring Provider. Chino Martin MD    ? Not Needed    Arrival time verified? Yes, 1020    Facility location verified? Yes, [address]    Instructions given regarding prep and procedure    Prep Type NPO    Are you taking any anticoagulants or blood thinners? No    Instructions given? N/A    Electronic implanted devices? No    Pre procedure teaching completed? Yes    Transportation from procedure? Mother    H&P / Pre op physical completed? N/A

## 2017-08-07 DIAGNOSIS — Z94.4 LIVER REPLACED BY TRANSPLANT (H): ICD-10-CM

## 2017-08-07 LAB
ALBUMIN SERPL-MCNC: 3.5 G/DL (ref 3.4–5)
ALP SERPL-CCNC: 92 U/L (ref 40–150)
ALT SERPL W P-5'-P-CCNC: 14 U/L (ref 0–70)
ANION GAP SERPL CALCULATED.3IONS-SCNC: 10 MMOL/L (ref 3–14)
AST SERPL W P-5'-P-CCNC: 8 U/L (ref 0–45)
BILIRUB DIRECT SERPL-MCNC: 0.3 MG/DL (ref 0–0.2)
BILIRUB SERPL-MCNC: 0.7 MG/DL (ref 0.2–1.3)
BUN SERPL-MCNC: 29 MG/DL (ref 7–30)
CALCIUM SERPL-MCNC: 9 MG/DL (ref 8.5–10.1)
CHLORIDE SERPL-SCNC: 108 MMOL/L (ref 94–109)
CO2 SERPL-SCNC: 21 MMOL/L (ref 20–32)
CREAT SERPL-MCNC: 1.5 MG/DL (ref 0.66–1.25)
DEPRECATED CALCIDIOL+CALCIFEROL SERPL-MC: 23 UG/L (ref 20–75)
ERYTHROCYTE [DISTWIDTH] IN BLOOD BY AUTOMATED COUNT: 15.7 % (ref 10–15)
GFR SERPL CREATININE-BSD FRML MDRD: 55 ML/MIN/1.7M2
GLUCOSE SERPL-MCNC: 129 MG/DL (ref 70–99)
HCT VFR BLD AUTO: 29.8 % (ref 40–53)
HGB BLD-MCNC: 9.9 G/DL (ref 13.3–17.7)
MAGNESIUM SERPL-MCNC: 1.2 MG/DL (ref 1.6–2.3)
MCH RBC QN AUTO: 30.3 PG (ref 26.5–33)
MCHC RBC AUTO-ENTMCNC: 33.2 G/DL (ref 31.5–36.5)
MCV RBC AUTO: 91 FL (ref 78–100)
PHOSPHATE SERPL-MCNC: 4.3 MG/DL (ref 2.5–4.5)
PLATELET # BLD AUTO: 150 10E9/L (ref 150–450)
POTASSIUM SERPL-SCNC: 5.1 MMOL/L (ref 3.4–5.3)
PROT SERPL-MCNC: 7.1 G/DL (ref 6.8–8.8)
RBC # BLD AUTO: 3.27 10E12/L (ref 4.4–5.9)
SODIUM SERPL-SCNC: 139 MMOL/L (ref 133–144)
TACROLIMUS BLD-MCNC: 10.4 UG/L (ref 5–15)
TME LAST DOSE: NORMAL H
WBC # BLD AUTO: 5.1 10E9/L (ref 4–11)

## 2017-08-07 PROCEDURE — 80076 HEPATIC FUNCTION PANEL: CPT | Performed by: TRANSPLANT SURGERY

## 2017-08-07 PROCEDURE — 84100 ASSAY OF PHOSPHORUS: CPT | Performed by: TRANSPLANT SURGERY

## 2017-08-07 PROCEDURE — 36415 COLL VENOUS BLD VENIPUNCTURE: CPT | Performed by: TRANSPLANT SURGERY

## 2017-08-07 PROCEDURE — 85027 COMPLETE CBC AUTOMATED: CPT | Performed by: TRANSPLANT SURGERY

## 2017-08-07 PROCEDURE — 80197 ASSAY OF TACROLIMUS: CPT | Performed by: TRANSPLANT SURGERY

## 2017-08-07 PROCEDURE — 82306 VITAMIN D 25 HYDROXY: CPT | Performed by: INTERNAL MEDICINE

## 2017-08-07 PROCEDURE — 80048 BASIC METABOLIC PNL TOTAL CA: CPT | Performed by: TRANSPLANT SURGERY

## 2017-08-07 PROCEDURE — 83735 ASSAY OF MAGNESIUM: CPT | Performed by: TRANSPLANT SURGERY

## 2017-08-11 ENCOUNTER — HOSPITAL ENCOUNTER (OUTPATIENT)
Dept: GENERAL RADIOLOGY | Facility: CLINIC | Age: 27
Discharge: HOME | End: 2017-08-11
Attending: TRANSPLANT SURGERY | Admitting: TRANSPLANT SURGERY
Payer: COMMERCIAL

## 2017-08-11 DIAGNOSIS — Z94.4 HISTORY OF LIVER TRANSPLANT (H): ICD-10-CM

## 2017-08-11 DIAGNOSIS — Z79.60 LONG-TERM USE OF IMMUNOSUPPRESSANT MEDICATION: ICD-10-CM

## 2017-08-11 PROCEDURE — 74020 XR ABDOMEN 2 VW: CPT

## 2017-08-14 DIAGNOSIS — Z94.4 LIVER REPLACED BY TRANSPLANT (H): ICD-10-CM

## 2017-08-14 LAB
ALBUMIN SERPL-MCNC: 3.6 G/DL (ref 3.4–5)
ALP SERPL-CCNC: 86 U/L (ref 40–150)
ALT SERPL W P-5'-P-CCNC: 12 U/L (ref 0–70)
ANION GAP SERPL CALCULATED.3IONS-SCNC: 10 MMOL/L (ref 3–14)
AST SERPL W P-5'-P-CCNC: 9 U/L (ref 0–45)
BILIRUB DIRECT SERPL-MCNC: 0.2 MG/DL (ref 0–0.2)
BILIRUB SERPL-MCNC: 0.7 MG/DL (ref 0.2–1.3)
BUN SERPL-MCNC: 32 MG/DL (ref 7–30)
CALCIUM SERPL-MCNC: 8.9 MG/DL (ref 8.5–10.1)
CHLORIDE SERPL-SCNC: 108 MMOL/L (ref 94–109)
CO2 SERPL-SCNC: 21 MMOL/L (ref 20–32)
CREAT SERPL-MCNC: 1.58 MG/DL (ref 0.66–1.25)
ERYTHROCYTE [DISTWIDTH] IN BLOOD BY AUTOMATED COUNT: 15.2 % (ref 10–15)
GFR SERPL CREATININE-BSD FRML MDRD: 52 ML/MIN/1.7M2
GLUCOSE SERPL-MCNC: 116 MG/DL (ref 70–99)
HCT VFR BLD AUTO: 30.1 % (ref 40–53)
HGB BLD-MCNC: 10.1 G/DL (ref 13.3–17.7)
MAGNESIUM SERPL-MCNC: 1.4 MG/DL (ref 1.6–2.3)
MCH RBC QN AUTO: 30.4 PG (ref 26.5–33)
MCHC RBC AUTO-ENTMCNC: 33.6 G/DL (ref 31.5–36.5)
MCV RBC AUTO: 91 FL (ref 78–100)
PHOSPHATE SERPL-MCNC: 3.9 MG/DL (ref 2.5–4.5)
PLATELET # BLD AUTO: 111 10E9/L (ref 150–450)
POTASSIUM SERPL-SCNC: 5.1 MMOL/L (ref 3.4–5.3)
PROT SERPL-MCNC: 6.8 G/DL (ref 6.8–8.8)
RBC # BLD AUTO: 3.32 10E12/L (ref 4.4–5.9)
SODIUM SERPL-SCNC: 139 MMOL/L (ref 133–144)
WBC # BLD AUTO: 4.1 10E9/L (ref 4–11)

## 2017-08-14 PROCEDURE — 85027 COMPLETE CBC AUTOMATED: CPT | Performed by: TRANSPLANT SURGERY

## 2017-08-14 PROCEDURE — 80197 ASSAY OF TACROLIMUS: CPT | Performed by: TRANSPLANT SURGERY

## 2017-08-14 PROCEDURE — 84100 ASSAY OF PHOSPHORUS: CPT | Performed by: TRANSPLANT SURGERY

## 2017-08-14 PROCEDURE — 80076 HEPATIC FUNCTION PANEL: CPT | Performed by: TRANSPLANT SURGERY

## 2017-08-14 PROCEDURE — 80048 BASIC METABOLIC PNL TOTAL CA: CPT | Performed by: TRANSPLANT SURGERY

## 2017-08-14 PROCEDURE — 83735 ASSAY OF MAGNESIUM: CPT | Performed by: TRANSPLANT SURGERY

## 2017-08-14 PROCEDURE — 36415 COLL VENOUS BLD VENIPUNCTURE: CPT | Performed by: TRANSPLANT SURGERY

## 2017-08-15 LAB
TACROLIMUS BLD-MCNC: 9.5 UG/L (ref 5–15)
TME LAST DOSE: NORMAL H

## 2017-08-21 DIAGNOSIS — Z94.4 LIVER REPLACED BY TRANSPLANT (H): ICD-10-CM

## 2017-08-21 LAB
ALBUMIN SERPL-MCNC: 3.8 G/DL (ref 3.4–5)
ALP SERPL-CCNC: 76 U/L (ref 40–150)
ALT SERPL W P-5'-P-CCNC: 13 U/L (ref 0–70)
ANION GAP SERPL CALCULATED.3IONS-SCNC: 6 MMOL/L (ref 3–14)
AST SERPL W P-5'-P-CCNC: 5 U/L (ref 0–45)
BILIRUB DIRECT SERPL-MCNC: 0.3 MG/DL (ref 0–0.2)
BILIRUB SERPL-MCNC: 0.7 MG/DL (ref 0.2–1.3)
BUN SERPL-MCNC: 36 MG/DL (ref 7–30)
CALCIUM SERPL-MCNC: 8.8 MG/DL (ref 8.5–10.1)
CHLORIDE SERPL-SCNC: 110 MMOL/L (ref 94–109)
CO2 SERPL-SCNC: 23 MMOL/L (ref 20–32)
CREAT SERPL-MCNC: 1.4 MG/DL (ref 0.66–1.25)
ERYTHROCYTE [DISTWIDTH] IN BLOOD BY AUTOMATED COUNT: 14.9 % (ref 10–15)
GFR SERPL CREATININE-BSD FRML MDRD: 60 ML/MIN/1.7M2
GLUCOSE SERPL-MCNC: 116 MG/DL (ref 70–99)
HCT VFR BLD AUTO: 29.4 % (ref 40–53)
HGB BLD-MCNC: 10 G/DL (ref 13.3–17.7)
MAGNESIUM SERPL-MCNC: 1.6 MG/DL (ref 1.6–2.3)
MCH RBC QN AUTO: 30.8 PG (ref 26.5–33)
MCHC RBC AUTO-ENTMCNC: 34 G/DL (ref 31.5–36.5)
MCV RBC AUTO: 91 FL (ref 78–100)
PHOSPHATE SERPL-MCNC: 4 MG/DL (ref 2.5–4.5)
PLATELET # BLD AUTO: 101 10E9/L (ref 150–450)
POTASSIUM SERPL-SCNC: 5 MMOL/L (ref 3.4–5.3)
PROT SERPL-MCNC: 6.9 G/DL (ref 6.8–8.8)
RBC # BLD AUTO: 3.25 10E12/L (ref 4.4–5.9)
SODIUM SERPL-SCNC: 139 MMOL/L (ref 133–144)
TACROLIMUS BLD-MCNC: 11.4 UG/L (ref 5–15)
TME LAST DOSE: 2100 H
WBC # BLD AUTO: 3.3 10E9/L (ref 4–11)

## 2017-08-21 PROCEDURE — 84100 ASSAY OF PHOSPHORUS: CPT | Performed by: FAMILY MEDICINE

## 2017-08-21 PROCEDURE — 85027 COMPLETE CBC AUTOMATED: CPT | Performed by: FAMILY MEDICINE

## 2017-08-21 PROCEDURE — 36415 COLL VENOUS BLD VENIPUNCTURE: CPT | Performed by: FAMILY MEDICINE

## 2017-08-21 PROCEDURE — 80076 HEPATIC FUNCTION PANEL: CPT | Performed by: FAMILY MEDICINE

## 2017-08-21 PROCEDURE — 80048 BASIC METABOLIC PNL TOTAL CA: CPT | Performed by: FAMILY MEDICINE

## 2017-08-21 PROCEDURE — 80197 ASSAY OF TACROLIMUS: CPT | Performed by: FAMILY MEDICINE

## 2017-08-21 PROCEDURE — 83735 ASSAY OF MAGNESIUM: CPT | Performed by: FAMILY MEDICINE

## 2017-08-28 ENCOUNTER — TELEPHONE (OUTPATIENT)
Dept: TRANSPLANT | Facility: CLINIC | Age: 27
End: 2017-08-28

## 2017-08-28 ENCOUNTER — OFFICE VISIT (OUTPATIENT)
Dept: TRANSPLANT | Facility: CLINIC | Age: 27
End: 2017-08-28
Attending: TRANSPLANT SURGERY
Payer: COMMERCIAL

## 2017-08-28 VITALS
SYSTOLIC BLOOD PRESSURE: 137 MMHG | HEIGHT: 74 IN | TEMPERATURE: 98.2 F | BODY MASS INDEX: 27.4 KG/M2 | HEART RATE: 83 BPM | RESPIRATION RATE: 16 BRPM | WEIGHT: 213.5 LBS | DIASTOLIC BLOOD PRESSURE: 91 MMHG | OXYGEN SATURATION: 100 %

## 2017-08-28 DIAGNOSIS — Z94.4 LIVER REPLACED BY TRANSPLANT (H): Primary | ICD-10-CM

## 2017-08-28 DIAGNOSIS — Z79.60 LONG-TERM USE OF IMMUNOSUPPRESSANT MEDICATION: ICD-10-CM

## 2017-08-28 DIAGNOSIS — Z94.4 LIVER REPLACED BY TRANSPLANT (H): ICD-10-CM

## 2017-08-28 DIAGNOSIS — Z94.4 LIVER TRANSPLANT RECIPIENT (H): Primary | ICD-10-CM

## 2017-08-28 LAB
ALBUMIN SERPL-MCNC: 3.8 G/DL (ref 3.4–5)
ALBUMIN UR-MCNC: NEGATIVE MG/DL
ALP SERPL-CCNC: 78 U/L (ref 40–150)
ALT SERPL W P-5'-P-CCNC: 12 U/L (ref 0–70)
ANION GAP SERPL CALCULATED.3IONS-SCNC: 7 MMOL/L (ref 3–14)
APPEARANCE UR: CLEAR
AST SERPL W P-5'-P-CCNC: 9 U/L (ref 0–45)
BACTERIA #/AREA URNS HPF: ABNORMAL /HPF
BILIRUB DIRECT SERPL-MCNC: 0.3 MG/DL (ref 0–0.2)
BILIRUB SERPL-MCNC: 0.7 MG/DL (ref 0.2–1.3)
BILIRUB UR QL STRIP: NEGATIVE
BUN SERPL-MCNC: 40 MG/DL (ref 7–30)
CALCIUM SERPL-MCNC: 8.3 MG/DL (ref 8.5–10.1)
CHLORIDE SERPL-SCNC: 111 MMOL/L (ref 94–109)
CHOLEST SERPL-MCNC: 111 MG/DL
CO2 SERPL-SCNC: 23 MMOL/L (ref 20–32)
COLOR UR AUTO: YELLOW
CREAT SERPL-MCNC: 1.68 MG/DL (ref 0.66–1.25)
CREAT UR-MCNC: 98 MG/DL
ERYTHROCYTE [DISTWIDTH] IN BLOOD BY AUTOMATED COUNT: 14.4 % (ref 10–15)
GFR SERPL CREATININE-BSD FRML MDRD: 48 ML/MIN/1.7M2
GLUCOSE SERPL-MCNC: 105 MG/DL (ref 70–99)
GLUCOSE UR STRIP-MCNC: NEGATIVE MG/DL
HCT VFR BLD AUTO: 31.3 % (ref 40–53)
HDLC SERPL-MCNC: 53 MG/DL
HGB BLD-MCNC: 10.3 G/DL (ref 13.3–17.7)
HGB UR QL STRIP: NEGATIVE
KETONES UR STRIP-MCNC: NEGATIVE MG/DL
LDLC SERPL CALC-MCNC: 44 MG/DL
LEUKOCYTE ESTERASE UR QL STRIP: NEGATIVE
MAGNESIUM SERPL-MCNC: 1.6 MG/DL (ref 1.6–2.3)
MCH RBC QN AUTO: 30.7 PG (ref 26.5–33)
MCHC RBC AUTO-ENTMCNC: 32.9 G/DL (ref 31.5–36.5)
MCV RBC AUTO: 93 FL (ref 78–100)
MUCOUS THREADS #/AREA URNS LPF: PRESENT /LPF
NITRATE UR QL: NEGATIVE
NONHDLC SERPL-MCNC: 58 MG/DL
PH UR STRIP: 5 PH (ref 5–7)
PHOSPHATE SERPL-MCNC: 3.8 MG/DL (ref 2.5–4.5)
PLATELET # BLD AUTO: 101 10E9/L (ref 150–450)
POTASSIUM SERPL-SCNC: 5.1 MMOL/L (ref 3.4–5.3)
PROT SERPL-MCNC: 7 G/DL (ref 6.8–8.8)
PROT UR-MCNC: 0.18 G/L
PROT/CREAT 24H UR: 0.18 G/G CR (ref 0–0.2)
RBC # BLD AUTO: 3.35 10E12/L (ref 4.4–5.9)
RBC #/AREA URNS AUTO: <1 /HPF (ref 0–2)
SODIUM SERPL-SCNC: 140 MMOL/L (ref 133–144)
SOURCE: ABNORMAL
SP GR UR STRIP: 1.01 (ref 1–1.03)
TACROLIMUS BLD-MCNC: 11.4 UG/L (ref 5–15)
TME LAST DOSE: NORMAL H
TRIGL SERPL-MCNC: 70 MG/DL
UROBILINOGEN UR STRIP-MCNC: 0 MG/DL (ref 0–2)
WBC # BLD AUTO: 2.8 10E9/L (ref 4–11)
WBC #/AREA URNS AUTO: <1 /HPF (ref 0–2)

## 2017-08-28 PROCEDURE — 80197 ASSAY OF TACROLIMUS: CPT | Performed by: TRANSPLANT SURGERY

## 2017-08-28 PROCEDURE — 81001 URINALYSIS AUTO W/SCOPE: CPT | Performed by: TRANSPLANT SURGERY

## 2017-08-28 PROCEDURE — 84100 ASSAY OF PHOSPHORUS: CPT | Performed by: TRANSPLANT SURGERY

## 2017-08-28 PROCEDURE — 99211 OFF/OP EST MAY X REQ PHY/QHP: CPT

## 2017-08-28 PROCEDURE — 83735 ASSAY OF MAGNESIUM: CPT | Performed by: TRANSPLANT SURGERY

## 2017-08-28 PROCEDURE — 80048 BASIC METABOLIC PNL TOTAL CA: CPT | Performed by: TRANSPLANT SURGERY

## 2017-08-28 PROCEDURE — 80061 LIPID PANEL: CPT | Performed by: TRANSPLANT SURGERY

## 2017-08-28 PROCEDURE — 80076 HEPATIC FUNCTION PANEL: CPT | Performed by: TRANSPLANT SURGERY

## 2017-08-28 PROCEDURE — 36415 COLL VENOUS BLD VENIPUNCTURE: CPT | Performed by: TRANSPLANT SURGERY

## 2017-08-28 PROCEDURE — 80321 ALCOHOLS BIOMARKERS 1OR 2: CPT | Performed by: TRANSPLANT SURGERY

## 2017-08-28 PROCEDURE — 84156 ASSAY OF PROTEIN URINE: CPT | Performed by: TRANSPLANT SURGERY

## 2017-08-28 PROCEDURE — 85027 COMPLETE CBC AUTOMATED: CPT | Performed by: TRANSPLANT SURGERY

## 2017-08-28 RX ORDER — TACROLIMUS 1 MG/1
CAPSULE ORAL
Qty: 150 CAPSULE | Refills: 11 | Status: SHIPPED | OUTPATIENT
Start: 2017-08-28 | End: 2017-09-29

## 2017-08-28 NOTE — PROGRESS NOTES
HPI      ROS      Physical Exam    Transplant Surgery -OUTPATIENT IMMUNOSUPPRESSION PROGRESS NOTE    Date of Visit: 2017    Transplants:  2017 (Liver); Postoperative day:  61  ASSESMENT AND PLAN:  1.Graft Function: Liver allograft: no rejection or technical problems.    2.Immunosuppression Management: keep tacrolimus levels at 10  3.Hypertension: ok  4.Renal Function:good  5.Lab frequency: weekly  6.Other:  Wound healthy and biliary stent out    Date: 2017    Transplant:  [x]                             Liver [x]                              Kidney []                             Pancreas []                              Other:             Chief Complaint:Liver Transplant (POD 61)  Doing well, no fever or abdominal pain  History of Present Illness:  Patient Active Problem List   Diagnosis     Alpha-1-antitrypsin deficiency (H)     Alcoholic cirrhosis of liver with ascites (H)     Vitamin D deficiency     Hepatic encephalopathy (H)     Alcoholism (H)     End stage liver disease (H)     Liver transplant recipient (H)     S/P liver transplant (H)     Immunosuppressed status (H)     SOCIAL /FAMILY HISTORY: [x]                  No recent change    Past Medical History:   Diagnosis Date     Alcoholic cirrhosis of liver with ascites (H) 3/9/2017     Alpha-1-antitrypsin deficiency (H) 3/9/2017     Anasarca      Chronic hyponatremia      Hepatic encephalopathy (H)      SBP (spontaneous bacterial peritonitis) (H)      Past Surgical History:   Procedure Laterality Date     TRANSPLANT LIVER RECIPIENT  DONOR N/A 2017    Procedure: TRANSPLANT LIVER RECIPIENT  DONOR;  TRANSPLANT LIVER RECIPIENT  DONOR with bile duct stenting;  Surgeon: Chino Martin MD;  Location:  OR     Social History     Social History     Marital status: Single     Spouse name: N/A     Number of children: N/A     Years of education: N/A     Occupational History     Not on file.     Social History Main  Topics     Smoking status: Never Smoker     Smokeless tobacco: Former User     Types: Chew     Alcohol use No      Comment: Quit Nov. 4, 2016     Drug use: No     Sexual activity: Not on file     Other Topics Concern     Not on file     Social History Narrative     Prescription Medications as of 8/28/2017             order for DME Equipment being ordered: Wheelchair, Manual  Treatment Diagnosis: Leg pain, severe    Patient height:  6 feet, 2 inches  Patient weight:  93.8 kg    traMADol (ULTRAM) 50 MG tablet Take 1-2 tablets ( mg) by mouth every 6 hours as needed for pain maximum 6 tablet(s) per day    multivitamin, therapeutic with minerals (THERA-VIT-M) TABS tablet Take 1 tablet by mouth daily    clotrimazole (MYCELEX) 10 MG LOZG lozenge Place 1 lozenge (1 Nona) inside cheek 3 times daily    tacrolimus (PROGRAF - GENERIC EQUIVALENT) 1 MG capsule Take 3 capsules (3 mg) by mouth every 12 hours    cyclobenzaprine (FLEXERIL) 5 MG tablet Take 1 tablet (5 mg) by mouth 3 times daily as needed for muscle spasms    valGANciclovir (VALCYTE) 450 MG tablet Take 1 tablet (450 mg) by mouth daily    mycophenolate (CELLCEPT - GENERIC EQUIVALENT) 250 MG capsule Take 4 capsules (1,000 mg) by mouth 2 times daily    sulfamethoxazole-trimethoprim (BACTRIM/SEPTRA) 400-80 MG per tablet Take 1 tablet by mouth daily    ursodiol (ACTIGALL) 300 MG capsule Take 1 capsule (300 mg) by mouth 2 times daily    omeprazole (PRILOSEC) 20 MG CR capsule Take 20 mg by mouth daily         Review of patient's allergies indicates no known allergies.   REVIEW OF SYSTEMS (check box if normal)  [x]               GENERAL  [x]                 PULMONARY [x]                GENITOURINARY  [x]                CNS                 [x]                 CARDIAC  [x]                 ENDOCRINE  [x]                EARS,NOSE,THROAT [x]                 GASTROINTESTINAL [x]                 NEUROLOGIC    [x]                MUSCLOSKELTAL  [x]                   "HEMATOLOGY      PHYSICAL EXAM (check box if normal)BP (!) 137/91  Pulse 83  Temp 98.2  F (36.8  C) (Oral)  Resp 16  Ht 1.88 m (6' 2\")  Wt 96.8 kg (213 lb 8 oz)  SpO2 100%  BMI 27.41 kg/m2      [x]            GENERAL:    [x]       EYES:  ICTERIC   []        YES  []                    NO  [x]           EXTREMITIES: Clubbing []       Y     [x]           N    [x]           EARS, NOSE, THROAT: Membranes Moist    YES   [x]                   NO []                  [x]           LUNGS:  CLEAR    YES       [x]                  NO    []                                [x]           SKIN: Jaundice           YES       []                  NO    [x]                   Rash: YES       []                  NO    [x]                                     [x]             HEART: Regular Rate          YES       [x]                  NO    []                   Incision Clean:  YES       [x]                  NO    []                                [x]                    ABDOMEN: Wound healthy Organomegaly YES       []                  NO    [x]                       [x]                    NEUROLOGICAL:  Nonfocal  YES       [x]                  NO    []                       [x]                    Hernia YES       []                  NO    [x]                   PSYCHIATRIC:  Appropriate  YES       [x]                  NO    []                       OTHER:                                                                                                   PAIN SCALE:: 3  "

## 2017-08-28 NOTE — NURSING NOTE
"Chief Complaint   Patient presents with     Liver Transplant     POD 61       Initial BP (!) 137/91  Pulse 83  Temp 98.2  F (36.8  C) (Oral)  Resp 16  Ht 1.88 m (6' 2\")  Wt 96.8 kg (213 lb 8 oz)  SpO2 100%  BMI 27.41 kg/m2 Estimated body mass index is 27.41 kg/(m^2) as calculated from the following:    Height as of this encounter: 1.88 m (6' 2\").    Weight as of this encounter: 96.8 kg (213 lb 8 oz).  "

## 2017-08-28 NOTE — LETTER
2017       RE: Joe Sosa  [address]  [place] [address]     Dear Colleague,    Thank you for referring your patient, Joe Sosa, to the [facility] SOLID ORGAN TRANSPLANT at [facility]. Please see a copy of my visit note below.    Transplant Surgery -OUTPATIENT IMMUNOSUPPRESSION PROGRESS NOTE    Date of Visit: 2017    Transplants:  2017 (Liver); Postoperative day:  61  ASSESMENT AND PLAN:  1.Graft Function: Liver allograft: no rejection or technical problems.    2.Immunosuppression Management: keep tacrolimus levels at 10  3.Hypertension: ok  4.Renal Function:good  5.Lab frequency: weekly  6.Other:  Wound healthy and biliary stent out    Date: 2017    Transplant:  [x]                             Liver [x]                              Kidney []                             Pancreas []                              Other:             Chief Complaint:Liver Transplant (POD 61)  Doing well, no fever or abdominal pain  History of Present Illness:  Patient Active Problem List   Diagnosis     Alpha-1-antitrypsin deficiency (H)     Alcoholic cirrhosis of liver with ascites (H)     Vitamin D deficiency     Hepatic encephalopathy (H)     Alcoholism (H)     End stage liver disease (H)     Liver transplant recipient (H)     S/P liver transplant (H)     Immunosuppressed status (H)     SOCIAL /FAMILY HISTORY: [x]                  No recent change    Past Medical History:   Diagnosis Date     Alcoholic cirrhosis of liver with ascites (H) 3/9/2017     Alpha-1-antitrypsin deficiency (H) 3/9/2017     Anasarca      Chronic hyponatremia      Hepatic encephalopathy (H)      SBP (spontaneous bacterial peritonitis) (H)      Past Surgical History:   Procedure Laterality Date     TRANSPLANT LIVER RECIPIENT  DONOR N/A 2017    Procedure: TRANSPLANT LIVER RECIPIENT  DONOR;  TRANSPLANT LIVER RECIPIENT  DONOR with bile duct stenting;   Surgeon: Chino Martin MD;  Location:  OR     Social History     Social History     Marital status: Single     Spouse name: N/A     Number of children: N/A     Years of education: N/A     Occupational History     Not on file.     Social History Main Topics     Smoking status: Never Smoker     Smokeless tobacco: Former User     Types: Chew     Alcohol use No      Comment: Quit Nov. 4, 2016     Drug use: No     Sexual activity: Not on file     Other Topics Concern     Not on file     Social History Narrative     Prescription Medications as of 8/28/2017             order for DME Equipment being ordered: Wheelchair, Manual  Treatment Diagnosis: Leg pain, severe    Patient height:  6 feet, 2 inches  Patient weight:  93.8 kg    traMADol (ULTRAM) 50 MG tablet Take 1-2 tablets ( mg) by mouth every 6 hours as needed for pain maximum 6 tablet(s) per day    multivitamin, therapeutic with minerals (THERA-VIT-M) TABS tablet Take 1 tablet by mouth daily    clotrimazole (MYCELEX) 10 MG LOZG lozenge Place 1 lozenge (1 Nona) inside cheek 3 times daily    tacrolimus (PROGRAF - GENERIC EQUIVALENT) 1 MG capsule Take 3 capsules (3 mg) by mouth every 12 hours    cyclobenzaprine (FLEXERIL) 5 MG tablet Take 1 tablet (5 mg) by mouth 3 times daily as needed for muscle spasms    valGANciclovir (VALCYTE) 450 MG tablet Take 1 tablet (450 mg) by mouth daily    mycophenolate (CELLCEPT - GENERIC EQUIVALENT) 250 MG capsule Take 4 capsules (1,000 mg) by mouth 2 times daily    sulfamethoxazole-trimethoprim (BACTRIM/SEPTRA) 400-80 MG per tablet Take 1 tablet by mouth daily    ursodiol (ACTIGALL) 300 MG capsule Take 1 capsule (300 mg) by mouth 2 times daily    omeprazole (PRILOSEC) 20 MG CR capsule Take 20 mg by mouth daily         Review of patient's allergies indicates no known allergies.   REVIEW OF SYSTEMS (check box if normal)  [x]               GENERAL  [x]                 PULMONARY [x]                GENITOURINARY  [x]          "       CNS                 [x]                 CARDIAC  [x]                 ENDOCRINE  [x]                EARS,NOSE,THROAT [x]                 GASTROINTESTINAL [x]                 NEUROLOGIC    [x]                MUSCLOSKELTAL  [x]                  HEMATOLOGY      PHYSICAL EXAM (check box if normal)BP (!) 137/91  Pulse 83  Temp 98.2  F (36.8  C) (Oral)  Resp 16  Ht 1.88 m (6' 2\")  Wt 96.8 kg (213 lb 8 oz)  SpO2 100%  BMI 27.41 kg/m2      [x]            GENERAL:    [x]       EYES:  ICTERIC   []        YES  []                    NO  [x]           EXTREMITIES: Clubbing []       Y     [x]           N    [x]           EARS, NOSE, THROAT: Membranes Moist    YES   [x]                   NO []                  [x]           LUNGS:  CLEAR    YES       [x]                  NO    []                                [x]           SKIN: Jaundice           YES       []                  NO    [x]                   Rash: YES       []                  NO    [x]                                     [x]             HEART: Regular Rate          YES       [x]                  NO    []                   Incision Clean:  YES       [x]                  NO    []                                [x]                    ABDOMEN: Wound healthy Organomegaly YES       []                  NO    [x]                       [x]                    NEUROLOGICAL:  Nonfocal  YES       [x]                  NO    []                       [x]                    Hernia YES       []                  NO    [x]                   PSYCHIATRIC:  Appropriate  YES       [x]                  NO    []                       OTHER:                                                                                                   PAIN SCALE:: 3    Again, thank you for allowing me to participate in the care of your patient.      Sincerely,    Chino Martin MD      "

## 2017-08-28 NOTE — MR AVS SNAPSHOT
After Visit Summary   8/28/2017    Joe Sosa    MRN: [id number]           Patient Information     Date Of Birth          1990        Visit Information        Provider Department      8/28/2017 9:30 AM Chino Martin MD [facility] Solid Organ Transplant        Today's Diagnoses     Liver replaced by transplant (H)    -  1       Follow-ups after your visit        Your next 10 appointments already scheduled     Sep 19, 2017 10:15 AM CDT   Lab with  LAB   [facility] ([facility])    [address] [place] [id number]   [phone]            Sep 19, 2017 11:10 AM CDT   (Arrive by 10:55 AM)   Return Liver Transplant with Andreina Templeton MD   [facility] Hepatology ([facility])    [address] [place] [id number] [phone]            Sep 25, 2017  9:50 AM CDT   (Arrive by 9:35 AM)   Liver Return Post Op with Chino Martin MD   [facility] Solid Organ Transplant ([facility])    [address] [place] [id number] [phone]              Who to contact     If you have questions or need follow up information about today's clinic visit or your schedule please contact [facility] SOLID ORGAN TRANSPLANT directly at [phone].  Normal or non-critical lab and imaging results will be communicated to you by MyChart, letter or phone within 4 business days after the clinic has received the results. If you do not hear from us within 7 days, please contact the clinic through MyChart or phone. If you have a critical or abnormal lab result, we will notify you by phone as soon as possible.  Submit refill requests through [employer] or call your pharmacy and they will forward the refill request to us. Please allow 3 business days for your refill to be completed.          Additional Information About Your Visit        MyChart Information      "[employer] gives you secure access to your electronic health record. If you see a primary care provider, you can also send messages to your care team and make appointments. If you have questions, please call your primary care clinic.  If you do not have a primary care provider, please call [phone] and they will assist you.        Care EveryWhere ID     This is your Care EveryWhere ID. This could be used by other organizations to access your [place] medical records  [id number]        Your Vitals Were     Pulse Temperature Respirations Height Pulse Oximetry BMI (Body Mass Index)    83 98.2  F (36.8  C) (Oral) 16 1.88 m (6' 2\") 100% 27.41 kg/m2       Blood Pressure from Last 3 Encounters:   08/28/17 (!) 137/91   07/27/17 140/84   07/24/17 114/71    Weight from Last 3 Encounters:   08/28/17 96.8 kg (213 lb 8 oz)   07/24/17 93.8 kg (206 lb 12.8 oz)   07/17/17 94.9 kg (209 lb 3.2 oz)              Today, you had the following     No orders found for display       Primary Care Provider Office Phone # Fax #    Jeffery Azul [phone]       [facility] [address] [place] [address]        Equal Access to Services     JERSEY VAUGHN : Hadii aad ku hadasho Soomaali, waaxda luqadaha, qaybta kaalmada adeegyada, vahe eaton . So [facility] [phone].    ATENCIÓN: Si habla español, tiene a chun disposición servicios gratuitos de asistencia lingüística. Llame al [phone].    We comply with applicable federal civil rights laws and [place] laws. We do not discriminate on the basis of race, color, national origin, age, disability sex, sexual orientation or gender identity.            Thank you!     Thank you for choosing [facility] SOLID ORGAN TRANSPLANT  for your care. Our goal is always to provide you with excellent care. Hearing back from our patients is one way we can continue to improve our services. Please take a few minutes to complete the " written survey that you may receive in the mail after your visit with us. Thank you!             Your Updated Medication List - Protect others around you: Learn how to safely use, store and throw away your medicines at www.disposemymeds.org.          This list is accurate as of: 8/28/17  9:48 AM.  Always use your most recent med list.                   Brand Name Dispense Instructions for use Diagnosis    clotrimazole 10 MG Lozg lozenge    MYCELEX    90 each    Place 1 lozenge (1 Nona) inside cheek 3 times daily    Liver transplant recipient (H)       cyclobenzaprine 5 MG tablet    FLEXERIL    42 tablet    Take 1 tablet (5 mg) by mouth 3 times daily as needed for muscle spasms    Liver transplant recipient (H)       multivitamin, therapeutic with minerals Tabs tablet     30 each    Take 1 tablet by mouth daily    Liver transplant recipient (H)       mycophenolate 250 MG capsule    GENERIC EQUIVALENT    240 capsule    Take 4 capsules (1,000 mg) by mouth 2 times daily    Liver transplant recipient (H)       omeprazole 20 MG CR capsule    priLOSEC     Take 20 mg by mouth daily    Vitamin D deficiency       order for DME     1 Units    Equipment being ordered: Wheelchair, Manual Treatment Diagnosis: Leg pain, severe  Patient height:  6 feet, 2 inches Patient weight:  93.8 kg    Bilateral lower extremity pain       sulfamethoxazole-trimethoprim 400-80 MG per tablet    BACTRIM/SEPTRA    30 tablet    Take 1 tablet by mouth daily    Liver transplant recipient (H)       tacrolimus 1 MG capsule    GENERIC EQUIVALENT    180 capsule    Take 3 capsules (3 mg) by mouth every 12 hours    Liver transplant recipient (H)       traMADol 50 MG tablet    ULTRAM    30 tablet    Take 1-2 tablets ( mg) by mouth every 6 hours as needed for pain maximum 6 tablet(s) per day    Post-operative pain       ursodiol 300 MG capsule    ACTIGALL    60 capsule    Take 1 capsule (300 mg) by mouth 2 times daily    Liver transplant recipient (H)        valGANciclovir 450 MG tablet    VALCYTE    30 tablet    Take 1 tablet (450 mg) by mouth daily    Liver transplant recipient (H)

## 2017-08-28 NOTE — TELEPHONE ENCOUNTER
Spoke with  Joe, instructed to reduce tacrolimus dose to 2mg pm, continue 3mg in am.   Joe verbalized understanding of dose change.  Lab frequency reduced to every 2 weeks.

## 2017-08-29 LAB — ETHYL GLUCURONIDE UR QL: NEGATIVE

## 2017-08-31 NOTE — NURSING NOTE
Here for post liver transplant follow-up.  Accompanied by mom, Therese.  Reviewed recent labs and assisted with interpretation.     Complaints:  No c/o, feeling much better.    Current immunosuppression:    Tacrolimus 3mg Q 12 hrs, cellcept 1000mg Q 12 hours.    Med changes: none,         Lab frequency:  Decreased to every other week.    Follow-up:  RTC to see  in one month      Hepatology. Defer appt to see Dr Templeton to Nov/Dec.

## 2017-09-05 ENCOUNTER — TELEPHONE (OUTPATIENT)
Dept: PHARMACY | Facility: CLINIC | Age: 27
End: 2017-09-05

## 2017-09-11 ENCOUNTER — TELEPHONE (OUTPATIENT)
Dept: TRANSPLANT | Facility: CLINIC | Age: 27
End: 2017-09-11

## 2017-09-11 DIAGNOSIS — D70.9 NEUTROPENIA (H): Primary | ICD-10-CM

## 2017-09-11 DIAGNOSIS — Z94.4 LIVER TRANSPLANT RECIPIENT (H): ICD-10-CM

## 2017-09-11 DIAGNOSIS — Z94.4 LIVER REPLACED BY TRANSPLANT (H): ICD-10-CM

## 2017-09-11 LAB
ALBUMIN SERPL-MCNC: 3.9 G/DL (ref 3.4–5)
ALP SERPL-CCNC: 71 U/L (ref 40–150)
ALT SERPL W P-5'-P-CCNC: 14 U/L (ref 0–70)
ANION GAP SERPL CALCULATED.3IONS-SCNC: 10 MMOL/L (ref 3–14)
AST SERPL W P-5'-P-CCNC: 10 U/L (ref 0–45)
BILIRUB DIRECT SERPL-MCNC: 0.3 MG/DL (ref 0–0.2)
BILIRUB SERPL-MCNC: 0.8 MG/DL (ref 0.2–1.3)
BUN SERPL-MCNC: 34 MG/DL (ref 7–30)
CALCIUM SERPL-MCNC: 9 MG/DL (ref 8.5–10.1)
CHLORIDE SERPL-SCNC: 110 MMOL/L (ref 94–109)
CO2 SERPL-SCNC: 20 MMOL/L (ref 20–32)
CREAT SERPL-MCNC: 1.46 MG/DL (ref 0.66–1.25)
ERYTHROCYTE [DISTWIDTH] IN BLOOD BY AUTOMATED COUNT: 13.8 % (ref 10–15)
GFR SERPL CREATININE-BSD FRML MDRD: 57 ML/MIN/1.7M2
GLUCOSE SERPL-MCNC: 120 MG/DL (ref 70–99)
HCT VFR BLD AUTO: 28.4 % (ref 40–53)
HGB BLD-MCNC: 9.8 G/DL (ref 13.3–17.7)
MAGNESIUM SERPL-MCNC: 1.4 MG/DL (ref 1.6–2.3)
MCH RBC QN AUTO: 30.5 PG (ref 26.5–33)
MCHC RBC AUTO-ENTMCNC: 34.5 G/DL (ref 31.5–36.5)
MCV RBC AUTO: 89 FL (ref 78–100)
PHOSPHATE SERPL-MCNC: 3.7 MG/DL (ref 2.5–4.5)
PLATELET # BLD AUTO: 102 10E9/L (ref 150–450)
POTASSIUM SERPL-SCNC: 5 MMOL/L (ref 3.4–5.3)
PROT SERPL-MCNC: 6.9 G/DL (ref 6.8–8.8)
RBC # BLD AUTO: 3.21 10E12/L (ref 4.4–5.9)
SODIUM SERPL-SCNC: 140 MMOL/L (ref 133–144)
TACROLIMUS BLD-MCNC: 9.4 UG/L (ref 5–15)
TME LAST DOSE: NORMAL H
WBC # BLD AUTO: 2 10E9/L (ref 4–11)

## 2017-09-11 PROCEDURE — 80048 BASIC METABOLIC PNL TOTAL CA: CPT | Performed by: TRANSPLANT SURGERY

## 2017-09-11 PROCEDURE — 84100 ASSAY OF PHOSPHORUS: CPT | Performed by: TRANSPLANT SURGERY

## 2017-09-11 PROCEDURE — 83735 ASSAY OF MAGNESIUM: CPT | Performed by: TRANSPLANT SURGERY

## 2017-09-11 PROCEDURE — 80076 HEPATIC FUNCTION PANEL: CPT | Performed by: TRANSPLANT SURGERY

## 2017-09-11 PROCEDURE — 85027 COMPLETE CBC AUTOMATED: CPT | Performed by: TRANSPLANT SURGERY

## 2017-09-11 PROCEDURE — 80197 ASSAY OF TACROLIMUS: CPT | Performed by: TRANSPLANT SURGERY

## 2017-09-11 PROCEDURE — 36415 COLL VENOUS BLD VENIPUNCTURE: CPT | Performed by: TRANSPLANT SURGERY

## 2017-09-11 RX ORDER — MYCOPHENOLATE MOFETIL 250 MG/1
750 CAPSULE ORAL 2 TIMES DAILY
Qty: 180 CAPSULE | Refills: 11 | Status: SHIPPED | OUTPATIENT
Start: 2017-09-11 | End: 2017-09-19

## 2017-09-11 RX ORDER — SULFAMETHOXAZOLE AND TRIMETHOPRIM 400; 80 MG/1; MG/1
1 TABLET ORAL DAILY
Qty: 30 TABLET | Refills: 5 | COMMUNITY
Start: 2017-09-11 | End: 2017-10-17

## 2017-09-11 NOTE — TELEPHONE ENCOUNTER
WBC 2.0.   Instructed to hold bactrim, decrease cellcept to 750 mg po bid.  Donor was CMV pos, recip neg so will also check CMV DNA PCR w/ next Monday's labs.

## 2017-09-18 ENCOUNTER — TELEPHONE (OUTPATIENT)
Dept: TRANSPLANT | Facility: CLINIC | Age: 27
End: 2017-09-18

## 2017-09-18 DIAGNOSIS — Z94.4 LIVER REPLACED BY TRANSPLANT (H): Primary | ICD-10-CM

## 2017-09-18 DIAGNOSIS — Z94.4 LIVER REPLACED BY TRANSPLANT (H): ICD-10-CM

## 2017-09-18 DIAGNOSIS — D70.9 NEUTROPENIA (H): ICD-10-CM

## 2017-09-18 DIAGNOSIS — Z20.828 CONTACT WITH OR EXPOSURE TO VIRAL DISEASE: ICD-10-CM

## 2017-09-18 DIAGNOSIS — Z94.4 LIVER TRANSPLANT RECIPIENT (H): ICD-10-CM

## 2017-09-18 LAB
ALBUMIN SERPL-MCNC: 3.7 G/DL (ref 3.4–5)
ALP SERPL-CCNC: 73 U/L (ref 40–150)
ALT SERPL W P-5'-P-CCNC: 14 U/L (ref 0–70)
ANION GAP SERPL CALCULATED.3IONS-SCNC: 16 MMOL/L (ref 3–14)
AST SERPL W P-5'-P-CCNC: 10 U/L (ref 0–45)
BILIRUB DIRECT SERPL-MCNC: 0.3 MG/DL (ref 0–0.2)
BILIRUB SERPL-MCNC: 0.7 MG/DL (ref 0.2–1.3)
BUN SERPL-MCNC: 34 MG/DL (ref 7–30)
CALCIUM SERPL-MCNC: 8.3 MG/DL (ref 8.5–10.1)
CHLORIDE SERPL-SCNC: 111 MMOL/L (ref 94–109)
CO2 SERPL-SCNC: 12 MMOL/L (ref 20–32)
CREAT SERPL-MCNC: 1.47 MG/DL (ref 0.66–1.25)
DIFFERENTIAL METHOD BLD: ABNORMAL
ERYTHROCYTE [DISTWIDTH] IN BLOOD BY AUTOMATED COUNT: 13.6 % (ref 10–15)
GFR SERPL CREATININE-BSD FRML MDRD: 57 ML/MIN/1.7M2
GLUCOSE SERPL-MCNC: 117 MG/DL (ref 70–99)
HCT VFR BLD AUTO: 28 % (ref 40–53)
HGB BLD-MCNC: 9.6 G/DL (ref 13.3–17.7)
LYMPHOCYTES # BLD AUTO: 0.8 10E9/L (ref 0.8–5.3)
LYMPHOCYTES NFR BLD AUTO: 48 %
MAGNESIUM SERPL-MCNC: 1 MG/DL (ref 1.6–2.3)
MCH RBC QN AUTO: 30.4 PG (ref 26.5–33)
MCHC RBC AUTO-ENTMCNC: 34.3 G/DL (ref 31.5–36.5)
MCV RBC AUTO: 89 FL (ref 78–100)
MONOCYTES # BLD AUTO: 0.1 10E9/L (ref 0–1.3)
MONOCYTES NFR BLD AUTO: 8 %
NEUTROPHILS # BLD AUTO: 0.7 10E9/L (ref 1.6–8.3)
NEUTROPHILS NFR BLD AUTO: 44 %
PHOSPHATE SERPL-MCNC: 4 MG/DL (ref 2.5–4.5)
PLATELET # BLD AUTO: 120 10E9/L (ref 150–450)
POTASSIUM SERPL-SCNC: 5 MMOL/L (ref 3.4–5.3)
PROT SERPL-MCNC: 6.6 G/DL (ref 6.8–8.8)
RBC # BLD AUTO: 3.16 10E12/L (ref 4.4–5.9)
SODIUM SERPL-SCNC: 139 MMOL/L (ref 133–144)
WBC # BLD AUTO: 1.6 10E9/L (ref 4–11)

## 2017-09-18 PROCEDURE — 83735 ASSAY OF MAGNESIUM: CPT | Performed by: TRANSPLANT SURGERY

## 2017-09-18 PROCEDURE — 87535 HIV-1 PROBE&REVERSE TRNSCRPJ: CPT | Mod: 90 | Performed by: TRANSPLANT SURGERY

## 2017-09-18 PROCEDURE — 36415 COLL VENOUS BLD VENIPUNCTURE: CPT | Performed by: TRANSPLANT SURGERY

## 2017-09-18 PROCEDURE — 87798 DETECT AGENT NOS DNA AMP: CPT | Mod: 90 | Performed by: TRANSPLANT SURGERY

## 2017-09-18 PROCEDURE — 80076 HEPATIC FUNCTION PANEL: CPT | Performed by: TRANSPLANT SURGERY

## 2017-09-18 PROCEDURE — 80048 BASIC METABOLIC PNL TOTAL CA: CPT | Performed by: TRANSPLANT SURGERY

## 2017-09-18 PROCEDURE — 80197 ASSAY OF TACROLIMUS: CPT | Performed by: TRANSPLANT SURGERY

## 2017-09-18 PROCEDURE — 87516 HEPATITIS B DNA AMP PROBE: CPT | Mod: 90 | Performed by: TRANSPLANT SURGERY

## 2017-09-18 PROCEDURE — 99000 SPECIMEN HANDLING OFFICE-LAB: CPT | Performed by: TRANSPLANT SURGERY

## 2017-09-18 PROCEDURE — 85025 COMPLETE CBC W/AUTO DIFF WBC: CPT | Performed by: TRANSPLANT SURGERY

## 2017-09-18 PROCEDURE — 84100 ASSAY OF PHOSPHORUS: CPT | Performed by: TRANSPLANT SURGERY

## 2017-09-18 PROCEDURE — 87521 HEPATITIS C PROBE&RVRS TRNSC: CPT | Mod: 90 | Performed by: TRANSPLANT SURGERY

## 2017-09-18 NOTE — TELEPHONE ENCOUNTER
DATE:  9/18/2017   TIME OF RECEIPT FROM LAB:  0948  LAB TEST:  WBC  LAB VALUE:  1.66  RESULTS GIVEN WITH READ-BACK TO (PROVIDER):  Millie Snider RN  TIME LAB VALUE REPORTED TO PROVIDER:   0981

## 2017-09-19 ENCOUNTER — TELEPHONE (OUTPATIENT)
Dept: TRANSPLANT | Facility: CLINIC | Age: 27
End: 2017-09-19

## 2017-09-19 DIAGNOSIS — Z79.60 LONG-TERM USE OF IMMUNOSUPPRESSANT MEDICATION: Primary | ICD-10-CM

## 2017-09-19 DIAGNOSIS — Z94.4 LIVER TRANSPLANT RECIPIENT (H): ICD-10-CM

## 2017-09-19 LAB
CMV DNA SPEC NAA+PROBE-ACNC: NORMAL [IU]/ML
CMV DNA SPEC NAA+PROBE-LOG#: NORMAL {LOG_IU}/ML
SPECIMEN SOURCE: NORMAL
TACROLIMUS BLD-MCNC: 9.1 UG/L (ref 5–15)
TME LAST DOSE: 2100 H

## 2017-09-19 RX ORDER — MYCOPHENOLATE MOFETIL 250 MG/1
750 CAPSULE ORAL 2 TIMES DAILY
Qty: 180 CAPSULE | Refills: 11 | Status: SHIPPED | OUTPATIENT
Start: 2017-09-19 | End: 2017-12-15

## 2017-09-19 RX ORDER — VALGANCICLOVIR 450 MG/1
450 TABLET, FILM COATED ORAL DAILY
Qty: 30 TABLET | Refills: 5 | Status: SHIPPED | OUTPATIENT
Start: 2017-09-19 | End: 2017-10-17

## 2017-09-19 NOTE — TELEPHONE ENCOUNTER
Spoke with Joe, re: low WBC, w/ absolute neutrophil count 0.7.   Instructed to stop valcyte, stop cellcept, continue to hold bactrim.   Joe reports only feeling more fatigued, reviewed with him that his CMV DNA quant was negative.  With plan made to check with next week's labs on Monday.  Reviewed with Joe that he was CMV - at the time of transplant, with a CMV + donor, so will likely restart both bactrim and valcyte when WBC improves.  Joe verbalized understanding of plan, and med changes.

## 2017-09-21 LAB
MPX SERIES: NONREACTIVE
WNV RNA SPEC QL NAA+PROBE: NONREACTIVE

## 2017-09-25 ENCOUNTER — OFFICE VISIT (OUTPATIENT)
Dept: TRANSPLANT | Facility: CLINIC | Age: 27
End: 2017-09-25
Attending: TRANSPLANT SURGERY
Payer: COMMERCIAL

## 2017-09-25 VITALS
RESPIRATION RATE: 16 BRPM | DIASTOLIC BLOOD PRESSURE: 82 MMHG | BODY MASS INDEX: 28.12 KG/M2 | WEIGHT: 219.14 LBS | TEMPERATURE: 98.1 F | SYSTOLIC BLOOD PRESSURE: 148 MMHG | HEIGHT: 74 IN | HEART RATE: 76 BPM | OXYGEN SATURATION: 100 %

## 2017-09-25 DIAGNOSIS — Z94.4 LIVER TRANSPLANT RECIPIENT (H): ICD-10-CM

## 2017-09-25 DIAGNOSIS — Z79.60 LONG-TERM USE OF IMMUNOSUPPRESSANT MEDICATION: ICD-10-CM

## 2017-09-25 DIAGNOSIS — Z94.4 LIVER REPLACED BY TRANSPLANT (H): ICD-10-CM

## 2017-09-25 DIAGNOSIS — D84.9 IMMUNOSUPPRESSION (H): ICD-10-CM

## 2017-09-25 DIAGNOSIS — Z94.4 LIVER TRANSPLANT RECIPIENT (H): Primary | ICD-10-CM

## 2017-09-25 LAB
ALBUMIN SERPL-MCNC: 3.9 G/DL (ref 3.4–5)
ALP SERPL-CCNC: 82 U/L (ref 40–150)
ALT SERPL W P-5'-P-CCNC: 15 U/L (ref 0–70)
ANION GAP SERPL CALCULATED.3IONS-SCNC: 6 MMOL/L (ref 3–14)
AST SERPL W P-5'-P-CCNC: 12 U/L (ref 0–45)
BILIRUB DIRECT SERPL-MCNC: 0.3 MG/DL (ref 0–0.2)
BILIRUB SERPL-MCNC: 1.2 MG/DL (ref 0.2–1.3)
BUN SERPL-MCNC: 28 MG/DL (ref 7–30)
CALCIUM SERPL-MCNC: 8.3 MG/DL (ref 8.5–10.1)
CHLORIDE SERPL-SCNC: 111 MMOL/L (ref 94–109)
CO2 SERPL-SCNC: 23 MMOL/L (ref 20–32)
CREAT SERPL-MCNC: 1.69 MG/DL (ref 0.66–1.25)
ERYTHROCYTE [DISTWIDTH] IN BLOOD BY AUTOMATED COUNT: 14.4 % (ref 10–15)
GFR SERPL CREATININE-BSD FRML MDRD: 48 ML/MIN/1.7M2
GLUCOSE SERPL-MCNC: 105 MG/DL (ref 70–99)
HCT VFR BLD AUTO: 30.2 % (ref 40–53)
HGB BLD-MCNC: 10.1 G/DL (ref 13.3–17.7)
MAGNESIUM SERPL-MCNC: 1.8 MG/DL (ref 1.6–2.3)
MCH RBC QN AUTO: 30.3 PG (ref 26.5–33)
MCHC RBC AUTO-ENTMCNC: 33.4 G/DL (ref 31.5–36.5)
MCV RBC AUTO: 91 FL (ref 78–100)
PHOSPHATE SERPL-MCNC: 3.7 MG/DL (ref 2.5–4.5)
PLATELET # BLD AUTO: 87 10E9/L (ref 150–450)
POTASSIUM SERPL-SCNC: 5 MMOL/L (ref 3.4–5.3)
PROT SERPL-MCNC: 7 G/DL (ref 6.8–8.8)
RBC # BLD AUTO: 3.33 10E12/L (ref 4.4–5.9)
SODIUM SERPL-SCNC: 139 MMOL/L (ref 133–144)
TACROLIMUS BLD-MCNC: 11.8 UG/L (ref 5–15)
TME LAST DOSE: NORMAL H
WBC # BLD AUTO: 1.7 10E9/L (ref 4–11)

## 2017-09-25 PROCEDURE — 83735 ASSAY OF MAGNESIUM: CPT | Performed by: TRANSPLANT SURGERY

## 2017-09-25 PROCEDURE — 84100 ASSAY OF PHOSPHORUS: CPT | Performed by: TRANSPLANT SURGERY

## 2017-09-25 PROCEDURE — 80076 HEPATIC FUNCTION PANEL: CPT | Performed by: TRANSPLANT SURGERY

## 2017-09-25 PROCEDURE — 80197 ASSAY OF TACROLIMUS: CPT | Performed by: TRANSPLANT SURGERY

## 2017-09-25 PROCEDURE — 80048 BASIC METABOLIC PNL TOTAL CA: CPT | Performed by: TRANSPLANT SURGERY

## 2017-09-25 PROCEDURE — 85027 COMPLETE CBC AUTOMATED: CPT | Performed by: TRANSPLANT SURGERY

## 2017-09-25 PROCEDURE — 36415 COLL VENOUS BLD VENIPUNCTURE: CPT | Performed by: TRANSPLANT SURGERY

## 2017-09-25 ASSESSMENT — PAIN SCALES - GENERAL: PAINLEVEL: MILD PAIN (2)

## 2017-09-25 NOTE — NURSING NOTE
"Chief Complaint   Patient presents with     Liver Transplant     POD 89       Initial /82  Pulse 76  Temp 98.1  F (36.7  C) (Oral)  Resp 16  Ht 1.88 m (6' 2\")  Wt 99.4 kg (219 lb 2.2 oz)  SpO2 100%  BMI 28.14 kg/m2 Estimated body mass index is 28.14 kg/(m^2) as calculated from the following:    Height as of this encounter: 1.88 m (6' 2\").    Weight as of this encounter: 99.4 kg (219 lb 2.2 oz).      "

## 2017-09-25 NOTE — NURSING NOTE
Here for post liver transplant follow-up.  Accompanied by mom.  Reviewed recent labs and assisted with interpretation.     Complaints:  Slight increase in fatigue with lower than normal WBC.    Current immunosuppression:    Tacrolimus 3mg am, 2mg pm    Med changes: Cellcept, bactrim and valcyte on hold.   Plan to continue hold on these 3 medications, if WBC improves, and pt has no symptoms, plan to not restart, except bactrim    Lab frequency:  Weekly until WBC improves.    Follow-up:  Hepatology , plan to see  in Dec.    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     Reviewed need for annual follow-up with dermatology and to be seen by his primary care doctor for management of his BP, annual checkups etc.

## 2017-09-25 NOTE — MR AVS SNAPSHOT
After Visit Summary   9/25/2017    Joe Sosa    MRN: [id number]           Patient Information     Date Of Birth          1990        Visit Information        Provider Department      9/25/2017 9:50 AM Chino Martin MD [facility] Solid Organ Transplant        Today's Diagnoses     Liver transplant recipient (H)    -  1    Immunosuppression (H)           Follow-ups after your visit        Your next 10 appointments already scheduled     Sep 28, 2017  2:30 PM CDT   NUTRITION VISIT with Uma Machado RD   [facility] Solid Organ Transplant ([facility])    [address] [place] [address] [id number] [phone]            Dec 15, 2017  9:00 AM CST   LAB with  LAB   [facility] Lab ([facility])    [address] [place] [id number] [phone]           Patient must bring picture ID. Patient should be prepared to give a urine specimen  Please do not eat 10-12 hours before your appointment if you are coming in fasting for labs on lipids, cholesterol, or glucose (sugar). Pregnant women should follow their Care Team instructions. Water with medications is okay. Do not drink coffee or other fluids. If you have concerns about taking  your medications, please ask at office or if scheduling via [facility], send a message by clicking on Secure Messaging, Message Your Care Team.            Dec 15, 2017  9:30 AM CST   (Arrive by 9:15 AM)   Return Liver Transplant with Andreina Templeton MD   [facility] Hepatology ([facility])    [address] [place] [id number] [phone]              Who to contact     If you have questions or need follow up information about today's clinic visit or your schedule please contact [facility] SOLID ORGAN TRANSPLANT directly at [phone].  Normal or non-critical lab and imaging results will be communicated to you by  "MyChart, letter or phone within 4 business days after the clinic has received the results. If you do not hear from us within 7 days, please contact the clinic through [employer] or phone. If you have a critical or abnormal lab result, we will notify you by phone as soon as possible.  Submit refill requests through [employer] or call your pharmacy and they will forward the refill request to us. Please allow 3 business days for your refill to be completed.          Additional Information About Your Visit        [employer] Information     [employer] gives you secure access to your electronic health record. If you see a primary care provider, you can also send messages to your care team and make appointments. If you have questions, please call your primary care clinic.  If you do not have a primary care provider, please call [phone] and they will assist you.        Care EveryWhere ID     This is your Care EveryWhere ID. This could be used by other organizations to access your [place] medical records  [id number]        Your Vitals Were     Pulse Temperature Respirations Height Pulse Oximetry BMI (Body Mass Index)    76 98.1  F (36.7  C) (Oral) 16 1.88 m (6' 2\") 100% 28.14 kg/m2       Blood Pressure from Last 3 Encounters:   09/25/17 148/82   08/28/17 (!) 137/91   07/27/17 140/84    Weight from Last 3 Encounters:   09/25/17 99.4 kg (219 lb 2.2 oz)   08/28/17 96.8 kg (213 lb 8 oz)   07/24/17 93.8 kg (206 lb 12.8 oz)              Today, you had the following     No orders found for display       Primary Care Provider Office Phone # Fax #    Jeffery Azul [phone]       [facility] [address] [place] [address]        Equal Access to Services     JERSEY VAUGHN : Aurora Liu, wamac case, qaybta kaalmaanel garcia, vahe lee. So [facility] [phone].    ATENCIÓN: Si habla español, tiene a chun disposición servicios gratuitos " de asistencia lingüística. Luis sanz [phone].    We comply with applicable federal civil rights laws and [place] laws. We do not discriminate on the basis of race, color, national origin, age, disability sex, sexual orientation or gender identity.            Thank you!     Thank you for choosing [facility] SOLID ORGAN TRANSPLANT  for your care. Our goal is always to provide you with excellent care. Hearing back from our patients is one way we can continue to improve our services. Please take a few minutes to complete the written survey that you may receive in the mail after your visit with us. Thank you!             Your Updated Medication List - Protect others around you: Learn how to safely use, store and throw away your medicines at www.disposemymeds.org.          This list is accurate as of: 9/25/17  9:51 AM.  Always use your most recent med list.                   Brand Name Dispense Instructions for use Diagnosis    clotrimazole 10 MG Lozg lozenge    MYCELEX    90 each    Place 1 lozenge (1 Nona) inside cheek 3 times daily    Liver transplant recipient (H)       cyclobenzaprine 5 MG tablet    FLEXERIL    42 tablet    Take 1 tablet (5 mg) by mouth 3 times daily as needed for muscle spasms    Liver transplant recipient (H)       multivitamin, therapeutic with minerals Tabs tablet     30 each    Take 1 tablet by mouth daily    Liver transplant recipient (H)       mycophenolate 250 MG capsule    GENERIC EQUIVALENT    180 capsule    Take 3 capsules (750 mg) by mouth 2 times daily ON HOLD 9/19 for low WBC.    Liver transplant recipient (H)       omeprazole 20 MG CR capsule    priLOSEC     Take 20 mg by mouth daily    Vitamin D deficiency       order for [facility]     1 Units    Equipment being ordered: Wheelchair, Manual Treatment Diagnosis: Leg pain, severe  Patient height:  6 feet, 2 inches Patient weight:  93.8 kg    Bilateral lower extremity pain       sulfamethoxazole-trimethoprim 400-80 MG per tablet     BACTRIM/SEPTRA    30 tablet    Take 1 tablet by mouth daily    Liver transplant recipient (H)       tacrolimus 1 MG capsule    GENERIC EQUIVALENT    150 capsule    Take 3 mgs  (3 capsules) in the am, take 2mg (2 capsules) in the pm, take every 12 hours    Liver transplant recipient (H), Long-term use of immunosuppressant medication       traMADol 50 MG tablet    ULTRAM    30 tablet    Take 1-2 tablets ( mg) by mouth every 6 hours as needed for pain maximum 6 tablet(s) per day    Post-operative pain       ursodiol 300 MG capsule    ACTIGALL    60 capsule    Take 1 capsule (300 mg) by mouth 2 times daily    Liver transplant recipient (H)       valGANciclovir 450 MG tablet    VALCYTE    30 tablet    Take 1 tablet (450 mg) by mouth daily On HOLD 9/19 for low WBC    Liver transplant recipient (H)

## 2017-09-25 NOTE — PROGRESS NOTES
HPI      ROS      Physical Exam    Transplant Surgery -OUTPATIENT IMMUNOSUPPRESSION PROGRESS NOTE    Date of Visit: 2017    Transplants:  2017 (Liver); Postoperative day:  89  ASSESMENT AND PLAN:  1.Graft Function: Liver allograft: no rejection or technical problems.    2.Immunosuppression Management: keep tacrolimus at 8-10  3.Hypertension: ok  4.Renal Function: ok  5.Lab frequency: weekly  6.Other:  Low WBC count, 1.7; hold valcyte and bactrim, d/c cellcetpt    Date: 2017    Transplant:  [x]                             Liver [x]                              Kidney []                             Pancreas []                              Other:             Chief Complaint:Liver Transplant (POD 89)  Doing well, no fever or abdominal pain  History of Present Illness:  Post liver transplant    Patient Active Problem List   Diagnosis     Alpha-1-antitrypsin deficiency (H)     Alcoholic cirrhosis of liver with ascites (H)     Vitamin D deficiency     Hepatic encephalopathy (H)     Alcoholism (H)     End stage liver disease (H)     Liver transplant recipient (H)     S/P liver transplant (H)     Immunosuppressed status (H)     SOCIAL /FAMILY HISTORY: [x]                  No recent change    Past Medical History:   Diagnosis Date     Alcoholic cirrhosis of liver with ascites (H) 3/9/2017     Alpha-1-antitrypsin deficiency (H) 3/9/2017     Anasarca      Chronic hyponatremia      Hepatic encephalopathy (H)      SBP (spontaneous bacterial peritonitis) (H)      Past Surgical History:   Procedure Laterality Date     TRANSPLANT LIVER RECIPIENT  DONOR N/A 2017    Procedure: TRANSPLANT LIVER RECIPIENT  DONOR;  TRANSPLANT LIVER RECIPIENT  DONOR with bile duct stenting;  Surgeon: Chino Martin MD;  Location:  OR     Social History     Social History     Marital status: Single     Spouse name: N/A     Number of children: N/A     Years of education: N/A     Occupational  History     Not on file.     Social History Main Topics     Smoking status: Never Smoker     Smokeless tobacco: Former User     Types: Chew     Alcohol use No      Comment: Quit Nov. 4, 2016     Drug use: No     Sexual activity: Not on file     Other Topics Concern     Not on file     Social History Narrative     Prescription Medications as of 9/25/2017             mycophenolate (GENERIC EQUIVALENT) 250 MG capsule Take 3 capsules (750 mg) by mouth 2 times daily ON HOLD 9/19 for low WBC.    valGANciclovir (VALCYTE) 450 MG tablet Take 1 tablet (450 mg) by mouth daily On HOLD 9/19 for low WBC    sulfamethoxazole-trimethoprim (BACTRIM/SEPTRA) 400-80 MG per tablet Take 1 tablet by mouth daily    tacrolimus (GENERIC EQUIVALENT) 1 MG capsule Take 3 mgs  (3 capsules) in the am, take 2mg (2 capsules) in the pm, take every 12 hours    order for DME Equipment being ordered: Wheelchair, Manual  Treatment Diagnosis: Leg pain, severe    Patient height:  6 feet, 2 inches  Patient weight:  93.8 kg    traMADol (ULTRAM) 50 MG tablet Take 1-2 tablets ( mg) by mouth every 6 hours as needed for pain maximum 6 tablet(s) per day    multivitamin, therapeutic with minerals (THERA-VIT-M) TABS tablet Take 1 tablet by mouth daily    clotrimazole (MYCELEX) 10 MG LOZG lozenge Place 1 lozenge (1 Nona) inside cheek 3 times daily    cyclobenzaprine (FLEXERIL) 5 MG tablet Take 1 tablet (5 mg) by mouth 3 times daily as needed for muscle spasms    ursodiol (ACTIGALL) 300 MG capsule Take 1 capsule (300 mg) by mouth 2 times daily    omeprazole (PRILOSEC) 20 MG CR capsule Take 20 mg by mouth daily         Review of patient's allergies indicates no known allergies.   REVIEW OF SYSTEMS (check box if normal)  [x]               GENERAL  [x]                 PULMONARY [x]                GENITOURINARY  [x]                CNS                 [x]                 CARDIAC  [x]                 ENDOCRINE  [x]                EARS,NOSE,THROAT [x]                 " GASTROINTESTINAL [x]                 NEUROLOGIC    [x]                MUSCLOSKELTAL  [x]                  HEMATOLOGY      PHYSICAL EXAM (check box if normal)/82  Pulse 76  Temp 98.1  F (36.7  C) (Oral)  Resp 16  Ht 1.88 m (6' 2\")  Wt 99.4 kg (219 lb 2.2 oz)  SpO2 100%  BMI 28.14 kg/m2        [x]            GENERAL:    [x]       EYES:  ICTERIC   []        YES  []                    NO  [x]           EXTREMITIES: Clubbing []       Y     [x]           N    [x]           EARS, NOSE, THROAT: Membranes Moist    YES   [x]                   NO []                  [x]           LUNGS:  CLEAR    YES       [x]                  NO    []                                [x]           SKIN: Jaundice           YES       []                  NO    [x]                   Rash: YES       []                  NO    [x]                                     [x]             HEART: Regular Rate          YES       [x]                  NO    []                   Incision Clean:  YES       [x]                  NO    []                                [x]                    ABDOMEN: Wound healthy Organomegaly YES       []                  NO    [x]                       [x]                    NEUROLOGICAL:  Nonfocal  YES       [x]                  NO    []                       [x]                    Hernia YES       []                  NO    [x]                   PSYCHIATRIC:  Appropriate  YES       [x]                  NO    []                       OTHER:                                                                                                   PAIN SCALE:: 3  "

## 2017-09-26 LAB
CMV DNA SPEC NAA+PROBE-ACNC: NORMAL [IU]/ML
CMV DNA SPEC NAA+PROBE-LOG#: NORMAL {LOG_IU}/ML
SPECIMEN SOURCE: NORMAL

## 2017-09-28 ENCOUNTER — ALLIED HEALTH/NURSE VISIT (OUTPATIENT)
Dept: TRANSPLANT | Facility: CLINIC | Age: 27
End: 2017-09-28
Attending: INTERNAL MEDICINE
Payer: COMMERCIAL

## 2017-09-28 DIAGNOSIS — Z94.4 LIVER TRANSPLANT RECIPIENT (H): ICD-10-CM

## 2017-09-28 DIAGNOSIS — Z94.4 LIVER TRANSPLANT RECIPIENT (H): Primary | ICD-10-CM

## 2017-09-28 DIAGNOSIS — Z79.60 LONG-TERM USE OF IMMUNOSUPPRESSANT MEDICATION: ICD-10-CM

## 2017-09-28 NOTE — PROGRESS NOTES
POST LIVER TRANSPLANT NUTRITION ASSESSMENT  Medical Nutrition Therapy    Visit Type: F/U Assessment    referred by Dr. Martin for MNT related to Liver Transplant 6/28/17    Patient accompanied by self    Nutrition Assessment:  Anthropometrics  Height: 74 in    BMI: 28.19       Weight: 219 lbs       IBW: 190 lbs  % IBW: 115  Adj/Dosing wt: 219 lbs/99 kg Wt hx: Pt reports gaining fluid wt before transplant which was about 232 lbs and reduced to 205 lbs after tx and now back up to 220 lbs 2/2 high protein/high kcal diet. Pt desires to loose wt with goal being 200 lbs       Nutrition Prescription  Energy: 2000 - 2500 kcal (20 - 25 kcal/kg)  Justification:maintenance and pt's desire to loose wt   Protein: 80 - 100 grams protein (0.8 -1.0 g/kg)  Justification: maintenance         Fluid:   1ml/kcal or per MD        Diet Recall:  Pt reports improved appetite and intake. Pt has stopped Ensure supplements. Pt has been tracking his intake via my fitness pal and is averaging ~ 2000 kcal and 70 gm protein/day. Pt generally eats 3 meal/day      B- Bagel or egg or greek yogurt or  pancakes or cereal   L- Chicken salad sandwich or Taco or Left overs  D- Similar to lunch or a pasta with chicken or turkey or has frozen pizza once a week or fish sticks  [place]- mostly water, milk, some OJ  Sn: crackers, apples, chips, ice cream     Nutrition Diagnosis:  Predicted inadequate protein intake related to pt cutting back protein after acute phase of healing for maintenance AEB per pt tracking pt averaging ~ 70 gm/day.    Nutrition Intervention/Recommendations:  1. Discussed protein goals for maintenance and encouraged pt to increase intake slightly. Discussed Sodium intake and gave Sodium goals as well. Encouraged continued tracking of intake. Reinforced food safety and interactions of immunosuppressant medications. Discussed potential need to monitor K+, CHO, and Na+ intakes d/t medication side effects and avoiding herbal supplements and  alternative medicine therapies d/t interaction with immunosuppression and risk for rejection. Answered all of pt's questions.    Nutrition Goals:  1. Maintain 2000 kcal/day with 80 -100 gm of protein (0.8 -1.0 gm/kg) and 2400 ml Na+ a day        Nutrition Follow Up / Monitoring:  PRN     Patient has RD contact information to call/email if needed.  Time spent with patient: 15  minutes    Edilia Horvath RD LD

## 2017-09-28 NOTE — MR AVS SNAPSHOT
After Visit Summary   9/28/2017    Joe Sosa    MRN: [id number]           Patient Information     Date Of Birth          1990        Visit Information        Provider Department      9/28/2017 2:30 PM Uma Machado RD [facility] Solid Organ Transplant        Today's Diagnoses     Liver transplant recipient (H)    -  1       Follow-ups after your visit        Your next 10 appointments already scheduled     Dec 15, 2017  9:00 AM CST   LAB with  LAB   [facility] Lab ([facility])    [address] [place] [id number] [phone]           Patient must bring picture ID. Patient should be prepared to give a urine specimen  Please do not eat 10-12 hours before your appointment if you are coming in fasting for labs on lipids, cholesterol, or glucose (sugar). Pregnant women should follow their Care Team instructions. Water with medications is okay. Do not drink coffee or other fluids. If you have concerns about taking  your medications, please ask at office or if scheduling via [employer], send a message by clicking on Secure Messaging, Message Your Care Team.            Dec 15, 2017  9:30 AM CST   (Arrive by 9:15 AM)   Return Liver Transplant with Andreina Templeton MD   [facility] Hepatology ([facility])    [address] [place] [id number] [phone]              Who to contact     If you have questions or need follow up information about today's clinic visit or your schedule please contact [facility] SOLID ORGAN TRANSPLANT directly at [phone].  Normal or non-critical lab and imaging results will be communicated to you by MyChart, letter or phone within 4 business days after the clinic has received the results. If you do not hear from us within 7 days, please contact the clinic through MyChart or phone. If you have a critical or abnormal lab result, we will notify you by phone as  soon as possible.  Submit refill requests through [employer] or call your pharmacy and they will forward the refill request to us. Please allow 3 business days for your refill to be completed.          Additional Information About Your Visit        [employer] Information     [employer] gives you secure access to your electronic health record. If you see a primary care provider, you can also send messages to your care team and make appointments. If you have questions, please call your primary care clinic.  If you do not have a primary care provider, please call [phone] and they will assist you.        Care EveryWhere ID     This is your Care EveryWhere ID. This could be used by other organizations to access your [place] medical records  [id number]         Blood Pressure from Last 3 Encounters:   09/25/17 148/82   08/28/17 (!) 137/91   07/27/17 140/84    Weight from Last 3 Encounters:   09/25/17 99.4 kg (219 lb 2.2 oz)   08/28/17 96.8 kg (213 lb 8 oz)   07/24/17 93.8 kg (206 lb 12.8 oz)              Today, you had the following     No orders found for display       Primary Care Provider Office Phone # Fax #    Jeffery Azul [phone]       [facility] [address] [place] [address]        Equal Access to Services     JENAE VAUGHN : Hadii aad ku hadasho Soomaali, waaxda luqadaha, qaybta kaalmada adeegyada, waxay idiin hayelijah eaton . So [facility] [place] [phone].    ATENCIÓN: Si habla español, tiene a chun disposición servicios gratuitos de asistencia lingüística. Llame al [phone].    We comply with applicable federal civil rights laws and [place] laws. We do not discriminate on the basis of race, color, national origin, age, disability sex, sexual orientation or gender identity.            Thank you!     Thank you for choosing [facility] SOLID ORGAN TRANSPLANT  for your care. Our goal is always to provide you with excellent care. Hearing back from our  patients is one way we can continue to improve our services. Please take a few minutes to complete the written survey that you may receive in the mail after your visit with us. Thank you!             Your Updated Medication List - Protect others around you: Learn how to safely use, store and throw away your medicines at www.disposemymeds.org.          This list is accurate as of: 9/28/17  4:38 PM.  Always use your most recent med list.                   Brand Name Dispense Instructions for use Diagnosis    clotrimazole 10 MG Lozg lozenge    MYCELEX    90 each    Place 1 lozenge (1 Nona) inside cheek 3 times daily    Liver transplant recipient (H)       cyclobenzaprine 5 MG tablet    FLEXERIL    42 tablet    Take 1 tablet (5 mg) by mouth 3 times daily as needed for muscle spasms    Liver transplant recipient (H)       multivitamin, therapeutic with minerals Tabs tablet     30 each    Take 1 tablet by mouth daily    Liver transplant recipient (H)       mycophenolate 250 MG capsule    GENERIC EQUIVALENT    180 capsule    Take 3 capsules (750 mg) by mouth 2 times daily ON HOLD 9/19 for low WBC.    Liver transplant recipient (H)       omeprazole 20 MG CR capsule    priLOSEC     Take 20 mg by mouth daily    Vitamin D deficiency       order for DME     1 Units    Equipment being ordered: Wheelchair, Manual Treatment Diagnosis: Leg pain, severe  Patient height:  6 feet, 2 inches Patient weight:  93.8 kg    Bilateral lower extremity pain       sulfamethoxazole-trimethoprim 400-80 MG per tablet    BACTRIM/SEPTRA    30 tablet    Take 1 tablet by mouth daily    Liver transplant recipient (H)       tacrolimus 1 MG capsule    GENERIC EQUIVALENT    150 capsule    Take 3 mgs  (3 capsules) in the am, take 2mg (2 capsules) in the pm, take every 12 hours    Liver transplant recipient (H), Long-term use of immunosuppressant medication       traMADol 50 MG tablet    ULTRAM    30 tablet    Take 1-2 tablets ( mg) by mouth every 6  hours as needed for pain maximum 6 tablet(s) per day    Post-operative pain       ursodiol 300 MG capsule    ACTIGALL    60 capsule    Take 1 capsule (300 mg) by mouth 2 times daily    Liver transplant recipient (H)       valGANciclovir 450 MG tablet    VALCYTE    30 tablet    Take 1 tablet (450 mg) by mouth daily On HOLD 9/19 for low WBC    Liver transplant recipient (H)

## 2017-09-28 NOTE — TELEPHONE ENCOUNTER
Please call Joe about tacrolimus level os 11.8, reduce tacrolimus dose from 3mg am, 2mg pm to 2 mg Q 12 hours.  Let Joe know that the CMV test was negative.  No change in other meds,

## 2017-09-29 RX ORDER — TACROLIMUS 1 MG/1
2 CAPSULE ORAL EVERY 12 HOURS
Qty: 120 CAPSULE | Refills: 11 | Status: SHIPPED | OUTPATIENT
Start: 2017-09-29 | End: 2017-10-04

## 2017-09-29 NOTE — TELEPHONE ENCOUNTER
Instructed pt to decrease tacrolimus to 2 mg bid. Pt repeated dose change correctly. Advised pt his CMV was negative. Pt verbalized understanding and has no questions.

## 2017-10-02 ENCOUNTER — DOCUMENTATION ONLY (OUTPATIENT)
Dept: TRANSPLANT | Facility: CLINIC | Age: 27
End: 2017-10-02

## 2017-10-02 DIAGNOSIS — Z94.4 LIVER REPLACED BY TRANSPLANT (H): ICD-10-CM

## 2017-10-02 LAB
ALBUMIN SERPL-MCNC: 3.9 G/DL (ref 3.4–5)
ALP SERPL-CCNC: 77 U/L (ref 40–150)
ALT SERPL W P-5'-P-CCNC: 13 U/L (ref 0–70)
ANION GAP SERPL CALCULATED.3IONS-SCNC: 8 MMOL/L (ref 3–14)
AST SERPL W P-5'-P-CCNC: 15 U/L (ref 0–45)
BILIRUB DIRECT SERPL-MCNC: 0.3 MG/DL (ref 0–0.2)
BILIRUB SERPL-MCNC: 1 MG/DL (ref 0.2–1.3)
BUN SERPL-MCNC: 32 MG/DL (ref 7–30)
CALCIUM SERPL-MCNC: 8.4 MG/DL (ref 8.5–10.1)
CHLORIDE SERPL-SCNC: 109 MMOL/L (ref 94–109)
CO2 SERPL-SCNC: 24 MMOL/L (ref 20–32)
CREAT SERPL-MCNC: 1.57 MG/DL (ref 0.66–1.25)
ERYTHROCYTE [DISTWIDTH] IN BLOOD BY AUTOMATED COUNT: 14.4 % (ref 10–15)
GFR SERPL CREATININE-BSD FRML MDRD: 52 ML/MIN/1.7M2
GLUCOSE SERPL-MCNC: 105 MG/DL (ref 70–99)
HCT VFR BLD AUTO: 30.2 % (ref 40–53)
HGB BLD-MCNC: 10.4 G/DL (ref 13.3–17.7)
MAGNESIUM SERPL-MCNC: 1.4 MG/DL (ref 1.6–2.3)
MCH RBC QN AUTO: 31.4 PG (ref 26.5–33)
MCHC RBC AUTO-ENTMCNC: 34.4 G/DL (ref 31.5–36.5)
MCV RBC AUTO: 91 FL (ref 78–100)
PHOSPHATE SERPL-MCNC: 4 MG/DL (ref 2.5–4.5)
PLATELET # BLD AUTO: 95 10E9/L (ref 150–450)
POTASSIUM SERPL-SCNC: 5.3 MMOL/L (ref 3.4–5.3)
PROT SERPL-MCNC: 6.8 G/DL (ref 6.8–8.8)
RBC # BLD AUTO: 3.31 10E12/L (ref 4.4–5.9)
SODIUM SERPL-SCNC: 141 MMOL/L (ref 133–144)
WBC # BLD AUTO: 1.9 10E9/L (ref 4–11)

## 2017-10-02 PROCEDURE — 80076 HEPATIC FUNCTION PANEL: CPT | Performed by: TRANSPLANT SURGERY

## 2017-10-02 PROCEDURE — 85027 COMPLETE CBC AUTOMATED: CPT | Performed by: TRANSPLANT SURGERY

## 2017-10-02 PROCEDURE — 83735 ASSAY OF MAGNESIUM: CPT | Performed by: TRANSPLANT SURGERY

## 2017-10-02 PROCEDURE — 36415 COLL VENOUS BLD VENIPUNCTURE: CPT | Performed by: TRANSPLANT SURGERY

## 2017-10-02 PROCEDURE — 80048 BASIC METABOLIC PNL TOTAL CA: CPT | Performed by: TRANSPLANT SURGERY

## 2017-10-02 PROCEDURE — 84100 ASSAY OF PHOSPHORUS: CPT | Performed by: TRANSPLANT SURGERY

## 2017-10-02 PROCEDURE — 80197 ASSAY OF TACROLIMUS: CPT | Performed by: TRANSPLANT SURGERY

## 2017-10-02 NOTE — PROGRESS NOTES
DATE:  10/2/2017   TIME OF RECEIPT FROM LAB:  11:01  LAB TEST:  WBC and Platelets  LAB VALUE:  WBC = 1.9 Platelets = 95  RESULTS GIVEN WITH READ-BACK TO (PROVIDER):  Babs paged Millie Snider    TIME LAB VALUE REPORTED TO PROVIDER:   11:07

## 2017-10-03 ENCOUNTER — TELEPHONE (OUTPATIENT)
Dept: PHARMACY | Facility: CLINIC | Age: 27
End: 2017-10-03

## 2017-10-03 LAB
CMV DNA SPEC NAA+PROBE-ACNC: NORMAL [IU]/ML
CMV DNA SPEC NAA+PROBE-LOG#: NORMAL {LOG_IU}/ML
SPECIMEN SOURCE: NORMAL
TACROLIMUS BLD-MCNC: 11.3 UG/L (ref 5–15)
TME LAST DOSE: NORMAL H

## 2017-10-04 DIAGNOSIS — Z79.60 LONG-TERM USE OF IMMUNOSUPPRESSANT MEDICATION: ICD-10-CM

## 2017-10-04 DIAGNOSIS — Z94.4 LIVER REPLACED BY TRANSPLANT (H): Primary | ICD-10-CM

## 2017-10-04 DIAGNOSIS — Z94.4 LIVER TRANSPLANT RECIPIENT (H): ICD-10-CM

## 2017-10-04 NOTE — TELEPHONE ENCOUNTER
Please call Joe, about tacrolimus level  11.3, high, reduce tacrolimus from 2mg Q 12 hours to:    1mg in the pm, continue 2mg in the am, take every 12 hours.  Let him know the CMV check is negative, and to continue to hold cellcept bactrim and valcyte for low WBC of 1.9.  Please add WBC differential to pt's labs, request Joe do labs  weekly until WBC > 2.5.

## 2017-10-04 NOTE — TELEPHONE ENCOUNTER
Spoke with pt and advised him of all instructions below from txp coordinator Millie GUAMAN Pt repeated dose change correctly and confirmed he will only take 1 mg tacrolimus this evening. He verbalized understanding and has no questions at this time.

## 2017-10-05 RX ORDER — TACROLIMUS 1 MG/1
CAPSULE ORAL
Qty: 90 CAPSULE | Refills: 11 | Status: SHIPPED | OUTPATIENT
Start: 2017-10-05 | End: 2017-10-10

## 2017-10-09 DIAGNOSIS — Z94.4 LIVER REPLACED BY TRANSPLANT (H): ICD-10-CM

## 2017-10-09 DIAGNOSIS — Z94.4 LIVER REPLACED BY TRANSPLANT (H): Primary | ICD-10-CM

## 2017-10-09 LAB
ALBUMIN SERPL-MCNC: 4.2 G/DL (ref 3.4–5)
ALP SERPL-CCNC: 83 U/L (ref 40–150)
ALT SERPL W P-5'-P-CCNC: 14 U/L (ref 0–70)
ANION GAP SERPL CALCULATED.3IONS-SCNC: 7 MMOL/L (ref 3–14)
AST SERPL W P-5'-P-CCNC: 11 U/L (ref 0–45)
BASOPHILS # BLD AUTO: 0.1 10E9/L (ref 0–0.2)
BASOPHILS NFR BLD AUTO: 2.3 %
BILIRUB DIRECT SERPL-MCNC: 0.3 MG/DL (ref 0–0.2)
BILIRUB SERPL-MCNC: 1 MG/DL (ref 0.2–1.3)
BUN SERPL-MCNC: 31 MG/DL (ref 7–30)
CALCIUM SERPL-MCNC: 8.4 MG/DL (ref 8.5–10.1)
CHLORIDE SERPL-SCNC: 107 MMOL/L (ref 94–109)
CO2 SERPL-SCNC: 26 MMOL/L (ref 20–32)
CREAT SERPL-MCNC: 1.41 MG/DL (ref 0.66–1.25)
DIFFERENTIAL METHOD BLD: ABNORMAL
EOSINOPHIL # BLD AUTO: 0 10E9/L (ref 0–0.7)
EOSINOPHIL NFR BLD AUTO: 0.8 %
ERYTHROCYTE [DISTWIDTH] IN BLOOD BY AUTOMATED COUNT: 14.2 % (ref 10–15)
GFR SERPL CREATININE-BSD FRML MDRD: 59 ML/MIN/1.7M2
GLUCOSE SERPL-MCNC: 109 MG/DL (ref 70–99)
HCT VFR BLD AUTO: 31 % (ref 40–53)
HGB BLD-MCNC: 10.7 G/DL (ref 13.3–17.7)
LYMPHOCYTES # BLD AUTO: 1 10E9/L (ref 0.8–5.3)
LYMPHOCYTES NFR BLD AUTO: 40.1 %
MAGNESIUM SERPL-MCNC: 1.7 MG/DL (ref 1.6–2.3)
MCH RBC QN AUTO: 31.4 PG (ref 26.5–33)
MCHC RBC AUTO-ENTMCNC: 34.5 G/DL (ref 31.5–36.5)
MCV RBC AUTO: 91 FL (ref 78–100)
MONOCYTES # BLD AUTO: 0.5 10E9/L (ref 0–1.3)
MONOCYTES NFR BLD AUTO: 19.5 %
NEUTROPHILS # BLD AUTO: 1 10E9/L (ref 1.6–8.3)
NEUTROPHILS NFR BLD AUTO: 37.3 %
PHOSPHATE SERPL-MCNC: 3.6 MG/DL (ref 2.5–4.5)
PLATELET # BLD AUTO: 108 10E9/L (ref 150–450)
POTASSIUM SERPL-SCNC: 4.6 MMOL/L (ref 3.4–5.3)
PROT SERPL-MCNC: 7 G/DL (ref 6.8–8.8)
RBC # BLD AUTO: 3.41 10E12/L (ref 4.4–5.9)
SODIUM SERPL-SCNC: 140 MMOL/L (ref 133–144)
TACROLIMUS BLD-MCNC: 3.7 UG/L (ref 5–15)
TME LAST DOSE: ABNORMAL H
WBC # BLD AUTO: 2.6 10E9/L (ref 4–11)

## 2017-10-09 PROCEDURE — 80048 BASIC METABOLIC PNL TOTAL CA: CPT | Performed by: TRANSPLANT SURGERY

## 2017-10-09 PROCEDURE — 80076 HEPATIC FUNCTION PANEL: CPT | Performed by: TRANSPLANT SURGERY

## 2017-10-09 PROCEDURE — 36415 COLL VENOUS BLD VENIPUNCTURE: CPT | Performed by: TRANSPLANT SURGERY

## 2017-10-09 PROCEDURE — 83735 ASSAY OF MAGNESIUM: CPT | Performed by: TRANSPLANT SURGERY

## 2017-10-09 PROCEDURE — 80197 ASSAY OF TACROLIMUS: CPT | Performed by: TRANSPLANT SURGERY

## 2017-10-09 PROCEDURE — 84100 ASSAY OF PHOSPHORUS: CPT | Performed by: TRANSPLANT SURGERY

## 2017-10-09 PROCEDURE — 85025 COMPLETE CBC W/AUTO DIFF WBC: CPT | Performed by: TRANSPLANT SURGERY

## 2017-10-10 ENCOUNTER — TELEPHONE (OUTPATIENT)
Dept: TRANSPLANT | Facility: CLINIC | Age: 27
End: 2017-10-10

## 2017-10-10 DIAGNOSIS — Z79.60 LONG-TERM USE OF IMMUNOSUPPRESSANT MEDICATION: ICD-10-CM

## 2017-10-10 DIAGNOSIS — Z94.4 LIVER TRANSPLANT RECIPIENT (H): ICD-10-CM

## 2017-10-10 RX ORDER — TACROLIMUS 1 MG/1
CAPSULE ORAL
Qty: 150 CAPSULE | Refills: 11 | Status: SHIPPED | OUTPATIENT
Start: 2017-10-10 | End: 2017-10-17

## 2017-10-10 NOTE — TELEPHONE ENCOUNTER
LM for pt to let him know he should increase tacrolimus to 3 mg Q AM and 2 mg Q PM. Requested return phone call to confirm.

## 2017-10-10 NOTE — TELEPHONE ENCOUNTER
Tacrolimus level 3.7, too low.  Please ask Joe to increase his dose from 2/1 to 3mg am, 2mg pm, every 12 hours.   Let Joe know his WBC is better, will be checking with  if he will be restarting valcyte or cellcept.

## 2017-10-16 DIAGNOSIS — Z94.4 LIVER REPLACED BY TRANSPLANT (H): ICD-10-CM

## 2017-10-16 LAB
ALBUMIN SERPL-MCNC: 3.9 G/DL (ref 3.4–5)
ALP SERPL-CCNC: 82 U/L (ref 40–150)
ALT SERPL W P-5'-P-CCNC: 16 U/L (ref 0–70)
ANION GAP SERPL CALCULATED.3IONS-SCNC: 4 MMOL/L (ref 3–14)
AST SERPL W P-5'-P-CCNC: 11 U/L (ref 0–45)
BILIRUB DIRECT SERPL-MCNC: 0.3 MG/DL (ref 0–0.2)
BILIRUB SERPL-MCNC: 1.3 MG/DL (ref 0.2–1.3)
BUN SERPL-MCNC: 25 MG/DL (ref 7–30)
CALCIUM SERPL-MCNC: 8.3 MG/DL (ref 8.5–10.1)
CHLORIDE SERPL-SCNC: 110 MMOL/L (ref 94–109)
CO2 SERPL-SCNC: 28 MMOL/L (ref 20–32)
CREAT SERPL-MCNC: 1.31 MG/DL (ref 0.66–1.25)
ERYTHROCYTE [DISTWIDTH] IN BLOOD BY AUTOMATED COUNT: 14.3 % (ref 10–15)
GFR SERPL CREATININE-BSD FRML MDRD: 65 ML/MIN/1.7M2
GLUCOSE SERPL-MCNC: 109 MG/DL (ref 70–99)
HCT VFR BLD AUTO: 31.3 % (ref 40–53)
HGB BLD-MCNC: 10.9 G/DL (ref 13.3–17.7)
MAGNESIUM SERPL-MCNC: 1.5 MG/DL (ref 1.6–2.3)
MCH RBC QN AUTO: 31.9 PG (ref 26.5–33)
MCHC RBC AUTO-ENTMCNC: 34.8 G/DL (ref 31.5–36.5)
MCV RBC AUTO: 92 FL (ref 78–100)
PHOSPHATE SERPL-MCNC: 3.7 MG/DL (ref 2.5–4.5)
PLATELET # BLD AUTO: 110 10E9/L (ref 150–450)
POTASSIUM SERPL-SCNC: 4.7 MMOL/L (ref 3.4–5.3)
PROT SERPL-MCNC: 6.7 G/DL (ref 6.8–8.8)
RBC # BLD AUTO: 3.42 10E12/L (ref 4.4–5.9)
SODIUM SERPL-SCNC: 142 MMOL/L (ref 133–144)
TACROLIMUS BLD-MCNC: 3.8 UG/L (ref 5–15)
TME LAST DOSE: 2100 H
WBC # BLD AUTO: 3.8 10E9/L (ref 4–11)

## 2017-10-16 PROCEDURE — 80048 BASIC METABOLIC PNL TOTAL CA: CPT | Performed by: TRANSPLANT SURGERY

## 2017-10-16 PROCEDURE — 80076 HEPATIC FUNCTION PANEL: CPT | Performed by: TRANSPLANT SURGERY

## 2017-10-16 PROCEDURE — 80197 ASSAY OF TACROLIMUS: CPT | Performed by: TRANSPLANT SURGERY

## 2017-10-16 PROCEDURE — 36415 COLL VENOUS BLD VENIPUNCTURE: CPT | Performed by: TRANSPLANT SURGERY

## 2017-10-16 PROCEDURE — 85027 COMPLETE CBC AUTOMATED: CPT | Performed by: TRANSPLANT SURGERY

## 2017-10-16 PROCEDURE — 84100 ASSAY OF PHOSPHORUS: CPT | Performed by: TRANSPLANT SURGERY

## 2017-10-16 PROCEDURE — 83735 ASSAY OF MAGNESIUM: CPT | Performed by: TRANSPLANT SURGERY

## 2017-10-17 DIAGNOSIS — Z94.4 LIVER TRANSPLANT RECIPIENT (H): ICD-10-CM

## 2017-10-17 DIAGNOSIS — Z79.60 LONG-TERM USE OF IMMUNOSUPPRESSANT MEDICATION: ICD-10-CM

## 2017-10-17 NOTE — TELEPHONE ENCOUNTER
Please call Joe about his recent labs, the WBC is much improved, and his tacrolimus level is too low.  Please make the following changes:   increase the tacrolimus dose to 4mg in am 3mg in pm, take every 12 hours.   restart valcyte 450mg, take 1 tablet every other day.   restart bactrim , take 1 tablet on Monday and Thursday only.  Labs repeat next week.

## 2017-10-17 NOTE — TELEPHONE ENCOUNTER
Spoke with pt and advised him of the changes in note from txp coordinator Emy, he repeated all dose changes correctly. Pt verbalized understanding. Asked if he can get his flu shot, advised him yes since he is over 3 months post transplant. No further questions.

## 2017-10-18 RX ORDER — SULFAMETHOXAZOLE AND TRIMETHOPRIM 400; 80 MG/1; MG/1
1 TABLET ORAL
Qty: 15 TABLET | Refills: 0 | COMMUNITY
Start: 2017-10-19 | End: 2017-12-27

## 2017-10-18 RX ORDER — TACROLIMUS 1 MG/1
CAPSULE ORAL
Qty: 210 CAPSULE | Refills: 11 | Status: SHIPPED | OUTPATIENT
Start: 2017-10-18 | End: 2017-10-24

## 2017-10-18 RX ORDER — VALGANCICLOVIR 450 MG/1
450 TABLET, FILM COATED ORAL EVERY OTHER DAY
Qty: 30 TABLET | Refills: 0 | COMMUNITY
Start: 2017-10-17 | End: 2017-12-15

## 2017-10-23 ENCOUNTER — ALLIED HEALTH/NURSE VISIT (OUTPATIENT)
Dept: NURSING | Facility: CLINIC | Age: 27
End: 2017-10-23
Payer: COMMERCIAL

## 2017-10-23 DIAGNOSIS — Z94.4 LIVER REPLACED BY TRANSPLANT (H): ICD-10-CM

## 2017-10-23 DIAGNOSIS — Z23 NEED FOR PROPHYLACTIC VACCINATION AND INOCULATION AGAINST INFLUENZA: Primary | ICD-10-CM

## 2017-10-23 LAB
ALBUMIN SERPL-MCNC: 4 G/DL (ref 3.4–5)
ALP SERPL-CCNC: 91 U/L (ref 40–150)
ALT SERPL W P-5'-P-CCNC: 40 U/L (ref 0–70)
ANION GAP SERPL CALCULATED.3IONS-SCNC: 5 MMOL/L (ref 3–14)
AST SERPL W P-5'-P-CCNC: 22 U/L (ref 0–45)
BASOPHILS # BLD AUTO: 0 10E9/L (ref 0–0.2)
BASOPHILS NFR BLD AUTO: 0.2 %
BILIRUB DIRECT SERPL-MCNC: 0.3 MG/DL (ref 0–0.2)
BILIRUB SERPL-MCNC: 1.4 MG/DL (ref 0.2–1.3)
BUN SERPL-MCNC: 26 MG/DL (ref 7–30)
CALCIUM SERPL-MCNC: 8.6 MG/DL (ref 8.5–10.1)
CHLORIDE SERPL-SCNC: 108 MMOL/L (ref 94–109)
CO2 SERPL-SCNC: 26 MMOL/L (ref 20–32)
CREAT SERPL-MCNC: 1.33 MG/DL (ref 0.66–1.25)
DIFFERENTIAL METHOD BLD: ABNORMAL
EOSINOPHIL # BLD AUTO: 0.1 10E9/L (ref 0–0.7)
EOSINOPHIL NFR BLD AUTO: 1.1 %
ERYTHROCYTE [DISTWIDTH] IN BLOOD BY AUTOMATED COUNT: 14.4 % (ref 10–15)
GFR SERPL CREATININE-BSD FRML MDRD: 63 ML/MIN/1.7M2
GLUCOSE SERPL-MCNC: 109 MG/DL (ref 70–99)
HCT VFR BLD AUTO: 32.7 % (ref 40–53)
HGB BLD-MCNC: 11.4 G/DL (ref 13.3–17.7)
LYMPHOCYTES # BLD AUTO: 1.2 10E9/L (ref 0.8–5.3)
LYMPHOCYTES NFR BLD AUTO: 24.7 %
MAGNESIUM SERPL-MCNC: 1.5 MG/DL (ref 1.6–2.3)
MCH RBC QN AUTO: 31.8 PG (ref 26.5–33)
MCHC RBC AUTO-ENTMCNC: 34.9 G/DL (ref 31.5–36.5)
MCV RBC AUTO: 91 FL (ref 78–100)
MONOCYTES # BLD AUTO: 0.5 10E9/L (ref 0–1.3)
MONOCYTES NFR BLD AUTO: 10.7 %
NEUTROPHILS # BLD AUTO: 3 10E9/L (ref 1.6–8.3)
NEUTROPHILS NFR BLD AUTO: 63.3 %
PHOSPHATE SERPL-MCNC: 3.5 MG/DL (ref 2.5–4.5)
PLATELET # BLD AUTO: 87 10E9/L (ref 150–450)
POTASSIUM SERPL-SCNC: 4.6 MMOL/L (ref 3.4–5.3)
PROT SERPL-MCNC: 6.6 G/DL (ref 6.8–8.8)
RBC # BLD AUTO: 3.59 10E12/L (ref 4.4–5.9)
SODIUM SERPL-SCNC: 139 MMOL/L (ref 133–144)
TACROLIMUS BLD-MCNC: 6.1 UG/L (ref 5–15)
TME LAST DOSE: NORMAL H
WBC # BLD AUTO: 4.7 10E9/L (ref 4–11)

## 2017-10-23 PROCEDURE — 36415 COLL VENOUS BLD VENIPUNCTURE: CPT | Performed by: TRANSPLANT SURGERY

## 2017-10-23 PROCEDURE — 83735 ASSAY OF MAGNESIUM: CPT | Performed by: TRANSPLANT SURGERY

## 2017-10-23 PROCEDURE — 85025 COMPLETE CBC W/AUTO DIFF WBC: CPT | Performed by: TRANSPLANT SURGERY

## 2017-10-23 PROCEDURE — 84100 ASSAY OF PHOSPHORUS: CPT | Performed by: TRANSPLANT SURGERY

## 2017-10-23 PROCEDURE — 90686 IIV4 VACC NO PRSV 0.5 ML IM: CPT

## 2017-10-23 PROCEDURE — 80197 ASSAY OF TACROLIMUS: CPT | Performed by: TRANSPLANT SURGERY

## 2017-10-23 PROCEDURE — 99207 ZZC NO CHARGE NURSE ONLY: CPT

## 2017-10-23 PROCEDURE — 80076 HEPATIC FUNCTION PANEL: CPT | Performed by: TRANSPLANT SURGERY

## 2017-10-23 PROCEDURE — 90471 IMMUNIZATION ADMIN: CPT

## 2017-10-23 PROCEDURE — 80048 BASIC METABOLIC PNL TOTAL CA: CPT | Performed by: TRANSPLANT SURGERY

## 2017-10-23 NOTE — PROGRESS NOTES

## 2017-10-23 NOTE — MR AVS SNAPSHOT
After Visit Summary   10/23/2017    Joe Sosa    MRN: [id number]           Patient Information     Date Of Birth          1990        Visit Information        Provider Department      10/23/2017 9:20 AM BA ANCILLARY [facility]        Today's Diagnoses     Need for prophylactic vaccination and inoculation against influenza    -  1       Follow-ups after your visit        Your next 10 appointments already scheduled     Oct 23, 2017  9:20 AM CDT   LAB with BA LAB ONLY   [facility]    [address] [facility] [address] [phone]           Patient must bring picture ID. Patient should be prepared to give a urine specimen  Please do not eat 10-12 hours before your appointment if you are coming in fasting for labs on lipids, cholesterol, or glucose (sugar). Pregnant women should follow their Care Team instructions. Water with medications is okay. Do not drink coffee or other fluids. If you have concerns about taking  your medications, please ask at office or if scheduling via [employer], send a message by clicking on Secure Messaging, Message Your Care Team.            Oct 23, 2017  9:20 AM CDT   Nurse Only with BA ANCILLARY   [facility]    [address] [facility] [address]   [phone]            Dec 15, 2017  9:00 AM CST   LAB with  LAB   [facility] ([facility])    [address] [address] [phone]           Patient must bring picture ID. Patient should be prepared to give a urine specimen  Please do not eat 10-12 hours before your appointment if you are coming in fasting for labs on lipids, cholesterol, or glucose (sugar). Pregnant women should follow their Care Team instructions. Water with medications is okay. Do not drink coffee or other fluids. If you have concerns about taking  your  medications, please ask at office or if scheduling via [employer], send a message by clicking on Secure Messaging, Message Your Care Team.            Dec 15, 2017  9:30 AM CST   (Arrive by 9:15 AM)   Return Liver Transplant with Andreina Templeton MD   [facility] ([facility] [place])    [address] [facility] [address] [facility]  [address] [facility] [address] [phone]              Who to contact     If you have questions or need follow up information about today's clinic visit or your schedule please contact [facility] directly at [phone].  Normal or non-critical lab and imaging results will be communicated to you by [employer], letter or phone within 4 business days after the clinic has received the results. If you do not hear from us within 7 days, please contact the clinic through [employer] or phone. If you have a critical or abnormal lab result, we will notify you by phone as soon as possible.  Submit refill requests through [employer] or call your pharmacy and they will forward the refill request to us. Please allow 3 business days for your refill to be completed.          Additional Information About Your Visit        [employer] Information     [employer] gives you secure access to your electronic health record. If you see a primary care provider, you can also send messages to your care team and make appointments. If you have questions, please call your primary care clinic.  If you do not have a primary care provider, please call [phone] and they will assist you.        Care EveryWhere ID     This is your Care EveryWhere ID. This could be used by other organizations to access your [place] medical records  [id number]         Blood Pressure from Last 3 Encounters:   09/25/17 148/82   08/28/17 (!) 137/91   07/27/17 140/84    Weight from Last 3 Encounters:   09/25/17 99.4 kg (219 lb 2.2 oz)   08/28/17 96.8 kg (213 lb 8 oz)   07/24/17 93.8 kg (206 lb 12.8 oz)              We  Performed the Following     FLU VAC, SPLIT VIRUS IM > 3 YO (QUADRIVALENT) [92110]     Vaccine Administration, Initial [39124]        Primary Care Provider Office Phone # Fax #    Jeffery Azul [phone]       [facility] [address] [place] [address]        Equal Access to Services     [facility]: Hadii aad ku hadasho Soomaali, waaxda luqadaha, qaybta kaalmada adeegyada, waxay idiin hayaan adeeg kharash la'aan . So [facility] [phone].    ATENCIÓN: Si habla español, tiene a chun disposición servicios gratuitos de asistencia lingüística. Luis al [phone].    We comply with applicable federal civil rights laws and [place] laws. We do not discriminate on the basis of race, color, national origin, age, disability, sex, sexual orientation, or gender identity.            Thank you!     Thank you for choosing [facility]  for your care. Our goal is always to provide you with excellent care. Hearing back from our patients is one way we can continue to improve our services. Please take a few minutes to complete the written survey that you may receive in the mail after your visit with us. Thank you!             Your Updated Medication List - Protect others around you: Learn how to safely use, store and throw away your medicines at www.disposemymeds.org.          This list is accurate as of: 10/23/17  9:19 AM.  Always use your most recent med list.                   Brand Name Dispense Instructions for use Diagnosis    clotrimazole 10 MG Lozg lozenge    MYCELEX    90 each    Place 1 lozenge (1 Nona) inside cheek 3 times daily    Liver transplant recipient (H)       cyclobenzaprine 5 MG tablet    FLEXERIL    42 tablet    Take 1 tablet (5 mg) by mouth 3 times daily as needed for muscle spasms    Liver transplant recipient (H)       multivitamin, therapeutic with minerals Tabs tablet     30 each    Take 1 tablet by mouth daily    Liver transplant  recipient (H)       mycophenolate 250 MG capsule    GENERIC EQUIVALENT    180 capsule    Take 3 capsules (750 mg) by mouth 2 times daily ON HOLD 9/19 for low WBC.    Liver transplant recipient (H)       omeprazole 20 MG CR capsule    priLOSEC     Take 20 mg by mouth daily    Vitamin D deficiency       order for DME     1 Units    Equipment being ordered: Wheelchair, Manual Treatment Diagnosis: Leg pain, severe  Patient height:  6 feet, 2 inches Patient weight:  93.8 kg    Bilateral lower extremity pain       sulfamethoxazole-trimethoprim 400-80 MG per tablet    BACTRIM/SEPTRA    15 tablet    Take 1 tablet by mouth in the morning, Mon and Thur    Liver transplant recipient (H)       tacrolimus 1 MG capsule    GENERIC EQUIVALENT    210 capsule    Take 4 capsules (4 mg) by mouth every morning and 3 capsules (3 mg) every evening    Liver transplant recipient (H), Long-term use of immunosuppressant medication       traMADol 50 MG tablet    ULTRAM    30 tablet    Take 1-2 tablets ( mg) by mouth every 6 hours as needed for pain maximum 6 tablet(s) per day    Post-operative pain       ursodiol 300 MG capsule    ACTIGALL    60 capsule    Take 1 capsule (300 mg) by mouth 2 times daily    Liver transplant recipient (H)       valGANciclovir 450 MG tablet    VALCYTE    30 tablet    Take 1 tablet (450 mg) by mouth every other day    Liver transplant recipient (H)

## 2017-10-24 ENCOUNTER — TELEPHONE (OUTPATIENT)
Dept: TRANSPLANT | Facility: CLINIC | Age: 27
End: 2017-10-24

## 2017-10-24 DIAGNOSIS — Z79.60 LONG-TERM USE OF IMMUNOSUPPRESSANT MEDICATION: ICD-10-CM

## 2017-10-24 DIAGNOSIS — Z94.4 LIVER TRANSPLANT RECIPIENT (H): ICD-10-CM

## 2017-10-24 RX ORDER — TACROLIMUS 1 MG/1
4 CAPSULE ORAL EVERY 12 HOURS
Qty: 240 CAPSULE | Refills: 11 | Status: SHIPPED | OUTPATIENT
Start: 2017-10-24 | End: 2017-11-20

## 2017-10-24 NOTE — TELEPHONE ENCOUNTER
Phone message left for Joe to increase his tacrolimus dose to 4mg every 12 hours, requesting call back to verify dose change.    Did receive a phone message from Joe stating that he changed his dose as directed.

## 2017-10-30 DIAGNOSIS — Z94.4 LIVER REPLACED BY TRANSPLANT (H): ICD-10-CM

## 2017-10-30 LAB
ALBUMIN SERPL-MCNC: 3.9 G/DL (ref 3.4–5)
ALP SERPL-CCNC: 93 U/L (ref 40–150)
ALT SERPL W P-5'-P-CCNC: 45 U/L (ref 0–70)
ANION GAP SERPL CALCULATED.3IONS-SCNC: 5 MMOL/L (ref 3–14)
AST SERPL W P-5'-P-CCNC: 24 U/L (ref 0–45)
BILIRUB DIRECT SERPL-MCNC: 0.4 MG/DL (ref 0–0.2)
BILIRUB SERPL-MCNC: 1.5 MG/DL (ref 0.2–1.3)
BUN SERPL-MCNC: 21 MG/DL (ref 7–30)
CALCIUM SERPL-MCNC: 8.4 MG/DL (ref 8.5–10.1)
CHLORIDE SERPL-SCNC: 109 MMOL/L (ref 94–109)
CO2 SERPL-SCNC: 26 MMOL/L (ref 20–32)
CREAT SERPL-MCNC: 1.23 MG/DL (ref 0.66–1.25)
DIFFERENTIAL METHOD BLD: ABNORMAL
EOSINOPHIL NFR BLD AUTO: 2 %
ERYTHROCYTE [DISTWIDTH] IN BLOOD BY AUTOMATED COUNT: 13.5 % (ref 10–15)
GFR SERPL CREATININE-BSD FRML MDRD: 69 ML/MIN/1.7M2
GLUCOSE SERPL-MCNC: 106 MG/DL (ref 70–99)
HCT VFR BLD AUTO: 31.2 % (ref 40–53)
HGB BLD-MCNC: 11.1 G/DL (ref 13.3–17.7)
LYMPHOCYTES NFR BLD AUTO: 28 %
MAGNESIUM SERPL-MCNC: 1.4 MG/DL (ref 1.6–2.3)
MCH RBC QN AUTO: 32.3 PG (ref 26.5–33)
MCHC RBC AUTO-ENTMCNC: 35.6 G/DL (ref 31.5–36.5)
MCV RBC AUTO: 91 FL (ref 78–100)
MONOCYTES NFR BLD AUTO: 5 %
NEUTROPHILS NFR BLD AUTO: 65 %
OVALOCYTES BLD QL SMEAR: SLIGHT
PHOSPHATE SERPL-MCNC: 3.2 MG/DL (ref 2.5–4.5)
PLATELET # BLD AUTO: 75 10E9/L (ref 150–450)
PLATELET # BLD EST: ABNORMAL 10*3/UL
POTASSIUM SERPL-SCNC: 4.4 MMOL/L (ref 3.4–5.3)
PROT SERPL-MCNC: 6.8 G/DL (ref 6.8–8.8)
RBC # BLD AUTO: 3.44 10E12/L (ref 4.4–5.9)
SODIUM SERPL-SCNC: 140 MMOL/L (ref 133–144)
TACROLIMUS BLD-MCNC: 7.8 UG/L (ref 5–15)
TME LAST DOSE: NORMAL H
WBC # BLD AUTO: 3.6 10E9/L (ref 4–11)

## 2017-10-30 PROCEDURE — 84100 ASSAY OF PHOSPHORUS: CPT | Performed by: TRANSPLANT SURGERY

## 2017-10-30 PROCEDURE — 36415 COLL VENOUS BLD VENIPUNCTURE: CPT | Performed by: TRANSPLANT SURGERY

## 2017-10-30 PROCEDURE — 85025 COMPLETE CBC W/AUTO DIFF WBC: CPT | Performed by: TRANSPLANT SURGERY

## 2017-10-30 PROCEDURE — 80048 BASIC METABOLIC PNL TOTAL CA: CPT | Performed by: TRANSPLANT SURGERY

## 2017-10-30 PROCEDURE — 83735 ASSAY OF MAGNESIUM: CPT | Performed by: TRANSPLANT SURGERY

## 2017-10-30 PROCEDURE — 80076 HEPATIC FUNCTION PANEL: CPT | Performed by: TRANSPLANT SURGERY

## 2017-10-30 PROCEDURE — 80197 ASSAY OF TACROLIMUS: CPT | Performed by: TRANSPLANT SURGERY

## 2017-11-07 DIAGNOSIS — Z94.4 LIVER REPLACED BY TRANSPLANT (H): ICD-10-CM

## 2017-11-07 LAB
ALBUMIN SERPL-MCNC: 4.2 G/DL (ref 3.4–5)
ALP SERPL-CCNC: 102 U/L (ref 40–150)
ALT SERPL W P-5'-P-CCNC: 27 U/L (ref 0–70)
ANION GAP SERPL CALCULATED.3IONS-SCNC: 5 MMOL/L (ref 3–14)
AST SERPL W P-5'-P-CCNC: 10 U/L (ref 0–45)
BILIRUB DIRECT SERPL-MCNC: 0.3 MG/DL (ref 0–0.2)
BILIRUB SERPL-MCNC: 1.6 MG/DL (ref 0.2–1.3)
BUN SERPL-MCNC: 29 MG/DL (ref 7–30)
CALCIUM SERPL-MCNC: 8.5 MG/DL (ref 8.5–10.1)
CHLORIDE SERPL-SCNC: 108 MMOL/L (ref 94–109)
CO2 SERPL-SCNC: 28 MMOL/L (ref 20–32)
CREAT SERPL-MCNC: 1.46 MG/DL (ref 0.66–1.25)
ERYTHROCYTE [DISTWIDTH] IN BLOOD BY AUTOMATED COUNT: 13.8 % (ref 10–15)
GFR SERPL CREATININE-BSD FRML MDRD: 57 ML/MIN/1.7M2
GLUCOSE SERPL-MCNC: 106 MG/DL (ref 70–99)
HCT VFR BLD AUTO: 32.5 % (ref 40–53)
HGB BLD-MCNC: 11.4 G/DL (ref 13.3–17.7)
MAGNESIUM SERPL-MCNC: 1.6 MG/DL (ref 1.6–2.3)
MCH RBC QN AUTO: 32.2 PG (ref 26.5–33)
MCHC RBC AUTO-ENTMCNC: 35.1 G/DL (ref 31.5–36.5)
MCV RBC AUTO: 92 FL (ref 78–100)
PHOSPHATE SERPL-MCNC: 3.4 MG/DL (ref 2.5–4.5)
PLATELET # BLD AUTO: 105 10E9/L (ref 150–450)
POTASSIUM SERPL-SCNC: 5 MMOL/L (ref 3.4–5.3)
PROT SERPL-MCNC: 6.9 G/DL (ref 6.8–8.8)
RBC # BLD AUTO: 3.54 10E12/L (ref 4.4–5.9)
SODIUM SERPL-SCNC: 141 MMOL/L (ref 133–144)
TACROLIMUS BLD-MCNC: 8.7 UG/L (ref 5–15)
TME LAST DOSE: NORMAL H
WBC # BLD AUTO: 4.5 10E9/L (ref 4–11)

## 2017-11-07 PROCEDURE — 80197 ASSAY OF TACROLIMUS: CPT | Performed by: TRANSPLANT SURGERY

## 2017-11-07 PROCEDURE — 85027 COMPLETE CBC AUTOMATED: CPT | Performed by: TRANSPLANT SURGERY

## 2017-11-07 PROCEDURE — 36415 COLL VENOUS BLD VENIPUNCTURE: CPT | Performed by: TRANSPLANT SURGERY

## 2017-11-07 PROCEDURE — 83735 ASSAY OF MAGNESIUM: CPT | Performed by: TRANSPLANT SURGERY

## 2017-11-07 PROCEDURE — 80048 BASIC METABOLIC PNL TOTAL CA: CPT | Performed by: TRANSPLANT SURGERY

## 2017-11-07 PROCEDURE — 80076 HEPATIC FUNCTION PANEL: CPT | Performed by: TRANSPLANT SURGERY

## 2017-11-07 PROCEDURE — 84100 ASSAY OF PHOSPHORUS: CPT | Performed by: TRANSPLANT SURGERY

## 2017-11-14 DIAGNOSIS — Z94.4 LIVER REPLACED BY TRANSPLANT (H): ICD-10-CM

## 2017-11-14 LAB
ALBUMIN SERPL-MCNC: 4.1 G/DL (ref 3.4–5)
ALP SERPL-CCNC: 102 U/L (ref 40–150)
ALT SERPL W P-5'-P-CCNC: 19 U/L (ref 0–70)
ANION GAP SERPL CALCULATED.3IONS-SCNC: 8 MMOL/L (ref 3–14)
AST SERPL W P-5'-P-CCNC: 9 U/L (ref 0–45)
BILIRUB DIRECT SERPL-MCNC: 0.3 MG/DL (ref 0–0.2)
BILIRUB SERPL-MCNC: 1.5 MG/DL (ref 0.2–1.3)
BUN SERPL-MCNC: 33 MG/DL (ref 7–30)
CALCIUM SERPL-MCNC: 8.1 MG/DL (ref 8.5–10.1)
CHLORIDE SERPL-SCNC: 109 MMOL/L (ref 94–109)
CO2 SERPL-SCNC: 24 MMOL/L (ref 20–32)
CREAT SERPL-MCNC: 1.26 MG/DL (ref 0.66–1.25)
ERYTHROCYTE [DISTWIDTH] IN BLOOD BY AUTOMATED COUNT: 14.1 % (ref 10–15)
GFR SERPL CREATININE-BSD FRML MDRD: 67 ML/MIN/1.7M2
GLUCOSE SERPL-MCNC: 111 MG/DL (ref 70–99)
HCT VFR BLD AUTO: 31.4 % (ref 40–53)
HGB BLD-MCNC: 11.2 G/DL (ref 13.3–17.7)
MAGNESIUM SERPL-MCNC: 1.4 MG/DL (ref 1.6–2.3)
MCH RBC QN AUTO: 32.8 PG (ref 26.5–33)
MCHC RBC AUTO-ENTMCNC: 35.7 G/DL (ref 31.5–36.5)
MCV RBC AUTO: 92 FL (ref 78–100)
PHOSPHATE SERPL-MCNC: 3.4 MG/DL (ref 2.5–4.5)
PLATELET # BLD AUTO: 103 10E9/L (ref 150–450)
POTASSIUM SERPL-SCNC: 4.4 MMOL/L (ref 3.4–5.3)
PROT SERPL-MCNC: 6.8 G/DL (ref 6.8–8.8)
RBC # BLD AUTO: 3.41 10E12/L (ref 4.4–5.9)
SODIUM SERPL-SCNC: 141 MMOL/L (ref 133–144)
TACROLIMUS BLD-MCNC: 6.6 UG/L (ref 5–15)
TME LAST DOSE: NORMAL H
WBC # BLD AUTO: 3.9 10E9/L (ref 4–11)

## 2017-11-14 PROCEDURE — 99000 SPECIMEN HANDLING OFFICE-LAB: CPT | Performed by: TRANSPLANT SURGERY

## 2017-11-14 PROCEDURE — 84100 ASSAY OF PHOSPHORUS: CPT | Performed by: TRANSPLANT SURGERY

## 2017-11-14 PROCEDURE — 80076 HEPATIC FUNCTION PANEL: CPT | Performed by: TRANSPLANT SURGERY

## 2017-11-14 PROCEDURE — 85027 COMPLETE CBC AUTOMATED: CPT | Performed by: TRANSPLANT SURGERY

## 2017-11-14 PROCEDURE — 80048 BASIC METABOLIC PNL TOTAL CA: CPT | Performed by: TRANSPLANT SURGERY

## 2017-11-14 PROCEDURE — 80197 ASSAY OF TACROLIMUS: CPT | Performed by: TRANSPLANT SURGERY

## 2017-11-14 PROCEDURE — 36415 COLL VENOUS BLD VENIPUNCTURE: CPT | Performed by: TRANSPLANT SURGERY

## 2017-11-14 PROCEDURE — 80321 ALCOHOLS BIOMARKERS 1OR 2: CPT | Mod: 90 | Performed by: TRANSPLANT SURGERY

## 2017-11-14 PROCEDURE — 83735 ASSAY OF MAGNESIUM: CPT | Performed by: TRANSPLANT SURGERY

## 2017-11-15 LAB — ETHYL GLUCURONIDE UR QL: NEGATIVE

## 2017-11-20 DIAGNOSIS — Z94.4 LIVER TRANSPLANT RECIPIENT (H): ICD-10-CM

## 2017-11-20 DIAGNOSIS — Z79.60 LONG-TERM USE OF IMMUNOSUPPRESSANT MEDICATION: ICD-10-CM

## 2017-11-20 NOTE — TELEPHONE ENCOUNTER
Spoke with pt, instructed him to change tacrolimus dose to 5 mg Q AM and 4 mg Q PM. Pt repeated dose change and has no questions at this time.

## 2017-11-21 RX ORDER — TACROLIMUS 1 MG/1
CAPSULE ORAL
Qty: 270 CAPSULE | Refills: 11 | Status: SHIPPED | OUTPATIENT
Start: 2017-11-21 | End: 2017-12-04

## 2017-11-28 DIAGNOSIS — Z94.4 LIVER REPLACED BY TRANSPLANT (H): ICD-10-CM

## 2017-11-28 LAB
ALBUMIN SERPL-MCNC: 3.9 G/DL (ref 3.4–5)
ALP SERPL-CCNC: 102 U/L (ref 40–150)
ALT SERPL W P-5'-P-CCNC: 17 U/L (ref 0–70)
ANION GAP SERPL CALCULATED.3IONS-SCNC: 8 MMOL/L (ref 3–14)
AST SERPL W P-5'-P-CCNC: 13 U/L (ref 0–45)
BASOPHILS # BLD AUTO: 0 10E9/L (ref 0–0.2)
BASOPHILS NFR BLD AUTO: 0 %
BILIRUB DIRECT SERPL-MCNC: 0.3 MG/DL (ref 0–0.2)
BILIRUB SERPL-MCNC: 1 MG/DL (ref 0.2–1.3)
BUN SERPL-MCNC: 28 MG/DL (ref 7–30)
CALCIUM SERPL-MCNC: 8.3 MG/DL (ref 8.5–10.1)
CHLORIDE SERPL-SCNC: 110 MMOL/L (ref 94–109)
CO2 SERPL-SCNC: 24 MMOL/L (ref 20–32)
CREAT SERPL-MCNC: 1.3 MG/DL (ref 0.66–1.25)
DIFFERENTIAL METHOD BLD: ABNORMAL
EOSINOPHIL # BLD AUTO: 0 10E9/L (ref 0–0.7)
EOSINOPHIL NFR BLD AUTO: 0.9 %
ERYTHROCYTE [DISTWIDTH] IN BLOOD BY AUTOMATED COUNT: 14.1 % (ref 10–15)
GFR SERPL CREATININE-BSD FRML MDRD: 65 ML/MIN/1.7M2
GLUCOSE SERPL-MCNC: 108 MG/DL (ref 70–99)
HCT VFR BLD AUTO: 33.6 % (ref 40–53)
HGB BLD-MCNC: 11.7 G/DL (ref 13.3–17.7)
LYMPHOCYTES # BLD AUTO: 1 10E9/L (ref 0.8–5.3)
LYMPHOCYTES NFR BLD AUTO: 28.4 %
MAGNESIUM SERPL-MCNC: 1.6 MG/DL (ref 1.6–2.3)
MCH RBC QN AUTO: 32.7 PG (ref 26.5–33)
MCHC RBC AUTO-ENTMCNC: 34.8 G/DL (ref 31.5–36.5)
MCV RBC AUTO: 94 FL (ref 78–100)
MONOCYTES # BLD AUTO: 0.3 10E9/L (ref 0–1.3)
MONOCYTES NFR BLD AUTO: 7.8 %
NEUTROPHILS # BLD AUTO: 2.2 10E9/L (ref 1.6–8.3)
NEUTROPHILS NFR BLD AUTO: 62.9 %
PHOSPHATE SERPL-MCNC: 3.5 MG/DL (ref 2.5–4.5)
PLATELET # BLD AUTO: 104 10E9/L (ref 150–450)
POTASSIUM SERPL-SCNC: 4.9 MMOL/L (ref 3.4–5.3)
PROT SERPL-MCNC: 6.8 G/DL (ref 6.8–8.8)
RBC # BLD AUTO: 3.58 10E12/L (ref 4.4–5.9)
SODIUM SERPL-SCNC: 142 MMOL/L (ref 133–144)
TACROLIMUS BLD-MCNC: 6.5 UG/L (ref 5–15)
TME LAST DOSE: NORMAL H
WBC # BLD AUTO: 3.5 10E9/L (ref 4–11)

## 2017-11-28 PROCEDURE — 84100 ASSAY OF PHOSPHORUS: CPT | Performed by: TRANSPLANT SURGERY

## 2017-11-28 PROCEDURE — 80048 BASIC METABOLIC PNL TOTAL CA: CPT | Performed by: TRANSPLANT SURGERY

## 2017-11-28 PROCEDURE — 36415 COLL VENOUS BLD VENIPUNCTURE: CPT | Performed by: TRANSPLANT SURGERY

## 2017-11-28 PROCEDURE — 80076 HEPATIC FUNCTION PANEL: CPT | Performed by: TRANSPLANT SURGERY

## 2017-11-28 PROCEDURE — 83735 ASSAY OF MAGNESIUM: CPT | Performed by: TRANSPLANT SURGERY

## 2017-11-28 PROCEDURE — 80197 ASSAY OF TACROLIMUS: CPT | Performed by: TRANSPLANT SURGERY

## 2017-11-28 PROCEDURE — 85025 COMPLETE CBC W/AUTO DIFF WBC: CPT | Performed by: TRANSPLANT SURGERY

## 2017-12-04 ENCOUNTER — TELEPHONE (OUTPATIENT)
Dept: TRANSPLANT | Facility: CLINIC | Age: 27
End: 2017-12-04

## 2017-12-04 DIAGNOSIS — Z94.4 LIVER TRANSPLANT RECIPIENT (H): ICD-10-CM

## 2017-12-04 DIAGNOSIS — Z79.60 LONG-TERM USE OF IMMUNOSUPPRESSANT MEDICATION: ICD-10-CM

## 2017-12-04 RX ORDER — TACROLIMUS 1 MG/1
5 CAPSULE ORAL EVERY 12 HOURS
Qty: 300 CAPSULE | Refills: 11 | Status: SHIPPED | OUTPATIENT
Start: 2017-12-04 | End: 2018-01-12

## 2017-12-04 NOTE — TELEPHONE ENCOUNTER
Message left for pt to increase tacrolimus dose to 5mg Q 12 hours, requesting call back or my chart message confirming this change, and plan for recheck of labs in 2 weeks.

## 2017-12-12 ENCOUNTER — TELEPHONE (OUTPATIENT)
Dept: TRANSPLANT | Facility: CLINIC | Age: 27
End: 2017-12-12

## 2017-12-12 NOTE — TELEPHONE ENCOUNTER
Patient Call: Transplant Illness  Duration of illness: past few days  Illness: Cold- wonders what he can take over the counter.

## 2017-12-13 NOTE — TELEPHONE ENCOUNTER
Returned call to pt. He is feeling better today, hasn't had any fevers. Advised to be cautious with OTC meds that contain acetaminophen, don't take more than 2000mg per day, and also anything that contains alcohol. Suggested if he has questions about OTCs while at the store, he can stop by the pharmacy in the store and ask the pharmacist any questions.

## 2017-12-15 ENCOUNTER — OFFICE VISIT (OUTPATIENT)
Dept: GASTROENTEROLOGY | Facility: CLINIC | Age: 27
End: 2017-12-15
Attending: INTERNAL MEDICINE
Payer: COMMERCIAL

## 2017-12-15 VITALS
WEIGHT: 234 LBS | HEART RATE: 79 BPM | HEIGHT: 74 IN | TEMPERATURE: 97.9 F | SYSTOLIC BLOOD PRESSURE: 138 MMHG | DIASTOLIC BLOOD PRESSURE: 78 MMHG | BODY MASS INDEX: 30.03 KG/M2 | OXYGEN SATURATION: 99 %

## 2017-12-15 DIAGNOSIS — Z94.4 S/P LIVER TRANSPLANT (H): Primary | ICD-10-CM

## 2017-12-15 DIAGNOSIS — Z94.4 LIVER REPLACED BY TRANSPLANT (H): ICD-10-CM

## 2017-12-15 DIAGNOSIS — F10.20 ALCOHOLISM (H): ICD-10-CM

## 2017-12-15 LAB
ALBUMIN SERPL-MCNC: 4 G/DL (ref 3.4–5)
ALP SERPL-CCNC: 101 U/L (ref 40–150)
ALT SERPL W P-5'-P-CCNC: 14 U/L (ref 0–70)
ANION GAP SERPL CALCULATED.3IONS-SCNC: 5 MMOL/L (ref 3–14)
AST SERPL W P-5'-P-CCNC: 12 U/L (ref 0–45)
BASOPHILS # BLD AUTO: 0 10E9/L (ref 0–0.2)
BASOPHILS NFR BLD AUTO: 0.4 %
BILIRUB DIRECT SERPL-MCNC: 0.3 MG/DL (ref 0–0.2)
BILIRUB SERPL-MCNC: 1.2 MG/DL (ref 0.2–1.3)
BUN SERPL-MCNC: 37 MG/DL (ref 7–30)
CALCIUM SERPL-MCNC: 8.3 MG/DL (ref 8.5–10.1)
CHLORIDE SERPL-SCNC: 107 MMOL/L (ref 94–109)
CO2 SERPL-SCNC: 26 MMOL/L (ref 20–32)
CREAT SERPL-MCNC: 1.45 MG/DL (ref 0.66–1.25)
DIFFERENTIAL METHOD BLD: ABNORMAL
EOSINOPHIL # BLD AUTO: 0 10E9/L (ref 0–0.7)
EOSINOPHIL NFR BLD AUTO: 0.7 %
ERYTHROCYTE [DISTWIDTH] IN BLOOD BY AUTOMATED COUNT: 13.2 % (ref 10–15)
GFR SERPL CREATININE-BSD FRML MDRD: 57 ML/MIN/1.7M2
GLUCOSE SERPL-MCNC: 109 MG/DL (ref 70–99)
HCT VFR BLD AUTO: 33.5 % (ref 40–53)
HGB BLD-MCNC: 11.5 G/DL (ref 13.3–17.7)
IMM GRANULOCYTES # BLD: 0 10E9/L (ref 0–0.4)
IMM GRANULOCYTES NFR BLD: 0.4 %
LYMPHOCYTES # BLD AUTO: 1.3 10E9/L (ref 0.8–5.3)
LYMPHOCYTES NFR BLD AUTO: 28.5 %
MAGNESIUM SERPL-MCNC: 1.7 MG/DL (ref 1.6–2.3)
MCH RBC QN AUTO: 32.9 PG (ref 26.5–33)
MCHC RBC AUTO-ENTMCNC: 34.3 G/DL (ref 31.5–36.5)
MCV RBC AUTO: 96 FL (ref 78–100)
MONOCYTES # BLD AUTO: 0.3 10E9/L (ref 0–1.3)
MONOCYTES NFR BLD AUTO: 7 %
NEUTROPHILS # BLD AUTO: 2.8 10E9/L (ref 1.6–8.3)
NEUTROPHILS NFR BLD AUTO: 63 %
NRBC # BLD AUTO: 0 10*3/UL
NRBC BLD AUTO-RTO: 0 /100
PHOSPHATE SERPL-MCNC: 3.9 MG/DL (ref 2.5–4.5)
PLATELET # BLD AUTO: 81 10E9/L (ref 150–450)
POTASSIUM SERPL-SCNC: 5.1 MMOL/L (ref 3.4–5.3)
PROT SERPL-MCNC: 7.2 G/DL (ref 6.8–8.8)
RBC # BLD AUTO: 3.5 10E12/L (ref 4.4–5.9)
SODIUM SERPL-SCNC: 137 MMOL/L (ref 133–144)
TACROLIMUS BLD-MCNC: 9.4 UG/L (ref 5–15)
TME LAST DOSE: 2000 H
WBC # BLD AUTO: 4.5 10E9/L (ref 4–11)

## 2017-12-15 PROCEDURE — 85025 COMPLETE CBC W/AUTO DIFF WBC: CPT | Performed by: TRANSPLANT SURGERY

## 2017-12-15 PROCEDURE — 80197 ASSAY OF TACROLIMUS: CPT | Performed by: TRANSPLANT SURGERY

## 2017-12-15 PROCEDURE — 99212 OFFICE O/P EST SF 10 MIN: CPT | Mod: ZF

## 2017-12-15 PROCEDURE — 36415 COLL VENOUS BLD VENIPUNCTURE: CPT | Performed by: TRANSPLANT SURGERY

## 2017-12-15 PROCEDURE — 83735 ASSAY OF MAGNESIUM: CPT | Performed by: TRANSPLANT SURGERY

## 2017-12-15 PROCEDURE — 80048 BASIC METABOLIC PNL TOTAL CA: CPT | Performed by: TRANSPLANT SURGERY

## 2017-12-15 PROCEDURE — 84100 ASSAY OF PHOSPHORUS: CPT | Performed by: TRANSPLANT SURGERY

## 2017-12-15 PROCEDURE — 80076 HEPATIC FUNCTION PANEL: CPT | Performed by: TRANSPLANT SURGERY

## 2017-12-15 ASSESSMENT — PAIN SCALES - GENERAL: PAINLEVEL: NO PAIN (0)

## 2017-12-15 NOTE — MR AVS SNAPSHOT
After Visit Summary   12/15/2017    Joe Sosa    MRN: [id number]           Patient Information     Date Of Birth          1990        Visit Information        Provider Department      12/15/2017 9:30 AM Andreina Templeton MD [facility]        Today's Diagnoses     S/P liver transplant (H)    -  1    Alcoholism (H)           Follow-ups after your visit        Follow-up notes from your care team     Return in about 3 months (around 3/15/2018).      Your next 10 appointments already scheduled     Mar 13, 2018  9:00 AM CDT   Lab with  LAB   [facility] ([facility])    [address] [place] [id number] [phone]            Mar 13, 2018  9:50 AM CDT   (Arrive by 9:35 AM)   Return Liver Transplant with Andreina Templeton MD   [facility] ([facility])    [address] [place] [address] [phone]              Who to contact     If you have questions or need follow up information about today's clinic visit or your schedule please contact [facility] directly at [phone].  Normal or non-critical lab and imaging results will be communicated to you by MyChart, letter or phone within 4 business days after the clinic has received the results. If you do not hear from us within 7 days, please contact the clinic through [employer] or phone. If you have a critical or abnormal lab result, we will notify you by phone as soon as possible.  Submit refill requests through [employer] or call your pharmacy and they will forward the refill request to us. Please allow 3 business days for your refill to be completed.          Additional Information About Your Visit        MyChart Information     [employer] gives you secure access to your electronic health record. If you see a primary care provider, you can also send messages to your care team and make appointments. If you  "have questions, please call your primary care clinic.  If you do not have a primary care provider, please call [phone] and they will assist you.        Care EveryWhere ID     This is your Care EveryWhere ID. This could be used by other organizations to access your [place] medical records  [id number]        Your Vitals Were     Pulse Temperature Height Pulse Oximetry BMI (Body Mass Index)       79 97.9  F (36.6  C) (Oral) 1.88 m (6' 2\") 99% 30.04 kg/m2        Blood Pressure from Last 3 Encounters:   12/15/17 138/78   09/25/17 148/82   08/28/17 (!) 137/91    Weight from Last 3 Encounters:   12/15/17 106.1 kg (234 lb)   09/25/17 99.4 kg (219 lb 2.2 oz)   08/28/17 96.8 kg (213 lb 8 oz)              Today, you had the following     No orders found for display         Today's Medication Changes          These changes are accurate as of: 12/15/17 10:09 AM.  If you have any questions, ask your nurse or doctor.               These medicines have changed or have updated prescriptions.        Dose/Directions    sertraline 50 MG tablet   Commonly known as:  ZOLOFT   This may have changed:    - medication strength  - how much to take   Used for:  S/P liver transplant (H), Alcoholism (H)   Changed by:  Andreina Templeton MD        Dose:  75 mg   Take 1.5 tablets (75 mg) by mouth daily   Quantity:  30 tablet   Refills:  0            Where to get your medicines      These medications were sent to [facility] - [place], [place] - [address]  [address] [facility] [facility] [address]    Hours:  Mon-Fri 8:30am-5:00pm Toll Free [phone] Phone:  [phone]     sertraline 50 MG tablet                Primary Care Provider Office Phone # Fax #    Jeffery Azul [phone]       [facility] [address] [place] [address]        Equal Access to Services     JENAE VAUGHN AH: liliam Noble, joaquin " vahe wei saundramajor dejah eaton ahNorris [facility] [phone].    ATENCIÓN: Si inez zapata, tiene a chun disposición servicios gratuitos de asistencia lingüística. Luis al [phone].    We comply with applicable federal civil rights laws and [place] laws. We do not discriminate on the basis of race, color, national origin, age, disability, sex, sexual orientation, or gender identity.            Thank you!     Thank you for choosing [facility]  for your care. Our goal is always to provide you with excellent care. Hearing back from our patients is one way we can continue to improve our services. Please take a few minutes to complete the written survey that you may receive in the mail after your visit with us. Thank you!             Your Updated Medication List - Protect others around you: Learn how to safely use, store and throw away your medicines at www.disposemymeds.org.          This list is accurate as of: 12/15/17 10:09 AM.  Always use your most recent med list.                   Brand Name Dispense Instructions for use Diagnosis    multivitamin, therapeutic with minerals Tabs tablet     30 each    Take 1 tablet by mouth daily    Liver transplant recipient (H)       omeprazole 20 MG CR capsule    priLOSEC     Take 20 mg by mouth daily    Vitamin D deficiency       order for DME     1 Units    Equipment being ordered: Wheelchair, Manual Treatment Diagnosis: Leg pain, severe  Patient height:  6 feet, 2 inches Patient weight:  93.8 kg    Bilateral lower extremity pain       sertraline 50 MG tablet    ZOLOFT    30 tablet    Take 1.5 tablets (75 mg) by mouth daily    S/P liver transplant (H), Alcoholism (H)       sulfamethoxazole-trimethoprim 400-80 MG per tablet    BACTRIM/SEPTRA    15 tablet    Take 1 tablet by mouth in the morning, Mon and Thur    Liver transplant recipient (H)       tacrolimus 1 MG capsule    GENERIC EQUIVALENT    300 capsule    Take 5 capsules (5 mg) by mouth  every 12 hours    Liver transplant recipient (H), Long-term use of immunosuppressant medication       ursodiol 300 MG capsule    ACTIGALL    60 capsule    Take 1 capsule (300 mg) by mouth 2 times daily    Liver transplant recipient (H)       valGANciclovir 450 MG tablet    VALCYTE    30 tablet    Take 1 tablet (450 mg) by mouth every other day    Liver transplant recipient (H)

## 2017-12-15 NOTE — LETTER
"12/15/2017      RE: Joe Sosa  [address]  [place] [address]       [facility]    A/P  27 year old male s/p LT   Medically doing extremely well.   Stopped bactrim, valcyte, sharan and PPI. Discussed each of these. Will check CMV in a month. He is to call if he develops fever or other symptoms concerning.    Labs up to date as is cancer screening.    Discussed weight gain and activity.    Increase Zoloft to 75 mg po QHS.     Will see back in 6 months.     This was a 25 minute visit, over 50% counseling and coordination of care.     Andreina Templeton MD  Hepatology/ Liver Transplant  [facility]  ===================================================================  PCP: Dr. Eliud Azul, [facility] [place], [facility].    SUBJECTIVE   27 year old male s/p DDLT 6/28/17 for ETOH.  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  BILIARY ISSUES: None  STENT: Out in AXR 8/11/17  KIDNEY FUNCTION:   Creatinine   Date Value Ref Range Status   11/28/2017 1.30 (H) 0.66 - 1.25 mg/dL Final     BP: Good. No meds.  PREV:derm not due. Flu shot done   DISEASE RECURRENCE: Not drinking. ETG neg. Sees counselor.  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 On valcyte QOD.   Has had a mild URI with stuffy nose this week.    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  ROS: 10 point ROS neg other than the symptoms noted above in the HPI.    OBJECTIVE  /78  Pulse 79  Temp 97.9  F (36.6  C) (Oral)  Ht 1.88 m (6' 2\")  Wt 106.1 kg (234 lb)  SpO2 99%  BMI 30.04 kg/m2   GENERAL:  Very pleasant, well-appearing, in no acute distress.    HEENT:  No icterus, no oral lesions.    LYMPH:  No supraclavicular or cervical lymphadenopathy.    CARDIOVASCULAR:  Regular rate and rhythm.    CHEST:  Lungs are clear.    ABDOMEN:  Bowel sounds are present.  Abdomen is soft, " nontender, nondistended.  Scar is well healed.      EXTREMITIES:  No edema.    SKIN:  No rash.    NEUROLOGIC:  Speech is fluent and clear.  No asterixis or tremor.      Creatinine   Date Value Ref Range Status   11/28/2017 1.30 (H) 0.66 - 1.25 mg/dL Final   ]   Lab Results   Component Value Date    BILITOTAL 1.0 11/28/2017    BILITOTAL 1.5 11/14/2017    BILITOTAL 1.6 11/07/2017    BILITOTAL 1.5 10/30/2017    BILITOTAL 1.4 10/23/2017      Lab Results   Component Value Date    ALT 17 11/28/2017      Lab Results   Component Value Date    ALBUMIN 3.9 11/28/2017       Hemoglobin   Date Value Ref Range Status   11/28/2017 11.7 (L) 13.3 - 17.7 g/dL Final   ]    Current Outpatient Prescriptions   Medication     tacrolimus (GENERIC EQUIVALENT) 1 MG capsule     sulfamethoxazole-trimethoprim (BACTRIM/SEPTRA) 400-80 MG per tablet     valGANciclovir (VALCYTE) 450 MG tablet     mycophenolate (GENERIC EQUIVALENT) 250 MG capsule     order for DME     traMADol (ULTRAM) 50 MG tablet     multivitamin, therapeutic with minerals (THERA-VIT-M) TABS tablet     clotrimazole (MYCELEX) 10 MG LOZG lozenge     cyclobenzaprine (FLEXERIL) 5 MG tablet     ursodiol (ACTIGALL) 300 MG capsule     omeprazole (PRILOSEC) 20 MG CR capsule     No current facility-administered medications for this visit.      .       Andreina Templeton MD

## 2017-12-15 NOTE — NURSING NOTE
Chief Complaint   Patient presents with     RECHECK     Post Liver TXP   Pt roomed, vitals, meds, and allergies reviewed with pt. Pt ready for provider.  Jose Alvarado, CMA

## 2017-12-15 NOTE — PROGRESS NOTES
"[facility]  LIVER TRANSPLANT CLINIC    A/P  27 year old male s/p LT   Medically doing extremely well.   Stopped bactrim, valcyte, sharan and PPI. Discussed each of these. Will check CMV in a month. He is to call if he develops fever or other symptoms concerning.    Labs up to date as is cancer screening.    Discussed weight gain and activity.    Increase Zoloft to 75 mg po QHS.     Will see back in 6 months.     This was a 25 minute visit, over 50% counseling and coordination of care.     Andreina Templeton MD  Hepatology/ Liver Transplant  [facility]  ===================================================================  PCP: Dr. Eliud Azul, [place], [facility].    SUBJECTIVE   27 year old male s/p DDLT 6/28/17 for ETOH.  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  BILIARY ISSUES: None  STENT: Out in AXR 8/11/17  KIDNEY FUNCTION:   Creatinine   Date Value Ref Range Status   11/28/2017 1.30 (H) 0.66 - 1.25 mg/dL Final     BP: Good. No meds.  PREV:derm not due. Flu shot done   DISEASE RECURRENCE: Not drinking. ETG neg. Sees counselor.  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 On valcyte QOD.   Has had a mild URI with stuffy nose this week.    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  ROS: 10 point ROS neg other than the symptoms noted above in the HPI.    OBJECTIVE  /78  Pulse 79  Temp 97.9  F (36.6  C) (Oral)  Ht 1.88 m (6' 2\")  Wt 106.1 kg (234 lb)  SpO2 99%  BMI 30.04 kg/m2   GENERAL:  Very pleasant, well-appearing, in no acute distress.    HEENT:  No icterus, no oral lesions.    LYMPH:  No supraclavicular or cervical lymphadenopathy.    CARDIOVASCULAR:  Regular rate and rhythm.    CHEST:  Lungs are clear.    ABDOMEN:  Bowel sounds are present.  Abdomen is soft, nontender, nondistended.  Scar is well healed.      EXTREMITIES:  No edema.    SKIN:  No rash. "    NEUROLOGIC:  Speech is fluent and clear.  No asterixis or tremor.      Creatinine   Date Value Ref Range Status   11/28/2017 1.30 (H) 0.66 - 1.25 mg/dL Final   ]   Lab Results   Component Value Date    BILITOTAL 1.0 11/28/2017    BILITOTAL 1.5 11/14/2017    BILITOTAL 1.6 11/07/2017    BILITOTAL 1.5 10/30/2017    BILITOTAL 1.4 10/23/2017      Lab Results   Component Value Date    ALT 17 11/28/2017      Lab Results   Component Value Date    ALBUMIN 3.9 11/28/2017       Hemoglobin   Date Value Ref Range Status   11/28/2017 11.7 (L) 13.3 - 17.7 g/dL Final   ]    Current Outpatient Prescriptions   Medication     tacrolimus (GENERIC EQUIVALENT) 1 MG capsule     sulfamethoxazole-trimethoprim (BACTRIM/SEPTRA) 400-80 MG per tablet     valGANciclovir (VALCYTE) 450 MG tablet     mycophenolate (GENERIC EQUIVALENT) 250 MG capsule     order for DME     traMADol (ULTRAM) 50 MG tablet     multivitamin, therapeutic with minerals (THERA-VIT-M) TABS tablet     clotrimazole (MYCELEX) 10 MG LOZG lozenge     cyclobenzaprine (FLEXERIL) 5 MG tablet     ursodiol (ACTIGALL) 300 MG capsule     omeprazole (PRILOSEC) 20 MG CR capsule     No current facility-administered medications for this visit.      .    Normal vision: sees adequately in most situations; can see medication labels, newsprint

## 2018-01-10 DIAGNOSIS — Z94.4 LIVER REPLACED BY TRANSPLANT (H): ICD-10-CM

## 2018-01-10 LAB
BASOPHILS # BLD AUTO: 0 10E9/L (ref 0–0.2)
BASOPHILS NFR BLD AUTO: 0 %
DIFFERENTIAL METHOD BLD: ABNORMAL
EOSINOPHIL # BLD AUTO: 0.1 10E9/L (ref 0–0.7)
EOSINOPHIL NFR BLD AUTO: 1.1 %
ERYTHROCYTE [DISTWIDTH] IN BLOOD BY AUTOMATED COUNT: 13.3 % (ref 10–15)
HCT VFR BLD AUTO: 32.9 % (ref 40–53)
HGB BLD-MCNC: 11.9 G/DL (ref 13.3–17.7)
LYMPHOCYTES # BLD AUTO: 1.6 10E9/L (ref 0.8–5.3)
LYMPHOCYTES NFR BLD AUTO: 30.2 %
MCH RBC QN AUTO: 33.3 PG (ref 26.5–33)
MCHC RBC AUTO-ENTMCNC: 36.2 G/DL (ref 31.5–36.5)
MCV RBC AUTO: 92 FL (ref 78–100)
MONOCYTES # BLD AUTO: 0.5 10E9/L (ref 0–1.3)
MONOCYTES NFR BLD AUTO: 10.2 %
NEUTROPHILS # BLD AUTO: 3.1 10E9/L (ref 1.6–8.3)
NEUTROPHILS NFR BLD AUTO: 58.5 %
PLATELET # BLD AUTO: 101 10E9/L (ref 150–450)
RBC # BLD AUTO: 3.57 10E12/L (ref 4.4–5.9)
WBC # BLD AUTO: 5.3 10E9/L (ref 4–11)

## 2018-01-10 PROCEDURE — 80076 HEPATIC FUNCTION PANEL: CPT | Performed by: TRANSPLANT SURGERY

## 2018-01-10 PROCEDURE — 84100 ASSAY OF PHOSPHORUS: CPT | Performed by: TRANSPLANT SURGERY

## 2018-01-10 PROCEDURE — 83735 ASSAY OF MAGNESIUM: CPT | Performed by: TRANSPLANT SURGERY

## 2018-01-10 PROCEDURE — 80197 ASSAY OF TACROLIMUS: CPT | Performed by: TRANSPLANT SURGERY

## 2018-01-10 PROCEDURE — 85025 COMPLETE CBC W/AUTO DIFF WBC: CPT | Performed by: TRANSPLANT SURGERY

## 2018-01-10 PROCEDURE — 80048 BASIC METABOLIC PNL TOTAL CA: CPT | Performed by: TRANSPLANT SURGERY

## 2018-01-10 PROCEDURE — 36415 COLL VENOUS BLD VENIPUNCTURE: CPT | Performed by: TRANSPLANT SURGERY

## 2018-01-11 LAB
ALBUMIN SERPL-MCNC: 4.3 G/DL (ref 3.4–5)
ALP SERPL-CCNC: 100 U/L (ref 40–150)
ALT SERPL W P-5'-P-CCNC: 18 U/L (ref 0–70)
ANION GAP SERPL CALCULATED.3IONS-SCNC: 4 MMOL/L (ref 3–14)
AST SERPL W P-5'-P-CCNC: 12 U/L (ref 0–45)
BILIRUB DIRECT SERPL-MCNC: 0.3 MG/DL (ref 0–0.2)
BILIRUB SERPL-MCNC: 1.3 MG/DL (ref 0.2–1.3)
BUN SERPL-MCNC: 32 MG/DL (ref 7–30)
CALCIUM SERPL-MCNC: 8.4 MG/DL (ref 8.5–10.1)
CHLORIDE SERPL-SCNC: 108 MMOL/L (ref 94–109)
CO2 SERPL-SCNC: 27 MMOL/L (ref 20–32)
CREAT SERPL-MCNC: 1.36 MG/DL (ref 0.66–1.25)
GFR SERPL CREATININE-BSD FRML MDRD: 62 ML/MIN/1.7M2
GLUCOSE SERPL-MCNC: 132 MG/DL (ref 70–99)
MAGNESIUM SERPL-MCNC: 1.7 MG/DL (ref 1.6–2.3)
PHOSPHATE SERPL-MCNC: 3.4 MG/DL (ref 2.5–4.5)
POTASSIUM SERPL-SCNC: 4.5 MMOL/L (ref 3.4–5.3)
PROT SERPL-MCNC: 7.1 G/DL (ref 6.8–8.8)
SODIUM SERPL-SCNC: 139 MMOL/L (ref 133–144)
TACROLIMUS BLD-MCNC: 9.9 UG/L (ref 5–15)
TME LAST DOSE: NORMAL H

## 2018-01-12 DIAGNOSIS — Z94.4 LIVER REPLACED BY TRANSPLANT (H): Primary | ICD-10-CM

## 2018-01-12 DIAGNOSIS — Z94.4 LIVER TRANSPLANT RECIPIENT (H): ICD-10-CM

## 2018-01-12 DIAGNOSIS — Z79.60 LONG-TERM USE OF IMMUNOSUPPRESSANT MEDICATION: ICD-10-CM

## 2018-01-12 DIAGNOSIS — Z03.89 CMV (CYTOMEGALOVIRUS INFECTION) STATUS NEGATIVE: ICD-10-CM

## 2018-01-12 DIAGNOSIS — Z13.220 LIPID SCREENING: ICD-10-CM

## 2018-01-12 RX ORDER — TACROLIMUS 1 MG/1
4 CAPSULE ORAL EVERY 12 HOURS
Qty: 240 CAPSULE | Refills: 11 | Status: SHIPPED | OUTPATIENT
Start: 2018-01-12 | End: 2018-06-19

## 2018-01-12 NOTE — PROGRESS NOTES
Lab orders updated, with add of CMV Qn recheck with next labs.  Spoke with Joe about tacrolimus level 9.9,  Instructed to decrease dose to 4mg Q 12 hours.  Joe reports that he has for the last week been having night sweats, no diarrhea, no increased fatigue, no other symptoms noted.  Reviewed with Joe that he did stop valcyte 12/25/2017, needs to have repeat CMV quant done to check if has remained negative CMV.   At the time of transplant Joe was CMV negative with positive donor.  Joe verbalizes understanding of dose change, and that orders are in his chart to have CMV rechecked.

## 2018-01-16 DIAGNOSIS — Z03.89 CMV (CYTOMEGALOVIRUS INFECTION) STATUS NEGATIVE: ICD-10-CM

## 2018-01-16 DIAGNOSIS — Z94.4 LIVER REPLACED BY TRANSPLANT (H): ICD-10-CM

## 2018-01-16 DIAGNOSIS — Z79.60 LONG-TERM USE OF IMMUNOSUPPRESSANT MEDICATION: ICD-10-CM

## 2018-02-27 DIAGNOSIS — Z79.60 LONG-TERM USE OF IMMUNOSUPPRESSANT MEDICATION: ICD-10-CM

## 2018-02-27 DIAGNOSIS — Z13.220 LIPID SCREENING: ICD-10-CM

## 2018-02-27 DIAGNOSIS — Z94.4 LIVER REPLACED BY TRANSPLANT (H): ICD-10-CM

## 2018-02-27 LAB
ERYTHROCYTE [DISTWIDTH] IN BLOOD BY AUTOMATED COUNT: 13.3 % (ref 10–15)
HCT VFR BLD AUTO: 32.3 % (ref 40–53)
HGB BLD-MCNC: 11.8 G/DL (ref 13.3–17.7)
MCH RBC QN AUTO: 33.5 PG (ref 26.5–33)
MCHC RBC AUTO-ENTMCNC: 36.5 G/DL (ref 31.5–36.5)
MCV RBC AUTO: 92 FL (ref 78–100)
PLATELET # BLD AUTO: 105 10E9/L (ref 150–450)
RBC # BLD AUTO: 3.52 10E12/L (ref 4.4–5.9)
WBC # BLD AUTO: 6.2 10E9/L (ref 4–11)

## 2018-02-27 PROCEDURE — 80076 HEPATIC FUNCTION PANEL: CPT | Performed by: INTERNAL MEDICINE

## 2018-02-27 PROCEDURE — 84100 ASSAY OF PHOSPHORUS: CPT | Performed by: INTERNAL MEDICINE

## 2018-02-27 PROCEDURE — 80197 ASSAY OF TACROLIMUS: CPT | Performed by: INTERNAL MEDICINE

## 2018-02-27 PROCEDURE — 80048 BASIC METABOLIC PNL TOTAL CA: CPT | Performed by: INTERNAL MEDICINE

## 2018-02-27 PROCEDURE — 83735 ASSAY OF MAGNESIUM: CPT | Performed by: INTERNAL MEDICINE

## 2018-02-27 PROCEDURE — 36415 COLL VENOUS BLD VENIPUNCTURE: CPT | Performed by: INTERNAL MEDICINE

## 2018-02-27 PROCEDURE — 85027 COMPLETE CBC AUTOMATED: CPT | Performed by: INTERNAL MEDICINE

## 2018-02-28 ENCOUNTER — TELEPHONE (OUTPATIENT)
Dept: TRANSPLANT | Facility: CLINIC | Age: 28
End: 2018-02-28

## 2018-02-28 LAB
ALBUMIN SERPL-MCNC: 4.2 G/DL (ref 3.4–5)
ALP SERPL-CCNC: 92 U/L (ref 40–150)
ALT SERPL W P-5'-P-CCNC: 16 U/L (ref 0–70)
ANION GAP SERPL CALCULATED.3IONS-SCNC: 8 MMOL/L (ref 3–14)
AST SERPL W P-5'-P-CCNC: 14 U/L (ref 0–45)
BILIRUB DIRECT SERPL-MCNC: 0.4 MG/DL (ref 0–0.2)
BILIRUB SERPL-MCNC: 1.8 MG/DL (ref 0.2–1.3)
BUN SERPL-MCNC: 38 MG/DL (ref 7–30)
CALCIUM SERPL-MCNC: 8.5 MG/DL (ref 8.5–10.1)
CHLORIDE SERPL-SCNC: 107 MMOL/L (ref 94–109)
CO2 SERPL-SCNC: 24 MMOL/L (ref 20–32)
CREAT SERPL-MCNC: 1.37 MG/DL (ref 0.66–1.25)
GFR SERPL CREATININE-BSD FRML MDRD: 61 ML/MIN/1.7M2
GLUCOSE SERPL-MCNC: 86 MG/DL (ref 70–99)
MAGNESIUM SERPL-MCNC: 1.8 MG/DL (ref 1.6–2.3)
PHOSPHATE SERPL-MCNC: 3.7 MG/DL (ref 2.5–4.5)
POTASSIUM SERPL-SCNC: 4.6 MMOL/L (ref 3.4–5.3)
PROT SERPL-MCNC: 7.2 G/DL (ref 6.8–8.8)
SODIUM SERPL-SCNC: 139 MMOL/L (ref 133–144)
TACROLIMUS BLD-MCNC: 9.4 UG/L (ref 5–15)
TME LAST DOSE: NORMAL H

## 2018-02-28 NOTE — TELEPHONE ENCOUNTER
Per 's review of LFT's done today,  Plan repeat labs in 2 weeks, pt has an appt on 3/13, will have labs done before appt.  Please call pt about slight increase in bilirubin, w/plan to recheck labs.

## 2018-03-01 NOTE — TELEPHONE ENCOUNTER
LM for pt asking him to have labs done on the 13th d/t slight increase in bilirubin when he's in clinic to see Dr. Templeton. Left call back number for any questions.

## 2018-03-12 NOTE — PROGRESS NOTES
[facility]  LIVER TRANSPLANT CLINIC     A/P  27 year old male s/p LT June 2017 for ETOH. FromLT standpoint doing well.    Main issue today is severe depression. Is not having thoughts of self-harm or harm to others. Is struggling with thoughts about drinking. Does have some support and seeking more. Has CD counselor, PCP, AA group and sponsor. Offered Piotr Edgar appt and he was definitely interested in meeting with Piotr. I tried to connect with Piotr today. We left a message. Also tried to connect to his LT coordinator, to fill her in and as another means of support and she is on vacation.I will increase his Zoloft again to 75 mg, and he will see PCP next week for continued management of Zoloft. Discussed calling 911 if he has thoughts of suicide, self harm or harming others. He had used Suicide Prevention Hotline many years ago and felt this was a good resource at that time for him.     Labs up to date as is cancer screening.     Discussed weight gain and activity. He is really working hard at this.      Increase Zoloft to 75 mg po QHS.      Will see back in 3 months.      This was a 45 minute visit, over 50% counseling and coordination of care.      Andreina Templeton MD  Hepatology/ Liver Transplant  [facility]  ===================================================================  PCP: Dr. Eliud Azul, [place], [facility].     SUBJECTIVE   27 year old male s/p DDLT 6/28/17 for ETOH. Today we spent the majority of the visit discussing his depression, which is worse. He denies thoughts of suicide or self-harm. Denies any plan for suicide. Does not own a gun. Does not plan to obtain a gun. Denies thoughts of hurting others. Does feel like he has a lot of support but is also acknowledging that he might use more, and despite all the support, he is still really struggling. He has not had any alcohol but has thought about it. He went to meet some friends at a meat raffle at a bar,  "and realized after he walked in that he was at risk for drinking, so he left. Josiah says he does not think that he needs any inpatient admission right now.     He is trying every technique he knows to cope. He is exercising. Working full time.     EXPLANT: ETOH cirrhosis with suggestion underlying A1AT. He is phenotype M2Z  IS: Prograf   LABS: Up to date and normal liver tests  REJECTION: None.  BILIARY ISSUES: None  STENT: Out in AXR 8/11/17  KIDNEY FUNCTION: creat 1.2-1.4. Currently stable at 1.37  BP: Good. No meds.  PREV:derm not due. Flu shot done   DISEASE RECURRENCE: Not drinking. ETG neg. Sees counselor.  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 On valcyte QOD.     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  ROS: 10 point ROS neg other than the symptoms noted above in the HPI.     OBJECTIVE  Vitals: /84  Pulse 75  Temp 97.9  F (36.6  C) (Oral)  Ht 1.88 m (6' 2\")  Wt 113.8 kg (250 lb 12.8 oz)  SpO2 99%  BMI 32.2 kg/m2  BMI= Body mass index is 32.2 kg/(m^2).  GENERAL:  Very pleasant, well-appearing, in no acute distress.    HEENT:  No icterus, no oral lesions.    LYMPH:  No supraclavicular or cervical lymphadenopathy.    CARDIOVASCULAR:  Regular rate and rhythm.    CHEST:  Lungs are clear.    ABDOMEN:  Bowel sounds are present.  Abdomen is soft, nontender, nondistended.  Scar is well healed.      EXTREMITIES:  No edema.    SKIN:  No rash.    NEUROLOGIC:  Speech is fluent and clear.  No asterixis or tremor.             Creatinine   Date Value Ref Range Status   11/28/2017 1.30 (H) 0.66 - 1.25 mg/dL Final   ]         Lab Results   Component Value Date     BILITOTAL 1.0 11/28/2017     BILITOTAL 1.5 11/14/2017     BILITOTAL 1.6 11/07/2017     BILITOTAL 1.5 10/30/2017     BILITOTAL 1.4 10/23/2017             Lab Results   Component Value Date     ALT 17 11/28/2017             Lab Results   Component Value Date     ALBUMIN 3.9 11/28/2017             Hemoglobin   Date Value Ref Range Status   11/28/2017 11.7 " (L) 13.3 - 17.7 g/dL Final   ]         Current Outpatient Prescriptions   Medication     tacrolimus (GENERIC EQUIVALENT) 1 MG capsule     sulfamethoxazole-trimethoprim (BACTRIM/SEPTRA) 400-80 MG per tablet     valGANciclovir (VALCYTE) 450 MG tablet     mycophenolate (GENERIC EQUIVALENT) 250 MG capsule     order for DME     traMADol (ULTRAM) 50 MG tablet     multivitamin, therapeutic with minerals (THERA-VIT-M) TABS tablet     clotrimazole (MYCELEX) 10 MG LOZG lozenge     cyclobenzaprine (FLEXERIL) 5 MG tablet     ursodiol (ACTIGALL) 300 MG capsule     omeprazole (PRILOSEC) 20 MG CR capsule

## 2018-03-13 ENCOUNTER — OFFICE VISIT (OUTPATIENT)
Dept: GASTROENTEROLOGY | Facility: CLINIC | Age: 28
End: 2018-03-13
Attending: INTERNAL MEDICINE
Payer: COMMERCIAL

## 2018-03-13 VITALS
SYSTOLIC BLOOD PRESSURE: 125 MMHG | DIASTOLIC BLOOD PRESSURE: 84 MMHG | OXYGEN SATURATION: 99 % | HEART RATE: 75 BPM | TEMPERATURE: 97.9 F | WEIGHT: 250.8 LBS | BODY MASS INDEX: 32.19 KG/M2 | HEIGHT: 74 IN

## 2018-03-13 DIAGNOSIS — Z79.60 LONG-TERM USE OF IMMUNOSUPPRESSANT MEDICATION: ICD-10-CM

## 2018-03-13 DIAGNOSIS — Z13.220 LIPID SCREENING: ICD-10-CM

## 2018-03-13 DIAGNOSIS — Z94.4 S/P LIVER TRANSPLANT (H): Primary | ICD-10-CM

## 2018-03-13 DIAGNOSIS — Z94.4 LIVER REPLACED BY TRANSPLANT (H): ICD-10-CM

## 2018-03-13 LAB
ALBUMIN SERPL-MCNC: 4.3 G/DL (ref 3.4–5)
ALP SERPL-CCNC: 92 U/L (ref 40–150)
ALT SERPL W P-5'-P-CCNC: 14 U/L (ref 0–70)
ANION GAP SERPL CALCULATED.3IONS-SCNC: 7 MMOL/L (ref 3–14)
AST SERPL W P-5'-P-CCNC: 13 U/L (ref 0–45)
BILIRUB DIRECT SERPL-MCNC: 0.3 MG/DL (ref 0–0.2)
BILIRUB SERPL-MCNC: 1.2 MG/DL (ref 0.2–1.3)
BUN SERPL-MCNC: 35 MG/DL (ref 7–30)
CALCIUM SERPL-MCNC: 8.6 MG/DL (ref 8.5–10.1)
CHLORIDE SERPL-SCNC: 108 MMOL/L (ref 94–109)
CO2 SERPL-SCNC: 23 MMOL/L (ref 20–32)
CREAT SERPL-MCNC: 1.25 MG/DL (ref 0.66–1.25)
ERYTHROCYTE [DISTWIDTH] IN BLOOD BY AUTOMATED COUNT: 13.2 % (ref 10–15)
GFR SERPL CREATININE-BSD FRML MDRD: 68 ML/MIN/1.7M2
GLUCOSE SERPL-MCNC: 107 MG/DL (ref 70–99)
HCT VFR BLD AUTO: 36.8 % (ref 40–53)
HGB BLD-MCNC: 13.1 G/DL (ref 13.3–17.7)
MAGNESIUM SERPL-MCNC: 1.7 MG/DL (ref 1.6–2.3)
MCH RBC QN AUTO: 33.5 PG (ref 26.5–33)
MCHC RBC AUTO-ENTMCNC: 35.6 G/DL (ref 31.5–36.5)
MCV RBC AUTO: 94 FL (ref 78–100)
PHOSPHATE SERPL-MCNC: 3 MG/DL (ref 2.5–4.5)
PLATELET # BLD AUTO: 110 10E9/L (ref 150–450)
POTASSIUM SERPL-SCNC: 4.7 MMOL/L (ref 3.4–5.3)
PROT SERPL-MCNC: 7.2 G/DL (ref 6.8–8.8)
RBC # BLD AUTO: 3.91 10E12/L (ref 4.4–5.9)
SODIUM SERPL-SCNC: 138 MMOL/L (ref 133–144)
TACROLIMUS BLD-MCNC: 6.5 UG/L (ref 5–15)
TME LAST DOSE: NORMAL H
WBC # BLD AUTO: 6.9 10E9/L (ref 4–11)

## 2018-03-13 PROCEDURE — 80076 HEPATIC FUNCTION PANEL: CPT | Performed by: INTERNAL MEDICINE

## 2018-03-13 PROCEDURE — 85027 COMPLETE CBC AUTOMATED: CPT | Performed by: INTERNAL MEDICINE

## 2018-03-13 PROCEDURE — G0463 HOSPITAL OUTPT CLINIC VISIT: HCPCS | Mod: ZF

## 2018-03-13 PROCEDURE — 36415 COLL VENOUS BLD VENIPUNCTURE: CPT | Performed by: INTERNAL MEDICINE

## 2018-03-13 PROCEDURE — 84100 ASSAY OF PHOSPHORUS: CPT | Performed by: INTERNAL MEDICINE

## 2018-03-13 PROCEDURE — 80197 ASSAY OF TACROLIMUS: CPT | Performed by: INTERNAL MEDICINE

## 2018-03-13 PROCEDURE — 83735 ASSAY OF MAGNESIUM: CPT | Performed by: INTERNAL MEDICINE

## 2018-03-13 PROCEDURE — 80048 BASIC METABOLIC PNL TOTAL CA: CPT | Performed by: INTERNAL MEDICINE

## 2018-03-13 ASSESSMENT — PAIN SCALES - GENERAL: PAINLEVEL: NO PAIN (0)

## 2018-03-13 NOTE — NURSING NOTE
"Chief Complaint   Patient presents with     RECHECK     3 mo f/u        Initial /84  Pulse 75  Temp 97.9  F (36.6  C) (Oral)  Ht 1.88 m (6' 2\")  Wt 113.8 kg (250 lb 12.8 oz)  SpO2 99%  BMI 32.2 kg/m2 Estimated body mass index is 32.2 kg/(m^2) as calculated from the following:    Height as of this encounter: 1.88 m (6' 2\").    Weight as of this encounter: 113.8 kg (250 lb 12.8 oz).  Medication Reconciliation: complete   Hardy Jay, [facility]      Depression Response    Patient completed the PHQ-9 assessment for depression and scored >9? Yes  Question 9 on the PHQ-9 was positive for suicidality? Yes  Is the patient already receiving treatment for depression? Yes  Patient would like to speak with behavioral health team ([facility] clinics only)? Yes    I personally notified the following: visit provider    Behavioral Health/Social Work Contact Information     [facility]  Marco Hernández MA, LMFT  Lead Behavioral Health Clinician  Phone: [phone]  [facility] Pager: [phone]    Non-[facility] Clinics  [facility] On-Call   Pager: [phone]     "

## 2018-03-13 NOTE — MR AVS SNAPSHOT
After Visit Summary   3/13/2018    Joe Sosa    MRN: [id number]           Patient Information     Date Of Birth          1990        Visit Information        Provider Department      3/13/2018 9:50 AM Andreina Templeton MD [facility] Hepatology        Today's Diagnoses     S/P liver transplant (H)    -  1       Follow-ups after your visit        Follow-up notes from your care team     Return in about 3 months (around 6/13/2018).      Your next 10 appointments already scheduled     Jul 31, 2018  8:00 AM CDT   Lab with  LAB   [facility] ([facility])    [address]  1st Floor  [place] [id number] [phone]            Jul 31, 2018  9:00 AM CDT   (Arrive by 8:45 AM)   Return Liver Transplant with Andreina Templeton MD   [facility] Hepatology ([facility])    [address] [place] [address]  [address]  [place] [address] [phone]              Who to contact     If you have questions or need follow up information about today's clinic visit or your schedule please contact [facility] HEPATOLOGY directly at [phone].  Normal or non-critical lab and imaging results will be communicated to you by MyChart, letter or phone within 4 business days after the clinic has received the results. If you do not hear from us within 7 days, please contact the clinic through [facility] or phone. If you have a critical or abnormal lab result, we will notify you by phone as soon as possible.  Submit refill requests through [employer] or call your pharmacy and they will forward the refill request to us. Please allow 3 business days for your refill to be completed.          Additional Information About Your Visit        MyChart Information     [employer] gives you secure access to your electronic health record. If you see a primary care provider, you can also send messages to your care team and make appointments. If you have questions, please  "call your primary care clinic.  If you do not have a primary care provider, please call [phone] and they will assist you.        Care EveryWhere ID     This is your Care EveryWhere ID. This could be used by other organizations to access your [place] medical records  [id number]        Your Vitals Were     Pulse Temperature Height Pulse Oximetry BMI (Body Mass Index)       75 97.9  F (36.6  C) (Oral) 1.88 m (6' 2\") 99% 32.2 kg/m2        Blood Pressure from Last 3 Encounters:   03/13/18 125/84   12/15/17 138/78   09/25/17 148/82    Weight from Last 3 Encounters:   03/13/18 113.8 kg (250 lb 12.8 oz)   12/15/17 106.1 kg (234 lb)   09/25/17 99.4 kg (219 lb 2.2 oz)              Today, you had the following     No orders found for display       Primary Care Provider Office Phone # Fax #    Jeffery Azul [phone]       [facility] [address] [place] [address]        Equal Access to Services     [facility]: Hadii aad ku hadasho Soomaali, waaxda luqadaha, qaybta kaalmada adeegyada, waxay idiin hayaan adeeg christian la'elijah . So [facility] [phone].    ATENCIÓN: Si habla español, tiene a chun disposición servicios gratuitos de asistencia lingüística. Llame al [phone].    We comply with applicable federal civil rights laws and [place] laws. We do not discriminate on the basis of race, color, national origin, age, disability, sex, sexual orientation, or gender identity.            Thank you!     Thank you for choosing [facility] HEPATOLOGY  for your care. Our goal is always to provide you with excellent care. Hearing back from our patients is one way we can continue to improve our services. Please take a few minutes to complete the written survey that you may receive in the mail after your visit with us. Thank you!             Your Updated Medication List - Protect others around you: Learn how to safely use, store and throw away your medicines at " www.disposemymeds.org.          This list is accurate as of 3/13/18 11:59 PM.  Always use your most recent med list.                   Brand Name Dispense Instructions for use Diagnosis    multivitamin, therapeutic with minerals Tabs tablet     30 each    Take 1 tablet by mouth daily    Liver transplant recipient (H)       order for DME     1 Units    Equipment being ordered: Wheelchair, Manual Treatment Diagnosis: Leg pain, severe  Patient height:  6 feet, 2 inches Patient weight:  93.8 kg    Bilateral lower extremity pain       sertraline 50 MG tablet    ZOLOFT    30 tablet    Take 1.5 tablets (75 mg) by mouth daily    S/P liver transplant (H), Alcoholism (H)       tacrolimus 1 MG capsule    GENERIC EQUIVALENT    240 capsule    Take 4 capsules (4 mg) by mouth every 12 hours    Liver transplant recipient (H), Long-term use of immunosuppressant medication

## 2018-03-13 NOTE — LETTER
3/13/2018      RE: Joe Sosa  [address]  [place] [address]       [facility]  LIVER TRANSPLANT CLINIC     A/P  27 year old male s/p LT June 2017 for ETOH. FromLT standpoint doing well.    Main issue today is severe depression. Is not having thoughts of self-harm or harm to others. Is struggling with thoughts about drinking. Does have some support and seeking more. Has CD counselor, PCP, AA group and sponsor. Offered Piotr Hernández appt and he was definitely interested in meeting with Piotr. I tried to connect with Piotr today. We left a message. Also tried to connect to his LT coordinator, to fill her in and as another means of support and she is on vacation.I will increase his Zoloft again to 75 mg, and he will see PCP next week for continued management of Zoloft. Discussed calling 911 if he has thoughts of suicide, self harm or harming others. He had used Suicide Prevention Hotline many years ago and felt this was a good resource at that time for him.     Labs up to date as is cancer screening.     Discussed weight gain and activity. He is really working hard at this.      Increase Zoloft to 75 mg po QHS.      Will see back in 3 months.      This was a 45 minute visit, over 50% counseling and coordination of care.      Andreina Templeton MD  Hepatology/ Liver Transplant  [facility]  ===================================================================  PCP: Dr. Eliud Azul, [place], [facility].     SUBJECTIVE   27 year old male s/p DDLT 6/28/17 for ETOH. Today we spent the majority of the visit discussing his depression, which is worse. He denies thoughts of suicide or self-harm. Denies any plan for suicide. Does not own a gun. Does not plan to obtain a gun. Denies thoughts of hurting others. Does feel like he has a lot of support but is also acknowledging that he might use more, and despite all the support, he is still really struggling. He has not  "had any alcohol but has thought about it. He went to meet some friends at a meat raffle at a bar, and realized after he walked in that he was at risk for drinking, so he left. Josiah says he does not think that he needs any inpatient admission right now.     He is trying every technique he knows to cope. He is exercising. Working full time.     EXPLANT: ETOH cirrhosis with suggestion underlying A1AT. He is phenotype M2Z  IS: Prograf   LABS: Up to date and normal liver tests  REJECTION: None.  BILIARY ISSUES: None  STENT: Out in AXR 8/11/17  KIDNEY FUNCTION: creat 1.2-1.4. Currently stable at 1.37  BP: Good. No meds.  PREV:derm not due. Flu shot done   DISEASE RECURRENCE: Not drinking. ETG neg. Sees counselor.  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 On valcyte QOD.     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  ROS: 10 point ROS neg other than the symptoms noted above in the HPI.     OBJECTIVE  Vitals: /84  Pulse 75  Temp 97.9  F (36.6  C) (Oral)  Ht 1.88 m (6' 2\")  Wt 113.8 kg (250 lb 12.8 oz)  SpO2 99%  BMI 32.2 kg/m2  BMI= Body mass index is 32.2 kg/(m^2).  GENERAL:  Very pleasant, well-appearing, in no acute distress.    HEENT:  No icterus, no oral lesions.    LYMPH:  No supraclavicular or cervical lymphadenopathy.    CARDIOVASCULAR:  Regular rate and rhythm.    CHEST:  Lungs are clear.    ABDOMEN:  Bowel sounds are present.  Abdomen is soft, nontender, nondistended.  Scar is well healed.      EXTREMITIES:  No edema.    SKIN:  No rash.    NEUROLOGIC:  Speech is fluent and clear.  No asterixis or tremor.             Creatinine   Date Value Ref Range Status   11/28/2017 1.30 (H) 0.66 - 1.25 mg/dL Final   ]         Lab Results   Component Value Date     BILITOTAL 1.0 11/28/2017     BILITOTAL 1.5 11/14/2017     BILITOTAL 1.6 11/07/2017     BILITOTAL 1.5 10/30/2017     BILITOTAL 1.4 10/23/2017             Lab Results   Component Value Date     ALT 17 11/28/2017             Lab Results   Component Value Date "     ALBUMIN 3.9 11/28/2017             Hemoglobin   Date Value Ref Range Status   11/28/2017 11.7 (L) 13.3 - 17.7 g/dL Final   ]         Current Outpatient Prescriptions   Medication     tacrolimus (GENERIC EQUIVALENT) 1 MG capsule     sulfamethoxazole-trimethoprim (BACTRIM/SEPTRA) 400-80 MG per tablet     valGANciclovir (VALCYTE) 450 MG tablet     mycophenolate (GENERIC EQUIVALENT) 250 MG capsule     order for DME     traMADol (ULTRAM) 50 MG tablet     multivitamin, therapeutic with minerals (THERA-VIT-M) TABS tablet     clotrimazole (MYCELEX) 10 MG LOZG lozenge     cyclobenzaprine (FLEXERIL) 5 MG tablet     ursodiol (ACTIGALL) 300 MG capsule     omeprazole (PRILOSEC) 20 MG CR capsule        Andreina Templeton MD

## 2018-03-14 ENCOUNTER — TELEPHONE (OUTPATIENT)
Dept: TRANSPLANT | Facility: CLINIC | Age: 28
End: 2018-03-14

## 2018-03-14 ASSESSMENT — PATIENT HEALTH QUESTIONNAIRE - PHQ9: SUM OF ALL RESPONSES TO PHQ QUESTIONS 1-9: 21

## 2018-03-14 NOTE — TELEPHONE ENCOUNTER
"Message rec'd from Dr. Templeton:    From: Andreina Templeton MD      Sent: 3/14/2018  12:21 PM        To: Lynette Baird RN     Saw your note about the tac. I'd like to leave him on 4 bid. Thanks!     Had already called pt, so called back and told him no changes to dose. Asked how he is feeling today, says \"a little better.\" Reports he took the day off work today and asks, \"Do you know if that navya is going to call me?\" Pt referring to Piotr Hernández. Message was left for him yesterday and writer will f/u today. Pt confirms he has a plan in place in case of emergency. Let him know he can call back here if he feels he just needs to talk to somebody, and we can help him figure out the best option. Pt verbalized understanding.  "

## 2018-03-14 NOTE — TELEPHONE ENCOUNTER
Tacrolimus level 6.5.    Please instruct patient to increase tacrolimus dose to 4 mg in the morning and 5 mg in the evening.

## 2018-03-15 ASSESSMENT — ANXIETY QUESTIONNAIRES
7. FEELING AFRAID AS IF SOMETHING AWFUL MIGHT HAPPEN: NEARLY EVERY DAY
GAD7 TOTAL SCORE: 16
3. WORRYING TOO MUCH ABOUT DIFFERENT THINGS: NEARLY EVERY DAY
4. TROUBLE RELAXING: NEARLY EVERY DAY
GAD7 TOTAL SCORE: 16
7. FEELING AFRAID AS IF SOMETHING AWFUL MIGHT HAPPEN: NEARLY EVERY DAY
5. BEING SO RESTLESS THAT IT IS HARD TO SIT STILL: NOT AT ALL
6. BECOMING EASILY ANNOYED OR IRRITABLE: SEVERAL DAYS
1. FEELING NERVOUS, ANXIOUS, OR ON EDGE: NEARLY EVERY DAY
2. NOT BEING ABLE TO STOP OR CONTROL WORRYING: NEARLY EVERY DAY

## 2018-03-15 ASSESSMENT — ENCOUNTER SYMPTOMS
CHILLS: 0
VOMITING: 0
HALLUCINATIONS: 0
POLYPHAGIA: 1
HEARTBURN: 0
WEIGHT GAIN: 1
DIARRHEA: 1
FEVER: 0
DECREASED CONCENTRATION: 1
FATIGUE: 1
DEPRESSION: 1
BLOOD IN STOOL: 0
PANIC: 1
WEIGHT LOSS: 0
POLYDIPSIA: 0
CONSTIPATION: 0
RECTAL PAIN: 0
NIGHT SWEATS: 1
BOWEL INCONTINENCE: 0
DECREASED APPETITE: 0
INCREASED ENERGY: 1
ALTERED TEMPERATURE REGULATION: 0
NAUSEA: 1
JAUNDICE: 0
INSOMNIA: 1
BLOATING: 1
ABDOMINAL PAIN: 0
NERVOUS/ANXIOUS: 1

## 2018-03-16 ENCOUNTER — OFFICE VISIT (OUTPATIENT)
Dept: INTERNAL MEDICINE | Facility: CLINIC | Age: 28
End: 2018-03-16
Payer: MEDICAID

## 2018-03-16 DIAGNOSIS — F41.9 ANXIETY: ICD-10-CM

## 2018-03-16 DIAGNOSIS — F10.21 ALCOHOL USE DISORDER, MODERATE, IN SUSTAINED REMISSION (H): ICD-10-CM

## 2018-03-16 DIAGNOSIS — F33.1 MAJOR DEPRESSIVE DISORDER, RECURRENT EPISODE, MODERATE (H): Primary | ICD-10-CM

## 2018-03-16 ASSESSMENT — ANXIETY QUESTIONNAIRES: GAD7 TOTAL SCORE: 16
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
## 2018-03-16 NOTE — MR AVS SNAPSHOT
After Visit Summary   3/16/2018    Joe Sosa    MRN: [id number]           Patient Information     Date Of Birth          1990        Visit Information        Provider Department      3/16/2018 10:00 AM Marco Hernández LMFT [facility]        Today's Diagnoses     Major depressive disorder, recurrent episode, moderate (H)    -  1    Anxiety           Follow-ups after your visit        Additional Services     BEHAVIORAL / SPIRITUAL HEALTH ([facility] ONLY)       The [facility] Behavioral / Spiritual Health Team is available to support any patient who receives care at the [facility].  This team provides and/or connects patients and families to appropriate psychological, spiritual, and social work services.     The Behavioral / Spiritual Health Team  also provides consultation to medical providers and other care team members regarding patient behavioral/mental health issues, psychosocial concerns, or spiritual needs.  Services are provided by behavioral health clinicians, licensed psychologists, licensed social workers, and a .     The Behavioral / Spiritual Health Team will coordinate with the patient's medical care team to determine the appropriate response(s) to the current need.        Please provide the following information to assist us in addressing the current concern:    1. Reason for Referral? Psychiatry consult - met with Piotr and gets meds from hepatology and/or PCP (not in our system)    2. Has clinical staff discussed this referral to the Behavioral / Spiritual Health Team with the patient and/or caregiver? Yes    3. Who should we contact on the patient's care team to discuss this issue further or to coordinate care? Piotr Hernández                  Your next 10 appointments already scheduled     Jul 31, 2018  8:00 AM CDT   Lab with [facility] ([facility])    [address]  [address]   [phone]            Jul 31, 2018  9:00 AM CDT   (Arrive by 8:45 AM)   Return Liver Transplant with Andreina Templeton MD   [facility] ([facility])    [address]  [address]  [facility] [id number] [phone]              Who to contact     Please call your clinic at [phone] to:    Ask questions about your health    Make or cancel appointments    Discuss your medicines    Learn about your test results    Speak to your doctor            Additional Information About Your Visit        [employer] Information     Instant API gives you secure access to your electronic health record. If you see a primary care provider, you can also send messages to your care team and make appointments. If you have questions, please call your primary care clinic.  If you do not have a primary care provider, please call [phone] and they will assist you.      Instant API is an electronic gateway that provides easy, online access to your medical records. With Instant API, you can request a clinic appointment, read your test results, renew a prescription or communicate with your care team.     To access your existing account, please contact your [facility] or call [phone] for assistance.        Care EveryWhere ID     This is your Care EveryWhere ID. This could be used by other organizations to access your [facility] medical records  [id number]         Blood Pressure from Last 3 Encounters:   03/13/18 125/84   12/15/17 138/78   09/25/17 148/82    Weight from Last 3 Encounters:   03/13/18 113.8 kg (250 lb 12.8 oz)   12/15/17 106.1 kg (234 lb)   09/25/17 99.4 kg (219 lb 2.2 oz)              We Performed the Following     BEHAVIORAL / SPIRITUAL HEALTH ([facility] ONLY)        Primary Care Provider Office Phone # Fax #    Jeffery Azul [phone]       [facility] [address] [place] [address]         Equal Access to Services     [facility] : Hadii hermilo turner ric Liu, warobyda luqadaha, qaybta kayoelvahe phelps. So [facility] [phone].    ATENCIÓN: Si habla español, tiene a chun disposición servicios gratuitos de asistencia lingüística. Llame al [phone].    We comply with applicable federal civil rights laws and [place] laws. We do not discriminate on the basis of race, color, national origin, age, disability, sex, sexual orientation, or gender identity.            Thank you!     Thank you for choosing [facility]  for your care. Our goal is always to provide you with excellent care. Hearing back from our patients is one way we can continue to improve our services. Please take a few minutes to complete the written survey that you may receive in the mail after your visit with us. Thank you!             Your Updated Medication List - Protect others around you: Learn how to safely use, store and throw away your medicines at www.disposemymeds.org.          This list is accurate as of 3/16/18 11:27 AM.  Always use your most recent med list.                   Brand Name Dispense Instructions for use Diagnosis    multivitamin, therapeutic with minerals Tabs tablet     30 each    Take 1 tablet by mouth daily    Liver transplant recipient (H)       order for DME     1 Units    Equipment being ordered: Wheelchair, Manual Treatment Diagnosis: Leg pain, severe  Patient height:  6 feet, 2 inches Patient weight:  93.8 kg    Bilateral lower extremity pain       sertraline 50 MG tablet    ZOLOFT    30 tablet    Take 1.5 tablets (75 mg) by mouth daily    S/P liver transplant (H), Alcoholism (H)       tacrolimus 1 MG capsule    GENERIC EQUIVALENT    240 capsule    Take 4 capsules (4 mg) by mouth every 12 hours    Liver transplant recipient (H), Long-term use of immunosuppressant medication

## 2018-04-09 ENCOUNTER — TELEPHONE (OUTPATIENT)
Dept: TRANSPLANT | Facility: CLINIC | Age: 28
End: 2018-04-09

## 2018-04-09 NOTE — TELEPHONE ENCOUNTER
Patient called- stated his [place] refill came with a paper that stated the manufacture of med was different than previous

## 2018-04-24 ENCOUNTER — OFFICE VISIT (OUTPATIENT)
Dept: BEHAVIORAL HEALTH | Facility: CLINIC | Age: 28
End: 2018-04-24
Payer: MEDICAID

## 2018-04-24 DIAGNOSIS — F34.1 PERSISTENT DEPRESSIVE DISORDER: Primary | ICD-10-CM

## 2018-04-24 DIAGNOSIS — F10.21 ALCOHOL USE DISORDER, SEVERE, IN SUSTAINED REMISSION (H): ICD-10-CM

## 2018-04-24 RX ORDER — SERTRALINE HYDROCHLORIDE 100 MG/1
150 TABLET, FILM COATED ORAL DAILY
Qty: 45 TABLET | Refills: 1 | Status: SHIPPED | OUTPATIENT
Start: 2018-04-24 | End: 2018-05-24

## 2018-04-24 NOTE — PATIENT INSTRUCTIONS
Increase sertraline (Zoloft) to 150mg daily      INSTRUCTIONS FOR USE OF SSRI ANTIDEPRESSANTS      DO:    - call clinic if you, or another provider, makes any medication changes  - call clinic if your use of Ibuprofen (or similar) increases significantly  - call clinic if you experience any symptom listed below      DO NOT:  stop this med abruptly         TRY TO AVOID:  excessive use of ibuprofen or similar pain medication      FOOD: take with or without food       COMMON ADVERSE EFFECTS:  headache, dizziness, nausea, diarrhea, fatigue, sleepiness, sexual problems, weight gain, anxiety, a need to pace or restlessness       CALL CLINIC URGENTLY or EMERGENCY ROOM:  if you have any concerns, especially the following...    - abnormal bleeding  or  easy bruising (magaly if increase use of ibuprofen etc)  - significant pacing, restlessness or muscle spasm  - thoughts of death or hurting yourself    - suspect Serotonin Syndrome:   confusion   tremor  severe headache  sweats  fever   funny feeling in muscles  need to pace / restlessness   severe nausea, diarrhea      Scheduling:  If you have any concerns about today's visit or wish to schedule another appointment please call our office during normal business hours [phone] (8-5:00 M-F)    Contact Us:  Please call [phone] during business hours (8-5:00 M-F).  If after clinic hours, or on the weekend, please call  [phone].    Financial Assistance [phone]  [facility] [phone]  [place] Billing [phone]  Medical Records [phone]      MENTAL HEALTH CRISIS NUMBERS:  [facility]:   [facility] - [phone]   Crisis Residence [facility] Residence - [phone]   Walk-In Counseling [facility] [phone]   COPE 24/7 [facility] Mobile Team for Adults - [[phone]]; Child - [[phone]]     Crisis Connection - [phone]     [facility]:   [facility] - [phone]   Walk-in counseling [facility]  [facility] - [phone]   Walk-in counseling [facility] - [phone]   Crisis Residence [facility] - [phone]   Urgent Care Adult Mental Health:   --Drop-in, 24/7 crisis line, and [facility] Mobile Team [[phone]]    CRISIS TEXT LINE: Text [phone] from anywhere, anytime, any crisis 24/7;    OR SEE www.crisistextline.org     Poison Control Center - [phone]    CHILD: [facility] needs assessment team - [phone]     [facility] Lifeline - [phone]; or [facility] Lif[facility] - [phone]    If you have a medical emergency please call 911 or go to the nearest ER.

## 2018-04-24 NOTE — MR AVS SNAPSHOT
After Visit Summary   4/24/2018    Joe Sosa    MRN: [id number]           Patient Information     Date Of Birth          1990        Visit Information        Provider Department      4/24/2018 2:00 PM Arthur Mccauley MD [facility]        Today's Diagnoses     Persistent depressive disorder    -  1    Alcohol use disorder, severe, in sustained remission (H)          Care Instructions    Increase sertraline (Zoloft) to 150mg daily      INSTRUCTIONS FOR USE OF SSRI ANTIDEPRESSANTS      DO:    - call clinic if you, or another provider, makes any medication changes  - call clinic if your use of Ibuprofen (or similar) increases significantly  - call clinic if you experience any symptom listed below      DO NOT:  stop this med abruptly         TRY TO AVOID:  excessive use of ibuprofen or similar pain medication      FOOD: take with or without food       COMMON ADVERSE EFFECTS:  headache, dizziness, nausea, diarrhea, fatigue, sleepiness, sexual problems, weight gain, anxiety, a need to pace or restlessness       CALL CLINIC URGENTLY or EMERGENCY ROOM:  if you have any concerns, especially the following...    - abnormal bleeding  or  easy bruising (magaly if increase use of ibuprofen etc)  - significant pacing, restlessness or muscle spasm  - thoughts of death or hurting yourself    - suspect Serotonin Syndrome:   confusion   tremor  severe headache  sweats  fever   funny feeling in muscles  need to pace / restlessness   severe nausea, diarrhea      Scheduling:  If you have any concerns about today's visit or wish to schedule another appointment please call our office during normal business hours [phone] (8-5:00 M-F)    Contact Us:  Please call [phone] during business hours (8-5:00 M-F).  If after clinic hours, or on the weekend, please call  [phone].    Financial Assistance [phone]  "[employer] Billing [phone]  [facility] Billing [phone]  Medical Records  [phone]      MENTAL HEALTH CRISIS NUMBERS:  [facility]:   [facility] - [phone]   Crisis Residence [facility] [place] [facility] - [phone]   Walk-In Counseling Center [facility] - [phone]   COPE 24/7 [facility] Mobile Team for Adults - [[phone]]; Child - [[phone]]     Crisis Connection - [phone]     [facility] - [phone]   Walk-in counseling [facility] - [phone]   Walk-in counseling [facility] - [phone]   Crisis Residence [facility] - [phone]   Urgent Care Adult Mental Health:   --Drop-in, 24/7 crisis line, and [facility] Mobile Team [[phone]]    CRISIS TEXT LINE: Text [phone] from anywhere, anytime, any crisis 24/7;    OR SEE www.crisistextline.org     Poison Control Center - [phone]    CHILD: [facility] needs assessment team - [phone]     [facility] Lifeline - [phone]; or [facility] Lifeline - [phone]    If you have a medical emergency please call 911 or go to the nearest ER.           Follow-ups after your visit        Follow-up notes from your care team     Return in about 4 weeks (around 5/22/2018).      Your next 10 appointments already scheduled     May 24, 2018  5:30 PM CDT   (Arrive by 5:15 PM)   Return Visit with Arthur Mccauley MD   [facility] ([facility])    [address] [place] [address]  3rd [facility] [address] [phone]            Jul 31, 2018  8:00 AM CDT   Lab with  LAB    Health Lab ([facility])    [address] [place] [address] [phone]            Jul 31, 2018  9:00 AM CDT   (Arrive by 8:45 AM)   Return Liver Transplant with Andreina Templeton MD   [facility] ([facility])    [address] [facility]  [address]  [facility] [address] [phone]               Who to contact     Please call your clinic at [phone] to:    Ask questions about your health    Make or cancel appointments    Discuss your medicines    Learn about your test results    Speak to your doctor            Additional Information About Your Visit        [employer] Information     [employer] gives you secure access to your electronic health record. If you see a primary care provider, you can also send messages to your care team and make appointments. If you have questions, please call your primary care clinic.  If you do not have a primary care provider, please call [phone] and they will assist you.      [employer] is an electronic gateway that provides easy, online access to your medical records. With [employer], you can request a clinic appointment, read your test results, renew a prescription or communicate with your care team.     To access your existing account, please contact your [facility] or call [phone] for assistance.        Care EveryWhere ID     This is your Care EveryWhere ID. This could be used by other organizations to access your [facility] medical records  [id number]         Blood Pressure from Last 3 Encounters:   03/13/18 125/84   12/15/17 138/78   09/25/17 148/82    Weight from Last 3 Encounters:   03/13/18 113.8 kg (250 lb 12.8 oz)   12/15/17 106.1 kg (234 lb)   09/25/17 99.4 kg (219 lb 2.2 oz)              Today, you had the following     No orders found for display         Today's Medication Changes          These changes are accurate as of 4/24/18  3:12 PM.  If you have any questions, ask your nurse or doctor.               These medicines have changed or have updated prescriptions.        Dose/Directions    sertraline 100 MG tablet   Commonly known as:  ZOLOFT   This may have changed:    - medication strength  - how much to take   Used for:  Persistent depressive disorder        Dose:  150 mg   Take 1.5 tablets (150 mg) by mouth daily    Quantity:  45 tablet   Refills:  1            Where to get your medicines      These medications were sent to [facility] - [place], [place] - [address]  [address] , [facility] [address]    Hours:  Mon-Fri 8:30am-5:00pm Toll Free [phone] Phone:  [phone]     sertraline 100 MG tablet                Primary Care Provider Office Phone # Fax #    Jeffery Azul [phone]       [facility] [address] [place] [address]        Equal Access to Services     [facility]: Hadii aad ku hadasho Soomaali, waaxda luqadaha, qaybta kaalmada adeegyada, waxobdulio boogiein hayelijah eaton . So [facility] [phone].    ATENCIÓN: Si habla español, tiene a chun disposición servicios gratuitos de asistencia lingüística. [facility] [phone].    We comply with applicable federal civil rights laws and [place] laws. We do not discriminate on the basis of race, color, national origin, age, disability, sex, sexual orientation, or gender identity.            Thank you!     Thank you for choosing [facility]  for your care. Our goal is always to provide you with excellent care. Hearing back from our patients is one way we can continue to improve our services. Please take a few minutes to complete the written survey that you may receive in the mail after your visit with us. Thank you!             Your Updated Medication List - Protect others around you: Learn how to safely use, store and throw away your medicines at www.disposemymeds.org.          This list is accurate as of 4/24/18  3:12 PM.  Always use your most recent med list.                   Brand Name Dispense Instructions for use Diagnosis    multivitamin, therapeutic with minerals Tabs tablet     30 each    Take 1 tablet by mouth daily    Liver transplant recipient (H)       order for DME     1 Units    Equipment being ordered: Wheelchair, Manual  Treatment Diagnosis: Leg pain, severe  Patient height:  6 feet, 2 inches Patient weight:  93.8 kg    Bilateral lower extremity pain       sertraline 100 MG tablet    ZOLOFT    45 tablet    Take 1.5 tablets (150 mg) by mouth daily    Persistent depressive disorder       tacrolimus 1 MG capsule    GENERIC EQUIVALENT    240 capsule    Take 4 capsules (4 mg) by mouth every 12 hours    Liver transplant recipient (H), Long-term use of immunosuppressant medication
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
## 2018-05-23 DIAGNOSIS — Z79.60 LONG-TERM USE OF IMMUNOSUPPRESSANT MEDICATION: ICD-10-CM

## 2018-05-23 DIAGNOSIS — Z13.220 LIPID SCREENING: ICD-10-CM

## 2018-05-23 DIAGNOSIS — Z94.4 LIVER REPLACED BY TRANSPLANT (H): ICD-10-CM

## 2018-05-23 LAB
ALBUMIN UR-MCNC: NEGATIVE MG/DL
APPEARANCE UR: CLEAR
BILIRUB UR QL STRIP: NEGATIVE
COLOR UR AUTO: YELLOW
ERYTHROCYTE [DISTWIDTH] IN BLOOD BY AUTOMATED COUNT: 13.1 % (ref 10–15)
GLUCOSE UR STRIP-MCNC: NEGATIVE MG/DL
HCT VFR BLD AUTO: 33.3 % (ref 40–53)
HGB BLD-MCNC: 11.9 G/DL (ref 13.3–17.7)
HGB UR QL STRIP: NEGATIVE
KETONES UR STRIP-MCNC: NEGATIVE MG/DL
LEUKOCYTE ESTERASE UR QL STRIP: NEGATIVE
MCH RBC QN AUTO: 33 PG (ref 26.5–33)
MCHC RBC AUTO-ENTMCNC: 35.7 G/DL (ref 31.5–36.5)
MCV RBC AUTO: 92 FL (ref 78–100)
NITRATE UR QL: NEGATIVE
PH UR STRIP: 5 PH (ref 5–7)
PLATELET # BLD AUTO: 115 10E9/L (ref 150–450)
RBC # BLD AUTO: 3.61 10E12/L (ref 4.4–5.9)
SOURCE: NORMAL
SP GR UR STRIP: 1.02 (ref 1–1.03)
UROBILINOGEN UR STRIP-ACNC: 0.2 EU/DL (ref 0.2–1)
WBC # BLD AUTO: 6.9 10E9/L (ref 4–11)

## 2018-05-23 PROCEDURE — 36415 COLL VENOUS BLD VENIPUNCTURE: CPT | Performed by: INTERNAL MEDICINE

## 2018-05-23 PROCEDURE — 84100 ASSAY OF PHOSPHORUS: CPT | Performed by: INTERNAL MEDICINE

## 2018-05-23 PROCEDURE — 85027 COMPLETE CBC AUTOMATED: CPT | Performed by: INTERNAL MEDICINE

## 2018-05-23 PROCEDURE — 80076 HEPATIC FUNCTION PANEL: CPT | Performed by: INTERNAL MEDICINE

## 2018-05-23 PROCEDURE — 80197 ASSAY OF TACROLIMUS: CPT | Performed by: INTERNAL MEDICINE

## 2018-05-23 PROCEDURE — 80061 LIPID PANEL: CPT | Performed by: INTERNAL MEDICINE

## 2018-05-23 PROCEDURE — 80048 BASIC METABOLIC PNL TOTAL CA: CPT | Performed by: INTERNAL MEDICINE

## 2018-05-23 PROCEDURE — 83735 ASSAY OF MAGNESIUM: CPT | Performed by: INTERNAL MEDICINE

## 2018-05-23 PROCEDURE — 84156 ASSAY OF PROTEIN URINE: CPT | Performed by: INTERNAL MEDICINE

## 2018-05-23 PROCEDURE — 81003 URINALYSIS AUTO W/O SCOPE: CPT | Performed by: INTERNAL MEDICINE

## 2018-05-24 ENCOUNTER — OFFICE VISIT (OUTPATIENT)
Dept: BEHAVIORAL HEALTH | Facility: CLINIC | Age: 28
End: 2018-05-24
Payer: MEDICAID

## 2018-05-24 VITALS
WEIGHT: 256.7 LBS | SYSTOLIC BLOOD PRESSURE: 127 MMHG | BODY MASS INDEX: 32.96 KG/M2 | HEART RATE: 80 BPM | DIASTOLIC BLOOD PRESSURE: 78 MMHG

## 2018-05-24 DIAGNOSIS — F34.1 PERSISTENT DEPRESSIVE DISORDER: ICD-10-CM

## 2018-05-24 LAB
ALBUMIN SERPL-MCNC: 4.2 G/DL (ref 3.4–5)
ALP SERPL-CCNC: 89 U/L (ref 40–150)
ALT SERPL W P-5'-P-CCNC: 14 U/L (ref 0–70)
ANION GAP SERPL CALCULATED.3IONS-SCNC: 9 MMOL/L (ref 3–14)
AST SERPL W P-5'-P-CCNC: 16 U/L (ref 0–45)
BILIRUB DIRECT SERPL-MCNC: 0.3 MG/DL (ref 0–0.2)
BILIRUB SERPL-MCNC: 1.3 MG/DL (ref 0.2–1.3)
BUN SERPL-MCNC: 40 MG/DL (ref 7–30)
CALCIUM SERPL-MCNC: 8.6 MG/DL (ref 8.5–10.1)
CHLORIDE SERPL-SCNC: 109 MMOL/L (ref 94–109)
CHOLEST SERPL-MCNC: 92 MG/DL
CO2 SERPL-SCNC: 21 MMOL/L (ref 20–32)
CREAT SERPL-MCNC: 1.44 MG/DL (ref 0.66–1.25)
CREAT UR-MCNC: 166 MG/DL
GFR SERPL CREATININE-BSD FRML MDRD: 58 ML/MIN/1.7M2
GLUCOSE SERPL-MCNC: 79 MG/DL (ref 70–99)
HDLC SERPL-MCNC: 39 MG/DL
LDLC SERPL CALC-MCNC: 42 MG/DL
MAGNESIUM SERPL-MCNC: 1.6 MG/DL (ref 1.6–2.3)
NONHDLC SERPL-MCNC: 53 MG/DL
PHOSPHATE SERPL-MCNC: 3.7 MG/DL (ref 2.5–4.5)
POTASSIUM SERPL-SCNC: 4.6 MMOL/L (ref 3.4–5.3)
PROT SERPL-MCNC: 7 G/DL (ref 6.8–8.8)
PROT UR-MCNC: 0.16 G/L
PROT/CREAT 24H UR: 0.1 G/G CR (ref 0–0.2)
SODIUM SERPL-SCNC: 139 MMOL/L (ref 133–144)
TACROLIMUS BLD-MCNC: 10.5 UG/L (ref 5–15)
TME LAST DOSE: NORMAL H
TRIGL SERPL-MCNC: 54 MG/DL

## 2018-05-24 RX ORDER — SERTRALINE HYDROCHLORIDE 100 MG/1
150 TABLET, FILM COATED ORAL DAILY
Qty: 45 TABLET | Refills: 1 | Status: SHIPPED | OUTPATIENT
Start: 2018-05-24 | End: 2021-06-11

## 2018-05-24 NOTE — NURSING NOTE
Chief Complaint   Patient presents with     Recheck Medication     PDD     Would like out of today's visit:  Med check  Odette Mercado LPN

## 2018-05-24 NOTE — PROGRESS NOTES
Psychiatry Outpatient Consultation Follow Up   Joe Sosa is a 27 year old male who was referred for consultation by Piotr Hernández for evaluation of depression on 04/24/18.   History was provided by patient who was a good historian.     Psych critical item history includes SUBSTANCE USE: alcohol, substance use treatment  and s/p liver transplant.    Interim History   Joe notes that things have been going better since increasing the Zoloft, has not really had any bad days recently. Notes that the baseline is back to where it was before.   Has had one appointment with new therapist, went okay.   He has had a bit of a cough lately and some heartburn which generally wasn't too much of an issue before. The reflux is always in the morning, goes away by noon-1pm.     Recent Substance Use  Alcohol- Sober since 11/4/16  Tobacco- Chewed from [age]  Caffeine- Not usually  Opioids- None        Narcan Kit- N/A  Cannabis- None  Other Illicit Drugs- none    Current Social Hx:  FINANCIAL SUPPORT- Works as , has long term position at [employer] in [place]. Has been subbing for about 4 years. It's okay, he has teaching license in social studies. 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   LIVING SITUATION- Currently living at home with mom in [place]. Was previously living with a friend, hoping to live on his own again at some point.   SOCIAL/ SPIRITUAL SUPPORT- Yes, has friends and AA home group and sponsor. It is sometimes hard when a lot of friends are getting  and settling down, doesn't see them all as often.      Medical Review of Systems                                                                                                      2,10   A comprehensive review of systems was performed and is negative other than noted in the HPI.     Psychiatric Medication Trials   Was on Remeron and Trintellix in 01/2017 during CD  treatment for only about 2 weeks due to side effects including constipation, and developed hepatic encephalopathy at the time which led to hospitalization.   Zoloft was first started about a year ago in 4/2017 prior to liver transplant. Physical condition worsening at the time so went off of it.      Medical / Surgical History   CARE TEAM:   PCP- Jeffery Azul with [facility]   Therapist- Joann Navarro from [facility] in [place], is transitioning to new therapist Ivana next week. Has been meeting with them both for a while.     Patient Active Problem List   Diagnosis     Alpha-1-antitrypsin deficiency (H)     Alcoholic cirrhosis of liver with ascites (H)     Vitamin D deficiency     Hepatic encephalopathy (H)     Alcoholism (H)     End stage liver disease (H)     Liver transplant recipient (H)     S/P liver transplant (H)     Immunosuppressed status (H)     Major depressive disorder, recurrent episode, moderate (H)     Past Medical History:   Diagnosis Date     Alcoholic cirrhosis of liver with ascites (H) 3/9/2017     Alpha-1-antitrypsin deficiency (H) 3/9/2017     Anasarca      Chronic hyponatremia      Hepatic encephalopathy (H)      SBP (spontaneous bacterial peritonitis) (H)       Allergy   Review of patient's allergies indicates no known allergies.     Current Medications     Current Outpatient Prescriptions   Medication Sig Dispense Refill     multivitamin, therapeutic with minerals (THERA-VIT-M) TABS tablet Take 1 tablet by mouth daily 30 each 11     order for DME Equipment being ordered: Wheelchair, Manual  Treatment Diagnosis: Leg pain, severe    Patient height:  6 feet, 2 inches  Patient weight:  93.8 kg (Patient not taking: Reported on 3/13/2018) 1 Units 0     sertraline (ZOLOFT) 100 MG tablet Take 1.5 tablets (150 mg) by mouth daily 45 tablet 1     tacrolimus (GENERIC EQUIVALENT) 1 MG capsule Take 4 capsules (4 mg) by mouth every 12 hours 240 capsule 11      Vitals   /78 (BP  "Location: Right arm, Patient Position: Sitting, Cuff Size: Adult Large)  Pulse 80  Wt 116.4 kg (256 lb 11.2 oz)  BMI 32.96 kg/m2     Mental Status Exam                                                                                      9, 14 cog gs   Alertness: alert  and oriented  Appearance: well groomed  Behavior/Demeanor: cooperative, pleasant and calm, with good eye contact   Speech: regular rate and rhythm, somewhat low volume and prosody  Language: intact and no problems  Psychomotor: normal or unremarkable  Mood: \"Doing better today\"  Affect: mildly blunted and appropriate; was congruent to mood; was congruent to content  Thought Process/Associations: unremarkable  Thought Content:  Reports none recently;  Denies suicidal ideation, violent ideation and delusions  Perception:  Reports none;  Denies auditory hallucinations and visual hallucinations  Insight: intact  Judgment: intact  Cognition: does appear grossly intact; formal cognitive testing was not done  Gait and Station: unremarkable     Labs and Data     PHQ-9 SCORE 3/13/2018   Total Score 21     RICHI-7 SCORE 3/15/2018   Total Score 16 (severe anxiety)   Total Score 16     Recent Labs   Lab Test  05/23/18   1801  03/13/18   0913  02/27/18   1807   CR  1.44*  1.25  1.37*   GFRESTIMATED  58*  68  61     Recent Labs   Lab Test  05/23/18   1801  03/13/18   0913   AST  16  13   ALT  14  14   ALKPHOS  89  92     Recent Labs   Lab Test  03/27/17   0628   TSH  2.46      Psychiatric Diagnoses   Persistent depressive disorder with major depressive episodes, currently mild  Alcohol use disorder, in sustained remission (Sober since 11/4/2016)     Assessment   Joe Sosa is a 27 year old male who provides a history supporting the diagnoses listed directly above.   Pertinent Background: Joe has noted a history of depressed mood going back to childhood. Although waxing and waning at times, he can not recall a time when he felt like he was not experiencing " depressive symptoms. He has had several major depressive episodes superimposed on this, and at some point began self medicating his mood symptoms with alcohol, leading to severe alcohol use disorder and subsequent cirrhosis. He is now s/p transplant in 6/2017, but has continued to struggle with depression.   TODAY: Joe notes that his mood seems to have improved with the increase in Zoloft. He has now had his first appointment with his new therapist and feels that it went well. Has noted some coughing since increasing the medication, and wonders if this is a coincidence or due to allergies (it is the season). He has also noticed heartburn for the last few weeks which was never an issue for him before. I told him that while these issues are not common with Zoloft, there could be a connection. Advised him to utilize Tums to start and to contact his primary care provider if this issue worsens. I will hand prescribing of his Zoloft off to his primary are this time. If the issues do persist, he could decrease the dose to 125mg. If Zoloft appears to be problematic in the future, Lexapro could be a good alternative.   Alcohol relapse prevention: Given his post transplant status, history of severe alcohol use, and noted presence of cravings during his last major depressive episode, it would be indicated for Joe to take medication for relapse prevention. Naltrexone is the best option for this, dosed 50mg daily.     [place] PRESCRIPTION MONITORING PROGRAM [] was checked today: not using controlled substances    PSYCHOTROPIC DRUG INTERACTIONS:   SERTRALINE -- TACROLIMUS may result in increased risk of QT-interval prolongation.    MANAGEMENT:  Monitoring for adverse effects     Plan                                                                                                                     +,++     1) MEDICATIONS:  - Continue sertraline 150mg daily    [see the following SmartPhrase(s) for more information:  PSYMEDINFOSERTRALINE]    2) THERAPY: Currently seeing Joann Toledojose maria from [facility] in [place], is transitioning to new therapist Ivana next week. Has been meeting with them both for a while.     3) LABS: Routine lab monitoring is not indicated for current psychotropic medication regimen.     4) REFERRALS: None    5) RTC: Follow up with PCP.     6) CRISIS NUMBERS: Routinely provided in AVS    Treatment Risk Statement:  The patient understands the risks, benefits, adverse effects and alternatives. Agrees to treatment with the capacity to do so. No medical contraindications to treatment. Agrees to call clinic for any problems. The patient understands to call 911 or go to the nearest ED if life threatening or urgent symptoms occur.       PROVIDER:  Arthur Mccauley MD

## 2018-05-24 NOTE — PATIENT INSTRUCTIONS
Scheduling:  If you have any concerns about today's visit or wish to schedule another appointment please call our office during normal business hours [phone] (8-5:00 M-F)    Contact Us:  Please call [phone] during business hours (8-5:00 M-F).  If after clinic hours, or on the weekend, please call  [phone].    Financial Assistance [phone]  [employer] Billing [phone]  [place] Billing [phone]  Medical Records [phone]      MENTAL HEALTH CRISIS NUMBERS:  [facility]:   [facility] - [phone]   Crisis Residence [facility] - [phone]   Walk-In Counseling Center [facility] - [phone]   COPE 24/7 [place] Mobile Team for Adults - [[phone]]; Child - [[phone]]     Crisis Connection - [phone]     [facility] - [phone]   Walk-in counseling [facility] - [phone]   Walk-in counseling [facility] - [phone]   Crisis Residence [facility] - [phone]   Urgent Care Adult Mental Health:   --Drop-in, 24/7 crisis line, and [facility] Mobile Team [[phone]]    CRISIS TEXT LINE: Text 741-744 from anywhere, anytime, any crisis 24/7;    OR SEE www.crisistextline.org     Poison Control Center - [phone]    CHILD: [facility] needs assessment team - [phone]     [facility] Lifeline - [phone]; or [facility] Lifeline - [phone]    If you have a medical emergency please call 911 or go to the nearest ER.

## 2018-05-24 NOTE — MR AVS SNAPSHOT
After Visit Summary   5/24/2018    Joe Sosa    MRN: [id number]           Patient Information     Date Of Birth          1990        Visit Information        Provider Department      5/24/2018 5:30 PM Arthur Mccauley MD [facility]        Today's Diagnoses     Persistent depressive disorder          Care Instructions    Scheduling:  If you have any concerns about today's visit or wish to schedule another appointment please call our office during normal business hours [phone] (8-5:00 M-F)    Contact Us:  Please call [phone] during business hours (8-5:00 M-F).  If after clinic hours, or on the weekend, please call  [phone].    Financial Assistance [phone]  [facility] Billing [phone]  [place] Billing [phone]  Medical Records [phone]      MENTAL HEALTH CRISIS NUMBERS:  [facility]:   [facility] - [phone]   [facility] Residence [facility] - [phone]   Walk-In Counseling [facility] [phone]   COPE 24/7 [place] Mobile Team for Adults - [[phone]]; Child - [[phone]]     Crisis Connection - [phone]     [facility] - [phone]   Walk-in counseling [facility] - [phone]   Walk-in counseling [facility] - [phone]   [facility] Residence [facility] - [phone]   Urgent Care Adult Mental Health:   --Drop-in, 24/7 crisis line, and [facility] Mobile Team [[phone]]    CRISIS TEXT LINE: Text 741-596 from anywhere, anytime, any crisis 24/7;    OR SEE www.crisistextline.org     Poison Control Center - [phone]    CHILD: [facility] needs assessment team - [phone]     [facility] Lifeline - [phone]; or [facility] Lifeline - [phone]    If you have a medical emergency please call 911 or go to the nearest ER.             Follow-ups after your visit        Your next 10 appointments  already scheduled     Jul 31, 2018  8:00 AM CDT   Lab with  LAB   [facility] ([facility])    [address]  1st Floor  [facility] [place] [id number] [phone]            Jul 31, 2018  9:00 AM CDT   (Arrive by 8:45 AM)   Return Liver Transplant with Andreina Templeton MD   [facility] Hepatology ([facility])    [address]  [address]  [place] [id number] [phone]              Who to contact     Please call your clinic at [phone] to:    Ask questions about your health    Make or cancel appointments    Discuss your medicines    Learn about your test results    Speak to your doctor            Additional Information About Your Visit        relocality Information     relocality gives you secure access to your electronic health record. If you see a primary care provider, you can also send messages to your care team and make appointments. If you have questions, please call your primary care clinic.  If you do not have a primary care provider, please call [phone] and they will assist you.      relocality is an electronic gateway that provides easy, online access to your medical records. With relocality, you can request a clinic appointment, read your test results, renew a prescription or communicate with your care team.     To access your existing account, please contact your [facility] or call [phone] for assistance.        Care EveryWhere ID     This is your Care EveryWhere ID. This could be used by other organizations to access your [place] medical records  [id number]        Your Vitals Were     Pulse BMI (Body Mass Index)                80 32.96 kg/m2           Blood Pressure from Last 3 Encounters:   05/24/18 127/78   03/13/18 125/84   12/15/17 138/78    Weight from Last 3 Encounters:   05/24/18 116.4 kg (256 lb 11.2 oz)   03/13/18 113.8 kg (250 lb 12.8 oz)   12/15/17 106.1 kg (234 lb)               Today, you had the following     No orders found for display         Where to get your medicines      These medications were sent to [facility] - [place], [place] - [address]   [address] , [facility] [address]    Hours:  Mon-Fri 8:30am-5:00pm Toll Free [phone] Phone:  [phone]     sertraline 100 MG tablet          Primary Care Provider Office Phone # Fax #    Jeffery Azul [phone]       [facility] [address] [place] [address]        Equal Access to Services     [facility] Center[facility]ACQUI : Hadii aad ku hadasho Soomaali, waaxda luqadaha, qaybta kaalmada adeegyada, waxobdulio mendieta hayeloyn dejah eaton . So [facility] [phone].    ATENCIÓN: Si habla español, tiene a chun disposición servicios gratuitos de asistencia lingüística. [facility] [phone].    We comply with applicable federal civil rights laws and [place] laws. We do not discriminate on the basis of race, color, national origin, age, disability, sex, sexual orientation, or gender identity.            Thank you!     Thank you for choosing [facility]  for your care. Our goal is always to provide you with excellent care. Hearing back from our patients is one way we can continue to improve our services. Please take a few minutes to complete the written survey that you may receive in the mail after your visit with us. Thank you!             Your Updated Medication List - Protect others around you: Learn how to safely use, store and throw away your medicines at www.disposemymeds.org.          This list is accurate as of 5/24/18  5:56 PM.  Always use your most recent med list.                   Brand Name Dispense Instructions for use Diagnosis    multivitamin, therapeutic with minerals Tabs tablet     30 each    Take 1 tablet by mouth daily    Liver transplant recipient (H)       order for DME     1 Units    Equipment being ordered:  Wheelchair, Manual Treatment Diagnosis: Leg pain, severe  Patient height:  6 feet, 2 inches Patient weight:  93.8 kg    Bilateral lower extremity pain       sertraline 100 MG tablet    ZOLOFT    45 tablet    Take 1.5 tablets (150 mg) by mouth daily    Persistent depressive disorder       tacrolimus 1 MG capsule    GENERIC EQUIVALENT    240 capsule    Take 4 capsules (4 mg) by mouth every 12 hours    Liver transplant recipient (H), Long-term use of immunosuppressant medication

## 2018-05-25 ENCOUNTER — TELEPHONE (OUTPATIENT)
Dept: TRANSPLANT | Facility: CLINIC | Age: 28
End: 2018-05-25

## 2018-05-25 DIAGNOSIS — Z94.4 LIVER REPLACED BY TRANSPLANT (H): Primary | ICD-10-CM

## 2018-05-25 NOTE — TELEPHONE ENCOUNTER
Please call Joe about tacrolimus level, verify current dose of 4mg Q 12 hours, decrease dose to 3mg Q 12 hours.   Remind him to do labs in 3-4 weeks to verify dose change and that his creatinine is better with reduced dose.

## 2018-05-25 NOTE — TELEPHONE ENCOUNTER
Left detailed message for Joe with coordinators instructions below. I advised Joe to call us if he is not currently taking 4 mg Q 12 hours. I put in new lab orders for monthly labs.

## 2018-06-15 ENCOUNTER — TELEPHONE (OUTPATIENT)
Dept: TRANSPLANT | Facility: CLINIC | Age: 28
End: 2018-06-15

## 2018-06-15 DIAGNOSIS — Z94.4 HISTORY OF LIVER TRANSPLANT (H): Primary | ICD-10-CM

## 2018-06-15 DIAGNOSIS — K30 BURNING SENSATION OF STOMACH: ICD-10-CM

## 2018-06-15 DIAGNOSIS — Z79.60 LONG-TERM USE OF IMMUNOSUPPRESSANT MEDICATION: ICD-10-CM

## 2018-06-15 NOTE — TELEPHONE ENCOUNTER
Patient Call: Transplant Illness  If the patient reports CHEST PAIN, SEVERE SHORTNESS OF BREATH, ONE SIDED WEAKNESS, or DIFFICULTY SPEAKING: CONTACT RN FACE-TO-FACE IMMEDIATELY    Duration of illness: started this AM 06/15/18  Transplanted organ? liver  Illness: Vomiting greather than 24 hours

## 2018-06-15 NOTE — TELEPHONE ENCOUNTER
Spoke with Joe who reports having heart burn with vomiting today.   He had previously been on omeprazole post transplant and stopped it, having no previous history of heart burn, or gastric upsets.  His stomach upset went away after vomiting.  He has no diarrhea, abd pain or fever.  Reviewed with him that he was on omeprazole after transplant, this is for reflux/acid stomach and burning.    Informed him that he can restart the omeprazole, still has a prescription and will restart it.  Provided information about GERD, to avoid eating later in the evening before bed, to avoid foods and liquids that cause stomach burning.  He will call back if the omeprazole does not help symptoms or if has more vomiting.  He does have an appt with  in July.

## 2018-06-19 DIAGNOSIS — Z94.4 LIVER TRANSPLANT RECIPIENT (H): ICD-10-CM

## 2018-06-19 DIAGNOSIS — Z79.60 LONG-TERM USE OF IMMUNOSUPPRESSANT MEDICATION: ICD-10-CM

## 2018-06-19 NOTE — TELEPHONE ENCOUNTER
Pt requesting refills on tacrolimus, pt states current dose is 3mg twice daily  Thank you very kindly!  Odette Lacy CPhT  [facility]

## 2018-06-20 RX ORDER — TACROLIMUS 1 MG/1
3 CAPSULE ORAL EVERY 12 HOURS
Qty: 180 CAPSULE | Refills: 11 | Status: SHIPPED | OUTPATIENT
Start: 2018-06-20 | End: 2018-08-14

## 2018-07-09 ENCOUNTER — TELEPHONE (OUTPATIENT)
Dept: PHARMACY | Facility: CLINIC | Age: 28
End: 2018-07-09

## 2018-07-10 DIAGNOSIS — Z94.4 LIVER REPLACED BY TRANSPLANT (H): ICD-10-CM

## 2018-07-10 LAB
ERYTHROCYTE [DISTWIDTH] IN BLOOD BY AUTOMATED COUNT: 12.9 % (ref 10–15)
HCT VFR BLD AUTO: 36.7 % (ref 40–53)
HGB BLD-MCNC: 13.5 G/DL (ref 13.3–17.7)
MCH RBC QN AUTO: 32.9 PG (ref 26.5–33)
MCHC RBC AUTO-ENTMCNC: 36.8 G/DL (ref 31.5–36.5)
MCV RBC AUTO: 90 FL (ref 78–100)
PLATELET # BLD AUTO: 127 10E9/L (ref 150–450)
RBC # BLD AUTO: 4.1 10E12/L (ref 4.4–5.9)
WBC # BLD AUTO: 6.9 10E9/L (ref 4–11)

## 2018-07-10 PROCEDURE — 80048 BASIC METABOLIC PNL TOTAL CA: CPT | Performed by: INTERNAL MEDICINE

## 2018-07-10 PROCEDURE — 85027 COMPLETE CBC AUTOMATED: CPT | Performed by: INTERNAL MEDICINE

## 2018-07-10 PROCEDURE — 80197 ASSAY OF TACROLIMUS: CPT | Performed by: INTERNAL MEDICINE

## 2018-07-10 PROCEDURE — 36415 COLL VENOUS BLD VENIPUNCTURE: CPT | Performed by: INTERNAL MEDICINE

## 2018-07-10 PROCEDURE — 80076 HEPATIC FUNCTION PANEL: CPT | Performed by: INTERNAL MEDICINE

## 2018-07-11 LAB
ALBUMIN SERPL-MCNC: 4.4 G/DL (ref 3.4–5)
ALP SERPL-CCNC: 90 U/L (ref 40–150)
ALT SERPL W P-5'-P-CCNC: 19 U/L (ref 0–70)
ANION GAP SERPL CALCULATED.3IONS-SCNC: 11 MMOL/L (ref 3–14)
AST SERPL W P-5'-P-CCNC: 17 U/L (ref 0–45)
BILIRUB DIRECT SERPL-MCNC: 0.3 MG/DL (ref 0–0.2)
BILIRUB SERPL-MCNC: 1.4 MG/DL (ref 0.2–1.3)
BUN SERPL-MCNC: 44 MG/DL (ref 7–30)
CALCIUM SERPL-MCNC: 8.6 MG/DL (ref 8.5–10.1)
CHLORIDE SERPL-SCNC: 107 MMOL/L (ref 94–109)
CO2 SERPL-SCNC: 20 MMOL/L (ref 20–32)
CREAT SERPL-MCNC: 1.51 MG/DL (ref 0.66–1.25)
GFR SERPL CREATININE-BSD FRML MDRD: 55 ML/MIN/1.7M2
GLUCOSE SERPL-MCNC: 65 MG/DL (ref 70–99)
POTASSIUM SERPL-SCNC: 4.6 MMOL/L (ref 3.4–5.3)
PROT SERPL-MCNC: 7.5 G/DL (ref 6.8–8.8)
SODIUM SERPL-SCNC: 138 MMOL/L (ref 133–144)
TACROLIMUS BLD-MCNC: 6.4 UG/L (ref 5–15)
TME LAST DOSE: NORMAL H

## 2018-08-13 ENCOUNTER — OFFICE VISIT (OUTPATIENT)
Dept: GASTROENTEROLOGY | Facility: CLINIC | Age: 28
End: 2018-08-13
Attending: INTERNAL MEDICINE
Payer: COMMERCIAL

## 2018-08-13 VITALS
SYSTOLIC BLOOD PRESSURE: 130 MMHG | WEIGHT: 252.2 LBS | DIASTOLIC BLOOD PRESSURE: 80 MMHG | HEIGHT: 74 IN | HEART RATE: 82 BPM | BODY MASS INDEX: 32.37 KG/M2 | OXYGEN SATURATION: 100 %

## 2018-08-13 DIAGNOSIS — Z94.4 LIVER REPLACED BY TRANSPLANT (H): ICD-10-CM

## 2018-08-13 DIAGNOSIS — R17 ELEVATED BILIRUBIN: Primary | ICD-10-CM

## 2018-08-13 LAB
ALBUMIN SERPL-MCNC: 4.1 G/DL (ref 3.4–5)
ALP SERPL-CCNC: 93 U/L (ref 40–150)
ALT SERPL W P-5'-P-CCNC: 21 U/L (ref 0–70)
ANION GAP SERPL CALCULATED.3IONS-SCNC: 8 MMOL/L (ref 3–14)
AST SERPL W P-5'-P-CCNC: 15 U/L (ref 0–45)
BILIRUB DIRECT SERPL-MCNC: 0.4 MG/DL (ref 0–0.2)
BILIRUB SERPL-MCNC: 1.7 MG/DL (ref 0.2–1.3)
BUN SERPL-MCNC: 38 MG/DL (ref 7–30)
CALCIUM SERPL-MCNC: 8.6 MG/DL (ref 8.5–10.1)
CHLORIDE SERPL-SCNC: 104 MMOL/L (ref 94–109)
CO2 SERPL-SCNC: 23 MMOL/L (ref 20–32)
CREAT SERPL-MCNC: 1.39 MG/DL (ref 0.66–1.25)
ERYTHROCYTE [DISTWIDTH] IN BLOOD BY AUTOMATED COUNT: 12.8 % (ref 10–15)
GFR SERPL CREATININE-BSD FRML MDRD: 60 ML/MIN/1.7M2
GLUCOSE SERPL-MCNC: 94 MG/DL (ref 70–99)
HCT VFR BLD AUTO: 38.4 % (ref 40–53)
HGB BLD-MCNC: 13.4 G/DL (ref 13.3–17.7)
MCH RBC QN AUTO: 32.5 PG (ref 26.5–33)
MCHC RBC AUTO-ENTMCNC: 34.9 G/DL (ref 31.5–36.5)
MCV RBC AUTO: 93 FL (ref 78–100)
PLATELET # BLD AUTO: 105 10E9/L (ref 150–450)
POTASSIUM SERPL-SCNC: 4.6 MMOL/L (ref 3.4–5.3)
PROT SERPL-MCNC: 7.4 G/DL (ref 6.8–8.8)
RBC # BLD AUTO: 4.12 10E12/L (ref 4.4–5.9)
SODIUM SERPL-SCNC: 136 MMOL/L (ref 133–144)
TACROLIMUS BLD-MCNC: 6.1 UG/L (ref 5–15)
TME LAST DOSE: NORMAL H
WBC # BLD AUTO: 6 10E9/L (ref 4–11)

## 2018-08-13 PROCEDURE — 85027 COMPLETE CBC AUTOMATED: CPT | Performed by: INTERNAL MEDICINE

## 2018-08-13 PROCEDURE — 80197 ASSAY OF TACROLIMUS: CPT | Performed by: INTERNAL MEDICINE

## 2018-08-13 PROCEDURE — G0463 HOSPITAL OUTPT CLINIC VISIT: HCPCS | Mod: ZF

## 2018-08-13 PROCEDURE — 80076 HEPATIC FUNCTION PANEL: CPT | Performed by: INTERNAL MEDICINE

## 2018-08-13 PROCEDURE — 80048 BASIC METABOLIC PNL TOTAL CA: CPT | Performed by: INTERNAL MEDICINE

## 2018-08-13 PROCEDURE — 36415 COLL VENOUS BLD VENIPUNCTURE: CPT | Performed by: INTERNAL MEDICINE

## 2018-08-13 ASSESSMENT — PAIN SCALES - GENERAL: PAINLEVEL: NO PAIN (0)

## 2018-08-13 NOTE — PROGRESS NOTES
"A/P  28 year old male s/p DDLT June 2017 for ETOH. From LT standpoint doing well.     Labs up to date.    Elevated bili: will get US.  IS: ok to run levels closer to 5.      CKD: mild, stable. Will lower IS and hopefully with weight loss and lower tac will remain stable/improve.  Weight gain: Discussed diet and activity. He is really working hard at this. Encouraged his efforts to change diet and reduce carbs.    Lasik: ok to proceed.      Depression: sees counselor. On Zoloft to 75 mg po QHS.      Will see back in 4 months.        Andreina Templeton MD  Hepatology/ Liver Transplant  [facility]  ===================================================================  PCP: Dr. Eliud Azul, [place], [facility].      SUBJECTIVE  28 year old male s/p DDLT 6/28/17 for ETOH.     His biggest issues have been depression (better, on Zoloft) and weight gain. He wants to lose weight and has joining [employer] 60 Day challenge and wants to do the keto diet. Wants to get Lasik.    EXPLANT: ETOH cirrhosis with suggestion underlying A1AT. 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 Otherwise nl.  REJECTION: None.  BILIARY ISSUES: None  STENT: Out in AXR 8/11/17  KIDNEY FUNCTION: creat 1.2-1.4. Currently stable at 1.39  BP: Good. No meds.  PREV:derm not due. Flu shot done   DISEASE RECURRENCE: Not drinking. ETG neg. Sees counselor.  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   Some heartburn and started taking PPI. This helps.  Significant weight gain.     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      OBJECTIVE  Vitals: /84  Pulse 75  Temp 97.9  F (36.6  C) (Oral)  Ht 1.88 m (6' 2\")  Wt 113.8 kg (250 lb 12.8 oz)  SpO2 99%  BMI 32.2 kg/m2  BMI= Body mass index is 32.2 kg/(m^2).  GENERAL:  Very pleasant, well-appearing, in no acute distress.    HEENT:  No icterus, " no oral lesions.    LYMPH:  No supraclavicular or cervical lymphadenopathy.    CARDIOVASCULAR:  Regular rate and rhythm.    CHEST:  Lungs are clear.    ABDOMEN:  Bowel sounds are present.  Abdomen is soft, nontender, nondistended.  Scar is well healed.      EXTREMITIES:  No edema.    SKIN:  No rash.    NEUROLOGIC:  Speech is fluent and clear.  No asterixis or tremor.        Current Outpatient Prescriptions   Medication     multivitamin, therapeutic with minerals (THERA-VIT-M) TABS tablet     omeprazole (PRILOSEC) 20 MG CR capsule     tacrolimus (GENERIC EQUIVALENT) 1 MG capsule     sertraline (ZOLOFT) 100 MG tablet     No current facility-administered medications for this visit.

## 2018-08-13 NOTE — MR AVS SNAPSHOT
After Visit Summary   8/13/2018    Joe Sosa    MRN: [id number]           Patient Information     Date Of Birth          1990        Visit Information        Provider Department      8/13/2018 9:30 AM Andreina Templeton MD [facility] Hepatology        Today's Diagnoses     Elevated bilirubin    -  1       Follow-ups after your visit        Your next 10 appointments already scheduled     Sep 15, 2018  9:00 AM CDT   US ABDOMEN LIMITED with UCUS3   [facility] Imaging [place] US ([facility])    [address] [place] [address]  [address] [facility] [address] [phone]           Please bring a list of your medicines (including vitamins, minerals and over-the-counter drugs). Also, tell your doctor about any allergies you may have. Wear comfortable clothes and leave your valuables at home.  Adults: No eating or drinking for 8 hours before the exam. You may take medicine with a small sip of water.  Children: - Infants, breast-fed: may have breast milk up to 2 hours before exam. - Infants, formula: may have bottle until 4 hours before exam. - Children 1-5 years: No food or drink for 4 hours before exam. - Children 6 -12 years: No food or drink for 6 hours before exam. - Children over 12 years: No food or drink for 8 hours before exam. - J Tube Fed: No need to stop feedings.  Please call the Imaging Department at your exam site with any questions.            Dec 21, 2018  8:30 AM CST   Lab with  LAB   [facility] Lab ([facility])    [address] [place] [address] [phone]            Dec 21, 2018  9:30 AM CST   (Arrive by 9:15 AM)   Return Liver Transplant with Andreina Templeton MD   [facility] Hepatology ([facility])    [address] [place] [address]  [address]  [facility] [address] [phone]              Future tests that were ordered for you today     Open Future Orders        Priority  "Expected Expires Ordered    US Abdomen Limited* Routine  8/13/2019 8/13/2018            Who to contact     If you have questions or need follow up information about today's clinic visit or your schedule please contact [facility] HEPATOLOGY directly at [phone].  Normal or non-critical lab and imaging results will be communicated to you by MyChart, letter or phone within 4 business days after the clinic has received the results. If you do not hear from us within 7 days, please contact the clinic through KIDOZhart or phone. If you have a critical or abnormal lab result, we will notify you by phone as soon as possible.  Submit refill requests through Novelo or call your pharmacy and they will forward the refill request to us. Please allow 3 business days for your refill to be completed.          Additional Information About Your Visit        KIDOZhart Information     Novelo gives you secure access to your electronic health record. If you see a primary care provider, you can also send messages to your care team and make appointments. If you have questions, please call your primary care clinic.  If you do not have a primary care provider, please call [phone] and they will assist you.        Care EveryWhere ID     This is your Care EveryWhere ID. This could be used by other organizations to access your [place] medical records  [id number]        Your Vitals Were     Pulse Height Pulse Oximetry BMI (Body Mass Index)          82 1.88 m (6' 2\") 100% 32.38 kg/m2         Blood Pressure from Last 3 Encounters:   08/13/18 130/80   05/24/18 127/78   03/13/18 125/84    Weight from Last 3 Encounters:   08/13/18 114.4 kg (252 lb 3.2 oz)   05/24/18 116.4 kg (256 lb 11.2 oz)   03/13/18 113.8 kg (250 lb 12.8 oz)               Primary Care Provider Office Phone # Fax #    Jeffery Azul [phone]       [facility] [address] [place] [address]        Equal Access to Services  "    JENAE VAUGHN : Hadii aad ku ric Liu, waaxda luqadaha, qaybta kaalmada jose, vahe gayatri hayelijah andrewirenealexa eaton . So [facility] [phone].    ATENCIÓN: Si habla español, tiene a chun disposición servicios gratuitos de asistencia lingüística. Llame al [phone].    We comply with applicable federal civil rights laws and [place] laws. We do not discriminate on the basis of race, color, national origin, age, disability, sex, sexual orientation, or gender identity.            Thank you!     Thank you for choosing [facility] HEPATOLOGY  for your care. Our goal is always to provide you with excellent care. Hearing back from our patients is one way we can continue to improve our services. Please take a few minutes to complete the written survey that you may receive in the mail after your visit with us. Thank you!             Your Updated Medication List - Protect others around you: Learn how to safely use, store and throw away your medicines at www.disposemymeds.org.          This list is accurate as of 8/13/18 10:15 AM.  Always use your most recent med list.                   Brand Name Dispense Instructions for use Diagnosis    multivitamin, therapeutic with minerals Tabs tablet     30 each    Take 1 tablet by mouth daily    Liver transplant recipient (H)       omeprazole 20 MG CR capsule    priLOSEC    90 capsule    Take 1 capsule (20 mg) by mouth daily    History of liver transplant (H), Burning sensation of stomach, Long-term use of immunosuppressant medication       sertraline 100 MG tablet    ZOLOFT    45 tablet    Take 1.5 tablets (150 mg) by mouth daily    Persistent depressive disorder       tacrolimus 1 MG capsule    GENERIC EQUIVALENT    180 capsule    Take 3 capsules (3 mg) by mouth every 12 hours    Liver transplant recipient (H), Long-term use of immunosuppressant medication

## 2018-08-13 NOTE — LETTER
"8/13/2018      RE: Joe Sosa  [address]  [place] [address]       A/P  28 year old male s/p DDLT June 2017 for ETOH. From LT standpoint doing well.     Labs up to date.    Elevated bili: will get US.  IS: ok to run levels closer to 5.      CKD: mild, stable. Will lower IS and hopefully with weight loss and lower tac will remain stable/improve.  Weight gain: Discussed diet and activity. He is really working hard at this. Encouraged his efforts to change diet and reduce carbs.    Lasik: ok to proceed.      Depression: sees counselor. On Zoloft to 75 mg po QHS.      Will see back in 4 months.        Andreina Templeton MD  Hepatology/ Liver Transplant  [facility]  ===================================================================  PCP: Dr. Eliud Azul, [place], [facility].      SUBJECTIVE  28 year old male s/p DDLT 6/28/17 for ETOH.     His biggest issues have been depression (better, on Zoloft) and weight gain. He wants to lose weight and has joining [employer] 60 Day challenge and wants to do the keto diet. Wants to get Lasik.    EXPLANT: ETOH cirrhosis with suggestion underlying A1AT. 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 Otherwise nl.  REJECTION: None.  BILIARY ISSUES: None  STENT: Out in AXR 8/11/17  KIDNEY FUNCTION: creat 1.2-1.4. Currently stable at 1.39  BP: Good. No meds.  PREV:derm not due. Flu shot done   DISEASE RECURRENCE: Not drinking. ETG neg. Sees counselor.  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   Some heartburn and started taking PPI. This helps.  Significant weight gain.     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      OBJECTIVE  Vitals: /84  Pulse 75  Temp 97.9  F (36.6  C) (Oral)  Ht 1.88 m (6' 2\")  Wt 113.8 kg (250 lb 12.8 oz)  SpO2 99%  BMI 32.2 kg/m2  BMI= Body mass index is 32.2 " kg/(m^2).  GENERAL:  Very pleasant, well-appearing, in no acute distress.    HEENT:  No icterus, no oral lesions.    LYMPH:  No supraclavicular or cervical lymphadenopathy.    CARDIOVASCULAR:  Regular rate and rhythm.    CHEST:  Lungs are clear.    ABDOMEN:  Bowel sounds are present.  Abdomen is soft, nontender, nondistended.  Scar is well healed.      EXTREMITIES:  No edema.    SKIN:  No rash.    NEUROLOGIC:  Speech is fluent and clear.  No asterixis or tremor.        Current Outpatient Prescriptions   Medication     multivitamin, therapeutic with minerals (THERA-VIT-M) TABS tablet     omeprazole (PRILOSEC) 20 MG CR capsule     tacrolimus (GENERIC EQUIVALENT) 1 MG capsule     sertraline (ZOLOFT) 100 MG tablet     No current facility-administered medications for this visit.        Andreina Templeton MD

## 2018-08-13 NOTE — NURSING NOTE
"Chief Complaint   Patient presents with     RECHECK     3 MO FOLLOW UP     /80  Pulse 82  Ht 1.88 m (6' 2\")  Wt 114.4 kg (252 lb 3.2 oz)  SpO2 100%  BMI 32.38 kg/m2  ERICA SIERRA CMA    "

## 2018-08-14 DIAGNOSIS — Z79.60 LONG-TERM USE OF IMMUNOSUPPRESSANT MEDICATION: ICD-10-CM

## 2018-08-14 DIAGNOSIS — Z94.4 LIVER TRANSPLANT RECIPIENT (H): ICD-10-CM

## 2018-08-14 NOTE — TELEPHONE ENCOUNTER
Talked to Joe and he will change to 3 mg in the AM, and 2 mg in the PM, and will repeat labs in 1 to 2 weeks.

## 2018-08-14 NOTE — TELEPHONE ENCOUNTER
Please call Joe to decrease tacrolimus dose to 2mg in the pm, continue 3mg in the am, taking every 12 hours.   Please ask Joe to recheck his tacrolimus level in 1-2 weeks, and all his labs in a month.

## 2018-08-15 RX ORDER — TACROLIMUS 1 MG/1
CAPSULE ORAL
Qty: 150 CAPSULE | Refills: 11 | Status: SHIPPED | OUTPATIENT
Start: 2018-08-15 | End: 2018-09-13

## 2018-08-29 DIAGNOSIS — F34.1 PERSISTENT DEPRESSIVE DISORDER: ICD-10-CM

## 2018-08-29 RX ORDER — SERTRALINE HYDROCHLORIDE 100 MG/1
150 TABLET, FILM COATED ORAL DAILY
Qty: 45 TABLET | Refills: 1 | OUTPATIENT
Start: 2018-08-29

## 2018-09-11 DIAGNOSIS — Z94.4 LIVER REPLACED BY TRANSPLANT (H): ICD-10-CM

## 2018-09-11 LAB
ERYTHROCYTE [DISTWIDTH] IN BLOOD BY AUTOMATED COUNT: 13.4 % (ref 10–15)
HCT VFR BLD AUTO: 34.9 % (ref 40–53)
HGB BLD-MCNC: 12.4 G/DL (ref 13.3–17.7)
MCH RBC QN AUTO: 33.2 PG (ref 26.5–33)
MCHC RBC AUTO-ENTMCNC: 35.5 G/DL (ref 31.5–36.5)
MCV RBC AUTO: 94 FL (ref 78–100)
PLATELET # BLD AUTO: 118 10E9/L (ref 150–450)
RBC # BLD AUTO: 3.73 10E12/L (ref 4.4–5.9)
WBC # BLD AUTO: 5.4 10E9/L (ref 4–11)

## 2018-09-11 PROCEDURE — 36415 COLL VENOUS BLD VENIPUNCTURE: CPT | Performed by: INTERNAL MEDICINE

## 2018-09-11 PROCEDURE — 85027 COMPLETE CBC AUTOMATED: CPT | Performed by: INTERNAL MEDICINE

## 2018-09-11 PROCEDURE — 80076 HEPATIC FUNCTION PANEL: CPT | Performed by: INTERNAL MEDICINE

## 2018-09-11 PROCEDURE — 80197 ASSAY OF TACROLIMUS: CPT | Performed by: INTERNAL MEDICINE

## 2018-09-11 PROCEDURE — 80048 BASIC METABOLIC PNL TOTAL CA: CPT | Performed by: INTERNAL MEDICINE

## 2018-09-12 LAB
ALBUMIN SERPL-MCNC: 4 G/DL (ref 3.4–5)
ALP SERPL-CCNC: 77 U/L (ref 40–150)
ALT SERPL W P-5'-P-CCNC: 22 U/L (ref 0–70)
ANION GAP SERPL CALCULATED.3IONS-SCNC: 7 MMOL/L (ref 3–14)
AST SERPL W P-5'-P-CCNC: 17 U/L (ref 0–45)
BILIRUB DIRECT SERPL-MCNC: 0.3 MG/DL (ref 0–0.2)
BILIRUB SERPL-MCNC: 1 MG/DL (ref 0.2–1.3)
BUN SERPL-MCNC: 33 MG/DL (ref 7–30)
CALCIUM SERPL-MCNC: 8.8 MG/DL (ref 8.5–10.1)
CHLORIDE SERPL-SCNC: 109 MMOL/L (ref 94–109)
CO2 SERPL-SCNC: 25 MMOL/L (ref 20–32)
CREAT SERPL-MCNC: 1.64 MG/DL (ref 0.66–1.25)
GFR SERPL CREATININE-BSD FRML MDRD: 50 ML/MIN/1.7M2
GLUCOSE SERPL-MCNC: 83 MG/DL (ref 70–99)
POTASSIUM SERPL-SCNC: 4.3 MMOL/L (ref 3.4–5.3)
PROT SERPL-MCNC: 7.1 G/DL (ref 6.8–8.8)
SODIUM SERPL-SCNC: 141 MMOL/L (ref 133–144)

## 2018-09-13 DIAGNOSIS — Z79.60 LONG-TERM USE OF IMMUNOSUPPRESSANT MEDICATION: ICD-10-CM

## 2018-09-13 DIAGNOSIS — Z94.4 LIVER TRANSPLANT RECIPIENT (H): ICD-10-CM

## 2018-09-13 LAB
TACROLIMUS BLD-MCNC: 3.3 UG/L (ref 5–15)
TME LAST DOSE: ABNORMAL H

## 2018-09-13 RX ORDER — TACROLIMUS 1 MG/1
3 CAPSULE ORAL EVERY 12 HOURS
Qty: 180 CAPSULE | Refills: 11 | Status: SHIPPED | OUTPATIENT
Start: 2018-09-13 | End: 2019-08-15

## 2018-09-13 NOTE — TELEPHONE ENCOUNTER
Talked to Joe and it was a good level and he will increase to 3 mg BID now and repeat Tac in a month

## 2018-09-13 NOTE — TELEPHONE ENCOUNTER
Please call Joe about tacrolimus level 3.3, too low.  Verify if a 12 hour level, and/or if has missed any doses.     Increase dose to 3mg Q 12 hours.  Ask Joe to repeat labs in 3-4 weeks to verify dose change.

## 2018-09-15 ENCOUNTER — RADIANT APPOINTMENT (OUTPATIENT)
Dept: ULTRASOUND IMAGING | Facility: CLINIC | Age: 28
End: 2018-09-15
Attending: INTERNAL MEDICINE
Payer: MEDICAID

## 2018-09-15 DIAGNOSIS — Z94.4 S/P LIVER TRANSPLANT (H): ICD-10-CM

## 2018-09-15 DIAGNOSIS — R17 ELEVATED BILIRUBIN: ICD-10-CM

## 2018-10-10 DIAGNOSIS — Z94.4 LIVER REPLACED BY TRANSPLANT (H): ICD-10-CM

## 2018-10-10 LAB
ERYTHROCYTE [DISTWIDTH] IN BLOOD BY AUTOMATED COUNT: 12.6 % (ref 10–15)
HCT VFR BLD AUTO: 36.3 % (ref 40–53)
HGB BLD-MCNC: 12.9 G/DL (ref 13.3–17.7)
MCH RBC QN AUTO: 33 PG (ref 26.5–33)
MCHC RBC AUTO-ENTMCNC: 35.5 G/DL (ref 31.5–36.5)
MCV RBC AUTO: 93 FL (ref 78–100)
PLATELET # BLD AUTO: 112 10E9/L (ref 150–450)
RBC # BLD AUTO: 3.91 10E12/L (ref 4.4–5.9)
WBC # BLD AUTO: 6 10E9/L (ref 4–11)

## 2018-10-10 PROCEDURE — 36415 COLL VENOUS BLD VENIPUNCTURE: CPT | Performed by: INTERNAL MEDICINE

## 2018-10-10 PROCEDURE — 80197 ASSAY OF TACROLIMUS: CPT | Performed by: INTERNAL MEDICINE

## 2018-10-10 PROCEDURE — 85027 COMPLETE CBC AUTOMATED: CPT | Performed by: INTERNAL MEDICINE

## 2018-10-10 PROCEDURE — 80048 BASIC METABOLIC PNL TOTAL CA: CPT | Performed by: INTERNAL MEDICINE

## 2018-10-10 PROCEDURE — 80076 HEPATIC FUNCTION PANEL: CPT | Performed by: INTERNAL MEDICINE

## 2018-10-11 LAB
ALBUMIN SERPL-MCNC: 4.1 G/DL (ref 3.4–5)
ALP SERPL-CCNC: 73 U/L (ref 40–150)
ALT SERPL W P-5'-P-CCNC: 20 U/L (ref 0–70)
ANION GAP SERPL CALCULATED.3IONS-SCNC: 4 MMOL/L (ref 3–14)
AST SERPL W P-5'-P-CCNC: 17 U/L (ref 0–45)
BILIRUB DIRECT SERPL-MCNC: 0.3 MG/DL (ref 0–0.2)
BILIRUB SERPL-MCNC: 1.1 MG/DL (ref 0.2–1.3)
BUN SERPL-MCNC: 39 MG/DL (ref 7–30)
CALCIUM SERPL-MCNC: 8.7 MG/DL (ref 8.5–10.1)
CHLORIDE SERPL-SCNC: 108 MMOL/L (ref 94–109)
CO2 SERPL-SCNC: 28 MMOL/L (ref 20–32)
CREAT SERPL-MCNC: 1.43 MG/DL (ref 0.66–1.25)
GFR SERPL CREATININE-BSD FRML MDRD: 58 ML/MIN/1.7M2
GLUCOSE SERPL-MCNC: 87 MG/DL (ref 70–99)
POTASSIUM SERPL-SCNC: 4.4 MMOL/L (ref 3.4–5.3)
PROT SERPL-MCNC: 7.4 G/DL (ref 6.8–8.8)
SODIUM SERPL-SCNC: 140 MMOL/L (ref 133–144)
TACROLIMUS BLD-MCNC: 6.8 UG/L (ref 5–15)
TME LAST DOSE: NORMAL H

## 2018-12-31 DIAGNOSIS — Z94.4 LIVER REPLACED BY TRANSPLANT (H): ICD-10-CM

## 2018-12-31 LAB
ALBUMIN SERPL-MCNC: 3.8 G/DL (ref 3.4–5)
ALP SERPL-CCNC: 88 U/L (ref 40–150)
ALT SERPL W P-5'-P-CCNC: 19 U/L (ref 0–70)
ANION GAP SERPL CALCULATED.3IONS-SCNC: 5 MMOL/L (ref 3–14)
AST SERPL W P-5'-P-CCNC: 16 U/L (ref 0–45)
BILIRUB DIRECT SERPL-MCNC: 0.2 MG/DL (ref 0–0.2)
BILIRUB SERPL-MCNC: 0.8 MG/DL (ref 0.2–1.3)
BUN SERPL-MCNC: 26 MG/DL (ref 7–30)
CALCIUM SERPL-MCNC: 8.8 MG/DL (ref 8.5–10.1)
CHLORIDE SERPL-SCNC: 107 MMOL/L (ref 94–109)
CO2 SERPL-SCNC: 29 MMOL/L (ref 20–32)
CREAT SERPL-MCNC: 1.53 MG/DL (ref 0.66–1.25)
ERYTHROCYTE [DISTWIDTH] IN BLOOD BY AUTOMATED COUNT: 13.2 % (ref 10–15)
GFR SERPL CREATININE-BSD FRML MDRD: 60 ML/MIN/{1.73_M2}
GLUCOSE SERPL-MCNC: 118 MG/DL (ref 70–99)
HCT VFR BLD AUTO: 39.5 % (ref 40–53)
HGB BLD-MCNC: 13.8 G/DL (ref 13.3–17.7)
MCH RBC QN AUTO: 32.9 PG (ref 26.5–33)
MCHC RBC AUTO-ENTMCNC: 34.9 G/DL (ref 31.5–36.5)
MCV RBC AUTO: 94 FL (ref 78–100)
PLATELET # BLD AUTO: 139 10E9/L (ref 150–450)
POTASSIUM SERPL-SCNC: 4.3 MMOL/L (ref 3.4–5.3)
PROT SERPL-MCNC: 7.1 G/DL (ref 6.8–8.8)
RBC # BLD AUTO: 4.19 10E12/L (ref 4.4–5.9)
SODIUM SERPL-SCNC: 141 MMOL/L (ref 133–144)
WBC # BLD AUTO: 7 10E9/L (ref 4–11)

## 2018-12-31 PROCEDURE — 80048 BASIC METABOLIC PNL TOTAL CA: CPT | Performed by: INTERNAL MEDICINE

## 2018-12-31 PROCEDURE — 80197 ASSAY OF TACROLIMUS: CPT | Performed by: INTERNAL MEDICINE

## 2018-12-31 PROCEDURE — 80076 HEPATIC FUNCTION PANEL: CPT | Performed by: INTERNAL MEDICINE

## 2018-12-31 PROCEDURE — 85027 COMPLETE CBC AUTOMATED: CPT | Performed by: INTERNAL MEDICINE

## 2018-12-31 PROCEDURE — 36415 COLL VENOUS BLD VENIPUNCTURE: CPT | Performed by: INTERNAL MEDICINE

## 2019-03-24 NOTE — PROGRESS NOTES
"A/P  28 year old male s/p DDLT June 2017 for ETOH. From LT standpoint doing well.     Labs up to date and liver tests normal.    IS ok to run levels closer to 5.      CKD mild, stable. Will lower IS and hopefully with weight loss and lower tac will remain stable/improve.  Weight gain    Skin cancer screening Rec derm      Depression sees counselor. On Zoloft to 75 mg po QHS.      Will see back in 12 months.        Andreina Templeton MD  Hepatology/ Liver Transplant  [facility]  ===================================================================  PCP: Dr. Eliud Azul, [place], [facility].      SUBJECTIVE  28 year old male s/p DDLT 6/28/17 for ETOH.      His biggest issues have been depression (better, on Zoloft) and weight gain. He wants to lose weight and has joining [employer] 60 Day challenge and wants to do the keto diet. Wants to get Lasik.     EXPLANT: ETOH cirrhosis with suggestion underlying A1AT. 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 Otherwise nl.    Liver Function Studies -   Recent Labs   Lab Test 03/25/19  1529   PROTTOTAL 7.5   ALBUMIN 4.1   BILITOTAL 1.0   ALKPHOS 78   AST 14   ALT 19         REJECTION: None.  BILIARY ISSUES: None  STENT: Out in AXR 8/11/17  KIDNEY FUNCTION: creat 1.2-1.5    BP: Good. No meds.  PREV:derm not due. Flu shot done   DISEASE RECURRENCE: Not drinking. ETG neg. Sees counselor.  OTHER ISSUES:   CMV neg recip and CMV pos donor.   Some heartburn and started taking PPI. This helps.  Significant weight gain.     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      OBJECTIVE  /85   Pulse 73   Temp 98.2  F (36.8  C) (Oral)   Ht 1.88 m (6' 2\")   Wt 124.5 kg (274 lb 6.4 oz)   SpO2 97%   BMI 35.23 kg/m      GENERAL:  Very pleasant, well-appearing, in no acute distress.    HEENT:  No icterus, no oral lesions.    LYMPH:  No " supraclavicular or cervical lymphadenopathy.    CARDIOVASCULAR:  Regular rate and rhythm.    CHEST:  Lungs are clear.    ABDOMEN:  Bowel sounds are present.  Abdomen is soft, nontender, nondistended.  Scar is well healed.      EXTREMITIES:  No edema.    SKIN:  No rash.    NEUROLOGIC:  Speech is fluent and clear.  No asterixis or tremor.

## 2019-03-25 ENCOUNTER — OFFICE VISIT (OUTPATIENT)
Dept: GASTROENTEROLOGY | Facility: CLINIC | Age: 29
End: 2019-03-25
Attending: INTERNAL MEDICINE
Payer: COMMERCIAL

## 2019-03-25 VITALS
SYSTOLIC BLOOD PRESSURE: 132 MMHG | HEART RATE: 73 BPM | DIASTOLIC BLOOD PRESSURE: 85 MMHG | WEIGHT: 274.4 LBS | TEMPERATURE: 98.2 F | HEIGHT: 74 IN | OXYGEN SATURATION: 97 % | BODY MASS INDEX: 35.22 KG/M2

## 2019-03-25 DIAGNOSIS — Z94.4 S/P LIVER TRANSPLANT (H): Primary | ICD-10-CM

## 2019-03-25 DIAGNOSIS — Z94.4 LIVER REPLACED BY TRANSPLANT (H): ICD-10-CM

## 2019-03-25 LAB
ALBUMIN SERPL-MCNC: 4.1 G/DL (ref 3.4–5)
ALP SERPL-CCNC: 78 U/L (ref 40–150)
ALT SERPL W P-5'-P-CCNC: 19 U/L (ref 0–70)
ANION GAP SERPL CALCULATED.3IONS-SCNC: 4 MMOL/L (ref 3–14)
AST SERPL W P-5'-P-CCNC: 14 U/L (ref 0–45)
BILIRUB DIRECT SERPL-MCNC: 0.2 MG/DL (ref 0–0.2)
BILIRUB SERPL-MCNC: 1 MG/DL (ref 0.2–1.3)
BUN SERPL-MCNC: 23 MG/DL (ref 7–30)
CALCIUM SERPL-MCNC: 9 MG/DL (ref 8.5–10.1)
CHLORIDE SERPL-SCNC: 107 MMOL/L (ref 94–109)
CO2 SERPL-SCNC: 28 MMOL/L (ref 20–32)
CREAT SERPL-MCNC: 1.52 MG/DL (ref 0.66–1.25)
ERYTHROCYTE [DISTWIDTH] IN BLOOD BY AUTOMATED COUNT: 12.1 % (ref 10–15)
GFR SERPL CREATININE-BSD FRML MDRD: 60 ML/MIN/{1.73_M2}
GLUCOSE SERPL-MCNC: 110 MG/DL (ref 70–99)
HCT VFR BLD AUTO: 41.6 % (ref 40–53)
HGB BLD-MCNC: 14 G/DL (ref 13.3–17.7)
MCH RBC QN AUTO: 32 PG (ref 26.5–33)
MCHC RBC AUTO-ENTMCNC: 33.7 G/DL (ref 31.5–36.5)
MCV RBC AUTO: 95 FL (ref 78–100)
PLATELET # BLD AUTO: 125 10E9/L (ref 150–450)
POTASSIUM SERPL-SCNC: 4.8 MMOL/L (ref 3.4–5.3)
PROT SERPL-MCNC: 7.5 G/DL (ref 6.8–8.8)
RBC # BLD AUTO: 4.37 10E12/L (ref 4.4–5.9)
SODIUM SERPL-SCNC: 139 MMOL/L (ref 133–144)
WBC # BLD AUTO: 6.4 10E9/L (ref 4–11)

## 2019-03-25 PROCEDURE — 80076 HEPATIC FUNCTION PANEL: CPT | Performed by: INTERNAL MEDICINE

## 2019-03-25 PROCEDURE — 80048 BASIC METABOLIC PNL TOTAL CA: CPT | Performed by: INTERNAL MEDICINE

## 2019-03-25 PROCEDURE — 80197 ASSAY OF TACROLIMUS: CPT | Performed by: INTERNAL MEDICINE

## 2019-03-25 PROCEDURE — G0463 HOSPITAL OUTPT CLINIC VISIT: HCPCS | Mod: ZF

## 2019-03-25 PROCEDURE — 85027 COMPLETE CBC AUTOMATED: CPT | Performed by: INTERNAL MEDICINE

## 2019-03-25 PROCEDURE — 36415 COLL VENOUS BLD VENIPUNCTURE: CPT | Performed by: INTERNAL MEDICINE

## 2019-03-25 RX ORDER — BUPROPION HYDROCHLORIDE 300 MG/1
300 TABLET ORAL
COMMUNITY
Start: 2018-11-13 | End: 2024-06-26

## 2019-03-25 ASSESSMENT — PAIN SCALES - GENERAL: PAINLEVEL: NO PAIN (0)

## 2019-03-25 ASSESSMENT — MIFFLIN-ST. JEOR: SCORE: 2274.42

## 2019-03-25 NOTE — LETTER
3/25/2019       RE: Joe Sosa  [address]  [place] [address]     Dear Colleague,    Thank you for referring your patient, Joe Sosa, to the [facility] HEPATOLOGY at [facility]. Please see a copy of my visit note below.    A/P  28 year old male s/p DDLT June 2017 for ETOH. From LT standpoint doing well.     Labs up to date and liver tests normal.    IS ok to run levels closer to 5.      CKD mild, stable. Will lower IS and hopefully with weight loss and lower tac will remain stable/improve.  Weight gain    Skin cancer screening Rec derm      Depression sees counselor. On Zoloft to 75 mg po QHS.      Will see back in  12 months.        Andreina Templeton MD  Hepatology/ Liver Transplant  [facility]  ===================================================================  PCP: Dr. Eliud Azul, [place], [facility].      SUBJECTIVE  28 year old male s/p DDLT 6/28/17 for ETOH.      His biggest issues have been depression (better, on Zoloft) and weight gain. He wants to lose weight and has joining [employer] 60 Day challenge and wants to do the keto diet. Wants to get Lasik.     EXPLANT: ETOH cirrhosis with suggestion underlying A1AT. 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 Otherwise nl.    Liver Function Studies -   Recent Labs   Lab Test 03/25/19  1529   PROTTOTAL 7.5   ALBUMIN 4.1   BILITOTAL 1.0   ALKPHOS 78   AST 14   ALT 19         REJECTION: None.  BILIARY ISSUES: None  STENT: Out in AXR 8/11/17  KIDNEY FUNCTION: creat 1.2-1.5    BP: Good. No meds.  PREV:derm not due. Flu shot done   DISEASE RECURRENCE: Not drinking. ETG neg. Sees counselor.  OTHER ISSUES:   CMV neg recip and CMV pos donor.   Some heartburn and started taking PPI. This helps.  Significant weight gain.     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      OBJECTIVE  /85   Pulse 73   Temp 98.2  F (36.8  C) (Oral)   Ht 1.88 m (6' 2\")   Wt 124.5 kg (274 lb 6.4 oz)   SpO2 97%   BMI 35.23 kg/m       GENERAL:  Very pleasant, well-appearing, in no acute distress.    HEENT:  No icterus, no oral lesions.    LYMPH:  No supraclavicular or cervical lymphadenopathy.    CARDIOVASCULAR:  Regular rate and rhythm.    CHEST:  Lungs are clear.    ABDOMEN:  Bowel sounds are present.  Abdomen is soft, nontender, nondistended.  Scar is well healed.      EXTREMITIES:  No edema.    SKIN:  No rash.    NEUROLOGIC:  Speech is fluent and clear.  No asterixis or tremor.              Again, thank you for allowing me to participate in the care of your patient.      Sincerely,    Andreina Templeton MD      "

## 2019-03-25 NOTE — LETTER
"3/25/2019      RE: Joe Sosa  [address]  [place] [address]       A/P  28 year old male s/p DDLT June 2017 for ETOH. From LT standpoint doing well.     Labs up to date and liver tests normal.    IS ok to run levels closer to 5.      CKD mild, stable. Will lower IS and hopefully with weight loss and lower tac will remain stable/improve.  Weight gain    Skin cancer screening Rec derm      Depression sees counselor. On Zoloft to 75 mg po QHS.      Will see back in  12 months.        Andreina Templeton MD  Hepatology/ Liver Transplant  [facility]  ===================================================================  PCP: Dr. Eliud Azul, [place], [facility].      SUBJECTIVE  28 year old male s/p DDLT 6/28/17 for ETOH.      His biggest issues have been depression (better, on Zoloft) and weight gain. He wants to lose weight and has joining [employer] 60 Day challenge and wants to do the keto diet. Wants to get Lasik.     EXPLANT: ETOH cirrhosis with suggestion underlying A1AT. 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 Otherwise nl.    Liver Function Studies -   Recent Labs   Lab Test 03/25/19  1529   PROTTOTAL 7.5   ALBUMIN 4.1   BILITOTAL 1.0   ALKPHOS 78   AST 14   ALT 19         REJECTION: None.  BILIARY ISSUES: None  STENT: Out in AXR 8/11/17  KIDNEY FUNCTION: creat 1.2-1.5    BP: Good. No meds.  PREV:derm not due. Flu shot done   DISEASE RECURRENCE: Not drinking. ETG neg. Sees counselor.  OTHER ISSUES:   CMV neg recip and CMV pos donor.   Some heartburn and started taking PPI. This helps.  Significant weight gain.     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      OBJECTIVE  /85   Pulse 73   Temp 98.2  F (36.8  C) (Oral)   Ht 1.88 m (6' 2\")   Wt 124.5 kg (274 lb 6.4 oz)   SpO2 97%   BMI 35.23 kg/m       GENERAL:  Very pleasant, " well-appearing, in no acute distress.    HEENT:  No icterus, no oral lesions.    LYMPH:  No supraclavicular or cervical lymphadenopathy.    CARDIOVASCULAR:  Regular rate and rhythm.    CHEST:  Lungs are clear.    ABDOMEN:  Bowel sounds are present.  Abdomen is soft, nontender, nondistended.  Scar is well healed.      EXTREMITIES:  No edema.    SKIN:  No rash.    NEUROLOGIC:  Speech is fluent and clear.  No asterixis or tremor.              Andreina Templeton MD

## 2019-03-26 LAB
TACROLIMUS BLD-MCNC: 5.5 UG/L (ref 5–15)
TME LAST DOSE: NORMAL H

## 2019-06-20 DIAGNOSIS — Z13.220 LIPID SCREENING: ICD-10-CM

## 2019-06-20 DIAGNOSIS — Z94.4 LIVER REPLACED BY TRANSPLANT (H): ICD-10-CM

## 2019-06-25 NOTE — TELEPHONE ENCOUNTER
DATE:  3/27/2017   TIME OF RECEIPT FROM LAB:  8:37  LAB TEST:  Platelet   LAB VALUE:  48  RESULTS GIVEN WITH READ-BACK TO (PROVIDER):  NABIL TREJO  TIME LAB VALUE REPORTED TO PROVIDER:   8:48     Pre-op Medical preoperative evaluation

## 2019-06-26 ENCOUNTER — TELEPHONE (OUTPATIENT)
Dept: PHARMACY | Facility: CLINIC | Age: 29
End: 2019-06-26

## 2019-06-28 DIAGNOSIS — Z13.220 LIPID SCREENING: ICD-10-CM

## 2019-06-28 DIAGNOSIS — Z94.4 LIVER REPLACED BY TRANSPLANT (H): ICD-10-CM

## 2019-06-28 LAB
ALBUMIN SERPL-MCNC: 4.1 G/DL (ref 3.4–5)
ALBUMIN UR-MCNC: NEGATIVE MG/DL
ALP SERPL-CCNC: 67 U/L (ref 40–150)
ALT SERPL W P-5'-P-CCNC: 23 U/L (ref 0–70)
ANION GAP SERPL CALCULATED.3IONS-SCNC: 3 MMOL/L (ref 3–14)
APPEARANCE UR: CLEAR
AST SERPL W P-5'-P-CCNC: 18 U/L (ref 0–45)
BILIRUB DIRECT SERPL-MCNC: 0.2 MG/DL (ref 0–0.2)
BILIRUB SERPL-MCNC: 0.6 MG/DL (ref 0.2–1.3)
BILIRUB UR QL STRIP: NEGATIVE
BUN SERPL-MCNC: 32 MG/DL (ref 7–30)
CALCIUM SERPL-MCNC: 8.7 MG/DL (ref 8.5–10.1)
CHLORIDE SERPL-SCNC: 109 MMOL/L (ref 94–109)
CHOLEST SERPL-MCNC: 119 MG/DL
CO2 SERPL-SCNC: 28 MMOL/L (ref 20–32)
COLOR UR AUTO: YELLOW
CREAT SERPL-MCNC: 1.38 MG/DL (ref 0.66–1.25)
CREAT UR-MCNC: 146 MG/DL
ERYTHROCYTE [DISTWIDTH] IN BLOOD BY AUTOMATED COUNT: 13.2 % (ref 10–15)
GFR SERPL CREATININE-BSD FRML MDRD: 68 ML/MIN/{1.73_M2}
GLUCOSE SERPL-MCNC: 127 MG/DL (ref 70–99)
GLUCOSE UR STRIP-MCNC: NEGATIVE MG/DL
HCT VFR BLD AUTO: 36.7 % (ref 40–53)
HDLC SERPL-MCNC: 36 MG/DL
HGB BLD-MCNC: 12.7 G/DL (ref 13.3–17.7)
HGB UR QL STRIP: NEGATIVE
KETONES UR STRIP-MCNC: NEGATIVE MG/DL
LDLC SERPL CALC-MCNC: 62 MG/DL
LEUKOCYTE ESTERASE UR QL STRIP: NEGATIVE
MCH RBC QN AUTO: 32.7 PG (ref 26.5–33)
MCHC RBC AUTO-ENTMCNC: 34.6 G/DL (ref 31.5–36.5)
MCV RBC AUTO: 95 FL (ref 78–100)
NITRATE UR QL: NEGATIVE
NONHDLC SERPL-MCNC: 83 MG/DL
PH UR STRIP: 5.5 PH (ref 5–7)
PLATELET # BLD AUTO: 138 10E9/L (ref 150–450)
POTASSIUM SERPL-SCNC: 4.5 MMOL/L (ref 3.4–5.3)
PROT SERPL-MCNC: 7.2 G/DL (ref 6.8–8.8)
PROT UR-MCNC: 0.18 G/L
PROT/CREAT 24H UR: 0.13 G/G CR (ref 0–0.2)
RBC # BLD AUTO: 3.88 10E12/L (ref 4.4–5.9)
SODIUM SERPL-SCNC: 140 MMOL/L (ref 133–144)
SOURCE: NORMAL
SP GR UR STRIP: 1.02 (ref 1–1.03)
TACROLIMUS BLD-MCNC: 6.2 UG/L (ref 5–15)
TME LAST DOSE: NORMAL H
TRIGL SERPL-MCNC: 107 MG/DL
UROBILINOGEN UR STRIP-ACNC: 0.2 EU/DL (ref 0.2–1)
WBC # BLD AUTO: 6.8 10E9/L (ref 4–11)

## 2019-06-28 PROCEDURE — 84156 ASSAY OF PROTEIN URINE: CPT | Performed by: INTERNAL MEDICINE

## 2019-06-28 PROCEDURE — 80048 BASIC METABOLIC PNL TOTAL CA: CPT | Performed by: INTERNAL MEDICINE

## 2019-06-28 PROCEDURE — 36415 COLL VENOUS BLD VENIPUNCTURE: CPT | Performed by: INTERNAL MEDICINE

## 2019-06-28 PROCEDURE — 80076 HEPATIC FUNCTION PANEL: CPT | Performed by: INTERNAL MEDICINE

## 2019-06-28 PROCEDURE — 80197 ASSAY OF TACROLIMUS: CPT | Performed by: INTERNAL MEDICINE

## 2019-06-28 PROCEDURE — 81003 URINALYSIS AUTO W/O SCOPE: CPT | Performed by: INTERNAL MEDICINE

## 2019-06-28 PROCEDURE — 80061 LIPID PANEL: CPT | Performed by: INTERNAL MEDICINE

## 2019-06-28 PROCEDURE — 85027 COMPLETE CBC AUTOMATED: CPT | Performed by: INTERNAL MEDICINE

## 2019-07-08 DIAGNOSIS — Z79.60 LONG-TERM USE OF IMMUNOSUPPRESSANT MEDICATION: ICD-10-CM

## 2019-07-08 DIAGNOSIS — Z94.4 HISTORY OF LIVER TRANSPLANT (H): Primary | ICD-10-CM

## 2019-07-08 DIAGNOSIS — K30 BURNING SENSATION OF STOMACH: ICD-10-CM

## 2019-08-14 DIAGNOSIS — Z79.60 LONG-TERM USE OF IMMUNOSUPPRESSANT MEDICATION: ICD-10-CM

## 2019-08-14 DIAGNOSIS — Z94.4 LIVER TRANSPLANT RECIPIENT (H): ICD-10-CM

## 2019-08-15 RX ORDER — TACROLIMUS 1 MG/1
3 CAPSULE ORAL EVERY 12 HOURS
Qty: 180 CAPSULE | Refills: 11 | Status: SHIPPED | OUTPATIENT
Start: 2019-08-15 | End: 2020-07-09

## 2019-10-08 ENCOUNTER — TELEPHONE (OUTPATIENT)
Dept: TRANSPLANT | Facility: CLINIC | Age: 29
End: 2019-10-08

## 2019-10-08 NOTE — TELEPHONE ENCOUNTER
Does the pt need the pneumonia vaccine and the flu? Or just the flu? Please connect with the pt to advise

## 2019-10-09 NOTE — TELEPHONE ENCOUNTER
Joe received PPSV23 (pneumovax) 11/29/2016 and PCV13 (prevnar) 10/11/2018. Immunizations reviewed with Darius Gaines, Pharm D. Pt not due for pneumovax until age 63. Flu vaccine should be yearly.     Attempted to reach Joe, no answer, left message to call our office.

## 2020-01-01 NOTE — LETTER
2017       RE: Joe Sosa  [address]  [place] [address]     Dear Colleague,    Thank you for referring your patient, Joe Sosa, to the [facility] SOLID ORGAN TRANSPLANT at [facility]. Please see a copy of my visit note below.    HPI      ROS      Physical Exam    Transplant Surgery -OUTPATIENT IMMUNOSUPPRESSION PROGRESS NOTE    Date of Visit: 2017    Transplants:  2017 (Liver); Postoperative day:  89  ASSESMENT AND PLAN:  1.Graft Function: Liver allograft: no rejection or technical problems.    2.Immunosuppression Management: keep tacrolimus at 8-10  3.Hypertension: ok  4.Renal Function: ok  5.Lab frequency: weekly  6.Other:  Low WBC count, 1.7; hold valcyte and bactrim, d/c cellcetpt    Date: 2017    Transplant:  [x]                             Liver [x]                              Kidney []                             Pancreas []                              Other:             Chief Complaint:Liver Transplant (POD 89)  Doing well, no fever or abdominal pain  History of Present Illness:  Post liver transplant    Patient Active Problem List   Diagnosis     Alpha-1-antitrypsin deficiency (H)     Alcoholic cirrhosis of liver with ascites (H)     Vitamin D deficiency     Hepatic encephalopathy (H)     Alcoholism (H)     End stage liver disease (H)     Liver transplant recipient (H)     S/P liver transplant (H)     Immunosuppressed status (H)     SOCIAL /FAMILY HISTORY: [x]                  No recent change    Past Medical History:   Diagnosis Date     Alcoholic cirrhosis of liver with ascites (H) 3/9/2017     Alpha-1-antitrypsin deficiency (H) 3/9/2017     Anasarca      Chronic hyponatremia      Hepatic encephalopathy (H)      SBP (spontaneous bacterial peritonitis) (H)      Past Surgical History:   Procedure Laterality Date     TRANSPLANT LIVER RECIPIENT  DONOR N/A 2017    Procedure: TRANSPLANT LIVER RECIPIENT   DONOR;  TRANSPLANT LIVER RECIPIENT  DONOR with bile duct stenting;  Surgeon: Chino Martin MD;  Location:  OR     Social History     Social History     Marital status: Single     Spouse name: N/A     Number of children: N/A     Years of education: N/A     Occupational History     Not on file.     Social History Main Topics     Smoking status: Never Smoker     Smokeless tobacco: Former User     Types: Chew     Alcohol use No      Comment: Quit 2016     Drug use: No     Sexual activity: Not on file     Other Topics Concern     Not on file     Social History Narrative     Prescription Medications as of 2017             mycophenolate (GENERIC EQUIVALENT) 250 MG capsule Take 3 capsules (750 mg) by mouth 2 times daily ON HOLD  for low WBC.    valGANciclovir (VALCYTE) 450 MG tablet Take 1 tablet (450 mg) by mouth daily On HOLD  for low WBC    sulfamethoxazole-trimethoprim (BACTRIM/SEPTRA) 400-80 MG per tablet Take 1 tablet by mouth daily    tacrolimus (GENERIC EQUIVALENT) 1 MG capsule Take 3 mgs  (3 capsules) in the am, take 2mg (2 capsules) in the pm, take every 12 hours    order for DME Equipment being ordered: Wheelchair, Manual  Treatment Diagnosis: Leg pain, severe    Patient height:  6 feet, 2 inches  Patient weight:  93.8 kg    traMADol (ULTRAM) 50 MG tablet Take 1-2 tablets ( mg) by mouth every 6 hours as needed for pain maximum 6 tablet(s) per day    multivitamin, therapeutic with minerals (THERA-VIT-M) TABS tablet Take 1 tablet by mouth daily    clotrimazole (MYCELEX) 10 MG LOZG lozenge Place 1 lozenge (1 [place]ona) inside cheek 3 times daily    cyclobenzaprine (FLEXERIL) 5 MG tablet Take 1 tablet (5 mg) by mouth 3 times daily as needed for muscle spasms    ursodiol (ACTIGALL) 300 MG capsule Take 1 capsule (300 mg) by mouth 2 times daily    omeprazole (PRILOSEC) 20 MG CR capsule Take 20 mg by mouth daily         Review of patient's allergies indicates no known  "allergies.   REVIEW OF SYSTEMS (check box if normal)  [x]               GENERAL  [x]                 PULMONARY [x]                GENITOURINARY  [x]                CNS                 [x]                 CARDIAC  [x]                 ENDOCRINE  [x]                EARS,NOSE,THROAT [x]                 GASTROINTESTINAL [x]                 NEUROLOGIC    [x]                MUSCLOSKELTAL  [x]                  HEMATOLOGY      PHYSICAL EXAM (check box if normal)/82  Pulse 76  Temp 98.1  F (36.7  C) (Oral)  Resp 16  Ht 1.88 m (6' 2\")  Wt 99.4 kg (219 lb 2.2 oz)  SpO2 100%  BMI 28.14 kg/m2        [x]            GENERAL:    [x]       EYES:  ICTERIC   []        YES  []                    NO  [x]           EXTREMITIES: Clubbing []       Y     [x]           N    [x]           EARS, NOSE, THROAT: Membranes Moist    YES   [x]                   NO []                  [x]           LUNGS:  CLEAR    YES       [x]                  NO    []                                [x]           SKIN: Jaundice           YES       []                  NO    [x]                   Rash: YES       []                  NO    [x]                                     [x]             HEART: Regular Rate          YES       [x]                  NO    []                   Incision Clean:  YES       [x]                  NO    []                                [x]                    ABDOMEN: Wound healthy Organomegaly YES       []                  NO    [x]                       [x]                    NEUROLOGICAL:  Nonfocal  YES       [x]                  NO    []                       [x]                    Hernia YES       []                  NO    [x]                   PSYCHIATRIC:  Appropriate  YES       [x]                  NO    []                       OTHER:                                                                                                   PAIN SCALE:: 3    Again, thank you for allowing me to participate in the care of your " 3 patient.      Sincerely,    Chino Martin MD

## 2020-02-19 NOTE — PROGRESS NOTES
Transplant Surgery  Inpatient Daily Progress Note  07/04/2017    Assessment & Plan: 27 year old male with PMH significant for ESLD secondary to EtOH/alpha 1 antitrypsin now s/p DD OLT on 6/28/17 with Dr. Martin. ESLD complicated by hepatic encephalopathy, ascites (paracentesis x 1-2x), SBP x 1 episode, chronic hyponatremia and anasarca.     Graft function: good, improved. LFTs improving, Tbili 2.1 from 2 yesterday, ALT, AST down trending, ALP slightly up from 63 to 81 to 93, started ursodiol 300mg PO BID. INR 1.1. Fg stable. LA WNL. Will continue to follow closely. Pain is better well controlled with oral PRN oxycodone, and scheduled tramadol 50mg PO Q8h scheduled.   Immunosuppression management: Solu-medrol taper per protocol. Cellcept 1000 mg BID. Tacrolimus, level 6.1, Continue to give 4mg BID. Goal 10-15. Complexity of management:Medium. Contributing factors: thrombocytopenia, anemia and induction  Hematology: HGB 9.0 from 8.7 yesterday. PLT, FFP, PRBC and cryo transfusions since transplant.  Cardiorespiratory: Stable on room air   GI/Nutrition: Regular diet, tolerating well. No flatus or BM. Continue bowel regimen: senna/colace and MOM.   Endocrine: Hyperglycemia due to steroids, transitioned from insulin gtt to subcutaneous sliding scale  Fluid/Electrolytes: MIVF: LR@ 10 cc/hour. IV lasix switched to 40 mg oral daily. Drain output still serosanguinous with 175 cc out this morning, continue to monitor.   : Major discontinued on POD 1, voiding  Infectious disease: Tmax 98.3. Continue Zosyn x 3 days per protocol. D/c'ed Micafungin and started Fluconazole on POD 1. Discontinued Vanco on POD 1.  Prophylaxis: GI, fungal  Disposition: 7A, likely home tomorrow if drain output improves.    Medical Decision Making: Medium  Subsequent visit 43957 (moderate level decision making)    DENISE/Fellow/Resident Provider: Ailyn Rodriguez    Faculty: Gian Jurado,  "MD    __________________________________________________________________  Transplant History: Admitted 6/27/2017 for liver transplant.   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    6/28/2017 (Liver), Postoperative day: 6     Interval History: History is obtained from the patient  Overnight events:   Feeling well this morning, lying in bed, watching TV with family. Has an appetite this morning. Drain still looks bloody with 800ml output over last 24hrs    ROS:   A 10-point review of systems was negative except as noted above.    Curent Meds:    sodium chloride (PF)  3 mL Intracatheter Q8H     [START ON 7/5/2017] furosemide  40 mg Oral Daily     pantoprazole  40 mg Oral BID AC     tacrolimus  4 mg Oral BID IS     traMADol  50 mg Oral Q8H     insulin aspart  1-3 Units Subcutaneous TID AC     insulin aspart  1-3 Units Subcutaneous At Bedtime     ursodiol  300 mg Oral BID     polyethylene glycol  17 g Oral Daily     insulin aspart   Subcutaneous QAM AC     insulin aspart   Subcutaneous Daily with lunch     insulin aspart   Subcutaneous Daily with supper     senna-docusate  2 tablet Oral BID     fluconazole  200 mg Oral Daily     multivitamin, therapeutic with minerals  1 tablet Per Feeding Tube Daily     valGANciclovir  450 mg Oral Daily     sulfamethoxazole-trimethoprim  1 tablet Oral Daily     mycophenolate  1,000 mg Oral BID IS       Physical Exam:     Admit Weight: 98.8 kg (217 lb 14.4 oz)    Current Vitals:   /78 (BP Location: Left arm)  Pulse 73  Temp 98.6  F (37  C) (Oral)  Resp 16  Ht 1.88 m (6' 2\")  Wt 103 kg (227 lb 1.6 oz)  SpO2 99%  BMI 29.16 kg/m2    CVP (mmHg): 10 mmHg    Vital sign ranges:    Temp:  [98.1  F (36.7  C)-99.5  F (37.5  C)] 98.6  F (37  C)  Pulse:  [73-87] 73  Heart Rate:  [77-93] 80  Resp:  [16-18] 16  BP: (133-147)/(78-90) 136/78  SpO2:  [93 %-99 %] 99 %  Patient Vitals for the past 24 hrs:   BP Temp Temp src Pulse Heart Rate Resp SpO2   07/04/17 0732 136/78 " 98.6  F (37  C) Oral 73 - 16 99 %   07/04/17 0045 135/86 98.3  F (36.8  C) Oral 80 80 16 93 %   07/03/17 2100 139/85 98.6  F (37  C) Oral 77 77 16 96 %   07/03/17 1712 133/80 98.3  F (36.8  C) Oral - 83 16 99 %   07/03/17 1552 138/82 98.4  F (36.9  C) Oral - 81 16 99 %   07/03/17 1529 142/85 98.4  F (36.9  C) Oral - 87 18 98 %   07/03/17 1402 136/79 99.5  F (37.5  C) Axillary - 92 18 99 %   07/03/17 1345 143/84 98.6  F (37  C) Oral 87 - 16 96 %   07/03/17 1245 142/82 98.1  F (36.7  C) Oral - 84 16 96 %   07/03/17 1159 147/90 98.2  F (36.8  C) Oral 82 - 18 98 %   07/03/17 1144 144/90 98.9  F (37.2  C) Oral - 87 16 98 %   07/03/17 1043 140/79 98.3  F (36.8  C) Oral - 93 16 99 %   07/03/17 0940 138/87 98.4  F (36.9  C) Oral 85 - 18 98 %   07/03/17 0915 140/85 98.5  F (36.9  C) - - 83 18 -     General Appearance: in no apparent distress.   Skin: normal  Heart: regular rate and rhythm  Lungs: NLB, stable on room air  Abdomen: rounded, and  appropriately tender, generalized. The wound is c/d/i. LAM with sero sang output.  Extremities: edema: absent. There is no skin breakdown.  Neurologic: awake, alert and oriented. Tremor absent. Asterixis: absent.    Data:   CMP  Recent Labs  Lab 07/04/17  0536 07/03/17  0700  06/30/17  0430  06/29/17  0311 06/29/17  0308  06/28/17  1421  06/27/17  0923    135  < > 137  < >  --  142  < > 140  < > 135   POTASSIUM 3.7 3.7  < > 4.2  < >  --  4.2  < > 4.0  < > 4.3   CHLORIDE 98 99  < > 104  < >  --  109  < > 108  --  100   CO2 30 29  < > 23  < >  --  25  < > 24  --  30   * 101*  < > 119*  < >  --  147*  < > 250*  < > 107*   BUN 24 32*  < > 40*  < >  --  24  < > 16  --  12   CR 1.01 1.04  < > 1.21  < >  --  1.05  < > 0.64*  --  0.63*   GFRESTIMATED 87 84  < > 71  < >  --  84  < > >90Non  GFR Calc  --  >90Non  GFR Calc   GFRESTBLACK >90African American GFR Calc >90African American GFR Calc  < > 86  < >  --  >90African American GFR Calc  < >  >90African American GFR Calc  --  >90African American GFR Calc   BRITNI 8.0* 7.9*  < > 7.9*  < >  --  8.2*  < > 9.0  --  8.3*   ICAW  --   --   --  4.4  --  5.0  --   < >  --   < >  --    MAG 1.9 2.1  < > 2.9*  --   --  2.5*  < > 1.8  --  2.0   PHOS 3.0 2.7  < > 6.7*  --   --  5.4*  --  3.7  --  3.8   AMYLASE  --   --   --   --   --   --  30  --   --   --  62   LIPASE  --   --   --   --   --   --  113  --  327  --   --    ALBUMIN 2.6* 2.6*  < > 2.8*  --   --  2.4*  --  2.2*  --  2.4*   BILITOTAL 1.7* 2.1*  < > 2.4*  --   --  2.2*  --  7.5*  --  7.1*   ALKPHOS 93 99  < > 63  --   --  56  --  84  --  280*   AST 40 64*  < > 509*  --   --  1282*  --  2612*  --  88*   * 191*  < > 472*  --   --  556*  --  836*  --  51   < > = values in this interval not displayed.  CBC  Recent Labs  Lab 07/04/17 0536 07/03/17 2003 07/03/17 0700 06/29/17  0308   HGB 9.0* 8.7* 6.9*  < > 6.8*   WBC 5.9  --  4.5  < > 8.3   PLT 45*  --  44*  < > 46*   A1C  --   --   --   --  Canceled, Test credited Below Assay Range   < > = values in this interval not displayed.  COAGS  Recent Labs  Lab 07/04/17 0536 07/03/17 0700 06/28/17  1421 06/28/17  1330   INR 1.19* 1.19*  < > 2.49* 2.78*   PTT  --   --   --  45* 59*   < > = values in this interval not displayed.   Urinalysis  Recent Labs   Lab Test  03/27/17   0738   COLOR  Ana Cristina   APPEARANCE  Clear   URINEGLC  Negative   URINEBILI  Negative   URINEKETONE  Negative   SG  1.018   UBLD  Negative   URINEPH  6.0   PROTEIN  Negative   NITRITE  Negative   LEUKEST  Negative   RBCU  1   WBCU  2   UTPG  Unable to calculate due to low value     Virology:  Hepatitis C Antibody   Date Value Ref Range Status   06/27/2017  NR Final    Nonreactive   Assay performance characteristics have not been established for newborns,   infants, and children        (1) 41-60 years

## 2020-03-10 ENCOUNTER — HEALTH MAINTENANCE LETTER (OUTPATIENT)
Age: 30
End: 2020-03-10

## 2020-03-17 ENCOUNTER — TELEPHONE (OUTPATIENT)
Dept: TRANSPLANT | Facility: CLINIC | Age: 30
End: 2020-03-17

## 2020-03-17 NOTE — TELEPHONE ENCOUNTER
Returned Joe's call.    The following instructions were provided to the patient:  The best prevention for avoiding all viral illness, including influenza and COVID-19, is thorough and frequent handwashing. You should avoid touching your face, nose, and mouth. Alcohol based hand sanitizers are also an effective method to clean your hands. For more information, the CDC website has numerous resources that are updated three times per week.     Also advised Joe to avoid crowds, gatherings and if he is able to work from home he should discuss with his employer.     Joe has an appt with Dr. Templeton next week. I notified Joe that most visits would be done via telephone and he would get a call at his scheduled appt time. He is due for labs but I asked him to hold off for a couple weeks as his last labs looked OK.     Joe verbalized understanding of all of the above.

## 2020-03-18 ENCOUNTER — TELEPHONE (OUTPATIENT)
Dept: TRANSPLANT | Facility: CLINIC | Age: 30
End: 2020-03-18

## 2020-03-18 NOTE — TELEPHONE ENCOUNTER
Joe's place of employment is requesting a letter stating that due to Joe's immune suppressed status his transplant team is recommending he work from home if they are not able to accommodate social distancing of 6 feet as recommended by the CDC.    Discussed with Dr. Templeton who has approved this.     Joe has asked if we can e-mail the letter to him at [email or web address]

## 2020-03-20 ENCOUNTER — MYC MEDICAL ADVICE (OUTPATIENT)
Dept: GASTROENTEROLOGY | Facility: CLINIC | Age: 30
End: 2020-03-20

## 2020-03-23 NOTE — TELEPHONE ENCOUNTER
Joe sent a message asking if labs should be done prior to his appt with Dr. Templeton on 3/24/220. I had instructed Joe to wait a couple weeks to have labs drawn due to COVID 19 precautions.    Dr. Templeton was forwarded Joe's message and said she left him a voicemail yesterday and also called Joe's Mom and left a message regarding labs.    Joe was not available. Left a message to call me back if he has any further questions regarding labs. Main office number provided.

## 2020-03-24 ENCOUNTER — VIRTUAL VISIT (OUTPATIENT)
Dept: GASTROENTEROLOGY | Facility: CLINIC | Age: 30
End: 2020-03-24
Attending: INTERNAL MEDICINE
Payer: COMMERCIAL

## 2020-03-24 DIAGNOSIS — Z94.4 S/P LIVER TRANSPLANT (H): Primary | ICD-10-CM

## 2020-03-24 NOTE — PROGRESS NOTES
"Joe Sosa is a 29 year old male who is being evaluated via a billable telephone visit during the COVID-19 pandemic and clinic response to limit in-person visits.    The patient has been notified of following:     \"This telephone visit will be conducted via a call between you and your physician/provider. We have found that certain health care needs can be provided without the need for a physical exam.  This service lets us provide the care you need with a short phone conversation.  If a prescription is necessary we can send it directly to your pharmacy.  If lab work is needed we can place an order for that and you can then stop by our lab to have the test done at a later time.    If during the course of the call the physician/provider feels a telephone visit is not appropriate, you will not be charged for this service.\"     Consent has been obtained for this service by 1 care team member: No    I have reviewed and updated the patient's Past Medical History, Social History, Family History and Medication List.    Phone call contact time  Call Started at 9:00  Call Ended at 9:21  On phone: patient    A/P  29 year old male s/p DDLT June 2017 for ETOH, A1AT M2Z. From LT standpoint doing well.     Labs up to date and liver tests normal.     IS ok to run levels closer to 3-5.      CKD mild, stable. Will lower IS and hopefully with weight loss and lower tac will remain stable/improve.  Weight gain He has lost about 15 pounds, eating better.  Skin cancer screening Rec derm      Depression sees counselor. On Zoloft to 75 mg po at bedtime and Wellbutrin.    Discussed Covid.    RTC 12 months.        Andreina Templeton MD  Hepatology/ Liver Transplant  [facility]  ===================================================================  PCP: Dr. Eliud Azul, [place], [facility].      SUBJECTIVE  29 year old male s/p DDLT 6/28/17 for ETOH.     He is doing well. He is working from home with the Covid outbreak: " distance learning and . Staying home. No fevers, cough, no abdominal pain. He was having episodes of feeling poorly in the morning, but hasn't had any issues with this in about 4 months.     His biggest issues have been depression (better, on Zoloft and Wellbutrin) and weight gain.    EXPLANT: ETOH cirrhosis with suggestion underlying A1AT. He is phenotype M2Z  IS: Prograf goal level ~5.  LABS: Up to date and normal. Last labs 12/12/19      REJECTION: None.  BILIARY ISSUES: None  STENT: Out in AXR 8/11/17  KIDNEY FUNCTION: creat 1.2-1.5. Last 1.46     BP: Good. No meds.  PREV:derm not due. Flu shot done   DISEASE RECURRENCE: Not drinking, 3.5 years sober. ETG neg.   OTHER ISSUES:   CMV neg recip and CMV pos donor.   Some heartburn and started taking PPI prn rarely.  Significant weight gain.     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

## 2020-06-30 ENCOUNTER — TELEPHONE (OUTPATIENT)
Dept: TRANSPLANT | Facility: CLINIC | Age: 30
End: 2020-06-30

## 2020-06-30 NOTE — TELEPHONE ENCOUNTER
Annual chart review.     Joe is overdue for routine transplant labs and dermatology annual skin check. [employer] message sent.

## 2020-07-07 ENCOUNTER — TELEPHONE (OUTPATIENT)
Dept: TRANSPLANT | Facility: CLINIC | Age: 30
End: 2020-07-07

## 2020-07-07 ENCOUNTER — HOSPITAL ENCOUNTER (OUTPATIENT)
Facility: CLINIC | Age: 30
Setting detail: SPECIMEN
Discharge: HOME | End: 2020-07-07
Admitting: INTERNAL MEDICINE
Payer: COMMERCIAL

## 2020-07-07 DIAGNOSIS — Z13.220 LIPID SCREENING: ICD-10-CM

## 2020-07-07 DIAGNOSIS — Z94.4 LIVER REPLACED BY TRANSPLANT (H): ICD-10-CM

## 2020-07-07 PROCEDURE — 80197 ASSAY OF TACROLIMUS: CPT | Performed by: INTERNAL MEDICINE

## 2020-07-07 NOTE — TELEPHONE ENCOUNTER
Received a response from Joe in regard to his annual chart review.     Joe will get labs drawn at the [place] [facility] clinic this week. Orders are  and message sent to Jasmin Greene LPN to please send new standing orders.     Joe also said he is scheduled for a dermatology appt in August. Will make note in Joe's checklist.

## 2020-07-07 NOTE — LETTER
OUTPATIENT OR TRANSITIONAL CARE  LABORATORY TEST ORDER  [facility]  Fax#[phone]    Patient Name: Joe Sosa  Transplant Date: 6/28/2017   YOB: 1990  Issue Date: 07/07/20   [facility] MR#: [id number]  Expiration Date: 07/07/2021       Diagnoses: [x]      Liver Transplant (ICD-10 Z94.4)    [x]      Long term use of medications (ICD-10 Z79.899)     Please fax results to [phone]    Frequency: every 2-3 months and PRN        [x] CBC with Platelets   [x] Basic Metabolic Panel (Sodium, Potassium, Chloride, CO2, Creatinine, Urea Nitrogen,  [x] Hepatic panel (Albumin, Alk Phos, ALT, AST, Direct and Total Bili)  [x] Tacrolimus drug level - 12 hour trough, please document time of last dose     Other Frequency: annually due NOW  [x]      Fasting lipid panel  [x] Random urine protein  [x] Urinalysis with reflex to micro  [x]      Phoshatidylethanol (PeTH)    If you have questions, please call [phone] or [phone].    .

## 2020-07-07 NOTE — TELEPHONE ENCOUNTER
Provider Call: Transplant Lab/Orders    Post Transplant Days: 1105    Reason for Call: Annual lab reorder    Document lab facility information when provider is calling about annual lab orders. Delete facility wildcards when not needed.  Facility Name: [facility] [place] [facility]    Outside Facility Fax Number: [phone]  Callback needed? No     Joe was in this Am 07/07/2020 needs updated lab order faxed to [facility]

## 2020-07-08 DIAGNOSIS — Z13.220 LIPID SCREENING: ICD-10-CM

## 2020-07-08 DIAGNOSIS — Z94.4 LIVER REPLACED BY TRANSPLANT (H): ICD-10-CM

## 2020-07-09 DIAGNOSIS — Z79.60 LONG-TERM USE OF IMMUNOSUPPRESSANT MEDICATION: ICD-10-CM

## 2020-07-09 DIAGNOSIS — Z94.4 LIVER TRANSPLANT RECIPIENT (H): ICD-10-CM

## 2020-07-09 LAB
TACROLIMUS BLD-MCNC: 8.4 UG/L (ref 5–15)
TME LAST DOSE: NORMAL H

## 2020-07-09 RX ORDER — TACROLIMUS 1 MG/1
CAPSULE ORAL
Qty: 150 CAPSULE | Refills: 11 | Status: SHIPPED | OUTPATIENT
Start: 2020-07-09 | End: 2021-03-18

## 2020-07-09 NOTE — TELEPHONE ENCOUNTER
Left a detailed message to change tacro to 2mg am, 3mg pm and check tacro level in 2 weeks and call with confirmation.

## 2020-07-09 NOTE — TELEPHONE ENCOUNTER
Patient Call: Voicemail  Date/Time: 7/9/2020-10:05am  Reason for call: pt received message an dose change

## 2020-07-09 NOTE — TELEPHONE ENCOUNTER
ISSUE:   Tacrolimus IR level 8.4 on 7/7/2020, goal 3-5, dose 3 mg BID.    PLAN:   Please call patient and confirm this was an accurate 12-hour trough. Verify Tacrolimus IR dose 3 mg BID. Confirm no new medications or illness. Confirm no missed doses. If accurate trough and accurate dose, decrease Tacrolimus IR dose to 2 mg every AM and 3 mg every PM and recheck TAC level in a couple weeks.     Carley Antony RN      OUTCOME:   Spoke with patient, they confirm accurate trough level and current dose 3 mg BID. Patient confirmed dose change to 2 mg am, 3 mg pm and to repeat tacro in 2 weeks. Orders sent to preferred pharmacy for dose change and lab for repeat labs. Patient voiced understanding of plan.     Jasmin Greene LPN

## 2020-07-09 NOTE — LETTER
OUTPATIENT OR TRANSITIONAL CARE  LABORATORY TEST ORDER  [facility]  Fax#[phone]    Patient Name: Joe Sosa  Transplant Date: 6/28/2017   YOB: 1990  Issue Date: 07/09/20   [facility] MR#: [id number]  Expiration Date: 07/31/2020       Diagnoses: [x]      Liver Transplant (ICD-10 Z94.4)    [x]      Long term use of medications (ICD-10 Z79.899)     Please fax results to [phone]    Frequency: please repeat in approximately two weeks    [x] Tacrolimus drug level - 12 hour trough, please document time of last dose         If you have questions, please call [phone] or [phone].    .

## 2020-12-27 ENCOUNTER — HEALTH MAINTENANCE LETTER (OUTPATIENT)
Age: 30
End: 2020-12-27

## 2021-02-15 ENCOUNTER — TELEPHONE (OUTPATIENT)
Dept: GASTROENTEROLOGY | Facility: CLINIC | Age: 31
End: 2021-02-15

## 2021-02-15 NOTE — TELEPHONE ENCOUNTER
[facility] Call Center    Phone Message    May a detailed message be left on voicemail: yes     Reason for Call: Order(s): Other:   Reason for requested: Lab orders  Date needed: ASAP  Provider name: Dr. Templeton    Please send lab orders to [facility] in [place]      Action Taken: Message routed to:  [facility] ([facility] Hep    Travel Screening: Not Applicable

## 2021-03-18 ENCOUNTER — TELEPHONE (OUTPATIENT)
Dept: TRANSPLANT | Facility: CLINIC | Age: 31
End: 2021-03-18

## 2021-03-18 DIAGNOSIS — Z94.4 LIVER TRANSPLANT RECIPIENT (H): ICD-10-CM

## 2021-03-18 DIAGNOSIS — Z79.60 LONG-TERM USE OF IMMUNOSUPPRESSANT MEDICATION: ICD-10-CM

## 2021-03-18 RX ORDER — TACROLIMUS 1 MG/1
2 CAPSULE ORAL 2 TIMES DAILY
Qty: 120 CAPSULE | Refills: 11 | Status: SHIPPED | OUTPATIENT
Start: 2021-03-18 | End: 2021-08-20

## 2021-03-18 NOTE — TELEPHONE ENCOUNTER
Reviewed 3/12/2021 lab results with Joe. His TAC level is above goal at 7.7. Current goal is 3-5. Current TAC dose is 2 mg every AM and 3 mg every PM. Verified this is an accurate dose and no new medications or medical symptoms. Joe will decrease his Tacrolimus to 2 mg BID.     Creatinine also up. Could be related to TAC level. He has increased his fluid intake and we will repeat his BMP within the next week or 2 along with other labs to re assess creatinine.     Total bili up a bit, no new medical symptoms. Direct bili about the same as previous draw. Will continue to monitor.    Joe will get all of his labs repeated in the next 1-2 weeks after TAC dose adjustment. I also reminded him to be checking transplant labs every 3 months. He verbalized understanding.

## 2021-03-23 ENCOUNTER — VIRTUAL VISIT (OUTPATIENT)
Dept: GASTROENTEROLOGY | Facility: CLINIC | Age: 31
End: 2021-03-23
Attending: INTERNAL MEDICINE
Payer: COMMERCIAL

## 2021-03-23 DIAGNOSIS — Z94.4 S/P LIVER TRANSPLANT (H): Primary | ICD-10-CM

## 2021-03-23 DIAGNOSIS — D84.9 IMMUNOSUPPRESSED STATUS (H): ICD-10-CM

## 2021-03-23 DIAGNOSIS — N18.31 STAGE 3A CHRONIC KIDNEY DISEASE (H): ICD-10-CM

## 2021-03-23 PROBLEM — N18.30 CHRONIC KIDNEY DISEASE, STAGE 3 (H): Status: ACTIVE | Noted: 2021-03-23

## 2021-03-23 PROCEDURE — 99214 OFFICE O/P EST MOD 30 MIN: CPT | Mod: GT | Performed by: INTERNAL MEDICINE

## 2021-03-23 ASSESSMENT — PAIN SCALES - GENERAL: PAINLEVEL: NO PAIN (0)

## 2021-03-23 NOTE — PROGRESS NOTES
Joe is a 30 year old who is being evaluated via a billable telephone visit.      What phone number would you like to be contacted at? [phone]  How would you like to obtain your AVS? Mail a copy  Phone call duration: *** minutes

## 2021-03-23 NOTE — PROGRESS NOTES
Left voicemail for patient to call back to set up telemedicine visit, will call again before appointment time.  Sonal Calixto CMA on 3/23/2021 at 9:37 AM

## 2021-03-23 NOTE — LETTER
3/23/2021         RE: Joe Sosa  [address] [place] [address]        Dear Colleague,    Thank you for referring your patient, Joe Sosa, to the [facility] [place]. Please see a copy of my visit note below.    [facility]  LIVER CLINIC FOLLOW UP      A/P  29 year old male s/p DDLT June 2017 for ETOH, A1AT M2Z. From LT standpoint doing well.    Graft function Excellent Labs up to date and liver tests normal.  IS Tac. ok to run levels closer to 3-5. Continue monitoring labs and IS level every 3 months to assess for appropriate dosing and side effects from IS including BLAS, pancytopenia, hyperkalemia, hypomagnesemia.    Anemia 2/2 Transplant. Hgb ~13. Stable.  Thrombocytopenia 2/2 prior liver disease. Plt ~120-140. Stable  CKD Will lower IS/  Weight gain Continues to struggle with his weight, now 280 pounds  Skin cancer screening Derm 8/20/20. Due 1 y       Depression sees counselor. On Zoloft and Wellbutrin. DOing well.  Sexual health Ability to obtain.maintin erection should not be affected by tac. Ok to use Viagra if that is what his PCP recommends.  Proph Has had Covid vaccine x 2.     RTC 12 months.        Andreina Templeton MD  Hepatology/ Liver Transplant  [facility]  ===================================================================  PCP: Dr. Eliud Azul, [place], [facility].      SUBJECTIVE  Mr. Sosa is a 30 Y M s/p DDLT 6/28/17 for ETOH.     He is doing well. He moved in with a friend out from his mom's house in August. His roommate is sober. He has a girlfriend now. Joe has been sober for 4 years.    Asks if it is ok to use Viagra.    EXPLANT: ETOH cirrhosis with suggestion underlying A1AT. He is phenotype M2Z  IS: Prograf goal level 3-5. Last level 7.7 3/12/21. Was 2/3 now 2/2.  LABS: Up to date and normal liver tests.  REJECTION: None.  BILIARY ISSUES: None  STENT: Out in AXR 8/11/17  KIDNEY FUNCTION: creat had been 1.4-1.6.  "Lately 1.8.  Thinks with mask wearing he is drinking less water.  BP: Good. No meds.  PREV: Derm August 2020. Flu shot yearly.   DISEASE RECURRENCE: Not drinking, 4 years sober. ETG neg.   OTHER ISSUES:   His biggest issues have been depression (better, on Zoloft and Wellbutrin) and weight gain  CMV neg recip and CMV pos donor.   Some heartburn and started taking PPI prn rarely.     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  Exam  Gen Alert pleasant NAD  Resp No difficulty breathing. No cough  Skin No Jaundice  Eyes No icterus  Neuro BARR  MSK no muscle wasting  Psyche Pleasant, appropriate. Well groomed.  Joe Sosa is a 30 year old male who is being evaluated via a billable video visit.      The patient has been notified of following:   \"This video visit will be conducted via a call between you and your physician/provider. We have found that certain health care needs can be provided without the need for an in-person physical exam.  This service lets us provide the care you need with a video conversation.  If a prescription is necessary we can send it directly to your pharmacy.  If lab work is needed we can place an order for that and you can then stop by our lab to have the test done at a later time. Video visits are billed at different rates depending on your insurance coverage.  Please reach out to your insurance provider with any questions.  If during the course of the call the physician/provider feels a video visit is not appropriate, you will not be charged for this service.\"   Patient has given verbal consent for Video visit? Yes      Type of service:  Video Visit  Video Start Time: 1004  Video End Time 1022  Patient location: work  Will anyone else be joining your video visit? No  {If patient encounters technical issues they should call [phone] :1  D[place] Location (provider location):  [facility] " HEPATOLOGY CLINIC [place]   Mode of Communication:  Video Conference via [employer]  I have reviewed and updated the patient's Past Medical History, Social History, Family History and Medication List.      Again, thank you for allowing me to participate in the care of your patient.        Sincerely,        Andreina Templeton MD

## 2021-03-23 NOTE — PROGRESS NOTES
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 From LT standpoint doing well.    Graft function Excellent Labs up to date and liver tests normal.  IS Tac. ok to run levels closer to 3-5. Continue monitoring labs and IS level every 3 months to assess for appropriate dosing and side effects from IS including BLAS, pancytopenia, hyperkalemia, hypomagnesemia.    Anemia 2/2 Transplant. Hgb ~13. Stable.  Thrombocytopenia 2/2 prior liver disease. Plt ~120-140. Stable  CKD Will lower IS/  Weight gain Continues to struggle with his weight, now 280 pounds  Skin cancer screening Derm 8/20/20. Due 1 y       Depression sees counselor. On Zoloft and Wellbutrin. DOing well.  Sexual health Ability to obtain.maintin erection should not be affected by tac. Ok to use Viagra if that is what his PCP recommends.  Proph Has had Covid vaccine x 2.     RTC 12 months.        Andreina Templeton MD  Hepatology/ Liver Transplant  [facility]  ===================================================================  PCP: Dr. Eliud Azul, [place], [facility].      SUBJECTIVE  Mr. Sosa is a 30 Y M s/p DDLT 6/28/17 for ETOH.     He is doing well. He moved in with a friend out from his mom's house in August. His roommate is sober. He has a girlfriend now. Joe has been sober for 4 years.    Asks if it is ok to use Viagra.    EXPLANT: ETOH cirrhosis with suggestion underlying A1AT. He is phenotype M2Z  IS: Prograf goal level 3-5. Last level 7.7 3/12/21. Was 2/3 now 2/2.  LABS: Up to date and normal liver tests.  REJECTION: None.  BILIARY ISSUES: None  STENT: Out in AXR 8/11/17  KIDNEY FUNCTION: creat had been 1.4-1.6. Lately 1.8.  Thinks with mask wearing he is drinking less water.  BP: Good. No meds.  PREV: Derm August 2020. Flu shot yearly.   DISEASE RECURRENCE: Not drinking, 4 years sober. ETG neg.   OTHER ISSUES:   His biggest issues have been depression (better,  "on Zoloft and Wellbutrin) and weight gain  CMV neg recip and CMV pos donor.   Some heartburn and started taking PPI prn rarely.     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  Exam  Gen Alert pleasant NAD  Resp No difficulty breathing. No cough  Skin No Jaundice  Eyes No icterus  Neuro BARR  MSK no muscle wasting  Psyche Pleasant, appropriate. Well groomed.  Joe Sosa is a 30 year old male who is being evaluated via a billable video visit.      The patient has been notified of following:   \"This video visit will be conducted via a call between you and your physician/provider. We have found that certain health care needs can be provided without the need for an in-person physical exam.  This service lets us provide the care you need with a video conversation.  If a prescription is necessary we can send it directly to your pharmacy.  If lab work is needed we can place an order for that and you can then stop by our lab to have the test done at a later time. Video visits are billed at different rates depending on your insurance coverage.  Please reach out to your insurance provider with any questions.  If during the course of the call the physician/provider feels a video visit is not appropriate, you will not be charged for this service.\"   Patient has given verbal consent for Video visit? Yes      Type of service:  Video Visit  Video Start Time: 1004  Video End Time 1022  Patient location: work  Will anyone else be joining your video visit? No  {If patient encounters technical issues they should call [phone] :1  Distant Location (provider location):  [facility] HEPATOLOGY CLINIC [place]   Mode of Communication:  Video Conference via [employer]  I have reviewed and updated the patient's Past Medical History, Social History, Family History and Medication List.          "

## 2021-04-05 ENCOUNTER — TELEPHONE (OUTPATIENT)
Dept: TRANSPLANT | Facility: CLINIC | Age: 31
End: 2021-04-05

## 2021-04-05 NOTE — TELEPHONE ENCOUNTER
Patient Call: Voicemail  Date/Time: 040521 10:52 am  Reason for call: Patient had Tacro levels done on 3/26, has not received results.  Please call him at [phone].

## 2021-04-05 NOTE — TELEPHONE ENCOUNTER
Returned Joe's call. No TAC level resulted for [facility]. He has a PRN order and will schedule an upcoming appt for TAC level only.

## 2021-04-07 ENCOUNTER — TELEPHONE (OUTPATIENT)
Dept: TRANSPLANT | Facility: CLINIC | Age: 31
End: 2021-04-07

## 2021-04-07 PROCEDURE — 80197 ASSAY OF TACROLIMUS: CPT | Performed by: INTERNAL MEDICINE

## 2021-04-07 NOTE — TELEPHONE ENCOUNTER
Patient Call: Voicemail  Date/Time: 4/6/2021  9132  Reason for call: fax new lab orders to [facility] [place]  Questions call pt

## 2021-04-07 NOTE — LETTER
OUTPATIENT OR TRANSITIONAL CARE  LABORATORY TEST ORDER  [facility], [place]/Patient copy  Fax#[phone]     Patient Name:    Joe Sosa                                            Transplant Date:   6/28/2017   YOB: 1990                                             Issue Date:            04/07/2021   [facility] MR#:    [id number]                                           Expiration Date:   04/07/2022        Diagnoses:            [x]?      Liver Transplant (ICD-10 Z94.4)                                 [x]?      Long term use of medications (ICD-10 Z79.899)      Please fax results to [phone]     Frequency: every 2-3 months and PRN        [x]?         CBC with Platelets   [x]?         Basic Metabolic Panel (Sodium, Potassium, Chloride, CO2, Creatinine, Urea Nitrogen,  [x]?         Hepatic panel (Albumin, Alk Phos, ALT, AST, Direct and Total Bili)  [x]?         Tacrolimus drug level - 12 hour trough, please document time of last dose      Other Frequency: annually due April 2021  [x]?         Fasting lipid panel  [x]?         Random urine protein  [x]?         Urinalysis with reflex to micro    Other Frequency: annually due July 2021  [x]?         Phoshatidylethanol (PeTH)    If you have questions, please call [phone] or [phone].    .

## 2021-04-07 NOTE — TELEPHONE ENCOUNTER
Patient Call: Transplant Lab/Orders  Route to LPN  Post Transplant Days: 1379  When patient is less than 60 days post-transplant, route high priority    Reason for Call: Annual lab reorder to [facility] [place]   Callback needed? No

## 2021-04-08 LAB
TACROLIMUS BLD-MCNC: 4.9 UG/L (ref 5–15)
TME LAST DOSE: ABNORMAL H

## 2021-04-24 ENCOUNTER — HEALTH MAINTENANCE LETTER (OUTPATIENT)
Age: 31
End: 2021-04-24

## 2021-06-11 ENCOUNTER — TELEPHONE (OUTPATIENT)
Dept: PHARMACY | Facility: CLINIC | Age: 31
End: 2021-06-11

## 2021-06-11 NOTE — TELEPHONE ENCOUNTER
Clinical Pharmacy Consult:                                                      Transplant Specific:  4 year post transplant medication review  Date of Transplant: 06/28/2017  Type of Transplant: liver  First Transplant: yes  History of rejection: no    Immunosuppression Regimen   TAC 2mg qAM & 2mg qPM  Patient specific goal: 3-5  Most recent level: 4.9, date 4/7/21  Immunosuppressant Levels:  Therapeutic  Pt adherent to lab draws: yes  Scr:   Lab Results   Component Value Date    CR 1.38 06/28/2019     Side effects: no side effects    Prophylactic Medications  Antibacterial:  Completed  Scheduled Discontinue Date: N/A    Antifungal: Not needed thus far  Scheduled Discontinue Date: N/A    Antiviral: Valcyte completed   Scheduled Discontinue Date: Therapy Complete    Acid Reducer: Prilosec not on  Scheduled Reviewed Date: N/A    Thrombosis Prevention: not on  Scheduled Discontinue Date:      Blood Pressure Management  Frequency of home Blood Pressure checks: rarely  Most recent home BP: did not ask  Patient Blood pressure goal: <140/90  Patient blood pressure at goal:  yes  Hospitalizations/ER visits since last assessment: 0      Med rec/DUR performed: yes  Med Rec Discrepancies: yes  Not on omeprazole or sertraline    Medication adherence flowsheet 6/11/2021   Patient medication administration: Responsible for own medications   Patient estimated adherence level: %   Pharmacist assessment of adherence: Excellent   Patient reported doses missed per week: 0-1   Facilitators to medication adherence  Schedule/routine      Medication access flowsheet 6/11/2021   Number of pharmacies used: 1   Pharmacy: [facility]   Enrolled in [facility] pharmacy? Yes      Joe is feeling good.  He is only on 3 meds.  No questions or concerns opt out of 5 year call.    Adrian Carmona [facility]

## 2021-08-19 DIAGNOSIS — Z94.4 LIVER TRANSPLANT RECIPIENT (H): ICD-10-CM

## 2021-08-19 DIAGNOSIS — Z79.60 LONG-TERM USE OF IMMUNOSUPPRESSANT MEDICATION: ICD-10-CM

## 2021-08-19 NOTE — TELEPHONE ENCOUNTER
ISSUE:   Tacrolimus IR level 6 on 8/16/2021, goal 3-5, dose 2 mg BID.    PLAN:   Please call patient and confirm this was an accurate 12-hour trough. Verify Tacrolimus IR dose 2 mg BID. Confirm no new medications or illness. Confirm no missed doses. If accurate trough and accurate dose, decrease Tacrolimus IR dose to 1 mg every AM and 2 mg every PM and repeat level with next scheduled lab draw in 2-3 months.     Carley Antony RN      OUTCOME:   Spoke with patient, they confirm accurate trough level and current dose 2 mg BID. Patient confirmed dose change to 1 mg am, 2 mg pm and to repeat labs in 2-3 months. Orders sent to preferred pharmacy for dose change and lab for repeat labs. Patient voiced understanding of plan.     Jasmin Greene LPN

## 2021-08-20 RX ORDER — TACROLIMUS 1 MG/1
CAPSULE ORAL
Qty: 270 CAPSULE | Refills: 3 | Status: SHIPPED | OUTPATIENT
Start: 2021-08-20 | End: 2022-09-07

## 2021-10-09 ENCOUNTER — HEALTH MAINTENANCE LETTER (OUTPATIENT)
Age: 31
End: 2021-10-09

## 2022-04-14 ENCOUNTER — TELEPHONE (OUTPATIENT)
Dept: TRANSPLANT | Facility: CLINIC | Age: 32
End: 2022-04-14
Payer: COMMERCIAL

## 2022-04-14 NOTE — TELEPHONE ENCOUNTER
Called Joe back regarding OTC cold medications. Per Jose, stay away from NSAIDS, all other OTC medications are acceptable.    Patient states he took an at home COVID test which was negative.

## 2022-05-21 ENCOUNTER — HEALTH MAINTENANCE LETTER (OUTPATIENT)
Age: 32
End: 2022-05-21

## 2022-07-13 ENCOUNTER — DOCUMENTATION ONLY (OUTPATIENT)
Dept: TRANSPLANT | Facility: CLINIC | Age: 32
End: 2022-07-13

## 2022-07-13 NOTE — Clinical Note
Please send letter reminding to schedule an appointment to see a liver doctor (hepatologist) here every year and to have lab testing every 3 months.

## 2022-07-13 NOTE — PROGRESS NOTES
Annual chart review completed.         Pt is not up to date with post transplant follow-up.       Letter sent reminding to schedule an appointment to see a liver doctor (hepatologist) here every year and to have lab testing every 3 months.

## 2022-07-14 ENCOUNTER — TELEPHONE (OUTPATIENT)
Dept: TRANSPLANT | Facility: CLINIC | Age: 32
End: 2022-07-14

## 2022-08-03 DIAGNOSIS — Z94.4 LIVER REPLACED BY TRANSPLANT (H): Primary | ICD-10-CM

## 2022-08-03 DIAGNOSIS — Z13.220 LIPID SCREENING: ICD-10-CM

## 2022-08-03 DIAGNOSIS — K70.31 ALCOHOLIC CIRRHOSIS OF LIVER WITH ASCITES (H): ICD-10-CM

## 2022-08-08 ENCOUNTER — VIRTUAL VISIT (OUTPATIENT)
Dept: GASTROENTEROLOGY | Facility: CLINIC | Age: 32
End: 2022-08-08
Attending: INTERNAL MEDICINE
Payer: COMMERCIAL

## 2022-08-08 DIAGNOSIS — Z94.4 S/P LIVER TRANSPLANT (H): Primary | ICD-10-CM

## 2022-08-08 PROCEDURE — 99214 OFFICE O/P EST MOD 30 MIN: CPT | Mod: 95 | Performed by: INTERNAL MEDICINE

## 2022-08-08 RX ORDER — SILDENAFIL 100 MG/1
TABLET, FILM COATED ORAL
COMMUNITY
Start: 2022-03-10

## 2022-08-08 NOTE — LETTER
8/8/2022         RE: Joe Sosa  [address]  [place] [address]        Dear Colleague,    Thank you for referring your patient, Joe Sosa, to the [facility] HEPATOLOGY CLINIC [place]. Please see a copy of my visit note below.    [facility]  LIVER CLINIC FOLLOW UP      A/P  Mr. Sosa is a 32 Y M s/p DDLT June 2017 for ETOH, A1AT M2Z. From LT standpoint doing well.    Graft function TB a little elevated. Will get US.   IS Tac. ok to run levels closer to 3-5. Continue monitoring labs and IS level every 3 months to assess for appropriate dosing and side effects from IS including BLAS, pancytopenia, hyperkalemia, hypomagnesemia.    Anemia 2/2 Transplant. Hgb ~13. Stable.  Thrombocytopenia 2/2 prior liver disease. Plt ~120-140. Stable  CKD Stable since LT. GFR 50-70  Weight gain Continues to struggle with his weight, now 272 pounds. Discussed Mediterranean diet and intermittent fasting. Discussed flour and sugar.  Skin cancer screening Derm yearly. UTD. Will see this month.       Depression sees counselor. On Wellbutrin. DOing well.  Sexual health Ok to use Viagra if that is what his PCP recommends.  Proph Has had Covid vaccine x 4.     RTC 12 months.        Andreina Templeton MD  Hepatology/ Liver Transplant  [facility]  ===================================================================  PCP: Dr. Eliud Azul, [place], [facility].      SUBJECTIVE  Mr. Sosa is a 32 Y M s/p DDLT 6/28/17 for ETOH.     He is doing well. No medical issues. He has not had Covid as far as he knows.    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     Asks if it is ok for Mediterranean diet and for intermittent fasting.  EXPLANT: ETOH cirrhosis with suggestion underlying A1AT. He is phenotype M2Z  IS: Prograf goal level 3-5. Last level 5.4 8/3/22  LABS: Up to date 8/3/22. TB 1.7. Slightly elevated for the last year.    REJECTION:  "None.  BILIARY ISSUES: None  STENT: Out in AXR 8/11/17  KIDNEY FUNCTION: creat ~1.6 stable since LT.  BP: Good. No meds.  PREV: Derm August 2020. Flu shot yearly.   DISEASE RECURRENCE: Not drinking, 5 years sober.   OTHER ISSUES:   His biggest issue has been weight gain  CMV neg recip and CMV pos donor.      SOC: Back to teaching.   ROS: 10 point ROS neg other than the symptoms noted above in the HPI.  Exam  Gen Alert pleasant NAD  Resp No difficulty breathing. No cough  Skin No Jaundice  Eyes No icterus  Neuro BARR  MSK no muscle wasting  Psyche Pleasant, appropriate. Well groomed.  Joe Sosa is a 30 year old male who is being evaluated via a billable video visit.      The patient has been notified of following:   \"This video visit will be conducted via a call between you and your physician/provider. We have found that certain health care needs can be provided without the need for an in-person physical exam.  This service lets us provide the care you need with a video conversation.  If a prescription is necessary we can send it directly to your pharmacy.  If lab work is needed we can place an order for that and you can then stop by our lab to have the test done at a later time. Video visits are billed at different rates depending on your insurance coverage.  Please reach out to your insurance provider with any questions.  If during the course of the call the physician/provider feels a video visit is not appropriate, you will not be charged for this service.\"   Patient has given verbal consent for Video visit? Yes      Type of service:  Video Visit  Video Start Time: 1136  Video End Time 1156  Patient location: work  Will anyone else be joining your video visit? No  {If patient encounters technical issues they should call [phone] :1  Distant Location (provider location):  [facility] HEPATOLOGY CLINIC [place]   Mode of Communication:  Video Conference via [employer]  I have reviewed and updated the " patient's Past Medical History, Social History, Family History and Medication List.                Again, thank you for allowing me to participate in the care of your patient.        Sincerely,        Andreina Templeton MD

## 2022-08-08 NOTE — PROGRESS NOTES
[facility]  LIVER CLINIC FOLLOW UP      A/P  Mr. Sosa is a 32 Y M s/p DDLT June 2017 for ETOH, A1AT M2Z. From LT standpoint doing well.    Graft function TB a little elevated. Will get US.   IS Tac. ok to run levels closer to 3-5. Continue monitoring labs and IS level every 3 months to assess for appropriate dosing and side effects from IS including BLAS, pancytopenia, hyperkalemia, hypomagnesemia.    Anemia 2/2 Transplant. Hgb ~13. Stable.  Thrombocytopenia 2/2 prior liver disease. Plt ~120-140. Stable  CKD Stable since LT. GFR 50-70  Weight gain Continues to struggle with his weight, now 272 pounds. Discussed Mediterranean diet and intermittent fasting. Discussed flour and sugar.  Skin cancer screening Derm yearly. UTD. Will see this month.       Depression sees counselor. On Wellbutrin. DOing well.  Sexual health Ok to use Viagra if that is what his PCP recommends.  Proph Has had Covid vaccine x 4.     RTC 12 months.        Andreina Templeton MD  Hepatology/ Liver Transplant  [facility]  ===================================================================  PCP: Dr. Eliud Azul, [place], [facility].      SUBJECTIVE  Mr. Sosa is a 32 Y M s/p DDLT 6/28/17 for ETOH.     He is doing well. No medical issues. He has not had Covid as far as he knows.    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     Asks if it is ok for Mediterranean diet and for intermittent fasting.  EXPLANT: ETOH cirrhosis with suggestion underlying A1AT. He is phenotype M2Z  IS: Prograf goal level 3-5. Last level 5.4 8/3/22  LABS: Up to date 8/3/22. TB 1.7. Slightly elevated for the last year.    REJECTION: None.  BILIARY ISSUES: None  STENT: Out in AXR 8/11/17  KIDNEY FUNCTION: creat ~1.6 stable since LT.  BP: Good. No meds.  PREV: Derm August 2020. Flu shot yearly.   DISEASE RECURRENCE: Not drinking, 5 years sober.   OTHER ISSUES:   His biggest issue has  "been weight gain  CMV neg recip and CMV pos donor.      SOC: Back to teaching.   ROS: 10 point ROS neg other than the symptoms noted above in the HPI.  Exam  Gen Alert pleasant NAD  Resp No difficulty breathing. No cough  Skin No Jaundice  Eyes No icterus  Neuro BARR  MSK no muscle wasting  Psyche Pleasant, appropriate. Well groomed.  Joe Sosa is a 30 year old male who is being evaluated via a billable video visit.      The patient has been notified of following:   \"This video visit will be conducted via a call between you and your physician/provider. We have found that certain health care needs can be provided without the need for an in-person physical exam.  This service lets us provide the care you need with a video conversation.  If a prescription is necessary we can send it directly to your pharmacy.  If lab work is needed we can place an order for that and you can then stop by our lab to have the test done at a later time. Video visits are billed at different rates depending on your insurance coverage.  Please reach out to your insurance provider with any questions.  If during the course of the call the physician/provider feels a video visit is not appropriate, you will not be charged for this service.\"   Patient has given verbal consent for Video visit? Yes      Type of service:  Video Visit  Video Start Time: 1136  Video End Time 1156  Patient location: work  Will anyone else be joining your video visit? No  {If patient encounters technical issues they should call [phone] :1  Distant Location (provider location):  [facility] HEPATOLOGY CLINIC [place]   Mode of Communication:  Video Conference via [employer]  I have reviewed and updated the patient's Past Medical History, Social History, Family History and Medication List.            "

## 2022-09-07 DIAGNOSIS — Z79.60 LONG-TERM USE OF IMMUNOSUPPRESSANT MEDICATION: ICD-10-CM

## 2022-09-07 DIAGNOSIS — Z94.4 LIVER TRANSPLANT RECIPIENT (H): ICD-10-CM

## 2022-09-07 RX ORDER — TACROLIMUS 1 MG/1
CAPSULE ORAL
Qty: 270 CAPSULE | Refills: 3 | Status: SHIPPED | OUTPATIENT
Start: 2022-09-07 | End: 2023-08-17

## 2022-09-11 ENCOUNTER — HEALTH MAINTENANCE LETTER (OUTPATIENT)
Age: 32
End: 2022-09-11

## 2023-08-17 DIAGNOSIS — Z94.4 LIVER TRANSPLANT RECIPIENT (H): ICD-10-CM

## 2023-08-17 DIAGNOSIS — Z79.60 LONG-TERM USE OF IMMUNOSUPPRESSANT MEDICATION: ICD-10-CM

## 2023-08-17 DIAGNOSIS — Z13.220 LIPID SCREENING: ICD-10-CM

## 2023-08-17 DIAGNOSIS — Z94.4 LIVER REPLACED BY TRANSPLANT (H): Primary | ICD-10-CM

## 2023-08-17 RX ORDER — TACROLIMUS 1 MG/1
CAPSULE ORAL
Qty: 270 CAPSULE | Refills: 0 | Status: SHIPPED | OUTPATIENT
Start: 2023-08-17 | End: 2023-11-16

## 2023-09-01 DIAGNOSIS — Z94.4 LIVER REPLACED BY TRANSPLANT (H): Primary | ICD-10-CM

## 2023-09-01 DIAGNOSIS — Z13.220 LIPID SCREENING: ICD-10-CM

## 2023-10-07 ENCOUNTER — HEALTH MAINTENANCE LETTER (OUTPATIENT)
Age: 33
End: 2023-10-07

## 2023-10-09 ENCOUNTER — LAB (OUTPATIENT)
Dept: LAB | Facility: CLINIC | Age: 33
End: 2023-10-09
Payer: COMMERCIAL

## 2023-10-09 ENCOUNTER — TELEPHONE (OUTPATIENT)
Dept: TRANSPLANT | Facility: CLINIC | Age: 33
End: 2023-10-09
Payer: COMMERCIAL

## 2023-10-09 DIAGNOSIS — R19.7 DIARRHEA, UNSPECIFIED TYPE: ICD-10-CM

## 2023-10-09 DIAGNOSIS — Z94.4 LIVER REPLACED BY TRANSPLANT (H): Primary | ICD-10-CM

## 2023-10-09 DIAGNOSIS — Z94.4 LIVER REPLACED BY TRANSPLANT (H): ICD-10-CM

## 2023-10-09 LAB

## 2023-10-09 PROCEDURE — 87493 C DIFF AMPLIFIED PROBE: CPT

## 2023-10-09 NOTE — TELEPHONE ENCOUNTER
Joe called regarding diarrhea, wondering what he can take for it. He has had watery diarrhea for the past 2 days, thought it was getting better, but then had 4 episodes from [id number] today. He vomited once 2 days ago, but not since, some nausea, no abdominal pain or cramping. Advised Joe to get stool studies done and to hold off on taking any medications until results come back. C. Diff and enteric bacteria/viral studies ordered. He will  kit from [place] lab today. Reminded Joe to stay well hydrated.

## 2023-11-16 DIAGNOSIS — Z79.60 LONG-TERM USE OF IMMUNOSUPPRESSANT MEDICATION: ICD-10-CM

## 2023-11-16 DIAGNOSIS — Z94.4 LIVER TRANSPLANT RECIPIENT (H): ICD-10-CM

## 2023-11-16 RX ORDER — TACROLIMUS 1 MG/1
CAPSULE ORAL
Qty: 270 CAPSULE | Refills: 3 | Status: SHIPPED | OUTPATIENT
Start: 2023-11-16

## 2023-11-27 ENCOUNTER — TELEPHONE (OUTPATIENT)
Dept: TRANSPLANT | Facility: CLINIC | Age: 33
End: 2023-11-27
Payer: COMMERCIAL

## 2023-11-27 NOTE — TELEPHONE ENCOUNTER
"Joe called to ask about a few treatments for low testosterone that an outside provider is thinking about trying. The treatments are:  Enclomiphene, HCG injections, Dim, and ashwaganda. The fertility provider wants to be sure that any of these treatments would be ok given Joe's transplant history. Message to Jose Palomino.    Message back from Jose:  \"Enclomiphene:    Clomiphene has been evaluated for use in males with infertility; however, additional studies are needed to determine efficacy and dosing (AUA/ASRM [Valente 2021]; Huijben 2022; Puia 2022). Clomiphene is not approved for the treatment of male infertility; testicular tumors and gynecomastia have been observed following use of clomiphene in males.  Use is contraindicated in patients with history of liver dysfunction  No drug interactions     Ashwaganda: Has cases of acute liver injury, probably would avoid.     DIM: Diindolylmethane  Possibly unsafe with doses over 600mg per day due to hyponatremia  Possible safe with doses of 45mg to 140mg daily    HCG injections   Experienced physician: These medications should only be used by physicians who are thoroughly familiar with infertility problems and their management.  no drug interactions\"    Call to Joe to relay the above information. He appreciated the information and asked if Jose has any suggestions for treatment of low testosterone. Message to Jose and let Joe know that when Jose reaches out, [facility] will send [employer] message with his response.   "

## 2024-02-06 ENCOUNTER — OFFICE VISIT (OUTPATIENT)
Dept: GASTROENTEROLOGY | Facility: CLINIC | Age: 34
End: 2024-02-06
Attending: INTERNAL MEDICINE
Payer: COMMERCIAL

## 2024-02-06 ENCOUNTER — LAB (OUTPATIENT)
Dept: LAB | Facility: CLINIC | Age: 34
End: 2024-02-06
Attending: INTERNAL MEDICINE
Payer: COMMERCIAL

## 2024-02-06 VITALS
OXYGEN SATURATION: 99 % | WEIGHT: 266.8 LBS | SYSTOLIC BLOOD PRESSURE: 144 MMHG | HEART RATE: 75 BPM | DIASTOLIC BLOOD PRESSURE: 99 MMHG | BODY MASS INDEX: 34.26 KG/M2

## 2024-02-06 DIAGNOSIS — Z94.4 LIVER REPLACED BY TRANSPLANT (H): ICD-10-CM

## 2024-02-06 DIAGNOSIS — Z13.220 LIPID SCREENING: ICD-10-CM

## 2024-02-06 DIAGNOSIS — K92.1 BLOOD IN STOOL: Primary | ICD-10-CM

## 2024-02-06 LAB
ALBUMIN MFR UR ELPH: 23.1 MG/DL
ALBUMIN SERPL BCG-MCNC: 4.2 G/DL (ref 3.5–5.2)
ALBUMIN UR-MCNC: 10 MG/DL
ALP SERPL-CCNC: 72 U/L (ref 40–150)
ALT SERPL W P-5'-P-CCNC: 11 U/L (ref 0–70)
ANION GAP SERPL CALCULATED.3IONS-SCNC: 7 MMOL/L (ref 7–15)
APPEARANCE UR: CLEAR
AST SERPL W P-5'-P-CCNC: 17 U/L (ref 0–45)
BILIRUB DIRECT SERPL-MCNC: 0.21 MG/DL (ref 0–0.3)
BILIRUB SERPL-MCNC: 0.8 MG/DL
BILIRUB UR QL STRIP: NEGATIVE
BUN SERPL-MCNC: 26.4 MG/DL (ref 6–20)
CALCIUM SERPL-MCNC: 8.9 MG/DL (ref 8.6–10)
CHLORIDE SERPL-SCNC: 106 MMOL/L (ref 98–107)
CHOLEST SERPL-MCNC: 132 MG/DL
COLOR UR AUTO: ABNORMAL
CREAT SERPL-MCNC: 1.67 MG/DL (ref 0.67–1.17)
CREAT UR-MCNC: 137 MG/DL
DEPRECATED HCO3 PLAS-SCNC: 26 MMOL/L (ref 22–29)
EGFRCR SERPLBLD CKD-EPI 2021: 54 ML/MIN/1.73M2
ERYTHROCYTE [DISTWIDTH] IN BLOOD BY AUTOMATED COUNT: 13.2 % (ref 10–15)
FASTING STATUS PATIENT QL REPORTED: YES
GLUCOSE SERPL-MCNC: 129 MG/DL (ref 70–99)
GLUCOSE UR STRIP-MCNC: NEGATIVE MG/DL
HCT VFR BLD AUTO: 41 % (ref 40–53)
HDLC SERPL-MCNC: 40 MG/DL
HGB BLD-MCNC: 14.5 G/DL (ref 13.3–17.7)
HGB UR QL STRIP: NEGATIVE
KETONES UR STRIP-MCNC: NEGATIVE MG/DL
LDLC SERPL CALC-MCNC: 66 MG/DL
LEUKOCYTE ESTERASE UR QL STRIP: NEGATIVE
MCH RBC QN AUTO: 32.7 PG (ref 26.5–33)
MCHC RBC AUTO-ENTMCNC: 35.4 G/DL (ref 31.5–36.5)
MCV RBC AUTO: 93 FL (ref 78–100)
MUCOUS THREADS #/AREA URNS LPF: PRESENT /LPF
NITRATE UR QL: NEGATIVE
NONHDLC SERPL-MCNC: 92 MG/DL
PH UR STRIP: 5.5 [PH] (ref 5–7)
PLATELET # BLD AUTO: 154 10E3/UL (ref 150–450)
POTASSIUM SERPL-SCNC: 4.5 MMOL/L (ref 3.4–5.3)
PROT SERPL-MCNC: 6.8 G/DL (ref 6.4–8.3)
PROT/CREAT 24H UR: 0.17 MG/MG CR (ref 0–0.2)
RBC # BLD AUTO: 4.43 10E6/UL (ref 4.4–5.9)
RBC URINE: <1 /HPF
SODIUM SERPL-SCNC: 139 MMOL/L (ref 135–145)
SP GR UR STRIP: 1.02 (ref 1–1.03)
TACROLIMUS BLD-MCNC: 4.8 UG/L (ref 5–15)
TME LAST DOSE: ABNORMAL H
TME LAST DOSE: ABNORMAL H
TRIGL SERPL-MCNC: 131 MG/DL
UROBILINOGEN UR STRIP-MCNC: NORMAL MG/DL
WBC # BLD AUTO: 6.8 10E3/UL (ref 4–11)
WBC URINE: <1 /HPF

## 2024-02-06 PROCEDURE — 36415 COLL VENOUS BLD VENIPUNCTURE: CPT | Performed by: PATHOLOGY

## 2024-02-06 PROCEDURE — 80061 LIPID PANEL: CPT | Performed by: PATHOLOGY

## 2024-02-06 PROCEDURE — 85027 COMPLETE CBC AUTOMATED: CPT | Performed by: PATHOLOGY

## 2024-02-06 PROCEDURE — 80197 ASSAY OF TACROLIMUS: CPT | Performed by: INTERNAL MEDICINE

## 2024-02-06 PROCEDURE — 84156 ASSAY OF PROTEIN URINE: CPT | Performed by: PATHOLOGY

## 2024-02-06 PROCEDURE — 81001 URINALYSIS AUTO W/SCOPE: CPT | Performed by: PATHOLOGY

## 2024-02-06 PROCEDURE — 99214 OFFICE O/P EST MOD 30 MIN: CPT | Performed by: INTERNAL MEDICINE

## 2024-02-06 PROCEDURE — 80053 COMPREHEN METABOLIC PANEL: CPT | Performed by: PATHOLOGY

## 2024-02-06 PROCEDURE — 99213 OFFICE O/P EST LOW 20 MIN: CPT | Performed by: INTERNAL MEDICINE

## 2024-02-06 PROCEDURE — 99000 SPECIMEN HANDLING OFFICE-LAB: CPT | Performed by: PATHOLOGY

## 2024-02-06 PROCEDURE — 82248 BILIRUBIN DIRECT: CPT | Performed by: PATHOLOGY

## 2024-02-06 ASSESSMENT — PAIN SCALES - GENERAL: PAINLEVEL: NO PAIN (0)

## 2024-02-06 NOTE — LETTER
2/6/2024         RE: Joe Sosa  [address] [address]  [address] [place] [id number]        Dear Colleague,    Thank you for referring your patient, Joe Sosa, to the [facility] HEPATOLOGY CLINIC [place]. Please see a copy of my visit note below.    [facility]  LIVER CLINIC FOLLOW UP      A/P  Mr. 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 From LT standpoint doing well.    Graft function Excellent. Normal liver tests.   IS Tac. ok to run levels closer to 3-5. Continue monitoring labs and IS level every 3 months to assess for appropriate dosing and side effects from IS including BLAS, pancytopenia, hyperkalemia, hypomagnesemia.    Blood in stool Maybe 10 episodes in the last year. Will order colonosopy    Anemia 2/2 Transplant. Hgb ~13. Now nl.  Thrombocytopenia 2/2 prior liver disease. Plt ~120-140. Now nl  CKD Stable since LT. GFR 50-70  Weight gain Continues to struggle with his weight, BMI 34. Discussed Mediterranean diet and intermittent fasting. Discussed flour and sugar.  Elevated glucose This was a fasting level discussed preDM  Skin cancer screening Derm yearly. Due.      Depression sees counselor. On Wellbutrin. Doing well.  Sexual health Ok to use Viagra if that is what his PCP recommends. He and wife are trying to conceive.  Proph Has had Covid vaccine x 4.     RTC 12 months in person or video.        Andreina Templeton MD  Hepatology/ Liver Transplant  [facility]  ===================================================================  PCP: Dr. Eliud Azul, [place], [facility].      SUBJECTIVE  Mr. Sosa is a 33 Y M s/p DDLT 6/28/17 for ETOH.     He is doing well. No medical issues.     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     He has had episodes of blood mixed in stool .Maybe 10 this year. No disrreah or cramping.   EXPLANT: ETOH cirrhosis with suggestion underlying A1AT. He is phenotype M2Z  IS: Prograf goal level 3-5. Last  level 5.4 8/3/22  LABS: Up to date 8/3/22. TB 1.7. Slightly elevated for the last year.    REJECTION: None.  BILIARY ISSUES: None  STENT: Out in AXR 8/11/17  KIDNEY FUNCTION: creat ~1.6 stable since LT.  BP: Good. No meds.  PREV: Derm August 2020. Flu shot yearly.   DISEASE RECURRENCE: Not drinking, 5 years sober.   OTHER ISSUES:   His biggest issue has been weight gain  CMV neg recip and CMV pos donor.      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 Wife is a ,  ROS: 10 point ROS neg other than the symptoms noted above in the HPI.  Exam  BP (!) 144/99 (BP Location: Right arm, Patient Position: Sitting, Cuff Size: Adult Large)   Pulse 75   Wt 121 kg (266 lb 12.8 oz)   SpO2 99%   BMI 34.26 kg/m      Gen Alert pleasant NAD  Resp No difficulty breathing. No cough  CV RRR no M  Chest clear B  Skin No Jaundice  Eyes No icterus  Neuro BARR  MSK no muscle wasting  Psyche Pleasant, appropriate. Well groomed.      Again, thank you for allowing me to participate in the care of your patient.        Sincerely,        Andreina Templeton MD

## 2024-02-06 NOTE — NURSING NOTE
Chief Complaint   Patient presents with    RECHECK     Scheduled by pt via phone     BP (!) 144/99 (BP Location: Right arm, Patient Position: Sitting, Cuff Size: Adult Large)   Pulse 75   Wt 121 kg (266 lb 12.8 oz)   SpO2 99%   BMI 34.26 kg/m    Shalini FERREIRA

## 2024-02-06 NOTE — PROGRESS NOTES
[facility] [place]  LIVER CLINIC FOLLOW UP      A/P  Mr. 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 From LT standpoint doing well.    Graft function Excellent. Normal liver tests.   IS Tac. ok to run levels closer to 3-5. Continue monitoring labs and IS level every 3 months to assess for appropriate dosing and side effects from IS including BLAS, pancytopenia, hyperkalemia, hypomagnesemia.    Blood in stool Maybe 10 episodes in the last year. Will order colonosopy    Anemia 2/2 Transplant. Hgb ~13. Now nl.  Thrombocytopenia 2/2 prior liver disease. Plt ~120-140. Now nl  CKD Stable since LT. GFR 50-70  Weight gain Continues to struggle with his weight, BMI 34. Discussed Mediterranean diet and intermittent fasting. Discussed flour and sugar.  Elevated glucose This was a fasting level discussed preDM  Skin cancer screening Derm yearly. Due.      Depression sees counselor. On Wellbutrin. Doing well.  Sexual health Ok to use Viagra if that is what his PCP recommends. He and wife are trying to conceive.  Proph Has had Covid vaccine x 4.     RTC 12 months in person or video.        Andreina Templeton MD  Hepatology/ Liver Transplant  [facility]  ===================================================================  PCP: Dr. Eliud Azul, [place], [facility].      SUBJECTIVE  Mr. Sosa is a 33 Y M s/p DDLT 6/28/17 for ETOH.     He is doing well. No medical issues.     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     He has had episodes of blood mixed in stool .Maybe 10 this year. No disrreah or cramping.   EXPLANT: ETOH cirrhosis with suggestion underlying A1AT. He is phenotype M2Z  IS: Prograf goal level 3-5. Last level 5.4 8/3/22  LABS: Up to date 8/3/22. TB 1.7. Slightly elevated for the last year.    REJECTION: None.  BILIARY ISSUES: None  STENT: Out in AXR 8/11/17  KIDNEY FUNCTION: creat ~1.6 stable since LT.  BP: Good. No meds.  PREV: Derm August 2020. Flu shot yearly.    DISEASE RECURRENCE: Not drinking, 5 years sober.   OTHER ISSUES:   His biggest issue has been weight gain  CMV neg recip and CMV pos donor.      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 Wife is a ,  ROS: 10 point ROS neg other than the symptoms noted above in the HPI.  Exam  BP (!) 144/99 (BP Location: Right arm, Patient Position: Sitting, Cuff Size: Adult Large)   Pulse 75   Wt 121 kg (266 lb 12.8 oz)   SpO2 99%   BMI 34.26 kg/m      Gen Alert pleasant NAD  Resp No difficulty breathing. No cough  CV RRR no M  Chest clear B  Skin No Jaundice  Eyes No icterus  Neuro BARR  MSK no muscle wasting  Psyche Pleasant, appropriate. Well groomed.

## 2024-02-15 ENCOUNTER — TELEPHONE (OUTPATIENT)
Dept: GASTROENTEROLOGY | Facility: CLINIC | Age: 34
End: 2024-02-15
Payer: COMMERCIAL

## 2024-02-15 NOTE — TELEPHONE ENCOUNTER
"Endoscopy Scheduling Screen    Have you had a positive Covid test in the last 14 days?  No    Are you active on MyChart?   Yes    What insurance is in the chart?  Other:  [employer]    Ordering/Referring Provider: NABIL TREJO   (If ordering provider performs procedure, schedule with ordering provider unless otherwise instructed. )    BMI: Estimated body mass index is 34.26 kg/m  as calculated from the following:    Height as of 3/25/19: 1.88 m (6' 2\").    Weight as of 2/6/24: 121 kg (266 lb 12.8 oz).     Sedation Ordered  moderate sedation.   If patient BMI > 50 do not schedule in [facility].    If patient BMI > 45 do not schedule at [facility].    Are you taking methadone or Suboxone?  No    Have you had difficulties, pain, or discomfort during past endoscopy procedures?  No    Are you taking any prescription medications for pain 3 or more times per week?   NO - No RN review required.    Do you have a history of malignant hyperthermia or adverse reaction to anesthesia?  No    (Females) Are you currently pregnant?   No     Have you been diagnosed or told you have pulmonary hypertension?   No    Do you have an LVAD?  No    Have you been told you have moderate to severe sleep apnea?  No    Have you been told you have COPD, asthma, or any other lung disease?  No    Do you have any heart conditions?  No     Have you ever had an organ transplant?   Yes - No [facility] LIVER    Have you ever had or are you awaiting a heart or lung transplant?   No    Have you had a stroke or transient ischemic attack (TIA aka \"mini stroke\" in the last 6 months?   No    Have you been diagnosed with or been told you have cirrhosis of the liver?   No    Are you currently on dialysis?   No    Do you need assistance transferring?   No    BMI: Estimated body mass index is 34.26 kg/m  as calculated from the following:    Height as of 3/25/19: 1.88 m (6' 2\").    Weight as of 2/6/24: 121 kg (266 lb 12.8 oz).     Is patients BMI > 40 and scheduling location " [facility]?  No    Do you take an injectable medication for weight loss or diabetes (excluding insulin)?  No    Do you take the medication Naltrexone?  No    Do you take blood thinners?  No       Prep   Are you currently on dialysis or do you have chronic kidney disease?  No    Do you have a diagnosis of diabetes?  No    Do you have a diagnosis of cystic fibrosis (CF)?  No    On a regular basis do you go 3 -5 days between bowel movements?  No    BMI > 40?  No    Preferred Pharmacy:    [id number] - [facility] [address]  [address] [address]  Phone: [phone] Fax: [phone]    [facility] - [place], [place] - [facility] [address] [facility] [address]  Phone: [phone] Fax: [phone]      Final Scheduling Details   Colonoscopy prep sent?  N/A    Procedure scheduled  Colonoscopy    Surgeon:  LEVENTHAL     Date of procedure:  6/26/24     Pre-OP / PAC:   No - Not required for this site.    Location  [facility] - Per order.    Sedation   Moderate Sedation - Per order.      Patient Reminders:   You will receive a call from a Nurse to review instructions and health history.  This assessment must be completed prior to your procedure.  Failure to complete the Nurse assessment may result in the procedure being cancelled.      On the day of your procedure, please designate an adult(s) who can drive you home stay with you for the next 24 hours. The medicines used in the exam will make you sleepy. You will not be able to drive.      You cannot take public transportation, ride share services, or non-medical taxi service without a responsible caregiver.  Medical transport services are allowed with the requirement that a responsible caregiver will receive you at your destination.  We require that drivers and caregivers are confirmed prior to your procedure.

## 2024-06-17 ENCOUNTER — TELEPHONE (OUTPATIENT)
Dept: GASTROENTEROLOGY | Facility: CLINIC | Age: 34
End: 2024-06-17
Payer: COMMERCIAL

## 2024-06-17 DIAGNOSIS — K92.1 BLOOD IN STOOL: Primary | ICD-10-CM

## 2024-06-17 RX ORDER — BISACODYL 5 MG/1
TABLET, DELAYED RELEASE ORAL
Qty: 4 TABLET | Refills: 0 | Status: SHIPPED | OUTPATIENT
Start: 2024-06-17

## 2024-06-17 NOTE — TELEPHONE ENCOUNTER
Pre visit planning completed.      Procedure details:    Patient scheduled for Colonoscopy  on 6/26/24.     Arrival time: 0645. Procedure time 0745    [facility] [address] [place] [address], 5[address] Floor, [place] [address]. Check in location: 5th Floor.    Sedation type: Conscious sedation     Pre op exam needed? N/A    Indication for procedure:   Blood in stool            Chart review:     Electronic implanted devices? No    Recent diagnosis of diverticulitis within the last 6 weeks? No    Diabetic? No      Medication review:    Anticoagulants? No    NSAIDS? No    Other medication HOLDING recommendations:  N/A      Prep for procedure:     Bowel prep recommendation: Standard Golytely. Bowel prep prescription sent to    [facility] #[id number] [place] [address] AT [facility]    Due to: CKD noted.     Prep instructions sent via [employer]         Neva Christian RN  Endoscopy Procedure Pre Assessment RN  [phone] option 4   Gastrointestinal stress ulcer prophylaxis and DVT prophylaxis administered

## 2024-06-18 NOTE — TELEPHONE ENCOUNTER
Attempted to contact patient in order to complete pre assessment questions.     Patient scheduled for Colonoscopy  on 6/26/24.     No answer. Left message to return call to [phone] option 4    Callback required communication sent via [employer].        Neva Christian RN  Endoscopy Procedure Pre Assessment

## 2024-06-19 NOTE — TELEPHONE ENCOUNTER
Pre assessment completed for upcoming procedure.   (Please see previous telephone encounter notes for complete details)    Patient  returned call.       Procedure details:    Arrival time and facility location reviewed.    Pre op exam needed? N/A    Designated  policy reviewed. Instructed to have someone stay 6  hours post procedure.       Medication review:    Medications reviewed. Please see supporting documentation below. Holding recommendations discussed (if applicable).       Prep for procedure:     Procedure prep instructions reviewed.        Any additional information needed:  N/A      Patient  verbalized understanding and had no questions or concerns at this time.      Wilma Jenkins RN  Endoscopy Procedure Pre Assessment   [phone] option 4

## 2024-06-25 ENCOUNTER — TELEPHONE (OUTPATIENT)
Dept: GASTROENTEROLOGY | Facility: CLINIC | Age: 34
End: 2024-06-25
Payer: COMMERCIAL

## 2024-06-25 PROBLEM — K62.5 RECTAL BLEEDING: Status: ACTIVE | Noted: 2024-06-25

## 2024-06-25 NOTE — TELEPHONE ENCOUNTER
The patient had received a reminder call regarding the 6/26/24 procedure at the [facility], he wanted to make certain that he did not need to call anyone back regarding this.     The  explained that RNs on site typically provide a reminder call the day before, and no callback is required.    PA call was completed on 6/19/24.

## 2024-06-26 ENCOUNTER — HOSPITAL ENCOUNTER (OUTPATIENT)
Facility: AMBULATORY SURGERY CENTER | Age: 34
Discharge: HOME | End: 2024-06-26
Attending: INTERNAL MEDICINE | Admitting: INTERNAL MEDICINE
Payer: COMMERCIAL

## 2024-06-26 VITALS
RESPIRATION RATE: 14 BRPM | HEIGHT: 74 IN | WEIGHT: 270 LBS | SYSTOLIC BLOOD PRESSURE: 135 MMHG | BODY MASS INDEX: 34.65 KG/M2 | TEMPERATURE: 97.3 F | HEART RATE: 82 BPM | DIASTOLIC BLOOD PRESSURE: 92 MMHG | OXYGEN SATURATION: 96 %

## 2024-06-26 DIAGNOSIS — K62.5 RECTAL BLEEDING: Primary | ICD-10-CM

## 2024-06-26 LAB — COLONOSCOPY: NORMAL

## 2024-06-26 PROCEDURE — 88305 TISSUE EXAM BY PATHOLOGIST: CPT | Mod: 26 | Performed by: PATHOLOGY

## 2024-06-26 PROCEDURE — 45385 COLONOSCOPY W/LESION REMOVAL: CPT | Performed by: INTERNAL MEDICINE

## 2024-06-26 PROCEDURE — 88305 TISSUE EXAM BY PATHOLOGIST: CPT | Mod: TC | Performed by: INTERNAL MEDICINE

## 2024-06-26 PROCEDURE — 45380 COLONOSCOPY AND BIOPSY: CPT | Mod: 59 | Performed by: INTERNAL MEDICINE

## 2024-06-26 RX ORDER — LIDOCAINE 40 MG/G
CREAM TOPICAL
Status: DISCONTINUED | OUTPATIENT
Start: 2024-06-26 | End: 2024-06-26 | Stop reason: HOSPADM

## 2024-06-26 RX ORDER — ONDANSETRON 2 MG/ML
4 INJECTION INTRAMUSCULAR; INTRAVENOUS
Status: DISCONTINUED | OUTPATIENT
Start: 2024-06-26 | End: 2024-06-26 | Stop reason: HOSPADM

## 2024-06-26 RX ORDER — PROCHLORPERAZINE MALEATE 10 MG
10 TABLET ORAL EVERY 6 HOURS PRN
Status: DISCONTINUED | OUTPATIENT
Start: 2024-06-26 | End: 2024-06-27 | Stop reason: HOSPADM

## 2024-06-26 RX ORDER — ONDANSETRON 2 MG/ML
4 INJECTION INTRAMUSCULAR; INTRAVENOUS EVERY 6 HOURS PRN
Status: DISCONTINUED | OUTPATIENT
Start: 2024-06-26 | End: 2024-06-27 | Stop reason: HOSPADM

## 2024-06-26 RX ORDER — ONDANSETRON 4 MG/1
4 TABLET, ORALLY DISINTEGRATING ORAL EVERY 6 HOURS PRN
Status: DISCONTINUED | OUTPATIENT
Start: 2024-06-26 | End: 2024-06-27 | Stop reason: HOSPADM

## 2024-06-26 RX ORDER — FLUMAZENIL 0.1 MG/ML
0.2 INJECTION, SOLUTION INTRAVENOUS
Status: ACTIVE | OUTPATIENT
Start: 2024-06-26 | End: 2024-06-26

## 2024-06-26 RX ORDER — NALOXONE HYDROCHLORIDE 0.4 MG/ML
0.4 INJECTION, SOLUTION INTRAMUSCULAR; INTRAVENOUS; SUBCUTANEOUS
Status: DISCONTINUED | OUTPATIENT
Start: 2024-06-26 | End: 2024-06-27 | Stop reason: HOSPADM

## 2024-06-26 RX ORDER — FENTANYL CITRATE 50 UG/ML
INJECTION, SOLUTION INTRAMUSCULAR; INTRAVENOUS DAILY PRN
Status: DISCONTINUED | OUTPATIENT
Start: 2024-06-26 | End: 2024-06-26 | Stop reason: HOSPADM

## 2024-06-26 RX ORDER — NALOXONE HYDROCHLORIDE 0.4 MG/ML
0.2 INJECTION, SOLUTION INTRAMUSCULAR; INTRAVENOUS; SUBCUTANEOUS
Status: DISCONTINUED | OUTPATIENT
Start: 2024-06-26 | End: 2024-06-27 | Stop reason: HOSPADM

## 2024-06-26 RX ORDER — UBIDECARENONE 100 MG
100 CAPSULE ORAL DAILY
COMMUNITY

## 2024-06-27 LAB
PATH REPORT.COMMENTS IMP SPEC: NORMAL
PATH REPORT.COMMENTS IMP SPEC: NORMAL
PATH REPORT.FINAL DX SPEC: NORMAL
PATH REPORT.GROSS SPEC: NORMAL
PATH REPORT.MICROSCOPIC SPEC OTHER STN: NORMAL
PATH REPORT.RELEVANT HX SPEC: NORMAL
PHOTO IMAGE: NORMAL

## 2024-09-26 DIAGNOSIS — Z94.4 LIVER REPLACED BY TRANSPLANT (H): Primary | ICD-10-CM

## 2024-11-12 DIAGNOSIS — Z79.60 LONG-TERM USE OF IMMUNOSUPPRESSANT MEDICATION: ICD-10-CM

## 2024-11-12 DIAGNOSIS — Z94.4 LIVER TRANSPLANT RECIPIENT (H): ICD-10-CM

## 2024-11-12 RX ORDER — TACROLIMUS 1 MG/1
CAPSULE ORAL
Qty: 270 CAPSULE | Refills: 3 | Status: SHIPPED | OUTPATIENT
Start: 2024-11-12

## 2024-11-30 ENCOUNTER — HEALTH MAINTENANCE LETTER (OUTPATIENT)
Age: 34
End: 2024-11-30

## 2024-12-03 NOTE — TELEPHONE ENCOUNTER
I did not see it. I called him and left a VM yesterday. Then called his mom and told her same.   Detail Level: Detailed

## 2025-06-09 ENCOUNTER — DOCUMENTATION ONLY (OUTPATIENT)
Dept: TRANSPLANT | Facility: CLINIC | Age: 35
End: 2025-06-09
Payer: COMMERCIAL

## 2025-06-09 DIAGNOSIS — K70.31 ALCOHOLIC CIRRHOSIS OF LIVER WITH ASCITES (H): ICD-10-CM

## 2025-06-09 NOTE — PROGRESS NOTES
"Received message that Joe is interested in doing whey protein powder to build some muscle. He reports hearing 0.8-1 g/lb of body weight, so 250 grams protein/day.    Sent message to Joe with recommendations for protein intake, based on height, weight, and kidney function. Last eGFR in the 50s (fall of 2024).    HT: 74\" based on note from 2017- no heights on file  WT: 270 lbs (from 6/2024)    IBW: 190 lbs/142% IBW  DW: 210 lbs/95 kg    Estimated Protein Needs:   75-95 grams/day (0.8-1 g/kg) for maintenance needs      "

## 2025-06-12 ENCOUNTER — RESULTS FOLLOW-UP (OUTPATIENT)
Dept: TRANSPLANT | Facility: CLINIC | Age: 35
End: 2025-06-12

## 2025-06-12 DIAGNOSIS — K70.31 ALCOHOLIC CIRRHOSIS OF LIVER WITH ASCITES (H): ICD-10-CM

## (undated) DEVICE — SUCTION TIP YANKAUER STR K87

## (undated) DEVICE — DRAIN JACKSON PRATT RESERVOIR 400ML SU130-1000

## (undated) DEVICE — SU PROLENE 5-0 RB-1DA 36"  8556H

## (undated) DEVICE — LINEN TOWEL PACK X6 WHITE 5487

## (undated) DEVICE — STPL ENDO RELOAD TA 30X2.5MM 010911L

## (undated) DEVICE — Device

## (undated) DEVICE — SPONGE LAP 18X18" X8435

## (undated) DEVICE — WIPES FOLEY CARE SURESTEP PROVON DFC100

## (undated) DEVICE — DEFIB PRO-PADZ LVP LQD GEL ADULT 8900-2105-01

## (undated) DEVICE — LINEN TOWEL PACK X30 5481

## (undated) DEVICE — SURGICEL HEMOSTAT 4X8" 1952

## (undated) DEVICE — RETR VISCERA FISH

## (undated) DEVICE — DRSG TEGADERM 8X12" 1629

## (undated) DEVICE — SU ETHILON 3-0 PS-1 18" 1663H

## (undated) DEVICE — GUIDEWIRE TERUMO .035X180 STR STIFF GS3505

## (undated) DEVICE — CLIP HEMOSTASIS ASSURANCE W16 MM BX00711884

## (undated) DEVICE — CLIP ENDO HEMO-LOC GREEN MED/LG 544230

## (undated) DEVICE — DRAPE IOBAN C-SECTION W/POUCH 30X35" 6657

## (undated) DEVICE — GOWN IMPERVIOUS 2XL BLUE

## (undated) DEVICE — ENDO SNARE POLYPECTOMY OVAL 15MM LOOP SD-240U-15

## (undated) DEVICE — SU PROLENE 4-0 RB-1DA 18" 8757H

## (undated) DEVICE — DRAIN BLAKE 19FR W/TROCAR SIL

## (undated) DEVICE — TUBING SUCTION MEDI-VAC 1/4"X20' N620A

## (undated) DEVICE — CLIP ENDO HEMO-LOC PURPLE LG 544240

## (undated) DEVICE — ESU PENCIL W/COATED BLADE E2450H

## (undated) DEVICE — KIT ENDO FIRST STEP DISINFECTANT 200ML W/POUCH EP-4

## (undated) DEVICE — SU PROLENE 6-0 RB-2DA 30" 8711H

## (undated) DEVICE — SU PROLENE 7-0 BV-1DA 30" 8703H

## (undated) DEVICE — SUCTION MANIFOLD DORNOCH ULTRA CART UL-CL500

## (undated) DEVICE — SU SILK 0 TIE 6X30" A306H

## (undated) DEVICE — ESU HANDPIECE ABC BEND-A-BEAM 6" 134006

## (undated) DEVICE — SOL NACL 0.9% IRRIG 1000ML BOTTLE 2F7124

## (undated) DEVICE — SU PROLENE 3-0 SHDA 36" 8522H

## (undated) DEVICE — DRAPE SLUSH/WARMER 66X44" ORS-320

## (undated) DEVICE — ENDO TRAP POLYP E-TRAP 00711099

## (undated) DEVICE — BLADE CLIPPER SGL USE 9680

## (undated) DEVICE — STPL ENDO TA30 2.5MM MULTIFIRE 010901

## (undated) DEVICE — ENDO FORCEP BX CAPTURA PRO SPIKE G50696

## (undated) DEVICE — SUCTION MANIFOLD NEPTUNE 2 SYS 1 PORT 702-025-000

## (undated) DEVICE — STPL SKIN 35W 059037

## (undated) DEVICE — NDL COUNTER 20CT 31142493

## (undated) DEVICE — SU SILK 3-0 SH CR 8X18" C013D

## (undated) DEVICE — SU PDS II 1 TP-1 54" Z879G

## (undated) DEVICE — KIT ENDO TURNOVER/PROCEDURE CARRY-ON 101822

## (undated) DEVICE — SUCTION TIP YANKAUER W/O VENT K86

## (undated) DEVICE — CATH TRAY FOLEY SURESTEP 16FR W/URINE MTR STATLK LF A303416A

## (undated) DEVICE — TUBE FEEDING 08FR 20" 461701

## (undated) DEVICE — SU PLEDGET SOFT TFE 13MMX7MMX1.5MM D7044

## (undated) DEVICE — SU PROLENE 3-0 SH-1 30" 8762H

## (undated) DEVICE — SPECIMEN CONTAINER 3OZ W/FORMALIN 59901

## (undated) DEVICE — SOL NACL 0.9% IRRIG 3000ML BAG 2B7477

## (undated) DEVICE — SU PROLENE 4-0 SHDA 36" 8521H

## (undated) DEVICE — SU PDS II 6-0 RB-2DA 30" Z149H

## (undated) DEVICE — SUCTION TIP YANKAUER VIAGUARD W/SLEEVE SMP-2006-001SS

## (undated) DEVICE — SU PROLENE 7-0 BV-1DA 18" 8701H

## (undated) DEVICE — TUBE NASOGASTRIC ENTRIFLEX 10FR 43"

## (undated) DEVICE — SOL WATER IRRIG 500ML BOTTLE 2F7113

## (undated) DEVICE — ESU GROUND PAD ADULT W/CORD E7507

## (undated) RX ORDER — ROCURONIUM BROMIDE 50 MG/5 ML
SYRINGE (ML) INTRAVENOUS
Status: DISPENSED
Start: 2017-06-28

## (undated) RX ORDER — FENTANYL CITRATE 50 UG/ML
INJECTION, SOLUTION INTRAMUSCULAR; INTRAVENOUS
Status: DISPENSED
Start: 2017-06-28

## (undated) RX ORDER — PHENYLEPHRINE HCL IN 0.9% NACL 1 MG/10 ML
SYRINGE (ML) INTRAVENOUS
Status: DISPENSED
Start: 2017-06-28

## (undated) RX ORDER — CALCIUM CHLORIDE 100 MG/ML
INJECTION INTRAVENOUS; INTRAVENTRICULAR
Status: DISPENSED
Start: 2017-06-28

## (undated) RX ORDER — ALBUMIN, HUMAN INJ 5% 5 %
SOLUTION INTRAVENOUS
Status: DISPENSED
Start: 2017-06-28

## (undated) RX ORDER — ONDANSETRON 2 MG/ML
INJECTION INTRAMUSCULAR; INTRAVENOUS
Status: DISPENSED
Start: 2024-06-26

## (undated) RX ORDER — ONDANSETRON 2 MG/ML
INJECTION INTRAMUSCULAR; INTRAVENOUS
Status: DISPENSED
Start: 2017-06-28

## (undated) RX ORDER — PROPOFOL 10 MG/ML
INJECTION, EMULSION INTRAVENOUS
Status: DISPENSED
Start: 2017-06-28

## (undated) RX ORDER — LIDOCAINE HYDROCHLORIDE 20 MG/ML
INJECTION, SOLUTION EPIDURAL; INFILTRATION; INTRACAUDAL; PERINEURAL
Status: DISPENSED
Start: 2017-06-28

## (undated) RX ORDER — ETOMIDATE 2 MG/ML
INJECTION INTRAVENOUS
Status: DISPENSED
Start: 2017-06-28

## (undated) RX ORDER — FENTANYL CITRATE 50 UG/ML
INJECTION, SOLUTION INTRAMUSCULAR; INTRAVENOUS
Status: DISPENSED
Start: 2024-06-26

## (undated) RX ORDER — EPHEDRINE SULFATE 50 MG/ML
INJECTION, SOLUTION INTRAMUSCULAR; INTRAVENOUS; SUBCUTANEOUS
Status: DISPENSED
Start: 2017-06-28

## (undated) RX ORDER — DIPHENHYDRAMINE HYDROCHLORIDE 50 MG/ML
INJECTION INTRAMUSCULAR; INTRAVENOUS
Status: DISPENSED
Start: 2024-06-26